# Patient Record
Sex: MALE | Race: WHITE | Employment: FULL TIME | URBAN - METROPOLITAN AREA
[De-identification: names, ages, dates, MRNs, and addresses within clinical notes are randomized per-mention and may not be internally consistent; named-entity substitution may affect disease eponyms.]

---

## 2017-04-25 RX ORDER — INSULIN DETEMIR 100 [IU]/ML
INJECTION, SOLUTION SUBCUTANEOUS
Qty: 15 ML | Refills: 3 | Status: SHIPPED | OUTPATIENT
Start: 2017-04-25 | End: 2017-05-05 | Stop reason: SDUPTHER

## 2017-04-25 RX ORDER — INSULIN DETEMIR 100 [IU]/ML
INJECTION, SOLUTION SUBCUTANEOUS
Qty: 15 ML | Refills: 3 | Status: SHIPPED | OUTPATIENT
Start: 2017-04-25 | End: 2017-05-05 | Stop reason: ALTCHOICE

## 2017-04-26 ENCOUNTER — TELEPHONE (OUTPATIENT)
Dept: INTERNAL MEDICINE CLINIC | Age: 37
End: 2017-04-26

## 2017-04-27 LAB
AVERAGE GLUCOSE: NORMAL
HBA1C MFR BLD: 18.1 %

## 2017-04-28 ENCOUNTER — TELEPHONE (OUTPATIENT)
Dept: INTERNAL MEDICINE CLINIC | Age: 37
End: 2017-04-28

## 2017-05-02 ENCOUNTER — CARE COORDINATION (OUTPATIENT)
Dept: CARE COORDINATION | Age: 37
End: 2017-05-02

## 2017-05-02 ENCOUNTER — TELEPHONE (OUTPATIENT)
Dept: INTERNAL MEDICINE CLINIC | Age: 37
End: 2017-05-02

## 2017-05-05 ENCOUNTER — OFFICE VISIT (OUTPATIENT)
Dept: INTERNAL MEDICINE CLINIC | Age: 37
End: 2017-05-05
Payer: COMMERCIAL

## 2017-05-05 ENCOUNTER — HOSPITAL ENCOUNTER (OUTPATIENT)
Age: 37
Setting detail: SPECIMEN
Discharge: HOME | End: 2017-05-05
Payer: COMMERCIAL

## 2017-05-05 VITALS
WEIGHT: 174 LBS | DIASTOLIC BLOOD PRESSURE: 68 MMHG | BODY MASS INDEX: 30.83 KG/M2 | HEART RATE: 95 BPM | SYSTOLIC BLOOD PRESSURE: 102 MMHG | HEIGHT: 63 IN

## 2017-05-05 DIAGNOSIS — N18.4 CKD (CHRONIC KIDNEY DISEASE) STAGE 4, GFR 15-29 ML/MIN (HCC): ICD-10-CM

## 2017-05-05 DIAGNOSIS — Z13.9 SCREENING: ICD-10-CM

## 2017-05-05 DIAGNOSIS — I10 BENIGN ESSENTIAL HTN: ICD-10-CM

## 2017-05-05 LAB
CREATININE URINE: 42.3 MG/DL (ref 39–259)
HBA1C MFR BLD: 14 %
MICROALBUMIN/CREAT 24H UR: 116 MG/L
MICROALBUMIN/CREAT UR-RTO: 274 MCG/MG CREAT

## 2017-05-05 PROCEDURE — 83036 HEMOGLOBIN GLYCOSYLATED A1C: CPT | Performed by: INTERNAL MEDICINE

## 2017-05-05 PROCEDURE — 99213 OFFICE O/P EST LOW 20 MIN: CPT | Performed by: INTERNAL MEDICINE

## 2017-05-05 RX ORDER — SODIUM BICARBONATE 650 MG/1
648 TABLET ORAL
COMMUNITY
Start: 2017-04-28 | End: 2017-05-28

## 2017-05-05 RX ORDER — PANTOPRAZOLE SODIUM 40 MG/1
40 TABLET, DELAYED RELEASE ORAL
COMMUNITY
Start: 2017-04-28 | End: 2017-05-28

## 2017-05-05 RX ORDER — DOXYCYCLINE HYCLATE 100 MG
100 TABLET ORAL
COMMUNITY
Start: 2017-04-28 | End: 2017-05-07

## 2017-05-05 ASSESSMENT — PATIENT HEALTH QUESTIONNAIRE - PHQ9
SUM OF ALL RESPONSES TO PHQ9 QUESTIONS 1 & 2: 0
SUM OF ALL RESPONSES TO PHQ QUESTIONS 1-9: 0
1. LITTLE INTEREST OR PLEASURE IN DOING THINGS: 0
2. FEELING DOWN, DEPRESSED OR HOPELESS: 0

## 2017-05-08 ASSESSMENT — ENCOUNTER SYMPTOMS
BACK PAIN: 0
CHOKING: 0
APNEA: 0
CONSTIPATION: 0
ANAL BLEEDING: 0
ABDOMINAL DISTENTION: 0
ABDOMINAL PAIN: 0
COUGH: 0
EYE ITCHING: 0
EYE DISCHARGE: 0
BLOOD IN STOOL: 0
SHORTNESS OF BREATH: 0
EYE PAIN: 0
EYE REDNESS: 0
CHEST TIGHTNESS: 0

## 2017-05-26 ENCOUNTER — OFFICE VISIT (OUTPATIENT)
Dept: INTERNAL MEDICINE CLINIC | Age: 37
End: 2017-05-26
Payer: COMMERCIAL

## 2017-05-26 VITALS
HEIGHT: 63 IN | DIASTOLIC BLOOD PRESSURE: 74 MMHG | WEIGHT: 180 LBS | BODY MASS INDEX: 31.89 KG/M2 | HEART RATE: 94 BPM | SYSTOLIC BLOOD PRESSURE: 122 MMHG

## 2017-05-26 DIAGNOSIS — I10 BENIGN ESSENTIAL HTN: Primary | ICD-10-CM

## 2017-05-26 DIAGNOSIS — N18.4 CKD (CHRONIC KIDNEY DISEASE) STAGE 4, GFR 15-29 ML/MIN (HCC): ICD-10-CM

## 2017-05-26 PROCEDURE — 99214 OFFICE O/P EST MOD 30 MIN: CPT | Performed by: INTERNAL MEDICINE

## 2017-06-04 ASSESSMENT — ENCOUNTER SYMPTOMS
SHORTNESS OF BREATH: 0
COLOR CHANGE: 0
CONSTIPATION: 0
EYE ITCHING: 0
EYE REDNESS: 0
BLOOD IN STOOL: 0
APNEA: 0
ABDOMINAL PAIN: 0
ANAL BLEEDING: 0
BACK PAIN: 0
ABDOMINAL DISTENTION: 0
CHEST TIGHTNESS: 0
EYE PAIN: 0
EYE DISCHARGE: 0
COUGH: 0
CHOKING: 0

## 2017-06-09 ENCOUNTER — TELEPHONE (OUTPATIENT)
Dept: INTERNAL MEDICINE CLINIC | Age: 37
End: 2017-06-09

## 2017-07-28 ENCOUNTER — TELEPHONE (OUTPATIENT)
Dept: INTERNAL MEDICINE CLINIC | Age: 37
End: 2017-07-28

## 2017-08-01 RX ORDER — LOSARTAN POTASSIUM 50 MG/1
TABLET ORAL
Qty: 90 TABLET | Refills: 2 | Status: SHIPPED | OUTPATIENT
Start: 2017-08-01 | End: 2018-05-15 | Stop reason: SDUPTHER

## 2017-09-07 ENCOUNTER — TELEPHONE (OUTPATIENT)
Dept: INTERNAL MEDICINE CLINIC | Age: 37
End: 2017-09-07

## 2018-05-15 RX ORDER — LOSARTAN POTASSIUM 50 MG/1
TABLET ORAL
Qty: 30 TABLET | Refills: 0 | Status: SHIPPED | OUTPATIENT
Start: 2018-05-15 | End: 2018-05-15 | Stop reason: SDUPTHER

## 2018-05-16 RX ORDER — LOSARTAN POTASSIUM 50 MG/1
TABLET ORAL
Qty: 15 TABLET | Refills: 0 | Status: SHIPPED | OUTPATIENT
Start: 2018-05-16

## 2018-07-09 RX ORDER — INSULIN GLARGINE 100 [IU]/ML
INJECTION, SOLUTION SUBCUTANEOUS
Qty: 15 ML | Refills: 0 | Status: SHIPPED | OUTPATIENT
Start: 2018-07-09

## 2024-02-21 ENCOUNTER — APPOINTMENT (OUTPATIENT)
Dept: GENERAL RADIOLOGY | Age: 44
DRG: 710 | End: 2024-02-21
Payer: MEDICAID

## 2024-02-21 ENCOUNTER — HOSPITAL ENCOUNTER (INPATIENT)
Age: 44
LOS: 14 days | Discharge: NURSING, REHAB OR LONG-TERM CARE | DRG: 710 | End: 2024-03-06
Attending: EMERGENCY MEDICINE | Admitting: INTERNAL MEDICINE
Payer: MEDICAID

## 2024-02-21 ENCOUNTER — APPOINTMENT (OUTPATIENT)
Dept: CT IMAGING | Age: 44
DRG: 710 | End: 2024-02-21
Payer: MEDICAID

## 2024-02-21 ENCOUNTER — HOSPITAL ENCOUNTER (EMERGENCY)
Age: 44
Discharge: ANOTHER HOSPITAL | DRG: 710 | End: 2024-02-21
Attending: EMERGENCY MEDICINE
Payer: MEDICAID

## 2024-02-21 VITALS
SYSTOLIC BLOOD PRESSURE: 97 MMHG | WEIGHT: 120 LBS | TEMPERATURE: 97.1 F | RESPIRATION RATE: 20 BRPM | BODY MASS INDEX: 21.26 KG/M2 | DIASTOLIC BLOOD PRESSURE: 64 MMHG | OXYGEN SATURATION: 100 % | HEART RATE: 116 BPM

## 2024-02-21 DIAGNOSIS — N49.3 FOURNIER GANGRENE: ICD-10-CM

## 2024-02-21 DIAGNOSIS — R79.89 ELEVATED TROPONIN: Primary | ICD-10-CM

## 2024-02-21 DIAGNOSIS — M72.6 NECROTIZING FASCIITIS (HCC): ICD-10-CM

## 2024-02-21 DIAGNOSIS — T14.8XXA OPEN WOUND: Primary | ICD-10-CM

## 2024-02-21 DIAGNOSIS — R06.02 SHORTNESS OF BREATH: ICD-10-CM

## 2024-02-21 DIAGNOSIS — N17.9 AKI (ACUTE KIDNEY INJURY) (HCC): ICD-10-CM

## 2024-02-21 DIAGNOSIS — M79.89 NECROTIZING SOFT TISSUE INFECTION: ICD-10-CM

## 2024-02-21 LAB
ABSOLUTE BANDS: 7.34 K/UL (ref 0–1)
ALBUMIN SERPL-MCNC: 2.5 G/DL (ref 3.5–5.2)
ALBUMIN/GLOB SERPL: 0.7 {RATIO} (ref 1–2.5)
ALP SERPL-CCNC: 104 U/L (ref 40–129)
ALT SERPL-CCNC: <5 U/L (ref 5–41)
ANION GAP SERPL CALCULATED.3IONS-SCNC: 24 MMOL/L (ref 9–17)
ANION GAP SERPL CALCULATED.3IONS-SCNC: 34 MMOL/L (ref 9–17)
AST SERPL-CCNC: 6 U/L
BACTERIA URNS QL MICRO: ABNORMAL
BANDS: 29 % (ref 0–10)
BASOPHILS # BLD: 0 K/UL (ref 0–0.2)
BASOPHILS # BLD: 0.04 K/UL (ref 0–0.2)
BASOPHILS NFR BLD: 0 % (ref 0–2)
BASOPHILS NFR BLD: 0 % (ref 0–2)
BILIRUB SERPL-MCNC: <0.1 MG/DL (ref 0.3–1.2)
BILIRUB UR QL STRIP: NEGATIVE
BUN SERPL-MCNC: 109 MG/DL (ref 6–20)
BUN SERPL-MCNC: 125 MG/DL (ref 6–20)
CALCIUM SERPL-MCNC: 8.1 MG/DL (ref 8.6–10.4)
CALCIUM SERPL-MCNC: 9.4 MG/DL (ref 8.6–10.4)
CASTS #/AREA URNS LPF: ABNORMAL /LPF
CHLORIDE SERPL-SCNC: 103 MMOL/L (ref 98–107)
CHLORIDE SERPL-SCNC: 92 MMOL/L (ref 98–107)
CLARITY UR: CLEAR
CO2 SERPL-SCNC: 7 MMOL/L (ref 20–31)
CO2 SERPL-SCNC: 8 MMOL/L (ref 20–31)
COLOR UR: YELLOW
CREAT SERPL-MCNC: 6.8 MG/DL (ref 0.7–1.2)
CREAT SERPL-MCNC: 8.2 MG/DL (ref 0.7–1.2)
EOSINOPHIL # BLD: 0 K/UL (ref 0–0.4)
EOSINOPHIL # BLD: <0.03 K/UL (ref 0–0.44)
EOSINOPHILS RELATIVE PERCENT: 0 % (ref 0–4)
EOSINOPHILS RELATIVE PERCENT: 0 % (ref 1–4)
EPI CELLS #/AREA URNS HPF: ABNORMAL /HPF
ERYTHROCYTE [DISTWIDTH] IN BLOOD BY AUTOMATED COUNT: 17.8 % (ref 11.8–14.4)
ERYTHROCYTE [DISTWIDTH] IN BLOOD BY AUTOMATED COUNT: 18.6 % (ref 11.5–14.9)
GFR SERPL CREATININE-BSD FRML MDRD: 10 ML/MIN/1.73M2
GFR SERPL CREATININE-BSD FRML MDRD: 8 ML/MIN/1.73M2
GLUCOSE SERPL-MCNC: 270 MG/DL (ref 70–99)
GLUCOSE SERPL-MCNC: 352 MG/DL (ref 70–99)
GLUCOSE UR STRIP-MCNC: ABNORMAL MG/DL
HCT VFR BLD AUTO: 26.4 % (ref 40.7–50.3)
HCT VFR BLD AUTO: 31.1 % (ref 41–53)
HGB BLD-MCNC: 8 G/DL (ref 13–17)
HGB BLD-MCNC: 9.4 G/DL (ref 13.5–17.5)
HGB UR QL STRIP.AUTO: ABNORMAL
IMM GRANULOCYTES # BLD AUTO: 0.5 K/UL (ref 0–0.3)
IMM GRANULOCYTES NFR BLD: 3 %
INR PPP: 1.4
INR PPP: 1.4
KETONES UR STRIP-MCNC: ABNORMAL MG/DL
LACTATE BLDV-SCNC: 2.2 MMOL/L (ref 0.5–1.9)
LACTATE BLDV-SCNC: 4.4 MMOL/L (ref 0.5–1.9)
LACTIC ACID, WHOLE BLOOD: 1 MMOL/L (ref 0.7–2.1)
LEUKOCYTE ESTERASE UR QL STRIP: NEGATIVE
LYMPHOCYTES NFR BLD: 0.95 K/UL (ref 1.1–3.7)
LYMPHOCYTES NFR BLD: 1.27 K/UL (ref 1–4.8)
LYMPHOCYTES RELATIVE PERCENT: 5 % (ref 24–44)
LYMPHOCYTES RELATIVE PERCENT: 6 % (ref 24–43)
MAGNESIUM SERPL-MCNC: 1.6 MG/DL (ref 1.6–2.6)
MAGNESIUM SERPL-MCNC: 1.8 MG/DL (ref 1.6–2.6)
MCH RBC QN AUTO: 24.8 PG (ref 26–34)
MCH RBC QN AUTO: 25.6 PG (ref 25.2–33.5)
MCHC RBC AUTO-ENTMCNC: 30.2 G/DL (ref 31–37)
MCHC RBC AUTO-ENTMCNC: 30.3 G/DL (ref 28.4–34.8)
MCV RBC AUTO: 81.8 FL (ref 80–100)
MCV RBC AUTO: 84.3 FL (ref 82.6–102.9)
MONOCYTES NFR BLD: 0.74 K/UL (ref 0.1–1.2)
MONOCYTES NFR BLD: 1.01 K/UL (ref 0.1–1.3)
MONOCYTES NFR BLD: 4 % (ref 1–7)
MONOCYTES NFR BLD: 4 % (ref 3–12)
MORPHOLOGY: ABNORMAL
MORPHOLOGY: ABNORMAL
NEUTROPHILS NFR BLD: 62 % (ref 36–66)
NEUTROPHILS NFR BLD: 87 % (ref 36–65)
NEUTS SEG NFR BLD: 14.63 K/UL (ref 1.5–8.1)
NEUTS SEG NFR BLD: 15.68 K/UL (ref 1.3–9.1)
NITRITE UR QL STRIP: NEGATIVE
NRBC BLD-RTO: 0 PER 100 WBC
PARTIAL THROMBOPLASTIN TIME: 25.7 SEC (ref 23–36.5)
PARTIAL THROMBOPLASTIN TIME: 26.4 SEC (ref 24–36)
PH UR STRIP: 5 [PH] (ref 5–8)
PHOSPHATE SERPL-MCNC: 8.3 MG/DL (ref 2.5–4.5)
PLATELET # BLD AUTO: 376 K/UL (ref 138–453)
PLATELET # BLD AUTO: 624 K/UL (ref 150–450)
PMV BLD AUTO: 7.5 FL (ref 6–12)
PMV BLD AUTO: 9.3 FL (ref 8.1–13.5)
POTASSIUM SERPL-SCNC: 4.7 MMOL/L (ref 3.7–5.3)
POTASSIUM SERPL-SCNC: 4.8 MMOL/L (ref 3.7–5.3)
PROT SERPL-MCNC: 5.9 G/DL (ref 6.4–8.3)
PROT UR STRIP-MCNC: ABNORMAL MG/DL
PROTHROMBIN TIME: 16.4 SEC (ref 11.7–14.9)
PROTHROMBIN TIME: 17.3 SEC (ref 11.8–14.6)
RBC # BLD AUTO: 3.13 M/UL (ref 4.21–5.77)
RBC # BLD AUTO: 3.8 M/UL (ref 4.5–5.9)
RBC # BLD: ABNORMAL 10*6/UL
RBC #/AREA URNS HPF: ABNORMAL /HPF
SODIUM SERPL-SCNC: 133 MMOL/L (ref 135–144)
SODIUM SERPL-SCNC: 135 MMOL/L (ref 135–144)
SP GR UR STRIP: 1.01 (ref 1–1.03)
TROPONIN I SERPL HS-MCNC: 34 NG/L (ref 0–22)
TROPONIN I SERPL HS-MCNC: 40 NG/L (ref 0–22)
UROBILINOGEN UR STRIP-ACNC: NORMAL EU/DL (ref 0–1)
WBC #/AREA URNS HPF: ABNORMAL /HPF
WBC OTHER # BLD: 16.9 K/UL (ref 3.5–11.3)
WBC OTHER # BLD: 25.3 K/UL (ref 3.5–11)

## 2024-02-21 PROCEDURE — 86403 PARTICLE AGGLUT ANTBDY SCRN: CPT

## 2024-02-21 PROCEDURE — 80202 ASSAY OF VANCOMYCIN: CPT

## 2024-02-21 PROCEDURE — 87040 BLOOD CULTURE FOR BACTERIA: CPT

## 2024-02-21 PROCEDURE — 83605 ASSAY OF LACTIC ACID: CPT

## 2024-02-21 PROCEDURE — 85025 COMPLETE CBC W/AUTO DIFF WBC: CPT

## 2024-02-21 PROCEDURE — 80053 COMPREHEN METABOLIC PANEL: CPT

## 2024-02-21 PROCEDURE — 87186 SC STD MICRODIL/AGAR DIL: CPT

## 2024-02-21 PROCEDURE — 6360000002 HC RX W HCPCS: Performed by: EMERGENCY MEDICINE

## 2024-02-21 PROCEDURE — 84484 ASSAY OF TROPONIN QUANT: CPT

## 2024-02-21 PROCEDURE — 71045 X-RAY EXAM CHEST 1 VIEW: CPT

## 2024-02-21 PROCEDURE — 36415 COLL VENOUS BLD VENIPUNCTURE: CPT

## 2024-02-21 PROCEDURE — 85610 PROTHROMBIN TIME: CPT

## 2024-02-21 PROCEDURE — 81001 URINALYSIS AUTO W/SCOPE: CPT

## 2024-02-21 PROCEDURE — 96361 HYDRATE IV INFUSION ADD-ON: CPT | Performed by: EMERGENCY MEDICINE

## 2024-02-21 PROCEDURE — 74176 CT ABD & PELVIS W/O CONTRAST: CPT

## 2024-02-21 PROCEDURE — 96368 THER/DIAG CONCURRENT INF: CPT | Performed by: EMERGENCY MEDICINE

## 2024-02-21 PROCEDURE — 82010 KETONE BODYS QUAN: CPT

## 2024-02-21 PROCEDURE — 2580000003 HC RX 258: Performed by: EMERGENCY MEDICINE

## 2024-02-21 PROCEDURE — 84100 ASSAY OF PHOSPHORUS: CPT

## 2024-02-21 PROCEDURE — 85730 THROMBOPLASTIN TIME PARTIAL: CPT

## 2024-02-21 PROCEDURE — 99285 EMERGENCY DEPT VISIT HI MDM: CPT

## 2024-02-21 PROCEDURE — 2000000000 HC ICU R&B

## 2024-02-21 PROCEDURE — 87205 SMEAR GRAM STAIN: CPT

## 2024-02-21 PROCEDURE — 96365 THER/PROPH/DIAG IV INF INIT: CPT | Performed by: EMERGENCY MEDICINE

## 2024-02-21 PROCEDURE — 96366 THER/PROPH/DIAG IV INF ADDON: CPT | Performed by: EMERGENCY MEDICINE

## 2024-02-21 PROCEDURE — 80048 BASIC METABOLIC PNL TOTAL CA: CPT

## 2024-02-21 PROCEDURE — 96367 TX/PROPH/DG ADDL SEQ IV INF: CPT | Performed by: EMERGENCY MEDICINE

## 2024-02-21 PROCEDURE — 93005 ELECTROCARDIOGRAM TRACING: CPT | Performed by: EMERGENCY MEDICINE

## 2024-02-21 PROCEDURE — 83735 ASSAY OF MAGNESIUM: CPT

## 2024-02-21 PROCEDURE — 87070 CULTURE OTHR SPECIMN AEROBIC: CPT

## 2024-02-21 PROCEDURE — 99285 EMERGENCY DEPT VISIT HI MDM: CPT | Performed by: EMERGENCY MEDICINE

## 2024-02-21 RX ORDER — SODIUM CHLORIDE 9 MG/ML
INJECTION, SOLUTION INTRAVENOUS PRN
Status: DISCONTINUED | OUTPATIENT
Start: 2024-02-21 | End: 2024-02-21 | Stop reason: HOSPADM

## 2024-02-21 RX ORDER — SODIUM CHLORIDE 0.9 % (FLUSH) 0.9 %
5-40 SYRINGE (ML) INJECTION EVERY 12 HOURS SCHEDULED
Status: DISCONTINUED | OUTPATIENT
Start: 2024-02-21 | End: 2024-02-21 | Stop reason: HOSPADM

## 2024-02-21 RX ORDER — SODIUM CHLORIDE 9 MG/ML
INJECTION, SOLUTION INTRAVENOUS CONTINUOUS
Status: DISCONTINUED | OUTPATIENT
Start: 2024-02-21 | End: 2024-02-21 | Stop reason: HOSPADM

## 2024-02-21 RX ORDER — SODIUM CHLORIDE 0.9 % (FLUSH) 0.9 %
5-40 SYRINGE (ML) INJECTION PRN
Status: DISCONTINUED | OUTPATIENT
Start: 2024-02-21 | End: 2024-02-21 | Stop reason: HOSPADM

## 2024-02-21 RX ORDER — 0.9 % SODIUM CHLORIDE 0.9 %
2000 INTRAVENOUS SOLUTION INTRAVENOUS ONCE
Status: COMPLETED | OUTPATIENT
Start: 2024-02-21 | End: 2024-02-21

## 2024-02-21 RX ORDER — CLINDAMYCIN PHOSPHATE 900 MG/50ML
900 INJECTION, SOLUTION INTRAVENOUS ONCE
Status: COMPLETED | OUTPATIENT
Start: 2024-02-21 | End: 2024-02-21

## 2024-02-21 RX ADMIN — CEFEPIME 2000 MG: 2 INJECTION, POWDER, FOR SOLUTION INTRAVENOUS at 17:28

## 2024-02-21 RX ADMIN — CLINDAMYCIN IN 5 PERCENT DEXTROSE 900 MG: 18 INJECTION, SOLUTION INTRAVENOUS at 18:11

## 2024-02-21 RX ADMIN — VANCOMYCIN HYDROCHLORIDE 1250 MG: 1.25 INJECTION, POWDER, LYOPHILIZED, FOR SOLUTION INTRAVENOUS at 19:33

## 2024-02-21 RX ADMIN — SODIUM CHLORIDE 2000 ML: 9 INJECTION, SOLUTION INTRAVENOUS at 19:34

## 2024-02-21 RX ADMIN — SODIUM CHLORIDE: 9 INJECTION, SOLUTION INTRAVENOUS at 20:40

## 2024-02-21 ASSESSMENT — ENCOUNTER SYMPTOMS
PHOTOPHOBIA: 0
COLOR CHANGE: 0
ABDOMINAL PAIN: 0
TROUBLE SWALLOWING: 0
VOMITING: 0
COUGH: 0
SHORTNESS OF BREATH: 1
DIARRHEA: 0
NAUSEA: 0

## 2024-02-21 ASSESSMENT — PAIN - FUNCTIONAL ASSESSMENT
PAIN_FUNCTIONAL_ASSESSMENT: 0-10
PAIN_FUNCTIONAL_ASSESSMENT: NONE - DENIES PAIN

## 2024-02-21 ASSESSMENT — LIFESTYLE VARIABLES
HOW MANY STANDARD DRINKS CONTAINING ALCOHOL DO YOU HAVE ON A TYPICAL DAY: PATIENT DOES NOT DRINK
HOW OFTEN DO YOU HAVE A DRINK CONTAINING ALCOHOL: NEVER

## 2024-02-21 ASSESSMENT — PAIN SCALES - GENERAL: PAINLEVEL_OUTOF10: 0

## 2024-02-21 NOTE — ED PROVIDER NOTES
EMERGENCY DEPARTMENT ENCOUNTER    Pt Name: Jules Nichols  MRN: [id number]  Birthdate 1980  Date of evaluation: 2/21/24  CHIEF COMPLAINT       Chief Complaint   Patient presents with    Wound Infection     HISTORY OF PRESENT ILLNESS   43-year-old male presenting to the ER complaining of shortness of breath as well as bilateral axillary worsening wounds.    The history is provided by the patient.   Rash  Location:  Shoulder/arm  Shoulder/arm rash location:  L axilla and R axilla  Quality: blistering, peeling, redness, scaling and weeping    Associated symptoms: fatigue and shortness of breath    Associated symptoms: no abdominal pain, no diarrhea, no fever, no joint pain, no myalgias, no nausea and not vomiting            REVIEW OF SYSTEMS     Review of Systems   Constitutional:  Positive for fatigue. Negative for activity change and fever.   HENT:  Negative for congestion, ear pain and trouble swallowing.    Eyes:  Negative for photophobia and visual disturbance.   Respiratory:  Positive for shortness of breath. Negative for cough.    Cardiovascular:  Negative for chest pain and palpitations.   Gastrointestinal:  Negative for abdominal pain, diarrhea, nausea and vomiting.   Genitourinary:  Negative for dysuria, flank pain and urgency.   Musculoskeletal:  Negative for arthralgias and myalgias.   Skin:  Positive for rash and wound. Negative for color change.   Neurological:  Negative for dizziness and facial asymmetry.   Psychiatric/Behavioral:  Negative for agitation and behavioral problems.      PASTMEDICAL HISTORY     Past Medical History:   Diagnosis Date    Benign essential HTN     Chronic kidney disease 1/20/16    Dr Stafford    Diabetic keto-acidosis (HCC) 1/20/16    hx of     Hyperlipemia     Uncontrolled type 2 diabetes mellitus with nephropathy 1/20/16     Past Problem List  Patient Active Problem List   Diagnosis Code    Benign essential HTN I10    Uncontrolled type 2 diabetes mellitus with nephropathy [id number]

## 2024-02-21 NOTE — PROGRESS NOTES
Pharmacy Note  Vancomycin Consult (Dialysis patient) - Initial Note       TODAY'S DATE:  2/21/2024  Patient name, age:  Jules Nichols, 43 y.o.    Vancomycin order/consult received from: Dr. Blas.  Indication: SSTI, MRSA suspected.  Additional antimicrobials:  cefepime, clindamycin      Allergies:  Patient has no known allergies.   Actual Weight:    Wt Readings from Last 1 Encounters:   02/21/24 54.4 kg (120 lb)     Labs/Ancillary Data  Recent Labs     02/21/24  1655   *     CrCl cannot be calculated (Unknown ideal weight.).  Recent Labs     02/21/24  1655   CREATININE 8.2*     Recent Labs     02/21/24  1655   WBC 25.3*     No results found for: \"PROCAL\"  No intake or output data in the 24 hours ending 02/21/24 1840  Temp: 97.1 F (36.2 C)       Recent vancomycin administrations        No vancomycin IV orders with administrations found.            Orders not given:            vancomycin (VANCOCIN) intermittent dosing (placeholder)    vancomycin (VANCOCIN) 1,250 mg in sodium chloride 0.9 % 250 mL IVPB ([id number])                  Culture Date / Source  /  Results  Pending     PLAN    Vancomycin level ordered for: 2/22/24 @ 0600.  2.   ONLY for suspected pneumonia or COPD: MRSA Nasal Swab: N/A. Non-respiratory infection..      Stable HD/PD patients do not require regular renal function monitoring.      Intermittent Hemodialysis (HD)*do not use Bayesian software for dosing  Empiric Dosing Regimen:  LD = 25 mg/kg (MAX 2,000mg dose), give dialysis, then         MD  = 10-15 mg/kg after HD sessions (MAX 2,000mg per dose)  Timing and Frequency of Concentration Monitoring  Critically ill à pre-HD concentrations after LD  Stable/stable dialysis à pre-HD concentration after 1st or 2nd MD on dialysis 3x/week  Pre-dialysis level Invasive MRSA Infection or Sepsis Non-Invasive Infection or Non-MRSA Infection   ? 25 mg/L Hold vancomycin dose, ? subsequent dose by 250 mg Hold vancomycin dose, ? subsequent dose by 250-500 mg

## 2024-02-22 ENCOUNTER — APPOINTMENT (OUTPATIENT)
Dept: GENERAL RADIOLOGY | Age: 44
DRG: 710 | End: 2024-02-22
Payer: MEDICAID

## 2024-02-22 ENCOUNTER — ANESTHESIA EVENT (OUTPATIENT)
Dept: OPERATING ROOM | Age: 44
End: 2024-02-22
Payer: MEDICAID

## 2024-02-22 ENCOUNTER — ANESTHESIA (OUTPATIENT)
Dept: OPERATING ROOM | Age: 44
End: 2024-02-22
Payer: MEDICAID

## 2024-02-22 PROBLEM — D64.9 ACUTE ON CHRONIC ANEMIA: Status: ACTIVE | Noted: 2024-02-22

## 2024-02-22 PROBLEM — R65.20 SEVERE SEPSIS (HCC): Status: ACTIVE | Noted: 2024-02-22

## 2024-02-22 PROBLEM — N18.9 ACUTE KIDNEY INJURY SUPERIMPOSED ON CKD (HCC): Status: ACTIVE | Noted: 2024-02-22

## 2024-02-22 PROBLEM — A41.9 SEVERE SEPSIS (HCC): Status: ACTIVE | Noted: 2024-02-22

## 2024-02-22 PROBLEM — M72.6 NECROTIZING FASCIITIS (HCC): Status: ACTIVE | Noted: 2024-02-22

## 2024-02-22 PROBLEM — R65.21 SEPTIC SHOCK (HCC): Status: ACTIVE | Noted: 2024-02-22

## 2024-02-22 PROBLEM — E44.0 MODERATE MALNUTRITION (HCC): Status: ACTIVE | Noted: 2024-02-22

## 2024-02-22 PROBLEM — N17.9 ACUTE KIDNEY INJURY SUPERIMPOSED ON CKD (HCC): Status: ACTIVE | Noted: 2024-02-22

## 2024-02-22 PROBLEM — L73.2 HIDRADENITIS SUPPURATIVA OF MULTIPLE SITES: Status: ACTIVE | Noted: 2024-02-22

## 2024-02-22 PROBLEM — E87.20 METABOLIC ACIDOSIS: Status: ACTIVE | Noted: 2024-02-22

## 2024-02-22 PROBLEM — E87.5 HYPERKALEMIA: Status: ACTIVE | Noted: 2024-02-22

## 2024-02-22 PROBLEM — A41.9 SEPTIC SHOCK (HCC): Status: ACTIVE | Noted: 2024-02-22

## 2024-02-22 PROBLEM — D72.825 BANDEMIA: Status: ACTIVE | Noted: 2024-02-22

## 2024-02-22 LAB
ALLEN TEST: POSITIVE
ANION GAP SERPL CALCULATED.3IONS-SCNC: 16 MMOL/L (ref 9–17)
ANION GAP SERPL CALCULATED.3IONS-SCNC: 21 MMOL/L (ref 9–17)
ANION GAP SERPL CALCULATED.3IONS-SCNC: 26 MMOL/L (ref 9–17)
B-OH-BUTYR SERPL-MCNC: 6.01 MMOL/L (ref 0.02–0.27)
BASOPHILS # BLD: 0 K/UL (ref 0–0.2)
BASOPHILS NFR BLD: 0 % (ref 0–2)
BUN SERPL-MCNC: 101 MG/DL (ref 6–20)
BUN SERPL-MCNC: 89 MG/DL (ref 6–20)
BUN SERPL-MCNC: 99 MG/DL (ref 6–20)
C3 SERPL-MCNC: 82 MG/DL (ref 90–180)
C4 SERPL-MCNC: 15 MG/DL (ref 10–40)
CA-I BLD-SCNC: 1.19 MMOL/L (ref 1.13–1.33)
CALCIUM SERPL-MCNC: 7.3 MG/DL (ref 8.6–10.4)
CALCIUM SERPL-MCNC: 7.9 MG/DL (ref 8.6–10.4)
CALCIUM SERPL-MCNC: 8.4 MG/DL (ref 8.6–10.4)
CHLORIDE SERPL-SCNC: 104 MMOL/L (ref 98–107)
CHLORIDE SERPL-SCNC: 106 MMOL/L (ref 98–107)
CHLORIDE SERPL-SCNC: 111 MMOL/L (ref 98–107)
CO2 SERPL-SCNC: 11 MMOL/L (ref 20–31)
CO2 SERPL-SCNC: 17 MMOL/L (ref 20–31)
CO2 SERPL-SCNC: 6 MMOL/L (ref 20–31)
CREAT SERPL-MCNC: 5.5 MG/DL (ref 0.7–1.2)
CREAT SERPL-MCNC: 6.1 MG/DL (ref 0.7–1.2)
CREAT SERPL-MCNC: 6.2 MG/DL (ref 0.7–1.2)
EKG ATRIAL RATE: 120 BPM
EKG P AXIS: 35 DEGREES
EKG P-R INTERVAL: 80 MS
EKG Q-T INTERVAL: 366 MS
EKG QRS DURATION: 96 MS
EKG QTC CALCULATION (BAZETT): 517 MS
EKG R AXIS: 52 DEGREES
EKG T AXIS: 54 DEGREES
EKG VENTRICULAR RATE: 120 BPM
EOSINOPHIL # BLD: 0 K/UL (ref 0–0.4)
EOSINOPHIL,URINE: NORMAL
EOSINOPHILS RELATIVE PERCENT: 0 % (ref 1–4)
ERYTHROCYTE [DISTWIDTH] IN BLOOD BY AUTOMATED COUNT: 18.5 % (ref 11.8–14.4)
EST. AVERAGE GLUCOSE BLD GHB EST-MCNC: 246 MG/DL
FIO2: 21
FREE KAPPA/LAMBDA RATIO: 0.8 (ref 0.26–1.65)
GFR SERPL CREATININE-BSD FRML MDRD: 11 ML/MIN/1.73M2
GFR SERPL CREATININE-BSD FRML MDRD: 11 ML/MIN/1.73M2
GFR SERPL CREATININE-BSD FRML MDRD: 12 ML/MIN/1.73M2
GLUCOSE BLD-MCNC: 110 MG/DL (ref 75–110)
GLUCOSE BLD-MCNC: 111 MG/DL (ref 75–110)
GLUCOSE BLD-MCNC: 120 MG/DL (ref 75–110)
GLUCOSE BLD-MCNC: 143 MG/DL (ref 75–110)
GLUCOSE BLD-MCNC: 145 MG/DL (ref 75–110)
GLUCOSE BLD-MCNC: 148 MG/DL (ref 75–110)
GLUCOSE BLD-MCNC: 149 MG/DL (ref 75–110)
GLUCOSE BLD-MCNC: 154 MG/DL (ref 75–110)
GLUCOSE BLD-MCNC: 163 MG/DL (ref 75–110)
GLUCOSE BLD-MCNC: 173 MG/DL (ref 75–110)
GLUCOSE BLD-MCNC: 197 MG/DL (ref 75–110)
GLUCOSE BLD-MCNC: 200 MG/DL (ref 75–110)
GLUCOSE BLD-MCNC: 209 MG/DL (ref 75–110)
GLUCOSE BLD-MCNC: 212 MG/DL (ref 75–110)
GLUCOSE BLD-MCNC: 212 MG/DL (ref 75–110)
GLUCOSE BLD-MCNC: 214 MG/DL (ref 75–110)
GLUCOSE BLD-MCNC: 223 MG/DL (ref 75–110)
GLUCOSE BLD-MCNC: 228 MG/DL (ref 74–100)
GLUCOSE BLD-MCNC: 233 MG/DL (ref 75–110)
GLUCOSE BLD-MCNC: 241 MG/DL (ref 75–110)
GLUCOSE SERPL-MCNC: 111 MG/DL (ref 70–99)
GLUCOSE SERPL-MCNC: 162 MG/DL (ref 70–99)
GLUCOSE SERPL-MCNC: 236 MG/DL (ref 70–99)
HBA1C MFR BLD: 10.2 % (ref 4–6)
HCT VFR BLD AUTO: 37.1 % (ref 40.7–50.3)
HGB BLD-MCNC: 10.4 G/DL (ref 13–17)
IMM GRANULOCYTES # BLD AUTO: 0.47 K/UL (ref 0–0.3)
IMM GRANULOCYTES NFR BLD: 2 %
KAPPA LC FREE SER-MCNC: 103.1 MG/L (ref 3.7–19.4)
LAMBDA LC FREE SERPL-MCNC: 128.8 MG/L (ref 5.7–26.3)
LYMPHOCYTES NFR BLD: 1.17 K/UL (ref 1–4.8)
LYMPHOCYTES RELATIVE PERCENT: 5 % (ref 24–44)
MAGNESIUM SERPL-MCNC: 1.2 MG/DL (ref 1.6–2.6)
MAGNESIUM SERPL-MCNC: 1.2 MG/DL (ref 1.6–2.6)
MAGNESIUM SERPL-MCNC: 1.8 MG/DL (ref 1.6–2.6)
MCH RBC QN AUTO: 25.7 PG (ref 25.2–33.5)
MCHC RBC AUTO-ENTMCNC: 28 G/DL (ref 28.4–34.8)
MCV RBC AUTO: 91.8 FL (ref 82.6–102.9)
MONOCYTES NFR BLD: 0.7 K/UL (ref 0.1–0.8)
MONOCYTES NFR BLD: 3 % (ref 1–7)
MORPHOLOGY: ABNORMAL
MRSA, DNA, NASAL: NEGATIVE
NEGATIVE BASE EXCESS, ART: 20 MMOL/L (ref 0–2)
NEUTROPHILS NFR BLD: 90 % (ref 36–66)
NEUTS SEG NFR BLD: 20.96 K/UL (ref 1.8–7.7)
NRBC BLD-RTO: 0 PER 100 WBC
O2 DELIVERY DEVICE: ABNORMAL
PHOSPHATE SERPL-MCNC: 5.1 MG/DL (ref 2.5–4.5)
PHOSPHATE SERPL-MCNC: 6.2 MG/DL (ref 2.5–4.5)
PHOSPHATE SERPL-MCNC: 8.1 MG/DL (ref 2.5–4.5)
PLATELET # BLD AUTO: 393 K/UL (ref 138–453)
PMV BLD AUTO: 9.5 FL (ref 8.1–13.5)
POC HCO3: 6.6 MMOL/L (ref 21–28)
POC O2 SATURATION: 98.5 % (ref 94–98)
POC PCO2: 18 MM HG (ref 35–48)
POC PH: 7.17 (ref 7.35–7.45)
POC PO2: 138.8 MM HG (ref 83–108)
POTASSIUM SERPL-SCNC: 3.5 MMOL/L (ref 3.7–5.3)
POTASSIUM SERPL-SCNC: 5.1 MMOL/L (ref 3.7–5.3)
POTASSIUM SERPL-SCNC: 5.1 MMOL/L (ref 3.7–5.3)
RBC # BLD AUTO: 4.04 M/UL (ref 4.21–5.77)
SAMPLE SITE: ABNORMAL
SODIUM SERPL-SCNC: 137 MMOL/L (ref 135–144)
SODIUM SERPL-SCNC: 138 MMOL/L (ref 135–144)
SODIUM SERPL-SCNC: 143 MMOL/L (ref 135–144)
SPECIMEN DESCRIPTION: NORMAL
VANCOMYCIN SERPL-MCNC: 36.7 UG/ML
WBC OTHER # BLD: 23.3 K/UL (ref 3.5–11.3)

## 2024-02-22 PROCEDURE — 86403 PARTICLE AGGLUT ANTBDY SCRN: CPT

## 2024-02-22 PROCEDURE — 6360000002 HC RX W HCPCS: Performed by: INTERNAL MEDICINE

## 2024-02-22 PROCEDURE — 86900 BLOOD TYPING SEROLOGIC ABO: CPT

## 2024-02-22 PROCEDURE — 2580000003 HC RX 258: Performed by: SPECIALIST

## 2024-02-22 PROCEDURE — 6360000002 HC RX W HCPCS: Performed by: ANESTHESIOLOGY

## 2024-02-22 PROCEDURE — 3600000015 HC SURGERY LEVEL 5 ADDTL 15MIN: Performed by: SURGERY

## 2024-02-22 PROCEDURE — 86850 RBC ANTIBODY SCREEN: CPT

## 2024-02-22 PROCEDURE — 11042 DBRDMT SUBQ TIS 1ST 20SQCM/<: CPT | Performed by: SURGERY

## 2024-02-22 PROCEDURE — P9041 ALBUMIN (HUMAN),5%, 50ML: HCPCS | Performed by: SPECIALIST

## 2024-02-22 PROCEDURE — 71045 X-RAY EXAM CHEST 1 VIEW: CPT

## 2024-02-22 PROCEDURE — 87102 FUNGUS ISOLATION CULTURE: CPT

## 2024-02-22 PROCEDURE — 2580000003 HC RX 258: Performed by: INTERNAL MEDICINE

## 2024-02-22 PROCEDURE — 2500000003 HC RX 250 WO HCPCS: Performed by: SPECIALIST

## 2024-02-22 PROCEDURE — 30243N1 TRANSFUSION OF NONAUTOLOGOUS RED BLOOD CELLS INTO CENTRAL VEIN, PERCUTANEOUS APPROACH: ICD-10-PCS | Performed by: INTERNAL MEDICINE

## 2024-02-22 PROCEDURE — 99222 1ST HOSP IP/OBS MODERATE 55: CPT | Performed by: INTERNAL MEDICINE

## 2024-02-22 PROCEDURE — 7100000000 HC PACU RECOVERY - FIRST 15 MIN: Performed by: SURGERY

## 2024-02-22 PROCEDURE — 2580000003 HC RX 258: Performed by: STUDENT IN AN ORGANIZED HEALTH CARE EDUCATION/TRAINING PROGRAM

## 2024-02-22 PROCEDURE — 84300 ASSAY OF URINE SODIUM: CPT

## 2024-02-22 PROCEDURE — P9016 RBC LEUKOCYTES REDUCED: HCPCS

## 2024-02-22 PROCEDURE — 90935 HEMODIALYSIS ONE EVALUATION: CPT

## 2024-02-22 PROCEDURE — 87206 SMEAR FLUORESCENT/ACID STAI: CPT

## 2024-02-22 PROCEDURE — 87077 CULTURE AEROBIC IDENTIFY: CPT

## 2024-02-22 PROCEDURE — 36556 INSERT NON-TUNNEL CV CATH: CPT | Performed by: INTERNAL MEDICINE

## 2024-02-22 PROCEDURE — 99291 CRITICAL CARE FIRST HOUR: CPT | Performed by: INTERNAL MEDICINE

## 2024-02-22 PROCEDURE — 0JB60ZZ EXCISION OF CHEST SUBCUTANEOUS TISSUE AND FASCIA, OPEN APPROACH: ICD-10-PCS | Performed by: SURGERY

## 2024-02-22 PROCEDURE — 11045 DBRDMT SUBQ TISS EACH ADDL: CPT | Performed by: SURGERY

## 2024-02-22 PROCEDURE — 36415 COLL VENOUS BLD VENIPUNCTURE: CPT

## 2024-02-22 PROCEDURE — 99214 OFFICE O/P EST MOD 30 MIN: CPT

## 2024-02-22 PROCEDURE — 86901 BLOOD TYPING SEROLOGIC RH(D): CPT

## 2024-02-22 PROCEDURE — 86920 COMPATIBILITY TEST SPIN: CPT

## 2024-02-22 PROCEDURE — 84100 ASSAY OF PHOSPHORUS: CPT

## 2024-02-22 PROCEDURE — 6360000002 HC RX W HCPCS: Performed by: STUDENT IN AN ORGANIZED HEALTH CARE EDUCATION/TRAINING PROGRAM

## 2024-02-22 PROCEDURE — 6370000000 HC RX 637 (ALT 250 FOR IP): Performed by: STUDENT IN AN ORGANIZED HEALTH CARE EDUCATION/TRAINING PROGRAM

## 2024-02-22 PROCEDURE — 85025 COMPLETE CBC W/AUTO DIFF WBC: CPT

## 2024-02-22 PROCEDURE — 2500000003 HC RX 250 WO HCPCS: Performed by: INTERNAL MEDICINE

## 2024-02-22 PROCEDURE — 6360000002 HC RX W HCPCS: Performed by: SPECIALIST

## 2024-02-22 PROCEDURE — 82803 BLOOD GASES ANY COMBINATION: CPT

## 2024-02-22 PROCEDURE — 36556 INSERT NON-TUNNEL CV CATH: CPT

## 2024-02-22 PROCEDURE — 2580000003 HC RX 258: Performed by: SURGERY

## 2024-02-22 PROCEDURE — 11046 DBRDMT MUSC&/FSCA EA ADDL: CPT | Performed by: SURGERY

## 2024-02-22 PROCEDURE — 87075 CULTR BACTERIA EXCEPT BLOOD: CPT

## 2024-02-22 PROCEDURE — 6360000002 HC RX W HCPCS

## 2024-02-22 PROCEDURE — 36600 WITHDRAWAL OF ARTERIAL BLOOD: CPT

## 2024-02-22 PROCEDURE — 87641 MR-STAPH DNA AMP PROBE: CPT

## 2024-02-22 PROCEDURE — 93010 ELECTROCARDIOGRAM REPORT: CPT | Performed by: INTERNAL MEDICINE

## 2024-02-22 PROCEDURE — 2000000000 HC ICU R&B

## 2024-02-22 PROCEDURE — 7100000001 HC PACU RECOVERY - ADDTL 15 MIN: Performed by: SURGERY

## 2024-02-22 PROCEDURE — 6370000000 HC RX 637 (ALT 250 FOR IP): Performed by: EMERGENCY MEDICINE

## 2024-02-22 PROCEDURE — 87205 SMEAR GRAM STAIN: CPT

## 2024-02-22 PROCEDURE — 3E043XZ INTRODUCTION OF VASOPRESSOR INTO CENTRAL VEIN, PERCUTANEOUS APPROACH: ICD-10-PCS | Performed by: INTERNAL MEDICINE

## 2024-02-22 PROCEDURE — 2709999900 HC NON-CHARGEABLE SUPPLY: Performed by: SURGERY

## 2024-02-22 PROCEDURE — 83036 HEMOGLOBIN GLYCOSYLATED A1C: CPT

## 2024-02-22 PROCEDURE — A4216 STERILE WATER/SALINE, 10 ML: HCPCS

## 2024-02-22 PROCEDURE — 02HV33Z INSERTION OF INFUSION DEVICE INTO SUPERIOR VENA CAVA, PERCUTANEOUS APPROACH: ICD-10-PCS | Performed by: INTERNAL MEDICINE

## 2024-02-22 PROCEDURE — 3700000001 HC ADD 15 MINUTES (ANESTHESIA): Performed by: SURGERY

## 2024-02-22 PROCEDURE — 87186 SC STD MICRODIL/AGAR DIL: CPT

## 2024-02-22 PROCEDURE — 3700000000 HC ANESTHESIA ATTENDED CARE: Performed by: SURGERY

## 2024-02-22 PROCEDURE — 80048 BASIC METABOLIC PNL TOTAL CA: CPT

## 2024-02-22 PROCEDURE — 83735 ASSAY OF MAGNESIUM: CPT

## 2024-02-22 PROCEDURE — G0108 DIAB MANAGE TRN  PER INDIV: HCPCS

## 2024-02-22 PROCEDURE — C9113 INJ PANTOPRAZOLE SODIUM, VIA: HCPCS

## 2024-02-22 PROCEDURE — 84165 PROTEIN E-PHORESIS SERUM: CPT

## 2024-02-22 PROCEDURE — 87106 FUNGI IDENTIFICATION YEAST: CPT

## 2024-02-22 PROCEDURE — 87070 CULTURE OTHR SPECIMN AEROBIC: CPT

## 2024-02-22 PROCEDURE — 0J980ZZ DRAINAGE OF ABDOMEN SUBCUTANEOUS TISSUE AND FASCIA, OPEN APPROACH: ICD-10-PCS | Performed by: SURGERY

## 2024-02-22 PROCEDURE — 99223 1ST HOSP IP/OBS HIGH 75: CPT | Performed by: SURGERY

## 2024-02-22 PROCEDURE — 2580000003 HC RX 258

## 2024-02-22 PROCEDURE — 0KBH0ZZ EXCISION OF RIGHT THORAX MUSCLE, OPEN APPROACH: ICD-10-PCS | Performed by: SURGERY

## 2024-02-22 PROCEDURE — 99255 IP/OBS CONSLTJ NEW/EST HI 80: CPT | Performed by: INTERNAL MEDICINE

## 2024-02-22 PROCEDURE — 5A1D70Z PERFORMANCE OF URINARY FILTRATION, INTERMITTENT, LESS THAN 6 HOURS PER DAY: ICD-10-PCS | Performed by: INTERNAL MEDICINE

## 2024-02-22 PROCEDURE — 82570 ASSAY OF URINE CREATININE: CPT

## 2024-02-22 PROCEDURE — 86334 IMMUNOFIX E-PHORESIS SERUM: CPT

## 2024-02-22 PROCEDURE — 6360000002 HC RX W HCPCS: Performed by: EMERGENCY MEDICINE

## 2024-02-22 PROCEDURE — 82947 ASSAY GLUCOSE BLOOD QUANT: CPT

## 2024-02-22 PROCEDURE — 86160 COMPLEMENT ANTIGEN: CPT

## 2024-02-22 PROCEDURE — 2580000003 HC RX 258: Performed by: EMERGENCY MEDICINE

## 2024-02-22 PROCEDURE — 86038 ANTINUCLEAR ANTIBODIES: CPT

## 2024-02-22 PROCEDURE — 84155 ASSAY OF PROTEIN SERUM: CPT

## 2024-02-22 PROCEDURE — 87086 URINE CULTURE/COLONY COUNT: CPT

## 2024-02-22 PROCEDURE — 82330 ASSAY OF CALCIUM: CPT

## 2024-02-22 PROCEDURE — 99254 IP/OBS CNSLTJ NEW/EST MOD 60: CPT | Performed by: INTERNAL MEDICINE

## 2024-02-22 PROCEDURE — 3600000005 HC SURGERY LEVEL 5 BASE: Performed by: SURGERY

## 2024-02-22 PROCEDURE — 86225 DNA ANTIBODY NATIVE: CPT

## 2024-02-22 PROCEDURE — 83521 IG LIGHT CHAINS FREE EACH: CPT

## 2024-02-22 PROCEDURE — 0JB80ZZ EXCISION OF ABDOMEN SUBCUTANEOUS TISSUE AND FASCIA, OPEN APPROACH: ICD-10-PCS | Performed by: SURGERY

## 2024-02-22 PROCEDURE — 11043 DBRDMT MUSC&/FSCA 1ST 20/<: CPT | Performed by: SURGERY

## 2024-02-22 RX ORDER — MAGNESIUM SULFATE IN WATER 40 MG/ML
2000 INJECTION, SOLUTION INTRAVENOUS PRN
Status: DISCONTINUED | OUTPATIENT
Start: 2024-02-22 | End: 2024-02-22

## 2024-02-22 RX ORDER — SODIUM CHLORIDE 0.9 % (FLUSH) 0.9 %
5-40 SYRINGE (ML) INJECTION PRN
Status: DISCONTINUED | OUTPATIENT
Start: 2024-02-22 | End: 2024-02-22

## 2024-02-22 RX ORDER — HEPARIN SODIUM 1000 [USP'U]/ML
1500 INJECTION, SOLUTION INTRAVENOUS; SUBCUTANEOUS ONCE
Status: COMPLETED | OUTPATIENT
Start: 2024-02-22 | End: 2024-02-22

## 2024-02-22 RX ORDER — LINEZOLID 2 MG/ML
600 INJECTION, SOLUTION INTRAVENOUS EVERY 12 HOURS
Status: DISCONTINUED | OUTPATIENT
Start: 2024-02-22 | End: 2024-02-24

## 2024-02-22 RX ORDER — POLYETHYLENE GLYCOL 3350 17 G/17G
17 POWDER, FOR SOLUTION ORAL DAILY PRN
Status: DISCONTINUED | OUTPATIENT
Start: 2024-02-22 | End: 2024-03-06 | Stop reason: HOSPADM

## 2024-02-22 RX ORDER — OXYCODONE HYDROCHLORIDE 5 MG/1
5 TABLET ORAL PRN
Status: DISCONTINUED | OUTPATIENT
Start: 2024-02-22 | End: 2024-02-22

## 2024-02-22 RX ORDER — CLINDAMYCIN PHOSPHATE 600 MG/50ML
600 INJECTION, SOLUTION INTRAVENOUS EVERY 8 HOURS
Status: DISCONTINUED | OUTPATIENT
Start: 2024-02-22 | End: 2024-02-22

## 2024-02-22 RX ORDER — SODIUM CHLORIDE 9 MG/ML
INJECTION, SOLUTION INTRAVENOUS PRN
Status: DISCONTINUED | OUTPATIENT
Start: 2024-02-22 | End: 2024-02-22

## 2024-02-22 RX ORDER — LIDOCAINE HYDROCHLORIDE 10 MG/ML
INJECTION, SOLUTION EPIDURAL; INFILTRATION; INTRACAUDAL; PERINEURAL PRN
Status: DISCONTINUED | OUTPATIENT
Start: 2024-02-22 | End: 2024-02-22 | Stop reason: SDUPTHER

## 2024-02-22 RX ORDER — PHENYLEPHRINE HCL IN 0.9% NACL 50MG/250ML
10-300 PLASTIC BAG, INJECTION (ML) INTRAVENOUS CONTINUOUS
Status: DISCONTINUED | OUTPATIENT
Start: 2024-02-22 | End: 2024-02-26

## 2024-02-22 RX ORDER — ONDANSETRON 2 MG/ML
4 INJECTION INTRAMUSCULAR; INTRAVENOUS EVERY 6 HOURS PRN
Status: DISCONTINUED | OUTPATIENT
Start: 2024-02-22 | End: 2024-03-06 | Stop reason: HOSPADM

## 2024-02-22 RX ORDER — ENOXAPARIN SODIUM 100 MG/ML
40 INJECTION SUBCUTANEOUS DAILY
Status: DISCONTINUED | OUTPATIENT
Start: 2024-02-22 | End: 2024-02-22

## 2024-02-22 RX ORDER — SODIUM CHLORIDE 0.9 % (FLUSH) 0.9 %
5-40 SYRINGE (ML) INJECTION PRN
Status: DISCONTINUED | OUTPATIENT
Start: 2024-02-22 | End: 2024-03-06 | Stop reason: HOSPADM

## 2024-02-22 RX ORDER — SODIUM CHLORIDE 0.9 % (FLUSH) 0.9 %
5-40 SYRINGE (ML) INJECTION EVERY 12 HOURS SCHEDULED
Status: DISCONTINUED | OUTPATIENT
Start: 2024-02-22 | End: 2024-03-06 | Stop reason: HOSPADM

## 2024-02-22 RX ORDER — FENTANYL CITRATE 50 UG/ML
INJECTION, SOLUTION INTRAMUSCULAR; INTRAVENOUS PRN
Status: DISCONTINUED | OUTPATIENT
Start: 2024-02-22 | End: 2024-02-22 | Stop reason: SDUPTHER

## 2024-02-22 RX ORDER — FENTANYL CITRATE 50 UG/ML
INJECTION, SOLUTION INTRAMUSCULAR; INTRAVENOUS
Status: COMPLETED
Start: 2024-02-22 | End: 2024-02-22

## 2024-02-22 RX ORDER — PROPOFOL 10 MG/ML
INJECTION, EMULSION INTRAVENOUS PRN
Status: DISCONTINUED | OUTPATIENT
Start: 2024-02-22 | End: 2024-02-22 | Stop reason: SDUPTHER

## 2024-02-22 RX ORDER — LABETALOL HYDROCHLORIDE 5 MG/ML
10 INJECTION, SOLUTION INTRAVENOUS
Status: DISCONTINUED | OUTPATIENT
Start: 2024-02-22 | End: 2024-02-22

## 2024-02-22 RX ORDER — ONDANSETRON 2 MG/ML
INJECTION INTRAMUSCULAR; INTRAVENOUS PRN
Status: DISCONTINUED | OUTPATIENT
Start: 2024-02-22 | End: 2024-02-22 | Stop reason: SDUPTHER

## 2024-02-22 RX ORDER — ROCURONIUM BROMIDE 10 MG/ML
INJECTION, SOLUTION INTRAVENOUS PRN
Status: DISCONTINUED | OUTPATIENT
Start: 2024-02-22 | End: 2024-02-22 | Stop reason: SDUPTHER

## 2024-02-22 RX ORDER — DIPHENHYDRAMINE HYDROCHLORIDE 50 MG/ML
12.5 INJECTION INTRAMUSCULAR; INTRAVENOUS
Status: DISCONTINUED | OUTPATIENT
Start: 2024-02-22 | End: 2024-02-22

## 2024-02-22 RX ORDER — POTASSIUM CHLORIDE 29.8 MG/ML
20 INJECTION INTRAVENOUS PRN
Status: DISCONTINUED | OUTPATIENT
Start: 2024-02-22 | End: 2024-02-22

## 2024-02-22 RX ORDER — POTASSIUM CHLORIDE 7.45 MG/ML
10 INJECTION INTRAVENOUS PRN
Status: DISCONTINUED | OUTPATIENT
Start: 2024-02-22 | End: 2024-02-26

## 2024-02-22 RX ORDER — SODIUM CHLORIDE 0.9 % (FLUSH) 0.9 %
5-40 SYRINGE (ML) INJECTION EVERY 12 HOURS SCHEDULED
Status: DISCONTINUED | OUTPATIENT
Start: 2024-02-22 | End: 2024-02-22

## 2024-02-22 RX ORDER — OXYCODONE HYDROCHLORIDE 5 MG/1
10 TABLET ORAL PRN
Status: DISCONTINUED | OUTPATIENT
Start: 2024-02-22 | End: 2024-02-22

## 2024-02-22 RX ORDER — ACETAMINOPHEN 325 MG/1
650 TABLET ORAL EVERY 6 HOURS PRN
Status: DISCONTINUED | OUTPATIENT
Start: 2024-02-22 | End: 2024-03-06 | Stop reason: HOSPADM

## 2024-02-22 RX ORDER — ONDANSETRON 4 MG/1
4 TABLET, ORALLY DISINTEGRATING ORAL EVERY 8 HOURS PRN
Status: DISCONTINUED | OUTPATIENT
Start: 2024-02-22 | End: 2024-03-06 | Stop reason: HOSPADM

## 2024-02-22 RX ORDER — POTASSIUM CHLORIDE 7.45 MG/ML
10 INJECTION INTRAVENOUS PRN
Status: DISCONTINUED | OUTPATIENT
Start: 2024-02-22 | End: 2024-02-22

## 2024-02-22 RX ORDER — MIDAZOLAM HYDROCHLORIDE 2 MG/2ML
2 INJECTION, SOLUTION INTRAMUSCULAR; INTRAVENOUS
Status: DISCONTINUED | OUTPATIENT
Start: 2024-02-22 | End: 2024-02-22

## 2024-02-22 RX ORDER — MIDAZOLAM HYDROCHLORIDE 1 MG/ML
INJECTION INTRAMUSCULAR; INTRAVENOUS PRN
Status: DISCONTINUED | OUTPATIENT
Start: 2024-02-22 | End: 2024-02-22 | Stop reason: SDUPTHER

## 2024-02-22 RX ORDER — CLINDAMYCIN PHOSPHATE 900 MG/50ML
900 INJECTION, SOLUTION INTRAVENOUS
Status: COMPLETED | OUTPATIENT
Start: 2024-02-22 | End: 2024-02-22

## 2024-02-22 RX ORDER — MAGNESIUM HYDROXIDE 1200 MG/15ML
LIQUID ORAL CONTINUOUS PRN
Status: COMPLETED | OUTPATIENT
Start: 2024-02-22 | End: 2024-02-22

## 2024-02-22 RX ORDER — FENTANYL CITRATE 50 UG/ML
25 INJECTION, SOLUTION INTRAMUSCULAR; INTRAVENOUS ONCE
Status: COMPLETED | OUTPATIENT
Start: 2024-02-22 | End: 2024-02-22

## 2024-02-22 RX ORDER — SODIUM CHLORIDE, SODIUM LACTATE, POTASSIUM CHLORIDE, CALCIUM CHLORIDE 600; 310; 30; 20 MG/100ML; MG/100ML; MG/100ML; MG/100ML
INJECTION, SOLUTION INTRAVENOUS CONTINUOUS PRN
Status: DISCONTINUED | OUTPATIENT
Start: 2024-02-22 | End: 2024-02-22 | Stop reason: SDUPTHER

## 2024-02-22 RX ORDER — SODIUM CHLORIDE 450 MG/100ML
INJECTION, SOLUTION INTRAVENOUS CONTINUOUS
Status: DISCONTINUED | OUTPATIENT
Start: 2024-02-22 | End: 2024-02-23

## 2024-02-22 RX ORDER — HEPARIN SODIUM 1000 [USP'U]/ML
1000 INJECTION, SOLUTION INTRAVENOUS; SUBCUTANEOUS ONCE
Status: DISCONTINUED | OUTPATIENT
Start: 2024-02-22 | End: 2024-02-22

## 2024-02-22 RX ORDER — HYDRALAZINE HYDROCHLORIDE 20 MG/ML
10 INJECTION INTRAMUSCULAR; INTRAVENOUS
Status: DISCONTINUED | OUTPATIENT
Start: 2024-02-22 | End: 2024-02-22

## 2024-02-22 RX ORDER — ACETAMINOPHEN 650 MG/1
650 SUPPOSITORY RECTAL EVERY 6 HOURS PRN
Status: DISCONTINUED | OUTPATIENT
Start: 2024-02-22 | End: 2024-03-06 | Stop reason: HOSPADM

## 2024-02-22 RX ORDER — MAGNESIUM SULFATE IN WATER 40 MG/ML
2000 INJECTION, SOLUTION INTRAVENOUS PRN
Status: DISCONTINUED | OUTPATIENT
Start: 2024-02-22 | End: 2024-02-26

## 2024-02-22 RX ORDER — ALBUMIN, HUMAN INJ 5% 5 %
SOLUTION INTRAVENOUS PRN
Status: DISCONTINUED | OUTPATIENT
Start: 2024-02-22 | End: 2024-02-22 | Stop reason: SDUPTHER

## 2024-02-22 RX ORDER — HEPARIN SODIUM 1000 [USP'U]/ML
1500 INJECTION, SOLUTION INTRAVENOUS; SUBCUTANEOUS PRN
Status: DISCONTINUED | OUTPATIENT
Start: 2024-02-22 | End: 2024-03-06 | Stop reason: HOSPADM

## 2024-02-22 RX ORDER — HEPARIN SODIUM 1000 [USP'U]/ML
1400 INJECTION, SOLUTION INTRAVENOUS; SUBCUTANEOUS PRN
Status: DISCONTINUED | OUTPATIENT
Start: 2024-02-22 | End: 2024-03-06 | Stop reason: HOSPADM

## 2024-02-22 RX ORDER — PROCHLORPERAZINE EDISYLATE 5 MG/ML
5 INJECTION INTRAMUSCULAR; INTRAVENOUS
Status: DISCONTINUED | OUTPATIENT
Start: 2024-02-22 | End: 2024-02-22

## 2024-02-22 RX ORDER — DROPERIDOL 2.5 MG/ML
0.62 INJECTION, SOLUTION INTRAMUSCULAR; INTRAVENOUS
Status: DISCONTINUED | OUTPATIENT
Start: 2024-02-22 | End: 2024-02-22

## 2024-02-22 RX ORDER — DEXTROSE AND SODIUM CHLORIDE 5; .45 G/100ML; G/100ML
INJECTION, SOLUTION INTRAVENOUS CONTINUOUS PRN
Status: DISCONTINUED | OUTPATIENT
Start: 2024-02-22 | End: 2024-02-23

## 2024-02-22 RX ORDER — HEPARIN SODIUM 5000 [USP'U]/ML
5000 INJECTION, SOLUTION INTRAVENOUS; SUBCUTANEOUS EVERY 8 HOURS SCHEDULED
Status: DISCONTINUED | OUTPATIENT
Start: 2024-02-22 | End: 2024-03-06 | Stop reason: HOSPADM

## 2024-02-22 RX ORDER — HEPARIN SODIUM 1000 [USP'U]/ML
1400 INJECTION, SOLUTION INTRAVENOUS; SUBCUTANEOUS PRN
Status: DISCONTINUED | OUTPATIENT
Start: 2024-02-22 | End: 2024-02-26 | Stop reason: SDUPTHER

## 2024-02-22 RX ORDER — FENTANYL CITRATE 50 UG/ML
25 INJECTION, SOLUTION INTRAMUSCULAR; INTRAVENOUS EVERY 5 MIN PRN
Status: DISCONTINUED | OUTPATIENT
Start: 2024-02-22 | End: 2024-02-22

## 2024-02-22 RX ORDER — SODIUM HYPOCHLORITE 1.25 MG/ML
SOLUTION TOPICAL 2 TIMES DAILY
Status: DISCONTINUED | OUTPATIENT
Start: 2024-02-22 | End: 2024-02-26

## 2024-02-22 RX ORDER — SODIUM CHLORIDE 9 MG/ML
INJECTION, SOLUTION INTRAVENOUS PRN
Status: DISCONTINUED | OUTPATIENT
Start: 2024-02-22 | End: 2024-03-06 | Stop reason: HOSPADM

## 2024-02-22 RX ADMIN — ONDANSETRON 4 MG: 2 INJECTION INTRAMUSCULAR; INTRAVENOUS at 22:57

## 2024-02-22 RX ADMIN — HYDROMORPHONE HYDROCHLORIDE 0.5 MG: 1 INJECTION, SOLUTION INTRAMUSCULAR; INTRAVENOUS; SUBCUTANEOUS at 03:15

## 2024-02-22 RX ADMIN — HEPARIN SODIUM 1400 UNITS: 1000 INJECTION INTRAVENOUS; SUBCUTANEOUS at 18:37

## 2024-02-22 RX ADMIN — SUGAMMADEX 200 MG: 100 INJECTION, SOLUTION INTRAVENOUS at 02:54

## 2024-02-22 RX ADMIN — HEPARIN SODIUM 1500 UNITS: 1000 INJECTION INTRAVENOUS; SUBCUTANEOUS at 18:38

## 2024-02-22 RX ADMIN — DAKIN'S SOLUTION 0.125% (QUARTER STRENGTH): 0.12 SOLUTION at 13:15

## 2024-02-22 RX ADMIN — SODIUM CHLORIDE, PRESERVATIVE FREE 10 ML: 5 INJECTION INTRAVENOUS at 20:50

## 2024-02-22 RX ADMIN — DEXTROSE AND SODIUM CHLORIDE: 5; 450 INJECTION, SOLUTION INTRAVENOUS at 21:19

## 2024-02-22 RX ADMIN — FENTANYL CITRATE 25 MCG: 50 INJECTION, SOLUTION INTRAMUSCULAR; INTRAVENOUS at 16:07

## 2024-02-22 RX ADMIN — PROPOFOL 100 MG: 10 INJECTION, EMULSION INTRAVENOUS at 00:47

## 2024-02-22 RX ADMIN — HEPARIN SODIUM 1400 UNITS: 1000 INJECTION INTRAVENOUS; SUBCUTANEOUS at 19:35

## 2024-02-22 RX ADMIN — HEPARIN SODIUM 5000 UNITS: 5000 INJECTION INTRAVENOUS; SUBCUTANEOUS at 05:56

## 2024-02-22 RX ADMIN — PHENYLEPHRINE HYDROCHLORIDE 100 MCG: 10 INJECTION INTRAVENOUS at 01:25

## 2024-02-22 RX ADMIN — SODIUM CHLORIDE 80 MG: 9 INJECTION, SOLUTION INTRAMUSCULAR; INTRAVENOUS; SUBCUTANEOUS at 10:20

## 2024-02-22 RX ADMIN — CLINDAMYCIN PHOSPHATE 900 MG: 900 INJECTION, SOLUTION INTRAVENOUS at 01:16

## 2024-02-22 RX ADMIN — SODIUM CHLORIDE, POTASSIUM CHLORIDE, SODIUM LACTATE AND CALCIUM CHLORIDE: 600; 310; 30; 20 INJECTION, SOLUTION INTRAVENOUS at 00:45

## 2024-02-22 RX ADMIN — DEXTROSE AND SODIUM CHLORIDE: 5; 450 INJECTION, SOLUTION INTRAVENOUS at 14:51

## 2024-02-22 RX ADMIN — PHENYLEPHRINE HYDROCHLORIDE 100 MCG: 10 INJECTION INTRAVENOUS at 00:49

## 2024-02-22 RX ADMIN — DEXTROSE AND SODIUM CHLORIDE: 5; 450 INJECTION, SOLUTION INTRAVENOUS at 05:20

## 2024-02-22 RX ADMIN — CLINDAMYCIN PHOSPHATE 900 MG: 900 INJECTION, SOLUTION INTRAVENOUS at 01:14

## 2024-02-22 RX ADMIN — FENTANYL CITRATE 50 MCG: 0.05 INJECTION, SOLUTION INTRAMUSCULAR; INTRAVENOUS at 03:05

## 2024-02-22 RX ADMIN — SODIUM BICARBONATE: 84 INJECTION, SOLUTION INTRAVENOUS at 12:22

## 2024-02-22 RX ADMIN — PHENYLEPHRINE HYDROCHLORIDE 100 MCG: 10 INJECTION INTRAVENOUS at 01:20

## 2024-02-22 RX ADMIN — SODIUM CHLORIDE, PRESERVATIVE FREE 10 ML: 5 INJECTION INTRAVENOUS at 08:14

## 2024-02-22 RX ADMIN — LIDOCAINE HYDROCHLORIDE 50 MG: 10 INJECTION, SOLUTION EPIDURAL; INFILTRATION; INTRACAUDAL; PERINEURAL at 00:47

## 2024-02-22 RX ADMIN — Medication 40 MCG/MIN: at 15:07

## 2024-02-22 RX ADMIN — ALBUMIN (HUMAN) 500 ML: 12.5 INJECTION, SOLUTION INTRAVENOUS at 01:09

## 2024-02-22 RX ADMIN — SODIUM CHLORIDE: 9 INJECTION, SOLUTION INTRAVENOUS at 05:28

## 2024-02-22 RX ADMIN — PHENYLEPHRINE HYDROCHLORIDE 50 MCG/MIN: 10 INJECTION INTRAVENOUS at 01:41

## 2024-02-22 RX ADMIN — ACETAMINOPHEN 650 MG: 325 TABLET ORAL at 19:04

## 2024-02-22 RX ADMIN — LINEZOLID 600 MG: 600 INJECTION, SOLUTION INTRAVENOUS at 21:24

## 2024-02-22 RX ADMIN — ROCURONIUM BROMIDE 50 MG: 10 INJECTION, SOLUTION INTRAVENOUS at 00:47

## 2024-02-22 RX ADMIN — Medication 50 MCG/MIN: at 05:59

## 2024-02-22 RX ADMIN — SODIUM BICARBONATE 50 MEQ: 84 INJECTION, SOLUTION INTRAVENOUS at 06:09

## 2024-02-22 RX ADMIN — PANTOPRAZOLE SODIUM 8 MG/HR: 40 INJECTION, POWDER, FOR SOLUTION INTRAVENOUS at 11:00

## 2024-02-22 RX ADMIN — PHENYLEPHRINE HYDROCHLORIDE 100 MCG: 10 INJECTION INTRAVENOUS at 01:03

## 2024-02-22 RX ADMIN — PIPERACILLIN AND TAZOBACTAM 3375 MG: 3; .375 INJECTION, POWDER, LYOPHILIZED, FOR SOLUTION INTRAVENOUS at 17:21

## 2024-02-22 RX ADMIN — MIDAZOLAM 2 MG: 1 INJECTION INTRAMUSCULAR; INTRAVENOUS at 00:45

## 2024-02-22 RX ADMIN — HEPARIN SODIUM 5000 UNITS: 5000 INJECTION INTRAVENOUS; SUBCUTANEOUS at 22:39

## 2024-02-22 RX ADMIN — SODIUM BICARBONATE: 84 INJECTION, SOLUTION INTRAVENOUS at 07:08

## 2024-02-22 RX ADMIN — PIPERACILLIN AND TAZOBACTAM 3375 MG: 3; .375 INJECTION, POWDER, LYOPHILIZED, FOR SOLUTION INTRAVENOUS at 05:56

## 2024-02-22 RX ADMIN — FENTANYL CITRATE 100 MCG: 0.05 INJECTION, SOLUTION INTRAMUSCULAR; INTRAVENOUS at 00:47

## 2024-02-22 RX ADMIN — SODIUM CHLORIDE 3.04 UNITS/HR: 9 INJECTION, SOLUTION INTRAVENOUS at 05:19

## 2024-02-22 RX ADMIN — HEPARIN SODIUM 1500 UNITS: 1000 INJECTION INTRAVENOUS; SUBCUTANEOUS at 19:36

## 2024-02-22 RX ADMIN — ONDANSETRON 4 MG: 2 INJECTION INTRAMUSCULAR; INTRAVENOUS at 02:20

## 2024-02-22 ASSESSMENT — PAIN SCALES - GENERAL
PAINLEVEL_OUTOF10: 0
PAINLEVEL_OUTOF10: 9
PAINLEVEL_OUTOF10: 3

## 2024-02-22 NOTE — ED NOTES
43-year-old male presenting with open wounds, tachycardic, borderline soft pressure, receiving fluids.  Predominantly concerned about open wound in the right axilla with exposure of muscle belly in the right arm and fascia overlying the ribs.  Has received Vanco cefepime and clindamycin.  Will need GEN surg, IM, and ID. Ground.    Accepted by Dr. John

## 2024-02-22 NOTE — CONSULTS
Nephrology Consult Note    Reason for Consult: Acute on chronic renal  Requesting Physician: Dr. Coley  Chief Complaint: Wounds in the groin, axillary area and shortness of breath    History Obtained From:  patient, electronic medical record    History of Present Illness:              This is a 43 y.o. male who presented to the hospital for evaluation of groin and axillary wounds with shortness of breath.  He tells me that he has a history of hidradenitis and previously has had a flareup of infections and requiring debridement as well.  Over the last 2 to 4 weeks he started noticing wounds that appeared in the groin and perineal area as well.  He has had about 10-year history of diabetes and takes Lantus at home.  He also has history of chronic hypertension and at 1 time he was on lisinopril but he was not taking that medication anymore.  He was evaluated at [facility] about a year ago in March 2, 2023 and had very extensive renal workup at that time his serum creatinine was running around 3.3-3.5 range.  His paraprotein labs were unremarkable, his complement levels were normal, his GFR based on 24-hour calculation was just around 20 mL/min.  Unfortunately he did not follow-up with any nephrology consultant following discharge from [facility] about a year or so ago.    In the ER he had CT imaging done which was worrisome for necrotizing fasciitis in the axillary and chest area but also involving scrotal area.  He was also having acute renal injury with a creatinine of 8.1.  He did receive a total of 3 L of fluid and was transferred over here.  Broad-spectrum antibiotics were started namely clindamycin cefepime and vancomycin.  He was evaluated urgently by surgery and he went for debridement through the night.    This morning his serum creatinine is 6.2, urea level is 101, potassium is 5.1 and bicarb is 11.  Through the night he was treated for DKA with insulin infusion and fluids.  He has also received    Recent Labs     02/21/24  1655 02/22/24  0417 02/22/24  0909   PHOS 8.3* 8.1* 6.2*     Magnesium:   Recent Labs     02/21/24  2249 02/22/24  0417 02/22/24  0909   MG 1.6 1.8 1.2*     Albumin:   Recent Labs     02/21/24  2249   LABALBU 2.5*         SPEP:   Lab Results   Component Value Date/Time    PROT 5.9 02/21/2024 10:49 PM     Urine Creatinine:    Lab Results   Component Value Date/Time    LABCREA 42.3 05/05/2017 08:14 PM       Urinalysis:  U/A:   Lab Results   Component Value Date/Time    NITRU NEGATIVE 02/21/2024 09:21 PM    COLORU Yellow 02/21/2024 09:21 PM    PHUR 5.0 02/21/2024 09:21 PM    WBCUA 0 TO 2 02/21/2024 09:21 PM    RBCUA 0 TO 2 02/21/2024 09:21 PM    BACTERIA None 02/21/2024 09:21 PM    SPECGRAV 1.011 02/21/2024 09:21 PM    LEUKOCYTESUR NEGATIVE 02/21/2024 09:21 PM    UROBILINOGEN Normal 02/21/2024 09:21 PM    BILIRUBINUR NEGATIVE 02/21/2024 09:21 PM    GLUCOSEU MOD 02/21/2024 09:21 PM    KETUA SMALL 02/21/2024 09:21 PM     CT scan of the chest, abdomen and pelvis     IMPRESSION:  1. Soft tissue irregularity and skin thickening in the bilateral inguinal  regions with small amount of subcutaneous air in the right inguinal region  tracking into the right perineum and small amount of air within the medial  right gluteal subcutaneous tissues.  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  2. Soft tissue wounds in the bilateral axillae, bilateral lateral chest walls  and right greater than left upper arms. There is a deep wound involving the  medial aspect of the image right upper arm. No appreciable extension to bone  or appreciable evidence of osteomyelitis.  3. Additional foci of subcutaneous air and abnormal soft tissue attenuation  within the midline anterior and left posterior paramedian chest wall and  right upper quadrant subcutaneous tissues, concerning for infection with  gas-forming organisms.  Associated skin thickening in the posterior chest  wall.  4. Wall thickening of

## 2024-02-22 NOTE — ANESTHESIA PRE PROCEDURE
Department of Anesthesiology  Preprocedure Note       Name:  Jules Nichols   Age:  43 y.o.  :  1980                                          MRN:  [id number]         Date:  2024      Surgeon: Surgeon(s):  Sandy Carmichael MD    Procedure: Procedure(s):  INCISION AND DRAINAGE OF AXILLAS, CHEST WALL AND GROIN (YELLOW FINS, LITHOTOMY, CYSTO TUBING, WINTER GREEN)    Medications prior to admission:   Prior to Admission medications    Medication Sig Start Date End Date Taking? Authorizing Provider   LANTUS SOLOSTAR 100 UNIT/ML injection pen ADMINISTER 18 UNITS UNDER THE SKIN EVERY NIGHT 18   Elvin Kelley MD   losartan (COZAAR) 50 MG tablet TAKE 1 TABLET BY MOUTH DAILY 18   Elvin Kelley MD   metFORMIN (GLUCOPHAGE) 1000 MG tablet Take 1 tablet by mouth 2 times daily (with meals) 18   Ty Tee MD   pantoprazole (PROTONIX) 40 MG tablet Take 40 mg by mouth 17  Provider, MD Flora   insulin glargine (LANTUS SOLOSTAR) 100 UNIT/ML injection pen Inject 25 Units into the skin nightly 17   Ty Tee MD   Blood Glucose Monitoring Suppl (NextBioULT BLOOD GLUCOSE) W/DEVICE KIT 1 kit by Does not apply route daily as needed 16   Elvin Kelley MD [facility] Ultra Thin Lancets MISC 1 each by Does not apply route 4 times daily (before meals and nightly) 16   Elvin Kelley MD   Glucose Blood (BLOOD GLUCOSE TEST STRIPS) STRP 1 each by Does not apply route 4 times daily (before meals and nightly) 16   Elvin Kelley MD   Alcohol Swabs 70 % PADS Inject 1 each into the skin 3 times daily as needed 16   Elvin Kelley MD   Insulin Pen Needle (B-D ULTRAFINE III SHORT PEN) 31G X 8 MM MISC Inject 1 each into the skin daily May substitute with any brand 16   Elvin Kelley MD   magnesium oxide (MAG-OX) 400 MG tablet Take 400 mg by mouth 2 times daily    ProviderFlora MD       Current medications:    Current

## 2024-02-22 NOTE — CONSULTS
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 We have been asked to see the patient in consultation by Ashkan Soares MD for concern for GI bleed      43-year-old male with a history of HTN, CKD stage IV, uncontrolled T2DM, axillary hidradenitis suppurativa, chronic wounds who presented to [facility] ED with reports of shortness of breath and worsening of axillary wounds over the past 6 months.  CT at outside facility showed necrotizing fasciitis in the axilla and chest as well as foreigners gangrene.  Patient was taken emergently to the OR for I&D of bilateral axilla and bilateral groins as well as I&D of abdominal wall abscess.   Hgb on admission was 9.4 g/dL and declined to 8.0 g/dL.  Hemoglobin this morning 10.4 g/dL.Patient has NG tube postoperatively.  Concern for GI bleed with coffee-ground content.    Patient seen and examined.  Patient denies any history of heartburn or reflux, difficulty or painful swallowing, nausea or vomiting.  No melena or hematochezia.  Reports recent NSAID use but nothing significant.  Denies any alcohol use.  No illicit drug use.  No prior EGD or colonoscopy      Previous GI history:     SUMMARY TABLE    Last EGD  - None  Last colonoscopy  - None  Primary GI physician  - None      OBJECTIVE:     PAST MEDICAL/SURGICAL HISTORY  Past Medical History:   Diagnosis Date    Benign essential HTN     Chronic kidney disease 16    Dr Stafford    Diabetic  chloride flush  5-40 mL IntraVENous 2 times per day    sodium hypochlorite   Irrigation BID    piperacillin-tazobactam  3,375 mg IntraVENous Q12H    vancomycin (VANCOCIN) intermittent dosing (placeholder)   Other RX Placeholder    heparin (porcine)  5,000 Units SubCUTAneous 3 times per day    pantoprazole  80 mg IntraVENous Once     Continuous Infusions:   sodium chloride Stopped (02/22/24 0556)    sodium chloride      dextrose 5 % and 0.45 % NaCl 150 mL/hr at 02/22/24 0727    insulin 1.2 Units/hr (02/22/24 1004)    Phenylephrine 50 mcg/min (02/22/24 0727)    sodium bicarbonate 150 mEq in sterile water 1,000 mL infusion 75 mL/hr at 02/22/24 0727    pantoprazole       PRN Meds:sodium chloride flush, sodium chloride, ondansetron **OR** ondansetron, polyethylene glycol, acetaminophen **OR** acetaminophen, dextrose bolus **OR** dextrose bolus, potassium chloride, magnesium sulfate, sodium phosphate 15 mmol in sodium chloride 0.9 % 250 mL IVPB, dextrose 5 % and 0.45 % NaCl    SOCIAL HISTORY:     Tobacco:   reports that he has never smoked. He has never used smokeless tobacco.  Alcohol:   reports no history of alcohol use.  Illicit drugs:  reports no history of drug use.    FAMILY HISTORY:     No family history on file.    REVIEW OF SYSTEMS:    Constitutional: No fever, no chills, no lethargy, no weakness.  HEENT:  No headache, otalgia, itchy eyes, nasal discharge or sore throat.  Cardiac:  No chest pain,  +dyspnea, no orthopnea or PND.  Chest:   No cough, phlegm or wheezing.  Abdomen:  No abdominal pain, nausea or vomiting.  Neuro:  No focal weakness, abnormal movements or seizure like activity.  Skin:   No rashes, no itching. + Worsening of wounds  :   No hematuria, no pyuria, no dysuria, no flank pain.  Extremities:  No swelling or joint pains.  ROS was otherwise negative except as mentioned in the [place].     PHYSICAL EXAM:    BP (!) 83/58   Pulse (!) 109   Temp 97.1 °F (36.2 °C) (Temporal)   Resp 13   Ht 1.651 m

## 2024-02-22 NOTE — ED PROVIDER NOTES
[facility]   Emergency Department  Emergency Medicine Attending Sign-out   Note started: 11:30 PM EST    Care of Jules Nichols was assumed from previous attending Dr. John at 11 PM and is being seen for Wound Infection (R. axilla, chest, L axilla, L neck)  .  The patient's initial evaluation and plan have been discussed with the prior provider who initially evaluated the patient.     Attestation  I was available and discussed any additional care issues that arose and coordinated the management plans with the resident(s) caring for the patient during my duty period. Any areas of disagreement with resident's documentation of care or procedures are noted on the chart. I was personally present for the key portions of any/all procedures, during my duty period. I have documented in the chart those procedures where I was not present during the key portions.     BRIEF PATIENT SUMMARY/MDM COURSE PER INITIAL PROVIDER:   RECENT VITALS:     Temp: 97.9 °F (36.6 °C),  Pulse: (!) 115, Respirations: 17, BP: 100/76, SpO2: 100 %    This patient is a 43 y.o. Male with extensive erosive hidradenitis supportiva.     DIAGNOSTICS/MEDICATIONS:     MEDICATIONS GIVEN:  ED Medication Orders (From admission, onward)      None            LABS    Labs Reviewed   CBC WITH AUTO DIFFERENTIAL - Abnormal; Notable for the following components:       Result Value    WBC 16.9 (*)     RBC 3.13 (*)     Hemoglobin 8.0 (*)     Hematocrit 26.4 (*)     RDW 17.8 (*)     Neutrophils % 87 (*)     Lymphocytes % 6 (*)     Eosinophils % 0 (*)     Immature Granulocytes 3 (*)     Neutrophils Absolute 14.63 (*)     Lymphocytes Absolute 0.95 (*)     Absolute Immature Granulocyte 0.50 (*)     All other components within normal limits   COMPREHENSIVE METABOLIC PANEL - Abnormal; Notable for the following components:    CO2 8 (*)     Anion Gap 24 (*)     Glucose 270 (*)      (*)     Creatinine 6.8 (*)     Est, Glom Filt Rate 10 (*)

## 2024-02-22 NOTE — PROGRESS NOTES
Pharmacist Review and Automatic Dose Adjustment of Prophylactic Enoxaparin    Reviewed reason(s) for admission/hospital problem list    The reviewing pharmacist has made an adjustment to the ordered enoxaparin dose or converted to UFH per the approved [facility] protocol and table as identified below.        Jules Nichols is a 43 y.o. male.     Recent Labs     02/21/24  1655 02/21/24  2249   CREATININE 8.2* 6.8*       CrCl cannot be calculated (Unknown ideal weight.).    Recent Labs     02/21/24  1655 02/21/24  2249   HGB 9.4* 8.0*   HCT 31.1* 26.4*   * 376     Recent Labs     02/21/24  1655 02/21/24  2249   INR 1.4 1.4       Height:   Ht Readings from Last 1 Encounters:   05/26/17 1.6 m (5' 2.99\")     Weight:  Wt Readings from Last 1 Encounters:   02/21/24 54.4 kg (120 lb)               Plan: Based upon the patient's weight and renal function    Ordered: Enoxaparin 40mg SUBQ Daily    Changed/converted to    New Order: Heparin 5,000 units SUBQ TID      Thank you,  Howard Godwin, [facility]  2/22/2024, 4:05 AM

## 2024-02-22 NOTE — CARE COORDINATION
Case Management Assessment  Initial Evaluation    Date/Time of Evaluation: 2/22/2024 11:55 AM  Assessment Completed by: LILIYA BARRON RN    If patient is discharged prior to next notation, then this note serves as note for discharge by case management.    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 Yes    Chart Reviewed: Yes      History Provided by: Patient  Patient Orientation: Alert and Oriented    Patient Cognition: Alert    Hospitalization in the last 30 days (Readmission):  No    If yes, Readmission Assessment in CM Navigator will be completed.    Advance Directives:      Code Status: Full Code   Patient's Primary Decision Maker is:        Discharge Planning:    Patient lives with: Friends Type of Home: House  Primary Care Giver: Self  Patient Support Systems include: Friends/Neighbors   Current Financial resources:    Current community resources: None  Current services prior to admission: None            Current DME:              Type of Home Care services:  None    ADLS  Prior functional level: Independent in ADLs/IADLs  Current functional level:      PT AM-PAC:   /24  OT AM-PAC:   /24    Family can provide assistance at DC:    Would you like Case Management to discuss the discharge plan with any other family members/significant others, and if so, who?    Plans to Return to Present Housing: Unknown at present  Other Identified Issues/Barriers to RETURNING to current housing: no insurance  Potential Assistance needed at discharge: Transitional Care            Potential DME:    Patient expects to discharge to: House  Plan for transportation at discharge:      Financial    Payor:

## 2024-02-22 NOTE — PLAN OF CARE
Problem: Discharge Planning  Goal: Discharge to home or other facility with appropriate resources  Outcome: Progressing  Flowsheets (Taken 2/22/2024 0400)  Discharge to home or other facility with appropriate resources:   Identify barriers to discharge with patient and caregiver   Arrange for needed discharge resources and transportation as appropriate   Identify discharge learning needs (meds, wound care, etc)     Problem: Pain  Goal: Verbalizes/displays adequate comfort level or baseline comfort level  Outcome: Progressing     Problem: Safety - Adult  Goal: Free from fall injury  Outcome: Progressing     Problem: Skin/Tissue Integrity  Goal: Absence of new skin breakdown  Description: 1.  Monitor for areas of redness and/or skin breakdown  2.  Assess vascular access sites hourly  3.  Every 4-6 hours minimum:  Change oxygen saturation probe site  4.  Every 4-6 hours:  If on nasal continuous positive airway pressure, respiratory therapy assess nares and determine need for appliance change or resting period.  Outcome: Progressing     Problem: ABCDS Injury Assessment  Goal: Absence of physical injury  Outcome: Progressing

## 2024-02-22 NOTE — ED NOTES
ED to inpatient nurses report      Chief Complaint:  Chief Complaint   Patient presents with    Wound Infection     R. axilla, chest, L axilla, L neck     Present to ED from: [facility]    MOA:     LOC: alert and orientated to name, place, date  Mobility: Independent  Oxygen Baseline: RA    Current needs required: RA   Pending ED orders: none   Present condition: stable    Why did the patient come to the ED? Pt arrived as a transport from [facility] with necrotizing fascitis to the L neck, L axilla, chest, R axilla, and back.  Pt has hx of diabetes.  Pt was going to wound care but stopped going approximately 6 months ago.  Pt states he stopped going because all of his wounds were healed.   Pt reports the wounds on his back starting about a week ago but is a poor historian about when the other wounds started getting bad/infected again.  Pt tachycardic on arrival.  Pt was given antibiotics, fluids, blood cultures completed, labs done pta.  Pt on 100 ml/hr fluids on arrival.  Pt denies pain.  Cardiac monitor placed.  What is the plan? Admission, wound care, surgery consult  Any procedures or intervention occur? labs  Any safety concerns??    Mental Status:  Level of Consciousness: Alert (0)    Psych Assessment:      Vital signs   Vitals:    02/21/24 2246 02/21/24 2248 02/21/24 2258 02/21/24 2322   BP: 100/76      Pulse: (!) 122  (!) 113 (!) 115   Resp: 20  18 17   Temp:  97.9 °F (36.6 °C)     SpO2: 99%  100% 100%   Weight: 54.4 kg (120 lb)           Vitals:  Patient Vitals for the past 24 hrs:   BP Temp Pulse Resp SpO2 Weight   02/21/24 2322 -- -- (!) 115 17 100 % --   02/21/24 2258 -- -- (!) 113 18 100 % --   02/21/24 2248 -- 97.9 °F (36.6 °C) -- -- -- --   02/21/24 2246 100/76 -- (!) 122 20 99 % 54.4 kg (120 lb)      Visit Vitals  /76   Pulse (!) 115   Temp 97.9 °F (36.6 °C)   Resp 17   Wt 54.4 kg (120 lb)   SpO2 100%   BMI 21.26 kg/m²        LDAs:   Peripheral IV 02/21/24 Left Antecubital (Active)        Ambulatory Status:  Presents to emergency department  because of falls (Syncope, seizure, or loss of consciousness): No, Age > 70: No, Altered Mental Status, Intoxication with alcohol or substance confusion (Disorientation, impaired judgment, poor safety awaremess, or inability to follow instructions): No, Impaired Mobility: Ambulates or transfers with assistive devices or assistance; Unable to ambulate or transer.: No, Nursing Judgement: No    Diagnosis:  DISPOSITION Decision To Admit 02/21/2024 10:49:31 PM   Final diagnoses:   None        Consults:  IP CONSULT TO GENERAL SURGERY  IP CONSULT TO CRITICAL CARE     Treatment Team:   Treatment Team: Attending Provider: Didi Maya MD; Consulting Physician: Sandy Carmichael MD; Resident: Nelly Walker DO; Registered Nurse: Cheryl Dalal RN    Treatment:  ED Course as of 02/21/24 2336 Wed Feb 21, 2024   2256 Surgery and critical care consulted [TD]   2306 WBC(!): 16.9 [TD]   2306 Hemoglobin Quant(!): 8.0 [TD]   2306 Lactic Acid, Whole Blood: 1.0 [TD]      ED Course User Index  [TD] Nelly Walker DO          Skin Assessment:        Pain Score:  Pain Assessment  Pain Assessment: None - Denies Pain      SOCIAL HISTORY       Social History     Socioeconomic History    Marital status: Single   Tobacco Use    Smoking status: Never    Smokeless tobacco: Never   Substance and Sexual Activity    Alcohol use: No     Alcohol/week: 0.0 standard drinks of alcohol    Drug use: No       FAMILY HISTORY     No family history on file.    ALLERGIES     Patient has no known allergies.    CURRENT MEDICATIONS       Previous Medications    ALCOHOL SWABS 70 % PADS    Inject 1 each into the skin 3 times daily as needed    BLOOD GLUCOSE MONITORING SUPPL (TRUERESULT BLOOD GLUCOSE) W/DEVICE KIT    1 kit by Does not apply route daily as needed    GLUCOSE BLOOD (BLOOD GLUCOSE TEST STRIPS) STRP    1 each by Does not apply route 4 times daily (before meals and nightly)

## 2024-02-22 NOTE — PROGRESS NOTES
Piperacillin-Tazobactam Extended Interval Interchange    The following ordered dose of Piperacillin-Tazobactam has been changed to optimize its pharmacodynamic profile as approved by the P&T Committee for adult patients.    Piperacillin/Tazobactam Ordered Creatinine Clearance* Therapeutic Interchange   3.375 gm IV over 30 minutes Greater than 20 mL/min 3.375 gm IV over 4hrs every 8hrs    Less than 20 mL/min 3.375 gm IV over 4hrs every 12hrs   *Dialysis patients should receive traditional infusion dosing     Pharmacists should be contacted for issues concerning drug compatibility with multiple IV medications.  All doses will be prepared using 50ml to be infused over 4-hours at a rate of 12.5ml/hr.    Thank You,  Howard Godwin, RPH2/22/[id number]:26 AM

## 2024-02-22 NOTE — PROGRESS NOTES
Inpatient Diabetes Education    Jules Nichols was seen for evaluation and education on uncontrolled diabetes.    Lab Results   Component Value Date    LABA1C 10.2 (H) 02/22/2024    LABA1C 14.0 05/05/2017    LABA1C 18.1 04/27/2017     Jules states that he has had diabetes for about 9 years. He states that in the past he has checked his blood sugar at home with a glucometer. He reports that in the he took metformin in the distant past. He states that in the past he took Lantus Insulin in a pen. He was vague about insulin use prior to admission. He said that he got a supply of Lantus from a friend.     Following Survival Skills education topics were covered as appropriate to patient plan of for care:  _X__ Type 2 Diabetes diagnosis as chronic and progressive  _X__ Healthy eating - CHO food groups and need for CHO controlled diet.  Elimination of sugary beverages, avoid skipping meal - States that he tends to eat one meal daily and sometimes will have a regular soda if he is getting a meal from [place].   _X__ Blood sugar targets Pre meal 80 - 130 and 2 hours post meal < 180  _X__ Hyperglycemia  ( BG over 250) signs and symptoms and treatment   _X__ Hypoglycemia( BG < than 70) signs and symptoms and treatment \"Rule of 15\"  __X_ Keep BG log and take log and meter to follow up HCP appointments  __X_ Medications -  Insulin     Education Folder provided with following support information was left at bedside:   _x__  Handout - What is diabetes? [email or web address] 2019  _x__  Handout - Eating Healthy for Life at every Meal / Plate method and grocery list  from www.[facility].org  _x__ Handout - How to Check Your Blood Sugar from www.[facility].org  _x__ Handout - Be safe with Needles teaching sheet - / www.[facility].org    _x__ Handout - How to Use an Insulin Pen - handout with QR code - [employer] Current as of Sept 22 2021  _x__ Emergency Insert sheet for your Vehicle - ADA Diabetes

## 2024-02-22 NOTE — ED PROVIDER NOTES
[facility] ED  Emergency Department Encounter  Emergency Medicine Resident     Pt Name:Jules Nichols  MRN: [id number]  Birthdate 1980  Date of evaluation: 2/21/24  PCP:  Elvin Kelley MD  Note Started: 10:47 PM EST      CHIEF COMPLAINT       Chief Complaint   Patient presents with    Wound Infection     R. axilla, chest, L axilla, L neck       HISTORY OF PRESENT ILLNESS  (Location/Symptom, Timing/Onset, Context/Setting, Quality, Duration, Modifying Factors, Severity.)      Jules Nichols is a 43 y.o. 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  Patient was previously going to wound care for his wounds with hidradenitis suppurativa but he stopped going about 6 months ago.  Patient presented to [facility] today with shortness of breath and chest wall pain.     Patient has gotten a total of 3 L of IV fluids.  Also received cefepime, vancomycin, clindamycin at [facility].    Upon arrival, patient is alert, awake.       PAST MEDICAL / SURGICAL / SOCIAL / FAMILY HISTORY      has a past medical history of Benign essential HTN, Chronic kidney disease, Diabetic keto-acidosis (HCC), Hyperlipemia, and Uncontrolled type 2 diabetes mellitus with nephropathy.       has a past surgical history that includes other surgical history (Bilateral, 02/22/2024).      Social History     Socioeconomic History    Marital status: Single     Spouse name: Not on file    Number of children: Not on file    Years of education: Not on file    Highest education level: Not on file   Occupational History    Not on file   Tobacco Use    Smoking status: Never    Smokeless tobacco: Never   Substance and Sexual Activity    Alcohol use: No     Alcohol/week: 0.0 standard drinks of alcohol    Drug use: No    Sexual

## 2024-02-22 NOTE — PROCEDURES
Central Line Placement Procedure Note    Performed by: Diallo Treviño DO    Indication: vascular access and centrally administered medications    Consent: The patient provided verbal consent for this procedure.    Time out performed: Immediately prior to the procedure a \"time out\" was called to verify the correct patient, the correct procedure, equipment, support staff and site/side marked as required.      All elements of maximal sterile barrier techniques were followed.    Procedure: The patient was positioned appropriately and the skin over the right internal jugular vein was prepped with chlorhexidine. Local anesthesia was obtained by infiltration using 1% Lidocaine without epinephrine.  A large bore needle was used to identify the vein.  A guide wire was then inserted into the vein through the needle. A triple lumen catheter was then inserted into the vessel over the guide wire using the Seldinger technique.  All ports showed good, free flowing blood return and were flushed with saline solution.  The catheter was then securely fastened to the skin with sutures and covered with a sterile dressing.  A post procedure X-ray was ordered and is still pending at this time.    The patient tolerated the procedure well.    Complications: None

## 2024-02-22 NOTE — ED NOTES
Gen surgery at bedside for consent form signature.  Writer to witness    Per pt, he has a brother who he does not talk to and he lost all of his other family years ago.  Pt reports having a roommate

## 2024-02-22 NOTE — PROGRESS NOTES
[facility]   Pharmacy Pharmacokinetic Monitoring Service - Vancomycin     Jules Nichols is a 43 y.o. male starting on vancomycin therapy for skin and soft tissue infection. Pharmacy consulted by Dr. Proctor for monitoring and adjustment.    Target Concentration: Dosing based on anticipated concentration <15 mg/L due to renal impairment/insufficiency    Additional Antimicrobials: Clindamycin, Zosyn    Pertinent Laboratory Values:   Wt Readings from Last 1 Encounters:   02/21/24 54.4 kg (120 lb)     Temp Readings from Last 1 Encounters:   02/21/24 97.9 °F (36.6 °C)     CrCl cannot be calculated (Unknown ideal weight.).  Recent Labs     02/21/24  1655 02/21/24  2249   CREATININE 8.2* 6.8*   * 109*   WBC 25.3* 16.9*     Procalcitonin: n/a    Pertinent Cultures:  Culture Date Source Results        MRSA Nasal Swab: N/A. Non-respiratory infection.    Recent vancomycin administrations                     vancomycin (VANCOCIN) 1,250 mg in sodium chloride 0.9 % 250 mL IVPB ([id number]) (mg) 1,250 mg New Bag 02/21/24 1933             Plan:  Concentration-guided dosing due to renal impairment/insufficiency  No additional vancomycin today  Renal labs as indicated   Will await further plans regarding renal function prior to re-dosing or collecting levels  Pharmacy will continue to monitor patient and adjust therapy as indicated    Thank you for the consult,  Howard Godwin RPH  2/22/2024 12:52 AM      ADDENDUM:  Jalyn Flores PharmD  PGY2 Pharmacy Resident 2/22/2024 7:38 AM [phone]

## 2024-02-22 NOTE — CONSULTS
Comprehensive Nutrition Assessment    Type and Reason for Visit:  Consult (TF Ordering & Mgt, underweight, ONS)    Nutrition Recommendations/Plan:   If desired to start TF, suggest Diabetic at goal rate 55 mL/hr continuous. Provides 1980 kcal, 109 g PRO, 1002 mL.  If PO diet initiated, suggest Diabetic ONS TID  Will monitor for start of nutrition  Pt meets ASPEN criteria for Moderate Protein-calorie Malnutrition, dx added.     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 PMH significant for HTN, CKD, T2DM, HLD, s/p excisional debridment and I&D of abdominal wall abscess. Pt +NGT to suction, with output. Pt reports UBW = 170 lbs ~ x 1 year ago, suspect accuracy of this statement. Per chart, pt UBW = 125 lbs (March 2023), indicating 6% wt loss x 11 mos (not significant). Pt reports he has increased his physical activity and reduced PO intake in that time. Pt reports typically eating 1 large meal daily, no snacks. Pt is agreeable to ONS, no flavor preference.    Nutrition Related Findings:    labs/meds reviewed Wound Type: Multiple, Surgical Incision       Current Nutrition Intake & Therapies:    Average Meal Intake: NPO  Average Supplements Intake: NPO  Diet NPO    Anthropometric Measures:  Height: 165.1 cm (5' 5\")  Ideal Body Weight (IBW): 136 lbs (62 kg)    Admission Body Weight: 53.3 kg (117 lb 8.1 oz)  Current Body Weight: 53.3 kg (117 lb 8.1 oz) (2/22/24), 86.4 % IBW.    Current BMI (kg/m2): 19.6  Usual Body Weight: 56.7 kg (125 lb)

## 2024-02-22 NOTE — ED NOTES
The following labs were labeled with appropriate pt sticker and tubed to lab:     [x] Blue     [x] Lavender   [] on ice  [x] Green/yellow  [x] Green/black [x] on ice  [] Grey  [] on ice  [] Yellow  [] Red  [] Pink  [] Type/ Screen  [] ABG  [] VBG    [] COVID-19 swab    [] Rapid  [] PCR  [] Flu swab  [] Peds Viral Panel     [] Urine Sample  [] Fecal Sample  [] Pelvic Cultures  [] Blood Cultures  [] X 2  [] STREP Cultures  [] Wound Cultures

## 2024-02-22 NOTE — PROGRESS NOTES
[facility] Nurse  Consult Note       NAME:  Jules Nichols  MEDICAL RECORD NUMBER:  [id number]  AGE: 43 y.o.   GENDER: male  : 1980  TODAY'S DATE:  2024    Subjective:     Reason for WOCN Evaluation and Assessment: \"extreme HS in all folds\"      Jules Nichols is a 43 y.o. male referred by:   [x] Physician  [] Nursing  [] Other:     Wound Identification:  Wound Type:  hidradenitis Suppurativa, surgical.   Contributing Factors: diabetes, poor glucose control, malnutrition, and non-adherence        Followed with [facility] for a non-healing back abscess s/p I&D. Was subsequently dx with HS of the axillae by the same physician in 2023. A referral for dermatology was placed. No dermatology notes are available in the EHR. Patient denies receiving medications for the HS. Per the bedside RN health insurance coverage was lost and patient did not seek further care.         L axilla, L groin - Sharp excisional debridement down through and including the removal of epidermis, dermis, and subcutaneous tissue total of  66 sq cm.  R axilla, R groin, chest wall -  Sharp excisional debridement down through and including the removal of epidermis, dermis, subcutaneous tissue, and muscle/fascia tissue total of 605 sq cm  I&D abdominal wall abscess  With Dr Carmichael 2024.    Currently on insulin gtt and phenylephrine.   Dr Terrazas at bedside for wound assessment.     Objective:      BP (!) 75/56   Pulse (!) 115   Temp 97.1 °F (36.2 °C) (Temporal)   Resp (!) 6   Ht 1.651 m (5' 5\")   Wt 53.3 kg (117 lb 8.1 oz)   SpO2 99%   BMI 19.55 kg/m²   Eduardo Risk Score: Eduardo Scale Score: 14    LABS    CBC:   Lab Results   Component Value Date/Time    WBC 23.3 2024 04:17 AM    RBC 4.04 2024 04:17 AM    HGB 10.4 2024 04:17 AM    HCT 37.1 2024 04:17 AM     CMP:  Albumin:    Lab Results   Component Value Date/Time    LABALBU 2.5 2024 10:49 PM     PT/INR:    Lab  Assessed: 02/22/24 0400   Present on Original Admission: Yes  Primary Wound Type: Other (comment)  Location: Back  Wound Location Orientation: Medial;Upper   Wound Etiology Other   Dressing Status New dressing applied   Wound Cleansed Soap and water   Dressing/Treatment ABD;Other (comment)  (moisture wicking underpad)   Wound Assessment Subcutaneous   Drainage Amount Moderate (25-50%)   Drainage Description Serosanguinous;Thin;Purulent   Odor Mild   Sariah-wound Assessment Blanchable erythema;Induration;Other (Comment)  (HS lesions and scars)   Wound 02/22/24 Pelvis   Date First Assessed: 02/22/24   Present on Original Admission: Yes  Primary Wound Type: Surgical Type  Location: Pelvis   Wound Image    Wound Etiology Surgical   Dressing Status New dressing applied   Dressing/Treatment Moist to moist;Pharmaceutical agent (see MAR);ABD   Dressing Change Due 02/23/24   Wound Assessment Subcutaneous;Pink/red   Drainage Amount Moderate (25-50%)   Drainage Description Serosanguinous   Odor None   Sariah-wound Assessment Intact;Edematous     Wound 02/22/24 Abdomen I&D site   Date First Assessed: 02/22/24   Present on Original Admission: No  Primary Wound Type: Surgical Type  Location: Abdomen  Wound Description (Comments): I&D site   Wound Etiology Surgical   Dressing Status New dressing applied   Dressing/Treatment Moist to moist;Pharmaceutical agent (see MAR);Packing;Dry dressing   Dressing Change Due 02/23/24   Wound Assessment Subcutaneous;Pink/red   Drainage Amount Moderate (25-50%)   Drainage Description Serosanguinous   Odor None   Sariah-wound Assessment Intact            Response to treatment:  Well tolerated by patient.         Plan:   Bilateral axillae, mid chest, abdomen, pelvis, bilateral groin: Dakin's solution moistened roll gauze/fluffs, ABD pads/Sorbex pads to cover. Roll gauze vs Medfix tape to secure. Quick Change pad and mesh underwear used to secure the groin dressings. Daily dressing changes planned per

## 2024-02-22 NOTE — ANESTHESIA POSTPROCEDURE EVALUATION
Department of Anesthesiology  Postprocedure Note    Patient: Jules Nichols  MRN: [id number]  YOB: 1980  Date of evaluation: 2/22/2024    Procedure Summary     Date: 02/22/24 Room / Location: [address]    Anesthesia Start: 0045 Anesthesia Stop: 0309    Procedure: INCISION AND DRAINAGE OF BILATERAL AXILLA, CHEST WALL AND BILATERAL GROIN (Bilateral: Chest) Diagnosis:       Necrotizing fasciitis (HCC)      (Necrotizing fasciitis (HCC) [M72.6])    Surgeons: Sandy Carmichael MD Responsible Provider: John Gannon MD    Anesthesia Type: general ASA Status: 4 - Emergent          Anesthesia Type: No value filed.    Jonah Phase I:      Jonah Phase II:      Anesthesia Post Evaluation    Patient location during evaluation: PACU  Patient participation: complete - patient participated  Level of consciousness: awake and alert  Airway patency: patent  Nausea & Vomiting: no nausea and no vomiting  Cardiovascular status: hemodynamically stable  Respiratory status: acceptable  Hydration status: stable  Pain management: adequate        No notable events documented.

## 2024-02-22 NOTE — ED NOTES
Report given to Bianca HERNANDEZ RN from ED .   Report method in person   The following was reviewed with receiving RN:   Current vital signs:  /72   Pulse (!) 118   Temp 97.1 °F (36.2 °C)   Resp 20   Wt 54.4 kg (120 lb)   SpO2 100%   BMI 21.26 kg/m²                      Any medication or safety alerts were reviewed. Any pending diagnostics and notifications were also reviewed, as well as any safety concerns or issues, abnormal labs, abnormal imaging, and abnormal assessment findings. Questions were answered.

## 2024-02-22 NOTE — CONSULTS
General Surgery  Consult    PATIENT NAME: Jules Nichols  AGE: 43 y.o.  MEDICAL RECORD NO. [id number]  DATE: 2/22/2024  SURGEON: Dr. Carmichael  PRIMARY CARE PHYSICIAN: Elvin Kelley MD    Patient evaluated at the request of  Dr. John  Reason for evaluation: Bishnu's gangrene    Patient information was obtained from patient and past medical records.  History/Exam limitations: due to condition and acuity of illness.    IMPRESSION:     Patient Active Problem List   Diagnosis    Benign essential HTN    Uncontrolled type 2 diabetes mellitus with nephropathy    Hyperlipemia    CKD (chronic kidney disease) stage 4, GFR 15-29 ml/min ([facility])    Bishnu's gangrene         PLAN:   OR for emergent incision and drainage with debridement of the sternum, right axilla, bilateral groins and scrotum. Informed consent obtained.  Dakin's ordered for wound dressing  MICU admit  IVF and abx  Ward placement in OR  Wound care consult  Dietitian consult for wounds and weight loss  DM education  ID consult      HISTORY:   History of Chief Complaint:    Jules Nichols is a 43 y.o. male who presents as a transfer from [place] with severe DKA, acute on chronic kidney injury, and severe wounds to the bilateral axilla, back, sternum, and bilateral groins and scrotum consistent with hidadenitis and bishnu's gangrene. Patient is a poor historian but is oriented and appropriate, states wounds started over 6mo ago with areas on the back that were managed by [facility] by Dr. Ley with I/D and dressings. Patient was healing well he reported and has not followed up for further wound assessments. Patient has poorly controlled DM-II with DM induced CKD and azotemia. Patient states he doesn't need to use insulin as he rarely eats. Patient states he lives with a roommate who helps him with his self care from time to time, patient malnourished and cachectic appearance, states he hasn't worked in over six months and he has month  Yellow  --    BACTERIA  --  None  --      Recent Labs     02/21/24  2249   ALKPHOS 104   ALT <5*   AST 6   BILITOT <0.1*   LABALBU 2.5*         RADIOLOGY:     CT CHEST ABDOMEN PELVIS WO CONTRAST Additional Contrast? None    Result Date: 2/21/2024  1. Soft tissue irregularity and skin thickening in the bilateral inguinal regions with small amount of subcutaneous air in the right inguinal region tracking into the right perineum and small amount of air within the medial right gluteal subcutaneous tissues.  Findings are concerning for developing Dago's gangrene. 2. Soft tissue wounds in the bilateral axillae, bilateral lateral chest walls and right greater than left upper arms. There is a deep wound involving the medial aspect of the image right upper arm. No appreciable extension to bone or appreciable evidence of osteomyelitis. 3. Additional foci of subcutaneous air and abnormal soft tissue attenuation within the midline anterior and left posterior paramedian chest wall and right upper quadrant subcutaneous tissues, concerning for infection with gas-forming organisms.  Associated skin thickening in the posterior chest wall. 4. Wall thickening of the mid and lower thoracic esophagus, which could be related to esophagitis or gastroesophageal reflux disease; underlying neoplasm is not excluded. Critical findings discussed with Dr. Luis Saez at 7:56 p.m. on 02/21/2024.     XR CHEST PORTABLE    Result Date: 2/21/2024  No acute process.        Thank you for the interesting evaluation. Further recommendations to follow.    Harjit Proctor DO  2/22/2024, 12:25 AM

## 2024-02-22 NOTE — ED PROVIDER NOTES
[facility]     Emergency Department     Faculty Attestation    I performed a history and physical examination of the patient and discussed management with the resident. I reviewed the resident’s note and agree with the documented findings including all diagnostic interpretations and plan of care. Any areas of disagreement are noted on the chart. I was personally present for the key portions of any procedures. I have documented in the chart those procedures where I was not present during the key portions. I have reviewed the emergency nurses triage note. I agree with the chief complaint, past medical history, past surgical history, allergies, medications, social and family history as documented unless otherwise noted below. Documentation of the HPI, Physical Exam and Medical Decision Making performed by bernard is based on my personal performance of the HPI, PE and MDM. For Physician Assistant/ Nurse Practitioner cases/documentation I have personally evaluated this patient and have completed at least one if not all key elements of the E/M (history, physical exam, and MDM). Additional findings are as noted.    Primary Care Physician: Elvin Kelley MD    Note Started: 12:26 AM EST     VITAL SIGNS:   weight is 54.4 kg (120 lb). His temperature is 97.9 °F (36.6 °C). His blood pressure is 100/76 and his pulse is 115 (abnormal). His respiration is 17 and oxygen saturation is 100%.      Medical Decision Making  Transfer for sepsis, necrotizing fasciitis, Dago's gangrene.  Known history of hidradenitis suppurativa, was following with wound care however stopped in July of last year.  Found to have renal failure with creatinine of 8 at outlying facility.  Currently receiving third liter of fluids as well as has received vancomycin cefepime and clindamycin.  On examination multiple wounds with soft tissue exposure notably in the right chest wall and arm as well

## 2024-02-22 NOTE — CONSULTS
[facility] -   Infectious diseases evaluation  admission date 2/21/2024    reason for consultation:   Fasciitis    Impression :   Current:  Right axilla  fasciitis with air in the soft tissues  Fluid in the gangrene groin area with a subcutaneous  Underlying hidradenitis suppurativa extensive sites  JAY on hemodialysis  Diabetes mellitus  Bandemia  Septic shock with severe sepsis with organ failure, on pressors    Other:    Discussion / summary of stay / plan of care/ Recommendations:     HENCE:   Continue Zosyn  Stop vancomycin and start Zyvox to spare the kidneys  Zyvox has also antitoxin effect if needed  Awaiting the cultures from the axilla and the groin,  Suspect multi bacterial fasciitis and diabetic    Infection Control Recommendations   [place] Precautions  Contact Isolation       Antimicrobial Stewardship Recommendations   Simplification of therapy  Targeted therapy  History of Present Illness:   Initial history:  Jules Nichols is a 43 y.o.-year-old male of diabetes and hidradenitis affecting multiple sites, was very short of breath and transferred from another facility with a creatinine of 3 and a right arm major inflammation seen for a groin area that had major inflammation and cellulitis, diagnosed with necrotizing patient this of both sites and was taken to surgery with a debridement of the right axilla as well as the groin area.  According to the patient those wounds were hidradenitis sites and they were progressing over the last 3 weeks progressively.    He was started on vancomycin and Zosyn  Was given a dose of clindamycin  Infectious disease consulted for antibiotic management    He is currently on Isma-Synephrine, getting hemodialysis for acute kidney injury  Alert appropriate NG tube in place abdomen soft nontender  No burning with the urine    Culture from the right axillary area is showing mixed bacteria 2/22  Another culture is showing group B    02/22/24 1045 (!) 84/58 -- (!) 107 14 -- -- --   02/22/24 1030 (!) 81/59 -- (!) 107 14 -- -- --       Summary of relevant labs:  Labs:  WBC 23  Platelet count 393  Micro:  Blood culture 2/22  Urine culture 2/22  Nasal MRSA 2/22    Right arm culture mixed bacteria 2/22  And other culture group B strep 2/21    Urine analysis negative  Procedures      Cardiology      Imaging:  CT chest and AP 2/21  . Soft tissue irregularity and skin thickening in the bilateral inguinal  regions with small amount of subcutaneous air in the right inguinal region  tracking into the right perineum and small amount of air within the medial  right gluteal subcutaneous tissues.  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  2. Soft tissue wounds in the bilateral axillae, bilateral lateral chest walls  and right greater than left upper arms. There is a deep wound involving the  medial aspect of the image right upper arm. No appreciable extension to bone  or appreciable evidence of osteomyelitis.  3. Additional foci of subcutaneous air and abnormal soft tissue attenuation  within the midline anterior and left posterior paramedian chest wall and  right upper quadrant subcutaneous tissues, concerning for infection with  gas-forming organisms.  Associated skin thickening in the posterior chest  wall.  4. Wall thickening of the mid and lower thoracic esophagus, which could be  related to esophagitis or gastroesophageal reflux disease; underlying  neoplasm is not excluded.    I have personally reviewed the past medical history, past surgical history, medications, social history, and family history, and I haveupdated the database accordingly.      Allergies:   Patient has no known allergies.     Review of Systems:     Review of Systems   Constitutional:  Negative for activity change.   HENT:  Negative for congestion.    Eyes:  Negative for discharge.   Respiratory:  Negative for apnea.    Cardiovascular:  Negative for chest pain.

## 2024-02-22 NOTE — H&P
Critical Care - History and Physical Examination    Patient's name:  Jules Nichols  Medical Record Number: [id number]  Patient's account/billing number: [id number]  Patient's YOB: 1980  Age: 43 y.o.  Date of Admission: 2/21/2024 10:41 PM  Date of History and Physical Examination: 2/22/2024    Primary Care Physician: Elvin Kelley MD  Attending Physician: Ashkan Soares MD     Code Status: No Order    Chief complaint:   Chief Complaint   Patient presents with    Wound Infection     R. axilla, chest, L axilla, L neck       HISTORY OF PRESENT ILLNESS:   Jules Nichols is a 43 y.o. male presented to outside facility with shortness of breath and worsening wounds.  Patient has a history of hypertension, diabetes, hyperlipidemia, chronic kidney disease with baseline creatinine around 3.  Patient also has a history of hidradenitis patient reportedly previously followed with wound care, however has not since then.  Patient states she has had worsening wounds over the last 6 months, however it has acutely worsened in the last few weeks.  No fevers or chills.    Patient had CT imaging done at outside hospital that showed necrotizing fasciitis in the axilla and chest as well as Dago's gangrene.  Patient also found to be in acute renal failure with creatinine of 8 at outside facility.  Patient was fluid resuscitated with 3 L total of IV fluids.  Received antibiotics with vancomycin, cefepime, clindamycin.    Past Medical History:        Diagnosis Date    Benign essential HTN     Chronic kidney disease 1/20/16    Dr Stafford    Diabetic keto-acidosis (HCC) 1/20/16    hx of     Hyperlipemia     Uncontrolled type 2 diabetes mellitus with nephropathy 1/20/16       Past Surgical History:  No past surgical history on file.    Allergies:    No Known Allergies      Home Meds:   Prior to Admission medications    Medication Sig Start Date End Date Taking? Authorizing Provider   LANTUS SOLOSTAR 100 UNIT/ML

## 2024-02-22 NOTE — PROGRESS NOTES
Dialysis Time Out  To be done by RN and tech or 2 RNs  Staff Names Felicia RN and Loreto RN    [x]  Identity of the patient using 2 patient identifiers  [x]  Consent for treatment  [x]  Equipment-proper machine and dialyzer  [x]  B-Hep B status  [x]  Orders- to include bath, blood flow, dialyzer, time and fluid removal  [x]  Access-Correct site and in working order  [x]  Time for patient to ask questions.

## 2024-02-22 NOTE — OP NOTE
Operative Note      Patient: Jules Nichols  YOB: 1980  MRN: [id number]    Date of Procedure: 2/22/2024    Pre-Op Diagnosis Codes:     * Necrotizing fasciitis (HCC) [M72.6], hidradinitis and chronic wounds    Post-Op Diagnosis: Same       PROCEDURE:  L axilla, L groin - Sharp excisional debridement down through and including the removal of epidermis, dermis, and subcutaneous tissue total of  66 sq cm.  R axilla, R groin, chest wall -  Sharp excisional debridement down through and including the removal of epidermis, dermis, subcutaneous tissue, and muscle/fascia tissue total of 605 sq cm  I&D abdominal wall abscess    Surgeon(s):  Sandy Carmichael MD    Assistant:   Resident: Harjit Proctor DO; Rika Jha DO    Anesthesia: General    Estimated Blood Loss (mL): 500    Complications: None    Specimens:   ID Type Source Tests Collected by Time Destination   1 : URINE FROM FIRST [place] CATHETER INSERTION Urine Urine, indwelling catheter CULTURE, URINE Sandy Carmichael MD 2/22/2024 0146    2 : SWAB FROM STERNUM Swab Sternum CULTURE, FUNGUS, CULTURE, ANAEROBIC AND AEROBIC Sandy Carmichael MD 2/22/2024 0204        Implants:  * No implants in log *      Drains:   NG/OG/NJ/NE Tube Nasogastric 18 fr Right nostril (Active)       Urinary Catheter 02/22/24 2 Way (Active)       Findings: Necrotic/devitalized tissue b/l Axillae, b/l groin, suprapubic region, chest wall, abdominal wall    INDICATIONS AND CONSENT:  44yo male with h/o chronic wounds who presented with significant wounds and concern for necrotizing fasciitis.  The risks, benefits, and alternatives to surgery were explained to the patient and informed consent was obtained and placed on the chart prior to the procedure.    Detailed Description of Procedure:   He was taken to the OR were he was placed in supine position with legs in lithotomy and underwent smooth induction of general anesthesia and then prepped and draped in the sterile  purulence    Post Debridement Measurements:  5x3 cm    Total Surface Area Debrided:  15 sq cm     WOUND#6: INFERIOR CHEST WALL  Debridement:Excisional Debridement  Using bovie electrocauteray the wound(s)/ulcer(s) was/were sharply debrided down through and including the removal of epidermis, dermis, subcutaneous tissue, and muscle/fascia.        Devitalized Tissue Debrided:  slough, necrotic/eschar, exudate, and purulence    Post Debridement Measurements:  8 x 6 cm    Total Surface Area Debrided:  48 sq cm     WOUND#7: ABDOMINAL WALL ABSCESS  INCISION AND DRAINAGE    All wounds were ensured to be hemostatic and packed with either kerlix or iodoform gauze and covered with sterile dressings.  All sponge and needle counts were correct x2 at the end of the case.     I performed all critical aspects of the case with assistance and was immediately available for the entirety of the case.    Electronically signed by Sandy Carmichael MD on 2/22/2024 at 2:15 AM    Sandy Carmichael MD

## 2024-02-22 NOTE — PLAN OF CARE
Problem: Discharge Planning  Goal: Discharge to home or other facility with appropriate resources  2/22/2024 1812 by Loreto Mae RN  Outcome: Progressing  2/22/2024 0648 by Fariha Epps RN  Outcome: Progressing  Flowsheets (Taken 2/22/2024 0400)  Discharge to home or other facility with appropriate resources:   Identify barriers to discharge with patient and caregiver   Arrange for needed discharge resources and transportation as appropriate   Identify discharge learning needs (meds, wound care, etc)     Problem: Pain  Goal: Verbalizes/displays adequate comfort level or baseline comfort level  2/22/2024 1812 by Loreto Mae RN  Outcome: Progressing  2/22/2024 0648 by Fariha Epps RN  Outcome: Progressing     Problem: Safety - Adult  Goal: Free from fall injury  2/22/2024 1812 by Loreto Mae RN  Outcome: Progressing  2/22/2024 0648 by Fariha Epps RN  Outcome: Progressing     Problem: Skin/Tissue Integrity  Goal: Absence of new skin breakdown  Description: 1.  Monitor for areas of redness and/or skin breakdown  2.  Assess vascular access sites hourly  3.  Every 4-6 hours minimum:  Change oxygen saturation probe site  4.  Every 4-6 hours:  If on nasal continuous positive airway pressure, respiratory therapy assess nares and determine need for appliance change or resting period.  2/22/2024 1812 by Loreto Mae RN  Outcome: Progressing  2/22/2024 0648 by Fariha Epps RN  Outcome: Progressing     Problem: ABCDS Injury Assessment  Goal: Absence of physical injury  2/22/2024 1812 by Loreto Mae RN  Outcome: Progressing  2/22/2024 0648 by Fariha Epps RN  Outcome: Progressing     Problem: Chronic Conditions and Co-morbidities  Goal: Patient's chronic conditions and co-morbidity symptoms are monitored and maintained or improved  Outcome: Progressing   Loreto Mae RN

## 2024-02-22 NOTE — ED NOTES
Pt arrived as a transport from [facility] with necrotizing fascitis to the L neck, L axilla, chest, R axilla, and back.  Pt has hx of diabetes.  Pt was going to wound care but stopped going approximately 6 months ago.  Pt states he stopped going because all of his wounds were healed.   Pt reports the wounds on his back starting about a week ago but is a poor historian about when the other wounds started getting bad/infected again.  Pt tachycardic on arrival.  Pt was given antibiotics, fluids, blood cultures completed, labs done pta.  Pt on 100 ml/hr fluids on arrival.  Pt denies pain.  Cardiac monitor placed.

## 2024-02-23 LAB
ALBUMIN PERCENT: 51 % (ref 45–65)
ALBUMIN SERPL-MCNC: 2.1 G/DL (ref 3.2–5.2)
ALPHA 2 PERCENT: 13 % (ref 6–13)
ALPHA1 GLOB SERPL ELPH-MCNC: 0.2 G/DL (ref 0.1–0.4)
ALPHA1 GLOB SERPL ELPH-MCNC: 6 % (ref 3–6)
ALPHA2 GLOB SERPL ELPH-MCNC: 0.5 G/DL (ref 0.5–0.9)
ANA SER QL IA: NEGATIVE
ANION GAP SERPL CALCULATED.3IONS-SCNC: 11 MMOL/L (ref 9–17)
ANION GAP SERPL CALCULATED.3IONS-SCNC: 14 MMOL/L (ref 9–17)
ANION GAP SERPL CALCULATED.3IONS-SCNC: 14 MMOL/L (ref 9–17)
B-GLOBULIN SERPL ELPH-MCNC: 0.5 G/DL (ref 0.5–1.1)
B-GLOBULIN SERPL ELPH-MCNC: 13 % (ref 11–19)
BASOPHILS # BLD: 0.03 K/UL (ref 0–0.2)
BASOPHILS NFR BLD: 0 % (ref 0–2)
BUN SERPL-MCNC: 41 MG/DL (ref 6–20)
BUN SERPL-MCNC: 42 MG/DL (ref 6–20)
BUN SERPL-MCNC: 42 MG/DL (ref 6–20)
CA-I BLD-SCNC: 1.09 MMOL/L (ref 1.13–1.33)
CALCIUM SERPL-MCNC: 7.5 MG/DL (ref 8.6–10.4)
CALCIUM SERPL-MCNC: 7.6 MG/DL (ref 8.6–10.4)
CALCIUM SERPL-MCNC: 7.7 MG/DL (ref 8.6–10.4)
CHLORIDE SERPL-SCNC: 98 MMOL/L (ref 98–107)
CHLORIDE SERPL-SCNC: 99 MMOL/L (ref 98–107)
CHLORIDE SERPL-SCNC: 99 MMOL/L (ref 98–107)
CO2 SERPL-SCNC: 21 MMOL/L (ref 20–31)
CO2 SERPL-SCNC: 22 MMOL/L (ref 20–31)
CO2 SERPL-SCNC: 22 MMOL/L (ref 20–31)
CREAT SERPL-MCNC: 3.1 MG/DL (ref 0.7–1.2)
CREAT SERPL-MCNC: 3.3 MG/DL (ref 0.7–1.2)
CREAT SERPL-MCNC: 3.4 MG/DL (ref 0.7–1.2)
CREAT UR-MCNC: 17.4 MG/DL (ref 39–259)
DSDNA IGG SER QL IA: 0.8 IU/ML
EOSINOPHIL # BLD: 0.06 K/UL (ref 0–0.44)
EOSINOPHILS RELATIVE PERCENT: 0 % (ref 1–4)
ERYTHROCYTE [DISTWIDTH] IN BLOOD BY AUTOMATED COUNT: 18.3 % (ref 11.8–14.4)
GAMMA GLOB SERPL ELPH-MCNC: 0.7 G/DL (ref 0.5–1.5)
GAMMA GLOBULIN %: 17 % (ref 9–20)
GFR SERPL CREATININE-BSD FRML MDRD: 22 ML/MIN/1.73M2
GFR SERPL CREATININE-BSD FRML MDRD: 23 ML/MIN/1.73M2
GFR SERPL CREATININE-BSD FRML MDRD: 24 ML/MIN/1.73M2
GLUCOSE BLD-MCNC: 124 MG/DL (ref 75–110)
GLUCOSE BLD-MCNC: 133 MG/DL (ref 75–110)
GLUCOSE BLD-MCNC: 139 MG/DL (ref 75–110)
GLUCOSE BLD-MCNC: 157 MG/DL (ref 75–110)
GLUCOSE BLD-MCNC: 164 MG/DL (ref 75–110)
GLUCOSE BLD-MCNC: 171 MG/DL (ref 75–110)
GLUCOSE BLD-MCNC: 195 MG/DL (ref 75–110)
GLUCOSE BLD-MCNC: 206 MG/DL (ref 75–110)
GLUCOSE BLD-MCNC: 217 MG/DL (ref 75–110)
GLUCOSE BLD-MCNC: 219 MG/DL (ref 75–110)
GLUCOSE BLD-MCNC: 235 MG/DL (ref 75–110)
GLUCOSE BLD-MCNC: 264 MG/DL (ref 75–110)
GLUCOSE SERPL-MCNC: 124 MG/DL (ref 70–99)
GLUCOSE SERPL-MCNC: 213 MG/DL (ref 70–99)
GLUCOSE SERPL-MCNC: 233 MG/DL (ref 70–99)
HCT VFR BLD AUTO: 26.7 % (ref 40.7–50.3)
HGB BLD-MCNC: 8.5 G/DL (ref 13–17)
IMM GRANULOCYTES # BLD AUTO: 0.39 K/UL (ref 0–0.3)
IMM GRANULOCYTES NFR BLD: 3 %
INTERPRETATION SERPL IFE-IMP: NORMAL
LYMPHOCYTES NFR BLD: 1.03 K/UL (ref 1.1–3.7)
LYMPHOCYTES RELATIVE PERCENT: 7 % (ref 24–43)
MAGNESIUM SERPL-MCNC: 1.2 MG/DL (ref 1.6–2.6)
MAGNESIUM SERPL-MCNC: 1.5 MG/DL (ref 1.6–2.6)
MCH RBC QN AUTO: 25.8 PG (ref 25.2–33.5)
MCHC RBC AUTO-ENTMCNC: 31.8 G/DL (ref 28.4–34.8)
MCV RBC AUTO: 81.2 FL (ref 82.6–102.9)
MICROORGANISM SPEC CULT: NO GROWTH
MONOCYTES NFR BLD: 0.63 K/UL (ref 0.1–1.2)
MONOCYTES NFR BLD: 4 % (ref 3–12)
NEUTROPHILS NFR BLD: 86 % (ref 36–65)
NEUTS SEG NFR BLD: 12.6 K/UL (ref 1.5–8.1)
NRBC BLD-RTO: 0 PER 100 WBC
NUCLEAR IGG SER IA-RTO: 0.1 U/ML
PATH REV: NORMAL
PATHOLOGIST: ABNORMAL
PHOSPHATE SERPL-MCNC: 2.7 MG/DL (ref 2.5–4.5)
PHOSPHATE SERPL-MCNC: 2.8 MG/DL (ref 2.5–4.5)
PLATELET # BLD AUTO: 253 K/UL (ref 138–453)
PMV BLD AUTO: 9 FL (ref 8.1–13.5)
POTASSIUM SERPL-SCNC: 3.2 MMOL/L (ref 3.7–5.3)
POTASSIUM SERPL-SCNC: 3.7 MMOL/L (ref 3.7–5.3)
POTASSIUM SERPL-SCNC: 3.8 MMOL/L (ref 3.7–5.3)
PROT PATTERN SERPL ELPH-IMP: ABNORMAL
PROT SERPL-MCNC: 4.1 G/DL (ref 6.4–8.3)
RBC # BLD AUTO: 3.29 M/UL (ref 4.21–5.77)
RBC # BLD: ABNORMAL 10*6/UL
SODIUM SERPL-SCNC: 132 MMOL/L (ref 135–144)
SODIUM SERPL-SCNC: 134 MMOL/L (ref 135–144)
SODIUM SERPL-SCNC: 134 MMOL/L (ref 135–144)
SODIUM UR-SCNC: 81 MMOL/L
SPECIMEN DESCRIPTION: NORMAL
TOTAL PROT. SUM,%: 100 % (ref 98–102)
TOTAL PROT. SUM: 4 G/DL (ref 6.3–8.2)
WBC OTHER # BLD: 14.7 K/UL (ref 3.5–11.3)

## 2024-02-23 PROCEDURE — 6360000002 HC RX W HCPCS: Performed by: EMERGENCY MEDICINE

## 2024-02-23 PROCEDURE — 2580000003 HC RX 258: Performed by: INTERNAL MEDICINE

## 2024-02-23 PROCEDURE — 99024 POSTOP FOLLOW-UP VISIT: CPT | Performed by: SURGERY

## 2024-02-23 PROCEDURE — 6360000002 HC RX W HCPCS

## 2024-02-23 PROCEDURE — 6360000002 HC RX W HCPCS: Performed by: STUDENT IN AN ORGANIZED HEALTH CARE EDUCATION/TRAINING PROGRAM

## 2024-02-23 PROCEDURE — 6370000000 HC RX 637 (ALT 250 FOR IP): Performed by: INTERNAL MEDICINE

## 2024-02-23 PROCEDURE — 82330 ASSAY OF CALCIUM: CPT

## 2024-02-23 PROCEDURE — 2580000003 HC RX 258

## 2024-02-23 PROCEDURE — 85025 COMPLETE CBC W/AUTO DIFF WBC: CPT

## 2024-02-23 PROCEDURE — 83735 ASSAY OF MAGNESIUM: CPT

## 2024-02-23 PROCEDURE — 99214 OFFICE O/P EST MOD 30 MIN: CPT

## 2024-02-23 PROCEDURE — 2580000003 HC RX 258: Performed by: EMERGENCY MEDICINE

## 2024-02-23 PROCEDURE — 84100 ASSAY OF PHOSPHORUS: CPT

## 2024-02-23 PROCEDURE — 99232 SBSQ HOSP IP/OBS MODERATE 35: CPT | Performed by: INTERNAL MEDICINE

## 2024-02-23 PROCEDURE — 82947 ASSAY GLUCOSE BLOOD QUANT: CPT

## 2024-02-23 PROCEDURE — G0108 DIAB MANAGE TRN  PER INDIV: HCPCS

## 2024-02-23 PROCEDURE — 6370000000 HC RX 637 (ALT 250 FOR IP): Performed by: STUDENT IN AN ORGANIZED HEALTH CARE EDUCATION/TRAINING PROGRAM

## 2024-02-23 PROCEDURE — 6360000002 HC RX W HCPCS: Performed by: INTERNAL MEDICINE

## 2024-02-23 PROCEDURE — 36415 COLL VENOUS BLD VENIPUNCTURE: CPT

## 2024-02-23 PROCEDURE — 2000000000 HC ICU R&B

## 2024-02-23 PROCEDURE — 6370000000 HC RX 637 (ALT 250 FOR IP)

## 2024-02-23 PROCEDURE — 2580000003 HC RX 258: Performed by: STUDENT IN AN ORGANIZED HEALTH CARE EDUCATION/TRAINING PROGRAM

## 2024-02-23 PROCEDURE — 80048 BASIC METABOLIC PNL TOTAL CA: CPT

## 2024-02-23 PROCEDURE — 99291 CRITICAL CARE FIRST HOUR: CPT | Performed by: INTERNAL MEDICINE

## 2024-02-23 RX ORDER — DEXTROSE AND SODIUM CHLORIDE 5; .9 G/100ML; G/100ML
INJECTION, SOLUTION INTRAVENOUS CONTINUOUS
Status: DISCONTINUED | OUTPATIENT
Start: 2024-02-23 | End: 2024-02-23

## 2024-02-23 RX ORDER — PANTOPRAZOLE SODIUM 40 MG/1
40 TABLET, DELAYED RELEASE ORAL
Status: DISCONTINUED | OUTPATIENT
Start: 2024-02-23 | End: 2024-02-27

## 2024-02-23 RX ORDER — INSULIN LISPRO 100 [IU]/ML
0-8 INJECTION, SOLUTION INTRAVENOUS; SUBCUTANEOUS
Status: DISCONTINUED | OUTPATIENT
Start: 2024-02-23 | End: 2024-02-25

## 2024-02-23 RX ORDER — INSULIN GLARGINE 100 [IU]/ML
20 INJECTION, SOLUTION SUBCUTANEOUS DAILY
Status: DISCONTINUED | OUTPATIENT
Start: 2024-02-23 | End: 2024-02-25

## 2024-02-23 RX ORDER — MAGNESIUM SULFATE IN WATER 40 MG/ML
2000 INJECTION, SOLUTION INTRAVENOUS ONCE
Status: DISCONTINUED | OUTPATIENT
Start: 2024-02-23 | End: 2024-02-23

## 2024-02-23 RX ORDER — MIDODRINE HYDROCHLORIDE 5 MG/1
5 TABLET ORAL
Status: DISCONTINUED | OUTPATIENT
Start: 2024-02-23 | End: 2024-02-23

## 2024-02-23 RX ORDER — POTASSIUM CHLORIDE 29.8 MG/ML
20 INJECTION INTRAVENOUS ONCE
Status: COMPLETED | OUTPATIENT
Start: 2024-02-23 | End: 2024-02-23

## 2024-02-23 RX ORDER — 0.9 % SODIUM CHLORIDE 0.9 %
250 INTRAVENOUS SOLUTION INTRAVENOUS ONCE
Status: COMPLETED | OUTPATIENT
Start: 2024-02-23 | End: 2024-02-23

## 2024-02-23 RX ORDER — MIDODRINE HYDROCHLORIDE 5 MG/1
5 TABLET ORAL
Status: DISCONTINUED | OUTPATIENT
Start: 2024-02-23 | End: 2024-02-24

## 2024-02-23 RX ORDER — FENTANYL CITRATE 50 UG/ML
25 INJECTION, SOLUTION INTRAMUSCULAR; INTRAVENOUS ONCE
Status: COMPLETED | OUTPATIENT
Start: 2024-02-23 | End: 2024-02-23

## 2024-02-23 RX ORDER — CALCIUM GLUCONATE 20 MG/ML
2000 INJECTION, SOLUTION INTRAVENOUS ONCE
Status: COMPLETED | OUTPATIENT
Start: 2024-02-23 | End: 2024-02-23

## 2024-02-23 RX ORDER — SODIUM CHLORIDE 9 MG/ML
INJECTION, SOLUTION INTRAVENOUS CONTINUOUS
Status: DISCONTINUED | OUTPATIENT
Start: 2024-02-23 | End: 2024-02-23

## 2024-02-23 RX ORDER — INSULIN LISPRO 100 [IU]/ML
0-4 INJECTION, SOLUTION INTRAVENOUS; SUBCUTANEOUS NIGHTLY
Status: DISCONTINUED | OUTPATIENT
Start: 2024-02-23 | End: 2024-02-25

## 2024-02-23 RX ORDER — SODIUM CHLORIDE 9 MG/ML
INJECTION, SOLUTION INTRAVENOUS CONTINUOUS
Status: DISCONTINUED | OUTPATIENT
Start: 2024-02-23 | End: 2024-02-25

## 2024-02-23 RX ADMIN — HEPARIN SODIUM 5000 UNITS: 5000 INJECTION INTRAVENOUS; SUBCUTANEOUS at 22:33

## 2024-02-23 RX ADMIN — MAGNESIUM SULFATE HEPTAHYDRATE 2000 MG: 40 INJECTION, SOLUTION INTRAVENOUS at 00:30

## 2024-02-23 RX ADMIN — CALCIUM GLUCONATE 2000 MG: 20 INJECTION, SOLUTION INTRAVENOUS at 12:12

## 2024-02-23 RX ADMIN — POTASSIUM CHLORIDE 20 MEQ: 29.8 INJECTION, SOLUTION INTRAVENOUS at 01:06

## 2024-02-23 RX ADMIN — DAKIN'S SOLUTION 0.125% (QUARTER STRENGTH): 0.12 SOLUTION at 15:00

## 2024-02-23 RX ADMIN — HEPARIN SODIUM 5000 UNITS: 5000 INJECTION INTRAVENOUS; SUBCUTANEOUS at 06:02

## 2024-02-23 RX ADMIN — MAGNESIUM SULFATE HEPTAHYDRATE 2000 MG: 40 INJECTION, SOLUTION INTRAVENOUS at 02:32

## 2024-02-23 RX ADMIN — LINEZOLID 600 MG: 600 INJECTION, SOLUTION INTRAVENOUS at 21:01

## 2024-02-23 RX ADMIN — FENTANYL CITRATE 25 MCG: 50 INJECTION, SOLUTION INTRAMUSCULAR; INTRAVENOUS at 15:19

## 2024-02-23 RX ADMIN — POTASSIUM BICARBONATE 20 MEQ: 782 TABLET, EFFERVESCENT ORAL at 00:25

## 2024-02-23 RX ADMIN — HEPARIN SODIUM 5000 UNITS: 5000 INJECTION INTRAVENOUS; SUBCUTANEOUS at 13:06

## 2024-02-23 RX ADMIN — MIDODRINE HYDROCHLORIDE 5 MG: 5 TABLET ORAL at 08:51

## 2024-02-23 RX ADMIN — MIDODRINE HYDROCHLORIDE 5 MG: 5 TABLET ORAL at 16:44

## 2024-02-23 RX ADMIN — DEXTROSE AND SODIUM CHLORIDE: 5; 900 INJECTION, SOLUTION INTRAVENOUS at 10:38

## 2024-02-23 RX ADMIN — SODIUM CHLORIDE, PRESERVATIVE FREE 10 ML: 5 INJECTION INTRAVENOUS at 20:00

## 2024-02-23 RX ADMIN — PIPERACILLIN AND TAZOBACTAM 3375 MG: 3; .375 INJECTION, POWDER, LYOPHILIZED, FOR SOLUTION INTRAVENOUS at 16:45

## 2024-02-23 RX ADMIN — INSULIN LISPRO 2 UNITS: 100 INJECTION, SOLUTION INTRAVENOUS; SUBCUTANEOUS at 16:42

## 2024-02-23 RX ADMIN — INSULIN GLARGINE 20 UNITS: 100 INJECTION, SOLUTION SUBCUTANEOUS at 08:48

## 2024-02-23 RX ADMIN — DEXTROSE AND SODIUM CHLORIDE: 5; 450 INJECTION, SOLUTION INTRAVENOUS at 04:02

## 2024-02-23 RX ADMIN — LINEZOLID 600 MG: 600 INJECTION, SOLUTION INTRAVENOUS at 09:00

## 2024-02-23 RX ADMIN — SODIUM CHLORIDE, PRESERVATIVE FREE 10 ML: 5 INJECTION INTRAVENOUS at 08:51

## 2024-02-23 RX ADMIN — PANTOPRAZOLE SODIUM 40 MG: 40 TABLET, DELAYED RELEASE ORAL at 08:51

## 2024-02-23 RX ADMIN — PIPERACILLIN AND TAZOBACTAM 3375 MG: 3; .375 INJECTION, POWDER, LYOPHILIZED, FOR SOLUTION INTRAVENOUS at 05:40

## 2024-02-23 RX ADMIN — SODIUM CHLORIDE: 9 INJECTION, SOLUTION INTRAVENOUS at 18:19

## 2024-02-23 RX ADMIN — SODIUM CHLORIDE 0.28 UNITS/HR: 9 INJECTION, SOLUTION INTRAVENOUS at 05:07

## 2024-02-23 RX ADMIN — Medication 30 MCG/MIN: at 15:56

## 2024-02-23 RX ADMIN — SODIUM CHLORIDE: 9 INJECTION, SOLUTION INTRAVENOUS at 12:10

## 2024-02-23 RX ADMIN — SODIUM CHLORIDE 250 ML: 9 INJECTION, SOLUTION INTRAVENOUS at 12:14

## 2024-02-23 ASSESSMENT — PAIN SCALES - GENERAL: PAINLEVEL_OUTOF10: 0

## 2024-02-23 NOTE — PROGRESS NOTES
Dialysis Post Treatment Note  Vitals:    02/22/24 2021   BP: 98/69   Pulse: 95   Resp: 22   Temp: 98.1 °F (36.7 °C)   SpO2: 100%     Pre-Weight = 53.3 kg  Post-weight = Weight - Scale: 53.3 kg (117 lb 8.1 oz)  Total Liters Processed = Blood Volume Processed (Liters): 29.56 L  Rinseback Volume (mL) = Rinseback Volume (ml): 250 ml  Net Removal (mL) =  0  Patient's dry weight=TBD  Type of access used= R temp cath  Length of treatment=120 minutes    Pt was on insulin and phenylephrine drip, BP soft 90s and tolerated treatment fairly. Temp cath flushed and locked with heparin. Report given to OSEAS Hannon.     Protopic Pregnancy And Lactation Text: This medication is Pregnancy Category C. It is unknown if this medication is excreted in breast milk when applied topically.

## 2024-02-23 NOTE — PROGRESS NOTES
PROGRESS NOTE          PATIENT NAME: Jules Nichols  MEDICAL RECORD NO. [id number]  DATE: 2/23/2024  SURGEON: Dr Carmichael  PRIMARY CARE PHYSICIAN: Elvin Kelley MD    HD: # 2    ASSESSMENT    Patient Active Problem List   Diagnosis    Benign essential HTN    Uncontrolled type 2 diabetes mellitus with nephropathy    Hyperlipemia    Stage 4 chronic kidney disease (HCC)    Dago disease    Necrotizing fasciitis (HCC)    Acute kidney injury superimposed on CKD (HCC)    Hidradenitis suppurativa of multiple sites    Metabolic acidosis    Hyperkalemia    Acute on chronic anemia    Moderate malnutrition (HCC)    Bandemia    Septic shock (HCC)    Severe sepsis (HCC)   43y/M w necrotizing soft tissue infection of bilateral axillae and bilateral groins.  S/p debridement of wound.    MEDICAL DECISION MAKING AND PLAN    Patient has NG tube in situ.  Currently continue NG tube placement.  NG tube is not meant for GI decompression but it has to be placed for providing nutritional support to the patient.  Okay to start tube feeds from general surgery standpoint.  Will defer the initiation of tube feeds to primary team in view of currently ongoing DKA management.   Patient is currently on insulin gtt.  Continue multimodal pain therapy  Daily dressings, wet-to-dry  Plan for wound debridement in OR on Saturday, 2/24/2024.  Follow-up culture report.  Continue antibiotics per ID recommendations.  Recommend psychiatry referral in view of failure to thrive possible depression  Rest of the care per primary team  Okay for ambulation and PT OT.      Chief Complaint: \"Patient complains of some soreness at the wound site\"    SUBJECTIVE    Jules Nichols is has slightly improved since yesterday.  Complains of slight soreness at the wound site.  Does not complain of any fever, chills, shortness of breath.  Does not have any chest pain.  NG tube in situ connected to LIWS.  Was told that tube feed has not started in view of DKA.  Patient  FINDINGS: The heart is normal in size.  Feeding tube terminates in the distal stomach. Dialysis catheter terminates in the SVC RA junction.  Pulmonary venous congestion is seen.  Perihilar pulmonary consolidation is present.  There is no pleural effusion.  No pneumothorax.  No acute osseous abnormality is seen.     1. Pulmonary venous congestion with perihilar pulmonary edema. 2. Feeding tube terminates in the distal stomach.     XR CHEST (SINGLE VIEW FRONTAL)    Result Date: 2/22/2024  EXAMINATION: ONE XRAY VIEW OF THE CHEST 2/22/2024 5:54 am COMPARISON: 02/21/2024 HISTORY: ORDERING SYSTEM PROVIDED HISTORY: Central line placement TECHNOLOGIST PROVIDED HISTORY: Central line placement FINDINGS: In the interim, and OG tube and right IJ line have been placed.  The distal tip and side hole of the OG tube are well within the body and antrum of the stomach.  The right IJ line tip is in the expected location of the right atrium. Heart size and pulmonary vasculature are normal.  The lungs are clear and normally expanded.  No pneumothorax or pleural effusion identified on this supine image.  Surrounding structures are unremarkable.     Right IJ line tip in the expected location of the right atrium. OG tube in satisfactory position. No acute cardiopulmonary abnormality identified.     CT CHEST ABDOMEN PELVIS WO CONTRAST Additional Contrast? None    Result Date: 2/21/2024  EXAMINATION: CT OF THE CHEST, ABDOMEN, AND PELVIS WITHOUT CONTRAST 2/21/2024 5:21 pm TECHNIQUE: CT of the chest, abdomen and pelvis was performed without the administration of intravenous contrast. Multiplanar reformatted images are provided for review. Automated exposure control, iterative reconstruction, and/or weight based adjustment of the mA/kV was utilized to reduce the radiation dose to as low as reasonably achievable. COMPARISON: Chest radiograph performed the same day HISTORY: ORDERING SYSTEM PROVIDED HISTORY: wounds bilateral axillary infections

## 2024-02-23 NOTE — PROGRESS NOTES
Nephrology Progress Note      SUBJECTIVE      Patient seen and examined.  He did receive 2-1/2-hour of dialysis treatment yesterday.  His urine output over the last 24 hours has improved significantly though.  His blood pressures are still labile.  Patient is without any nausea or vomiting.  He states he just feels overall quite weak.  He is off insulin drip as of this morning  Current blood pressure was in the high 80s although he seems to be mentating just fine.    Background history:  43-year-old gentleman with a history of diabetes mellitus, essential hypertension presented to the hospital because of axillary groin as well as chest wall wounds with shortness of breath.  He has a history of hidradenitis.  He underwent extensive debridement of the axillary area pectoral area and also had Dago's gangrene.  He was in DKA with blood sugars that were in the high 500s, he had high ketones in the serum, his creatinine was up to 8 and he had severe metabolic acidosis.  He did receive 1 hemodialysis treatment.  Urine output seems to have picked up.  He does have history of stage IV CKD with a creatinine that is been running around 3.5 or thereabouts going back almost 10 months from [employer] database.    OBJECTIVE      CURRENT TEMPERATURE:  Temp: 97.5 °F (36.4 °C)  MAXIMUM TEMPERATURE OVER 24HRS:  Temp (24hrs), Av.8 °F (36.6 °C), Min:97.5 °F (36.4 °C), Max:98.4 °F (36.9 °C)    CURRENT RESPIRATORY RATE:  Respirations: 15  CURRENT PULSE:  Pulse: 90  CURRENT BLOOD PRESSURE:  BP: (!) 84/54  24HR BLOOD PRESSURE RANGE:  Systolic (24hrs), Av , Min:75 , Max:144   ; Diastolic (24hrs), Av, Min:48, Max:86    24HR INTAKE/OUTPUT:    Intake/Output Summary (Last 24 hours) at 2024 1410  Last data filed at 2024 1316  Gross per 24 hour   Intake 7157.33 ml   Output 3500 ml   Net 3657.33 ml     WEIGHT :Patient Vitals for the past 96 hrs (Last 3 readings):   Weight   24 0600 52.4 kg (115 lb 8.3 oz)   24

## 2024-02-23 NOTE — PROGRESS NOTES
[facility] Nurse  Follow up      NAME:  Jules Nichols  MEDICAL RECORD NUMBER:  [id number]  AGE: 43 y.o.   GENDER: male  : 1980  TODAY'S DATE:  2024    Subjective:     Reason for WOCN Evaluation and Assessment: \"extreme HS in all folds\"      Jules Nichols is a 43 y.o. male referred by:   [x] Physician  [] Nursing  [] Other:      Wound Identification:  Wound Type:  hidradenitis Suppurativa, surgical.   Contributing Factors: diabetes, poor glucose control, malnutrition, and non-adherence                                        Followed with [facility] for a non-healing back abscess s/p I&D. Was subsequently dx with HS of the axillae by the same physician in 2023. A referral for dermatology was placed. No dermatology notes are available in the EHR. Patient denies receiving medications for the HS. Per the bedside RN health insurance coverage was lost and patient did not seek further care.            L axilla, L groin - Sharp excisional debridement down through and including the removal of epidermis, dermis, and subcutaneous tissue total of  66 sq cm.  R axilla, R groin, chest wall -  Sharp excisional debridement down through and including the removal of epidermis, dermis, subcutaneous tissue, and muscle/fascia tissue total of 605 sq cm  I&D abdominal wall abscess  With Dr Carmichael 2024.     Currently on phenylephrine. insulin gtt off.  Diet started today.   No strikethrough drainage noted. Per bedside RN the groin dressings were changed overnight due to fecal soilage.   Dr Terrazas at bedside for wound assessment.             Objective:      BP 94/63   Pulse 60   Temp 97.9 °F (36.6 °C) (Oral)   Resp 15   Ht 1.651 m (5' 5\")   Wt 52.4 kg (115 lb 8.3 oz)   SpO2 99%   BMI 19.22 kg/m²   Eduardo Risk Score: Eduardo Scale Score: 14    LABS    CBC:   Lab Results   Component Value Date/Time    WBC 14.7 2024 02:13 AM    RBC 3.29 2024 02:13 AM    HGB 8.5 2024  (Comment)  (HS lesions and scar)   Wound 02/22/24 Groin Proximal;Anterior;Left   Date First Assessed/Time First Assessed: 02/22/24 0400   Present on Original Admission: Yes  Primary Wound Type: Surgical Type  Location: Groin  Wound Location Orientation: Proximal;Anterior;Left   Wound Image    Wound Etiology Surgical   Dressing Status New dressing applied   Dressing/Treatment Moist to moist;Pharmaceutical agent (see MAR);Packing;ABD   Dressing Change Due 02/23/24   Wound Assessment Subcutaneous;Pink/red   Drainage Amount Moderate (25-50%)   Drainage Description Serosanguinous   Odor None   Sariah-wound Assessment Maceration;Hyperpigmented;Denuded   Wound 02/22/24 Back Medial;Upper   Date First Assessed/Time First Assessed: 02/22/24 0400   Present on Original Admission: Yes  Primary Wound Type: Other (comment)  Location: Back  Wound Location Orientation: Medial;Upper   Wound Etiology Other   Dressing Status New dressing applied   Wound Cleansed Soap and water   Dressing/Treatment Other (comment)  (moisture wicking underpad)   Wound Assessment Subcutaneous   Drainage Amount Moderate (25-50%)   Drainage Description Serosanguinous;Thin;Purulent   Odor Mild   Sariah-wound Assessment Blanchable erythema;Induration;Other (Comment)  (HS lesions and scars)   Wound 02/22/24 Pelvis   Date First Assessed: 02/22/24   Present on Original Admission: Yes  Primary Wound Type: Surgical Type  Location: Pelvis   Wound Etiology Surgical   Dressing Status New dressing applied   Dressing/Treatment Moist to moist;Pharmaceutical agent (see MAR);ABD   Dressing Change Due 02/24/24   Wound Assessment Subcutaneous;Pink/red   Drainage Amount Moderate (25-50%)   Drainage Description Serosanguinous   Odor None   Sariah-wound Assessment Intact;Edematous   Wound 02/22/24 Abdomen I&D site   Date First Assessed: 02/22/24   Present on Original Admission: No  Primary Wound Type: Surgical Type  Location: Abdomen  Wound Description (Comments): I&D site   Wound

## 2024-02-23 NOTE — PROGRESS NOTES
[facility] -   Infectious diseases evaluation  admission date 2/21/2024    reason for consultation:   Fasciitis    Impression :   Current:  Right axilla  fasciitis with air in the soft tissues  Fluid in the gangrene groin area with a subcutaneous  Underlying hidradenitis suppurativa extensive sites  JAY on hemodialysis  Diabetes mellitus  Bandemia  Septic shock with severe sepsis with organ failure, on pressors    Other:    Discussion / summary of stay / plan of care/ Recommendations:     HENCE:   Continue Zosyn  Zyvox to spare the kidneys  Suspect multi bacterial fasciitis and diabetic  Adjust AB per final cx    Infection Control Recommendations   [place] Precautions  Contact Isolation       Antimicrobial Stewardship Recommendations   Simplification of therapy  Targeted therapy  History of Present Illness:   Initial history:  Jules Nichols is a 43 y.o.-year-old male of diabetes and hidradenitis affecting multiple sites, was very short of breath and transferred from another facility with a creatinine of 3 and a right arm major inflammation seen for a groin area that had major inflammation and cellulitis, diagnosed with necrotizing patient this of both sites and was taken to surgery with a debridement of the right axilla as well as the groin area.  According to the patient those wounds were hidradenitis sites and they were progressing over the last 3 weeks progressively.    He was started on vancomycin and Zosyn  Was given a dose of clindamycin  Infectious disease consulted for antibiotic management    He is currently on Isma-Synephrine, getting hemodialysis for acute kidney injury  Alert appropriate NG tube in place abdomen soft nontender  No burning with the urine    Culture from the right axillary area is showing mixed bacteria 2/22  Another culture is showing group B strep  Blood culture and urine culture still pending    Patient is tachycardic hypotensive    Left  group B strep 2/21    Urine analysis negative  Procedures      Cardiology      Imaging:  CT chest and AP 2/21  . Soft tissue irregularity and skin thickening in the bilateral inguinal  regions with small amount of subcutaneous air in the right inguinal region  tracking into the right perineum and small amount of air within the medial  right gluteal subcutaneous tissues.  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  2. Soft tissue wounds in the bilateral axillae, bilateral lateral chest walls  and right greater than left upper arms. There is a deep wound involving the  medial aspect of the image right upper arm. No appreciable extension to bone  or appreciable evidence of osteomyelitis.  3. Additional foci of subcutaneous air and abnormal soft tissue attenuation  within the midline anterior and left posterior paramedian chest wall and  right upper quadrant subcutaneous tissues, concerning for infection with  gas-forming organisms.  Associated skin thickening in the posterior chest  wall.  4. Wall thickening of the mid and lower thoracic esophagus, which could be  related to esophagitis or gastroesophageal reflux disease; underlying  neoplasm is not excluded.    I have personally reviewed the past medical history, past surgical history, medications, social history, and family history, and I haveupdated the database accordingly.      Allergies:   Patient has no known allergies.     Review of Systems:     Review of Systems   Constitutional:  Negative for activity change.   HENT:  Negative for congestion.    Eyes:  Negative for discharge.   Respiratory:  Negative for apnea.    Cardiovascular:  Negative for chest pain.   Gastrointestinal:  Negative for abdominal distention.   Endocrine: Negative for cold intolerance, polydipsia and polyphagia.   Genitourinary:  Negative for dysuria.   Musculoskeletal:  Negative for arthralgias.   Skin:  Positive for color change and wound (right axillae and groin).

## 2024-02-23 NOTE — PLAN OF CARE
Problem: Discharge Planning  Goal: Discharge to home or other facility with appropriate resources  2/22/2024 2326 by Hanane Todd RN  Outcome: Progressing  2/22/2024 1812 by Loreto Mae RN  Outcome: Progressing     Problem: Pain  Goal: Verbalizes/displays adequate comfort level or baseline comfort level  2/22/2024 2326 by Hanane Todd RN  Outcome: Progressing  2/22/2024 1812 by Loreto Mae RN  Outcome: Progressing     Problem: Safety - Adult  Goal: Free from fall injury  2/22/2024 2326 by Hanane Todd RN  Outcome: Progressing  2/22/2024 1812 by Loreto Mae RN  Outcome: Progressing     Problem: Skin/Tissue Integrity  Goal: Absence of new skin breakdown  Description: 1.  Monitor for areas of redness and/or skin breakdown  2.  Assess vascular access sites hourly  3.  Every 4-6 hours minimum:  Change oxygen saturation probe site  4.  Every 4-6 hours:  If on nasal continuous positive airway pressure, respiratory therapy assess nares and determine need for appliance change or resting period.  2/22/2024 2326 by Hanane Todd RN  Outcome: Progressing  2/22/2024 1812 by Loreto Mae RN  Outcome: Progressing     Problem: ABCDS Injury Assessment  Goal: Absence of physical injury  2/22/2024 2326 by Hanane Todd RN  Outcome: Progressing  2/22/2024 1812 by Loreto Mae RN  Outcome: Progressing     Problem: Chronic Conditions and Co-morbidities  Goal: Patient's chronic conditions and co-morbidity symptoms are monitored and maintained or improved  2/22/2024 2326 by Hanane Todd RN  Outcome: Progressing  2/22/2024 1812 by Loreto Mae RN  Outcome: Progressing

## 2024-02-23 NOTE — PROGRESS NOTES
INTENSIVE CARE UNIT  Resident Physician Progress Note    Patient - Jules Nichols  Date of Admission -  2/21/2024 10:41 PM  Date of Evaluation -  2/23/2024  Room and Bed Number -  3015/3015-01   Hospital Day - 2      SUBJECTIVE:     OVERNIGHT EVENTS:      No acute events overnight  Patient remained afebrile, hemodynamically stable and remained on pressor support    TODAY:    Patient examined at bedside today.  Labs and chart reviewed.  Vitals reviewed.    Labs show sodium 134, potassium 3.8, chloride 99, bicarb 21, BUN 41, creatinine 3.3, anion gap 14  Magnesium 1.5-replaced, glucose 124, calcium 7.6  Phosphorus 2.7  WBC trending down 14.7  Hemoglobin 8.5    Patient has no concerns and complaints, is on Isma-Synephrine 40.  MAP is above 75, saturating well on room air, heart rate 70/min..    When asked, patient stated that he was tolerating oral diet before coming to the hospital.  Patient stated that he wants to try p.o. intake instead of tube feedings.  Per nursing staff, patient passed bedside swallow test.  NG was removed but afterwards surgery wanted the NG to be placed back for his nutritional support.  Tentative plan is to potentially put NG tube tomorrow in OR while doing debridement.    Infectious disease : Consulted for fasciitis, recommends to continue Zosyn and Zyvox.  Cultures from the wound show streptococci, beta-hemolytic group B and many gram-positive cocci in pairs and gram-positive rods and few gram-positive cocci in clusters.  Wound care is also following.    Nephrology : Is on board for JAY on CKD.  As per report he appears to be in CKD stage IV with baseline creatinine around 3.5.  Patient has dialysis access, underwent dialysis overnight.      Brief History:   Jules Nichols is a 43 y.o. male presented to outside facility with shortness of breath and worsening wounds.  Patient has a history of hypertension, diabetes, hyperlipidemia, chronic kidney disease with baseline creatinine around 3.

## 2024-02-23 NOTE — PROGRESS NOTES
Inpatient Diabetes Education    Jules Nichols was seen for follow-up. Progress note from yesterday copied below for reference.    Lab Results   Component Value Date    LABA1C 10.2 (H) 02/22/2024    LABA1C 14.0 05/05/2017    LABA1C 18.1 04/27/2017     Jules was awake in bed. A container of applesauce with a few bites out of it was on his over bed table. I asked him about how it tasted. He states \"okay\". I used this as a teaching opportunity to talk about regular meals being a good style of eating for a person with diabetes.    We next talked about him being transitioned off the insulin drip onto insulin injections. He acknowledged this.     Will continue to follow patient's case as discharge plan develops. Patient likely will need support with his diabetes self care.    Education Folder provided with following support information was left at bedside: Folder of educational materials remains at bedside  _x__  Handout - What is diabetes? [email or web address] 2019  _x__  Handout - Eating Healthy for Life at every Meal / Plate method and grocery list  from www.[facility].org  _x__ Handout - How to Check Your Blood Sugar from www.[facility].org  _x__ Handout - Be safe with Needles teaching sheet - / www.[facility].org    _x__ Handout - How to Use an Insulin Pen - handout with QR code - [employer] Current as of Sept 22 2021  _x__ Emergency Insert sheet for your Vehicle - ADA Diabetes Emergencies   _x__ Diabetes ID card - ADA Low and High Blood Sugar and treatments  _x__ [facility] Education brochure/ contact card    PABLO VARGAS RN       Inpatient Diabetes Education Date of Note: 2-22-24    Jules Nichols was seen for evaluation and education on uncontrolled diabetes.    Lab Results   Component Value Date    LABA1C 10.2 (H) 02/22/2024    LABA1C 14.0 05/05/2017    LABA1C 18.1 04/27/2017     Jules states that he has had diabetes for about 9 years. He states that in the past he has checked

## 2024-02-24 ENCOUNTER — ANESTHESIA EVENT (OUTPATIENT)
Dept: OPERATING ROOM | Age: 44
End: 2024-02-24

## 2024-02-24 ENCOUNTER — ANESTHESIA (OUTPATIENT)
Dept: OPERATING ROOM | Age: 44
End: 2024-02-24

## 2024-02-24 LAB
ABO/RH: NORMAL
ANION GAP SERPL CALCULATED.3IONS-SCNC: 11 MMOL/L (ref 9–17)
ANTIBODY SCREEN: NEGATIVE
ARM BAND NUMBER: NORMAL
BASOPHILS # BLD: 0.04 K/UL (ref 0–0.2)
BASOPHILS NFR BLD: 0 % (ref 0–2)
BLOOD BANK BLOOD PRODUCT EXPIRATION DATE: NORMAL
BLOOD BANK DISPENSE STATUS: NORMAL
BLOOD BANK ISBT PRODUCT BLOOD TYPE: 9500
BLOOD BANK PRODUCT CODE: NORMAL
BLOOD BANK SAMPLE EXPIRATION: NORMAL
BLOOD BANK UNIT TYPE AND RH: NORMAL
BPU ID: NORMAL
BUN SERPL-MCNC: 48 MG/DL (ref 6–20)
CALCIUM SERPL-MCNC: 7.6 MG/DL (ref 8.6–10.4)
CHLORIDE SERPL-SCNC: 106 MMOL/L (ref 98–107)
CO2 SERPL-SCNC: 19 MMOL/L (ref 20–31)
COMPONENT: NORMAL
CREAT SERPL-MCNC: 4 MG/DL (ref 0.7–1.2)
CROSSMATCH RESULT: NORMAL
EOSINOPHIL # BLD: 0.14 K/UL (ref 0–0.44)
EOSINOPHILS RELATIVE PERCENT: 1 % (ref 1–4)
ERYTHROCYTE [DISTWIDTH] IN BLOOD BY AUTOMATED COUNT: 18.6 % (ref 11.8–14.4)
GFR SERPL CREATININE-BSD FRML MDRD: 18 ML/MIN/1.73M2
GLUCOSE BLD-MCNC: 112 MG/DL (ref 75–110)
GLUCOSE BLD-MCNC: 122 MG/DL (ref 75–110)
GLUCOSE BLD-MCNC: 176 MG/DL (ref 75–110)
GLUCOSE SERPL-MCNC: 182 MG/DL (ref 70–99)
HCT VFR BLD AUTO: 23.9 % (ref 40.7–50.3)
HGB BLD-MCNC: 7.8 G/DL (ref 13–17)
IMM GRANULOCYTES # BLD AUTO: 0.35 K/UL (ref 0–0.3)
IMM GRANULOCYTES NFR BLD: 3 %
LYMPHOCYTES NFR BLD: 2.18 K/UL (ref 1.1–3.7)
LYMPHOCYTES RELATIVE PERCENT: 20 % (ref 24–43)
MAGNESIUM SERPL-MCNC: 1.7 MG/DL (ref 1.6–2.6)
MCH RBC QN AUTO: 26 PG (ref 25.2–33.5)
MCHC RBC AUTO-ENTMCNC: 32.6 G/DL (ref 28.4–34.8)
MCV RBC AUTO: 79.7 FL (ref 82.6–102.9)
MICROORGANISM SPEC CULT: ABNORMAL
MICROORGANISM SPEC CULT: ABNORMAL
MICROORGANISM/AGENT SPEC: ABNORMAL
MICROORGANISM/AGENT SPEC: ABNORMAL
MONOCYTES NFR BLD: 0.72 K/UL (ref 0.1–1.2)
MONOCYTES NFR BLD: 7 % (ref 3–12)
NEUTROPHILS NFR BLD: 69 % (ref 36–65)
NEUTS SEG NFR BLD: 7.73 K/UL (ref 1.5–8.1)
NRBC BLD-RTO: 0 PER 100 WBC
PHOSPHATE SERPL-MCNC: 3.5 MG/DL (ref 2.5–4.5)
PLATELET # BLD AUTO: 235 K/UL (ref 138–453)
PMV BLD AUTO: 9.5 FL (ref 8.1–13.5)
POTASSIUM SERPL-SCNC: 4.1 MMOL/L (ref 3.7–5.3)
RBC # BLD AUTO: 3 M/UL (ref 4.21–5.77)
RBC # BLD: ABNORMAL 10*6/UL
SODIUM SERPL-SCNC: 136 MMOL/L (ref 135–144)
SPECIMEN DESCRIPTION: ABNORMAL
TRANSFUSION STATUS: NORMAL
UNIT DIVISION: 0
UNIT ISSUE DATE/TIME: NORMAL
WBC OTHER # BLD: 11.2 K/UL (ref 3.5–11.3)

## 2024-02-24 PROCEDURE — 83735 ASSAY OF MAGNESIUM: CPT

## 2024-02-24 PROCEDURE — 6360000002 HC RX W HCPCS: Performed by: INTERNAL MEDICINE

## 2024-02-24 PROCEDURE — 2720000010 HC SURG SUPPLY STERILE: Performed by: SURGERY

## 2024-02-24 PROCEDURE — 99232 SBSQ HOSP IP/OBS MODERATE 35: CPT | Performed by: INTERNAL MEDICINE

## 2024-02-24 PROCEDURE — 85025 COMPLETE CBC W/AUTO DIFF WBC: CPT

## 2024-02-24 PROCEDURE — 11046 DBRDMT MUSC&/FSCA EA ADDL: CPT | Performed by: SURGERY

## 2024-02-24 PROCEDURE — 86901 BLOOD TYPING SEROLOGIC RH(D): CPT

## 2024-02-24 PROCEDURE — 99024 POSTOP FOLLOW-UP VISIT: CPT | Performed by: SURGERY

## 2024-02-24 PROCEDURE — 3600000004 HC SURGERY LEVEL 4 BASE: Performed by: SURGERY

## 2024-02-24 PROCEDURE — 3700000000 HC ANESTHESIA ATTENDED CARE: Performed by: SURGERY

## 2024-02-24 PROCEDURE — 2580000003 HC RX 258: Performed by: STUDENT IN AN ORGANIZED HEALTH CARE EDUCATION/TRAINING PROGRAM

## 2024-02-24 PROCEDURE — 86850 RBC ANTIBODY SCREEN: CPT

## 2024-02-24 PROCEDURE — 2580000003 HC RX 258: Performed by: SURGERY

## 2024-02-24 PROCEDURE — 80048 BASIC METABOLIC PNL TOTAL CA: CPT

## 2024-02-24 PROCEDURE — 6360000002 HC RX W HCPCS: Performed by: STUDENT IN AN ORGANIZED HEALTH CARE EDUCATION/TRAINING PROGRAM

## 2024-02-24 PROCEDURE — 2500000003 HC RX 250 WO HCPCS

## 2024-02-24 PROCEDURE — 6360000002 HC RX W HCPCS

## 2024-02-24 PROCEDURE — 84100 ASSAY OF PHOSPHORUS: CPT

## 2024-02-24 PROCEDURE — 7100000000 HC PACU RECOVERY - FIRST 15 MIN: Performed by: SURGERY

## 2024-02-24 PROCEDURE — 0KBF0ZZ EXCISION OF RIGHT TRUNK MUSCLE, OPEN APPROACH: ICD-10-PCS | Performed by: SURGERY

## 2024-02-24 PROCEDURE — 6370000000 HC RX 637 (ALT 250 FOR IP)

## 2024-02-24 PROCEDURE — 2580000003 HC RX 258: Performed by: INTERNAL MEDICINE

## 2024-02-24 PROCEDURE — 6370000000 HC RX 637 (ALT 250 FOR IP): Performed by: INTERNAL MEDICINE

## 2024-02-24 PROCEDURE — 99291 CRITICAL CARE FIRST HOUR: CPT | Performed by: INTERNAL MEDICINE

## 2024-02-24 PROCEDURE — 7100000001 HC PACU RECOVERY - ADDTL 15 MIN: Performed by: SURGERY

## 2024-02-24 PROCEDURE — 36415 COLL VENOUS BLD VENIPUNCTURE: CPT

## 2024-02-24 PROCEDURE — 86920 COMPATIBILITY TEST SPIN: CPT

## 2024-02-24 PROCEDURE — 2709999900 HC NON-CHARGEABLE SUPPLY: Performed by: SURGERY

## 2024-02-24 PROCEDURE — 11043 DBRDMT MUSC&/FSCA 1ST 20/<: CPT | Performed by: SURGERY

## 2024-02-24 PROCEDURE — 3600000014 HC SURGERY LEVEL 4 ADDTL 15MIN: Performed by: SURGERY

## 2024-02-24 PROCEDURE — 3700000001 HC ADD 15 MINUTES (ANESTHESIA): Performed by: SURGERY

## 2024-02-24 PROCEDURE — 2580000003 HC RX 258: Performed by: EMERGENCY MEDICINE

## 2024-02-24 PROCEDURE — 6370000000 HC RX 637 (ALT 250 FOR IP): Performed by: STUDENT IN AN ORGANIZED HEALTH CARE EDUCATION/TRAINING PROGRAM

## 2024-02-24 PROCEDURE — 82947 ASSAY GLUCOSE BLOOD QUANT: CPT

## 2024-02-24 PROCEDURE — 86900 BLOOD TYPING SEROLOGIC ABO: CPT

## 2024-02-24 PROCEDURE — 6360000002 HC RX W HCPCS: Performed by: EMERGENCY MEDICINE

## 2024-02-24 PROCEDURE — 2060000000 HC ICU INTERMEDIATE R&B

## 2024-02-24 RX ORDER — DROPERIDOL 2.5 MG/ML
0.62 INJECTION, SOLUTION INTRAMUSCULAR; INTRAVENOUS
Status: DISCONTINUED | OUTPATIENT
Start: 2024-02-24 | End: 2024-02-25

## 2024-02-24 RX ORDER — PROPOFOL 10 MG/ML
INJECTION, EMULSION INTRAVENOUS PRN
Status: DISCONTINUED | OUTPATIENT
Start: 2024-02-24 | End: 2024-02-24

## 2024-02-24 RX ORDER — ETOMIDATE 2 MG/ML
INJECTION INTRAVENOUS PRN
Status: DISCONTINUED | OUTPATIENT
Start: 2024-02-24 | End: 2024-02-24 | Stop reason: SDUPTHER

## 2024-02-24 RX ORDER — SODIUM CHLORIDE 9 MG/ML
INJECTION, SOLUTION INTRAVENOUS PRN
Status: DISCONTINUED | OUTPATIENT
Start: 2024-02-24 | End: 2024-02-25

## 2024-02-24 RX ORDER — SODIUM CHLORIDE 0.9 % (FLUSH) 0.9 %
5-40 SYRINGE (ML) INJECTION PRN
Status: DISCONTINUED | OUTPATIENT
Start: 2024-02-24 | End: 2024-02-25

## 2024-02-24 RX ORDER — AMPICILLIN AND SULBACTAM 2; 1 G/1; G/1
INJECTION, POWDER, FOR SOLUTION INTRAMUSCULAR; INTRAVENOUS PRN
Status: DISCONTINUED | OUTPATIENT
Start: 2024-02-24 | End: 2024-02-24 | Stop reason: SDUPTHER

## 2024-02-24 RX ORDER — MIDODRINE HYDROCHLORIDE 5 MG/1
10 TABLET ORAL
Status: DISCONTINUED | OUTPATIENT
Start: 2024-02-24 | End: 2024-03-02

## 2024-02-24 RX ORDER — MIDAZOLAM HYDROCHLORIDE 1 MG/ML
INJECTION INTRAMUSCULAR; INTRAVENOUS PRN
Status: DISCONTINUED | OUTPATIENT
Start: 2024-02-24 | End: 2024-02-24 | Stop reason: SDUPTHER

## 2024-02-24 RX ORDER — LIDOCAINE HYDROCHLORIDE 10 MG/ML
INJECTION, SOLUTION EPIDURAL; INFILTRATION; INTRACAUDAL; PERINEURAL PRN
Status: DISCONTINUED | OUTPATIENT
Start: 2024-02-24 | End: 2024-02-24 | Stop reason: SDUPTHER

## 2024-02-24 RX ORDER — FENTANYL CITRATE 50 UG/ML
INJECTION, SOLUTION INTRAMUSCULAR; INTRAVENOUS PRN
Status: DISCONTINUED | OUTPATIENT
Start: 2024-02-24 | End: 2024-02-24 | Stop reason: SDUPTHER

## 2024-02-24 RX ORDER — MAGNESIUM HYDROXIDE 1200 MG/15ML
LIQUID ORAL CONTINUOUS PRN
Status: COMPLETED | OUTPATIENT
Start: 2024-02-24 | End: 2024-02-24

## 2024-02-24 RX ORDER — SODIUM CHLORIDE 0.9 % (FLUSH) 0.9 %
5-40 SYRINGE (ML) INJECTION EVERY 12 HOURS SCHEDULED
Status: DISCONTINUED | OUTPATIENT
Start: 2024-02-25 | End: 2024-02-25

## 2024-02-24 RX ORDER — HYDRALAZINE HYDROCHLORIDE 20 MG/ML
10 INJECTION INTRAMUSCULAR; INTRAVENOUS
Status: DISCONTINUED | OUTPATIENT
Start: 2024-02-24 | End: 2024-02-25

## 2024-02-24 RX ORDER — DIPHENHYDRAMINE HYDROCHLORIDE 50 MG/ML
12.5 INJECTION INTRAMUSCULAR; INTRAVENOUS
Status: DISCONTINUED | OUTPATIENT
Start: 2024-02-24 | End: 2024-02-25

## 2024-02-24 RX ORDER — DEXAMETHASONE SODIUM PHOSPHATE 10 MG/ML
INJECTION INTRAMUSCULAR; INTRAVENOUS PRN
Status: DISCONTINUED | OUTPATIENT
Start: 2024-02-24 | End: 2024-02-24 | Stop reason: SDUPTHER

## 2024-02-24 RX ORDER — FENTANYL CITRATE 50 UG/ML
50 INJECTION, SOLUTION INTRAMUSCULAR; INTRAVENOUS ONCE
Status: COMPLETED | OUTPATIENT
Start: 2024-02-24 | End: 2024-02-24

## 2024-02-24 RX ORDER — ROCURONIUM BROMIDE 10 MG/ML
INJECTION, SOLUTION INTRAVENOUS PRN
Status: DISCONTINUED | OUTPATIENT
Start: 2024-02-24 | End: 2024-02-24 | Stop reason: SDUPTHER

## 2024-02-24 RX ORDER — METOCLOPRAMIDE HYDROCHLORIDE 5 MG/ML
10 INJECTION INTRAMUSCULAR; INTRAVENOUS
Status: DISCONTINUED | OUTPATIENT
Start: 2024-02-24 | End: 2024-02-25

## 2024-02-24 RX ORDER — ONDANSETRON 2 MG/ML
INJECTION INTRAMUSCULAR; INTRAVENOUS PRN
Status: DISCONTINUED | OUTPATIENT
Start: 2024-02-24 | End: 2024-02-24 | Stop reason: SDUPTHER

## 2024-02-24 RX ADMIN — HEPARIN SODIUM 5000 UNITS: 5000 INJECTION INTRAVENOUS; SUBCUTANEOUS at 05:49

## 2024-02-24 RX ADMIN — MIDODRINE HYDROCHLORIDE 5 MG: 5 TABLET ORAL at 09:00

## 2024-02-24 RX ADMIN — FENTANYL CITRATE 25 MCG: 50 INJECTION, SOLUTION INTRAMUSCULAR; INTRAVENOUS at 22:40

## 2024-02-24 RX ADMIN — ROCURONIUM BROMIDE 50 MG: 10 INJECTION, SOLUTION INTRAVENOUS at 21:57

## 2024-02-24 RX ADMIN — DEXAMETHASONE SODIUM PHOSPHATE 4 MG: 10 INJECTION INTRAMUSCULAR; INTRAVENOUS at 21:55

## 2024-02-24 RX ADMIN — PANTOPRAZOLE SODIUM 40 MG: 40 TABLET, DELAYED RELEASE ORAL at 09:51

## 2024-02-24 RX ADMIN — PIPERACILLIN AND TAZOBACTAM 3375 MG: 3; .375 INJECTION, POWDER, LYOPHILIZED, FOR SOLUTION INTRAVENOUS at 04:49

## 2024-02-24 RX ADMIN — SUGAMMADEX 200 MG: 100 INJECTION, SOLUTION INTRAVENOUS at 23:41

## 2024-02-24 RX ADMIN — ETOMIDATE 16 MG: 2 INJECTION, SOLUTION INTRAVENOUS at 21:43

## 2024-02-24 RX ADMIN — SODIUM CHLORIDE: 9 INJECTION, SOLUTION INTRAVENOUS at 02:45

## 2024-02-24 RX ADMIN — SODIUM CHLORIDE: 9 INJECTION, SOLUTION INTRAVENOUS at 04:52

## 2024-02-24 RX ADMIN — FENTANYL CITRATE 50 MCG: 50 INJECTION, SOLUTION INTRAMUSCULAR; INTRAVENOUS at 04:06

## 2024-02-24 RX ADMIN — SODIUM CHLORIDE, PRESERVATIVE FREE 10 ML: 5 INJECTION INTRAVENOUS at 20:06

## 2024-02-24 RX ADMIN — FENTANYL CITRATE 100 MCG: 50 INJECTION, SOLUTION INTRAMUSCULAR; INTRAVENOUS at 21:43

## 2024-02-24 RX ADMIN — LINEZOLID 600 MG: 600 INJECTION, SOLUTION INTRAVENOUS at 07:43

## 2024-02-24 RX ADMIN — LIDOCAINE HYDROCHLORIDE 50 MG: 10 INJECTION, SOLUTION EPIDURAL; INFILTRATION; INTRACAUDAL; PERINEURAL at 21:43

## 2024-02-24 RX ADMIN — FENTANYL CITRATE 50 MCG: 50 INJECTION, SOLUTION INTRAMUSCULAR; INTRAVENOUS at 23:47

## 2024-02-24 RX ADMIN — ROCURONIUM BROMIDE 50 MG: 10 INJECTION, SOLUTION INTRAVENOUS at 21:43

## 2024-02-24 RX ADMIN — AMPICILLIN AND SULBACTAM 3000 G: 2; 1 INJECTION, POWDER, FOR SOLUTION INTRAMUSCULAR; INTRAVENOUS at 22:29

## 2024-02-24 RX ADMIN — DAKIN'S SOLUTION 0.125% (QUARTER STRENGTH): 0.12 SOLUTION at 04:14

## 2024-02-24 RX ADMIN — ONDANSETRON 4 MG: 2 INJECTION INTRAMUSCULAR; INTRAVENOUS at 23:24

## 2024-02-24 RX ADMIN — AMPICILLIN SODIUM AND SULBACTAM SODIUM 3000 MG: 2; 1 INJECTION, POWDER, FOR SOLUTION INTRAMUSCULAR; INTRAVENOUS at 20:06

## 2024-02-24 RX ADMIN — FENTANYL CITRATE 25 MCG: 50 INJECTION, SOLUTION INTRAMUSCULAR; INTRAVENOUS at 22:15

## 2024-02-24 RX ADMIN — SODIUM CHLORIDE, PRESERVATIVE FREE 10 ML: 5 INJECTION INTRAVENOUS at 09:00

## 2024-02-24 RX ADMIN — SODIUM CHLORIDE: 9 INJECTION, SOLUTION INTRAVENOUS at 04:54

## 2024-02-24 RX ADMIN — MIDAZOLAM 2 MG: 1 INJECTION INTRAMUSCULAR; INTRAVENOUS at 21:40

## 2024-02-24 ASSESSMENT — PAIN SCALES - GENERAL: PAINLEVEL_OUTOF10: 0

## 2024-02-24 NOTE — PLAN OF CARE
Problem: Discharge Planning  Goal: Discharge to home or other facility with appropriate resources  Outcome: Progressing     Problem: Pain  Goal: Verbalizes/displays adequate comfort level or baseline comfort level  Outcome: Progressing     Problem: Safety - Adult  Goal: Free from fall injury  Outcome: Progressing     Problem: Skin/Tissue Integrity  Goal: Absence of new skin breakdown  Description: 1.  Monitor for areas of redness and/or skin breakdown  2.  Assess vascular access sites hourly  3.  Every 4-6 hours minimum:  Change oxygen saturation probe site  4.  Every 4-6 hours:  If on nasal continuous positive airway pressure, respiratory therapy assess nares and determine need for appliance change or resting period.  Outcome: Progressing     Problem: ABCDS Injury Assessment  Goal: Absence of physical injury  Outcome: Progressing  Flowsheets (Taken 2/24/2024 0800)  Absence of Physical Injury: Implement safety measures based on patient assessment     Problem: Chronic Conditions and Co-morbidities  Goal: Patient's chronic conditions and co-morbidity symptoms are monitored and maintained or improved  Outcome: Progressing

## 2024-02-24 NOTE — PROGRESS NOTES
Conjunctiva/sclera: Conjunctivae normal.   Cardiovascular:      Rate and Rhythm: Normal rate and regular rhythm.      Heart sounds: No murmur heard.     No gallop.   Pulmonary:      Effort: No respiratory distress.      Breath sounds: No wheezing.   Abdominal:      General: There is no distension.      Tenderness: There is no abdominal tenderness.   Musculoskeletal:         General: Tenderness (arm pit and groin) present. No swelling or deformity.      Cervical back: Neck supple. No rigidity or tenderness.      Comments: Right axilla major wound debridement with groin area as well, both dressed   Skin:     Coloration: Skin is not jaundiced.   Neurological:      Mental Status: He is alert and oriented to person, place, and time.      Cranial Nerves: No cranial nerve deficit.   Psychiatric:         Mood and Affect: Mood normal.         Thought Content: Thought content normal.         Past Medical History:     Past Medical History:   Diagnosis Date    Benign essential HTN     Chronic kidney disease 1/20/16    Dr Stafford    Diabetic keto-acidosis (HCC) 1/20/16    hx of     Hyperlipemia     Uncontrolled type 2 diabetes mellitus with nephropathy 1/20/16       Past Surgical  History:     Past Surgical History:   Procedure Laterality Date    OTHER SURGICAL HISTORY Bilateral 02/22/2024    INCISION AND DRAINAGE OF AXILLAS, CHEST WALL AND GROIN (YELLOW FINS, LITHOTOMY, CYSTO TUBING, WINTER GREEN) (Bilateral)    WOUND EXPLORATION Bilateral 2/22/2024    INCISION AND DRAINAGE OF BILATERAL AXILLA, CHEST WALL AND BILATERAL GROIN performed by Sandy Carmichael MD at [facility] OR       Medications:      midodrine  10 mg Oral TID WC    insulin glargine  20 Units SubCUTAneous Daily    insulin lispro  0-8 Units SubCUTAneous TID WC    insulin lispro  0-4 Units SubCUTAneous Nightly    pantoprazole  40 mg Oral QAM AC    sodium chloride flush  5-40 mL IntraVENous 2 times per day    sodium hypochlorite   Irrigation BID    piperacillin-tazobactam   Results   Component Value Date/Time    CREATININE 4.0 02/24/2024 12:00 PM    GLUCOSE 182 02/24/2024 12:00 PM       Detailed results:        Thank you for allowing us to participate in the care of this patient.Please call with questions.    This note is created with the assistance of a speech recognition program.  While intending to generate adocument that actually reflects the content of the visit, the document can still have some errors including those of syntax and sound a like substitutions which may escape proof reading.  It such instances, actual meaningcan be extrapolated by contextual diversion.    Isha Mack MD  Office: [phone]  Perfect serve / office [phone]

## 2024-02-24 NOTE — PROGRESS NOTES
Nephrology Progress Note      SUBJECTIVE       Pt was seen and examined. No acute issues overnite. Stable hemodynamics .   Patient is awake and alert.  This morning he is not on any pressors.  Blood pressures are in the low systolic 100s.  Urine output noted to be at 2.1 L.  Labs from this morning are still pending.  White count is down to 11.2 with a hemoglobin of 7.8 and a normal platelet count though.  Blood sugars are in the 200s now.    Background history:  43-year-old gentleman with a history of diabetes mellitus, essential hypertension presented to the hospital because of axillary groin as well as chest wall wounds with shortness of breath.  He has a history of hidradenitis.  He underwent extensive debridement of the axillary area pectoral area and also had Dago's gangrene.  He was in DKA with blood sugars that were in the high 500s, he had high ketones in the serum, his creatinine was up to 8 and he had severe metabolic acidosis.  He did receive 1 hemodialysis treatment.  Urine output seems to have picked up.  He does have history of stage IV CKD with a creatinine that is been running around 3.5 or thereabouts going back almost 10 months from [facility] [employer] database.    OBJECTIVE      CURRENT TEMPERATURE:  Temp: 98.1 °F (36.7 °C)  MAXIMUM TEMPERATURE OVER 24HRS:  Temp (24hrs), Av.6 °F (36.4 °C), Min:97.2 °F (36.2 °C), Max:98.1 °F (36.7 °C)    CURRENT RESPIRATORY RATE:  Respirations: 16  CURRENT PULSE:  Pulse: (!) 48  CURRENT BLOOD PRESSURE:  BP: 103/71  24HR BLOOD PRESSURE RANGE:  Systolic (24hrs), Av , Min:75 , Max:145   ; Diastolic (24hrs), Av, Min:48, Max:95    24HR INTAKE/OUTPUT:    Intake/Output Summary (Last 24 hours) at 2024 1050  Last data filed at 2024 0900  Gross per 24 hour   Intake 2455.92 ml   Output 2150 ml   Net 305.92 ml     WEIGHT :Patient Vitals for the past 96 hrs (Last 3 readings):   Weight   24 0600 52.7 kg (116 lb 2.9 oz)   24 0600 52.4 kg (115 lb 8.3

## 2024-02-24 NOTE — PROGRESS NOTES
PROGRESS NOTE          PATIENT NAME: Jules Nichols  MEDICAL RECORD NO. [id number]  DATE: 2024  SURGEON: Dr. Keene  PRIMARY CARE PHYSICIAN: Elvin Kelley MD    HD: # 3    ASSESSMENT    Patient Active Problem List   Diagnosis    Benign essential HTN    Uncontrolled type 2 diabetes mellitus with nephropathy    Hyperlipemia    Stage 4 chronic kidney disease (HCC)    Dago disease    Necrotizing fasciitis (HCC)    Acute kidney injury superimposed on CKD (HCC)    Hidradenitis suppurativa of multiple sites    Metabolic acidosis    Hyperkalemia    Acute on chronic anemia    Moderate malnutrition (HCC)    Bandemia    Septic shock (HCC)    Severe sepsis (HCC)       MEDICAL DECISION MAKING AND PLAN    Plan 4 OR today for surgical debridement of multiple areas of wounds  Risks and benefits of the procedure explained the patient detail.  Risk including bleeding, infection, pain, scarring, damage surrounding structures, need for additional procedures were discussed.  He was given the opportunity ask questions.  Written and verbal consent was obtained.     Chief Complaint: \"I feel okay\"    SUBJECTIVE    Jules Nichols was seen and evaluated bedside.  Patient states he feels fine and has minimal pain.  Agreeable to surgery this afternoon.      OBJECTIVE  VITALS: Temp: Temp: 98.1 °F (36.7 °C)Temp  Av.5 °F (36.4 °C)  Min: 97.2 °F (36.2 °C)  Max: 98.1 °F (36.7 °C) BP Systolic (24hrs), Av , Min:77 , Max:145   Diastolic (24hrs), Av, Min:54, Max:95   Pulse Pulse  Av.1  Min: 46  Max: 108 Resp Resp  Avg: 15  Min: 6  Max: 21 Pulse ox SpO2  Av.8 %  Min: 94 %  Max: 100 %  GENERAL: alert, no distress  NEURO: GCS 15  HEENT: Normocephalic, atraumatic  : deferred  LUNGS: Equal rise and fall of chest, normal effort with respirations  HEART: normal rate and regular rhythm  ABDOMEN: soft, non-tender and non-distended  EXTREMITY: Various stages of wounds scattered on bilateral upper lower extremities and

## 2024-02-24 NOTE — PROCEDURES
[facility] Procedure Note    Performed by: Bo Kendrick MD, MD    Indication: need for dialysis    Consent: The patient provided verbal consent for this procedure.    Time out performed: Immediately prior to the procedure a \"time out\" was called to verify the correct patient, the correct procedure, equipment, support staff and site/side marked as required.      All elements of maximal sterile barrier techniques were followed.    Procedure: The patient was positioned appropriately and the skin and CVC in right IJ was throughouly cleansed with chloraprep. Local anesthesia was not performed.  A guide wire was then inserted into the existing triple lumen CVC and CVC subsequently removed. Sequential dilation performed and 15 F trialysis catheter then inserted over the guidewire using a modified seldinger technique.  All ports showed good, free flowing blood return and were flushed with saline solution. The catheter was then securely fastened to the skin with sutures and covered with a sterile dressing.  Heparin then instilled into the arterial and venous limb. A post procedure X-ray was ordered and showed good line position.    The patient tolerated the procedure well.    Complications: None

## 2024-02-24 NOTE — PROGRESS NOTES
INTENSIVE CARE UNIT  Resident Physician Progress Note    Patient - Jules Nichols  Date of Admission -  2/21/2024 10:41 PM  Date of Evaluation -  2/24/2024  Room and Bed Number -  3015/3015-01   Hospital Day - 3      SUBJECTIVE:     OVERNIGHT EVENTS:      No acute events overnight  Patient remained afebrile, hemodynamically stable and remained on pressor support    TODAY:    Patient examined at bedside today.  Labs and chart reviewed.  Vitals reviewed.    Patient has no concerns and complaints, is on minimal dose of Isma-Synephrine 10.    saturating well on room air,    On 100 cc/hr of NS  On midodrine 5 mg TID     Planned for surgical debridement in OR today.     Lab  BMP pending   WBC trending down 14.7-> 11.7  Hemoglobin 8.5-> 7.8     On lantus 20 units daily.     Infectious disease : Consulted for fasciitis, recommends to continue Zosyn and Zyvox.  Cultures from the wound show streptococci, beta-hemolytic group B and many gram-positive cocci in pairs and gram-positive rods and few gram-positive cocci in clusters.  Wound care is also following.    Nephrology : Is on board for JAY on CKD stage IV with baseline creatinine around 3.5.  Patient has dialysis access, underwent dialysis 2 days back       Brief History:   Jules Nichols is a 43 y.o. male presented to outside facility with shortness of breath and worsening wounds.  Patient has a history of hypertension, diabetes, hyperlipidemia, chronic kidney disease with baseline creatinine around 3.  Patient also has a history of hidradenitis patient reportedly previously followed with wound care, however has not since then.  Patient states she has had worsening wounds over the last 6 months, however it has acutely worsened in the last few weeks.  No fevers or chills.     Patient had CT imaging done at outside hospital that showed necrotizing fasciitis in the axilla and chest as well as Dago's gangrene.  Patient also found to be in acute renal failure with      sodium chloride flush  5-40 mL IntraVENous 2 times per day    sodium hypochlorite   Irrigation BID    piperacillin-tazobactam  3,375 mg IntraVENous Q12H    heparin (porcine)  5,000 Units SubCUTAneous 3 times per day    sodium bicarbonate 75 mEq in sterile water 500 mL infusion   IntraVENous Once    linezolid  600 mg IntraVENous Q12H     Continuous Infusions:   sodium chloride 100 mL/hr at 02/24/24 0452    sodium chloride 10 mL/hr at 02/24/24 0454    insulin Stopped (02/23/24 1041)    Phenylephrine 20 mcg/min (02/24/24 0246)     PRN Meds:   sodium chloride flush, 5-40 mL, PRN  sodium chloride, , PRN  ondansetron, 4 mg, Q8H PRN   Or  ondansetron, 4 mg, Q6H PRN  polyethylene glycol, 17 g, Daily PRN  acetaminophen, 650 mg, Q6H PRN   Or  acetaminophen, 650 mg, Q6H PRN  dextrose bolus, 125 mL, PRN   Or  dextrose bolus, 250 mL, PRN  potassium chloride, 10 mEq, PRN  magnesium sulfate, 2,000 mg, PRN  sodium phosphate 15 mmol in sodium chloride 0.9 % 250 mL IVPB, 15 mmol, PRN  heparin (porcine), 1,400 Units, PRN  heparin (porcine), 1,500 Units, PRN  heparin (porcine), 1,400 Units, PRN         Additional Respiratory Assessments  Pulse: 70  Respirations: 16  SpO2: 100 %      Lab Results   Component Value Date/Time    FIO2 21.0 02/22/2024 05:54 AM         DATA:  Complete Blood Count:   Recent Labs     02/22/24  0417 02/23/24  0213 02/24/24  0628   WBC 23.3* 14.7* 11.2   RBC 4.04* 3.29* 3.00*   HGB 10.4* 8.5* 7.8*   HCT 37.1* 26.7* 23.9*   MCV 91.8 81.2* 79.7*   MCH 25.7 25.8 26.0   MCHC 28.0* 31.8 32.6   RDW 18.5* 18.3* 18.6*    253 235   MPV 9.5 9.0 9.5          Last 3 Blood Glucose:   Recent Labs     02/21/24  1655 02/21/24  2249 02/22/24  0417 02/22/24  0909 02/22/24  1302 02/22/24  2246 02/23/24  0213 02/23/24  0742   GLUCOSE 352* 270* 236* 111* 162* 233* 124* 213*          PT/INR:    Lab Results   Component Value Date/Time    PROTIME 16.4 02/21/2024 10:49 PM    INR 1.4 02/21/2024 10:49 PM     PTT:    Lab

## 2024-02-24 NOTE — ANESTHESIA PRE PROCEDURE
Department of Anesthesiology  Preprocedure Note       Name:  Jules Nichols   Age:  43 y.o.  :  1980                                          MRN:  [id number]         Date:  2024      Surgeon: Surgeon(s):  Boyd Keene MD    Procedure: Procedure(s):  WOUND BED PREPARATION BILATERAL GROIN AND ARMPIT, POSSIBLE WOUND VAC PLACEMENT  (PRONE, SUPINE, MISONIX)    Medications prior to admission:   Prior to Admission medications    Medication Sig Start Date End Date Taking? Authorizing Provider   LANTUS SOLOSTAR 100 UNIT/ML injection pen ADMINISTER 18 UNITS UNDER THE SKIN EVERY NIGHT 18   Elvin Kelley MD   losartan (COZAAR) 50 MG tablet TAKE 1 TABLET BY MOUTH DAILY 18   Elvin Kelley MD   metFORMIN (GLUCOPHAGE) 1000 MG tablet Take 1 tablet by mouth 2 times daily (with meals) 18   Ty Tee MD   pantoprazole (PROTONIX) 40 MG tablet Take 40 mg by mouth 17  ProviderFlora MD   insulin glargine (LANTUS SOLOSTAR) 100 UNIT/ML injection pen Inject 25 Units into the skin nightly 17   Ty Tee MD   Blood Glucose Monitoring Suppl ([employer] BLOOD GLUCOSE) W/DEVICE KIT 1 kit by Does not apply route daily as needed 16   Elvin Kelley MD [facility] Ultra Thin Lancets MISC 1 each by Does not apply route 4 times daily (before meals and nightly) 16   Elvin Kelley MD   Glucose Blood (BLOOD GLUCOSE TEST STRIPS) STRP 1 each by Does not apply route 4 times daily (before meals and nightly) 16   Elvin Kelley MD   Alcohol Swabs 70 % PADS Inject 1 each into the skin 3 times daily as needed 16   Elvin Kelley MD   Insulin Pen Needle (B-D ULTRAFINE III SHORT PEN) 31G X 8 MM MISC Inject 1 each into the skin daily May substitute with any brand 16   Elvin Kelley MD   magnesium oxide (MAG-OX) 400 MG tablet Take 400 mg by mouth 2 times daily    ProviderFlora MD       Current medications:    Current Facility-Administered

## 2024-02-24 NOTE — PROGRESS NOTES
Advance Care Planning     Advance Care Planning Inpatient Note  Spiritual Care Department    Today's Date: 2/23/2024  Unit: [facility] 3- MICU    Received request from HealthCare Provider.  Upon review of chart and communication with care team, patient's decision making abilities are not in question.. Patient was/were present in the room during visit.    Goals of ACP Conversation:  Discuss advance care planning documents    Health Care Decision Makers:       Primary Decision Maker: Eddi Nichols - Brother/Sister - [phone]  Summary:  Completed New Documents    Advance Care Planning Documents (Patient Wishes):  Healthcare Power of /Advance Directive Appointment of Health Care Agent     Assessment:  Patient appears to be calm and coping.  Indicates that he wants his brother as his POA.  Seems to be handling his condition well and in good spirits.      Interventions:  Provided education on documents for clarity and greater understanding  Assisted in the completion of documents according to patient's wishes at this time    Care Preferences Communicated:   No    Outcomes/Plan:  New advance directive completed.    Electronically signed by ERUM Rousseau on 2/23/2024 at 10:08 PM             02/23/24 2100   Encounter Summary   Encounter Overview/Reason  Advance Care Planning   Service Provided For: Patient   Referral/Consult From: Other    Support System Family members   Last Encounter  02/23/24   Complexity of Encounter Moderate   Begin Time 2100   End Time  2120   Total Time Calculated 20 min   Advance Care Planning   Type Completed AD/ACP document(s)   Assessment/Intervention/Outcome   Assessment Calm;Coping   Intervention Active listening;Discussed illness injury and it’s impact;Explored/Affirmed feelings, thoughts, concerns   Outcome Engaged in conversation;Expressed feelings, needs, and concerns;Coping     Electronically signed by Gianluca Denney on 2/23/2024 at 10:08 PM

## 2024-02-25 LAB
ALBUMIN SERPL-MCNC: 1.9 G/DL (ref 3.5–5.2)
ALBUMIN/GLOB SERPL: 0.8 {RATIO} (ref 1–2.5)
ALP SERPL-CCNC: 103 U/L (ref 40–129)
ALT SERPL-CCNC: 6 U/L (ref 5–41)
ANION GAP SERPL CALCULATED.3IONS-SCNC: 12 MMOL/L (ref 9–17)
AST SERPL-CCNC: 11 U/L
BASOPHILS # BLD: 0 K/UL (ref 0–0.2)
BASOPHILS NFR BLD: 0 % (ref 0–2)
BILIRUB DIRECT SERPL-MCNC: <0.1 MG/DL
BILIRUB INDIRECT SERPL-MCNC: ABNORMAL MG/DL (ref 0–1)
BILIRUB SERPL-MCNC: <0.1 MG/DL (ref 0.3–1.2)
BUN SERPL-MCNC: 46 MG/DL (ref 6–20)
CALCIUM SERPL-MCNC: 7.1 MG/DL (ref 8.6–10.4)
CHLORIDE SERPL-SCNC: 103 MMOL/L (ref 98–107)
CO2 SERPL-SCNC: 18 MMOL/L (ref 20–31)
CREAT SERPL-MCNC: 3.9 MG/DL (ref 0.7–1.2)
EOSINOPHIL # BLD: 0 K/UL (ref 0–0.4)
EOSINOPHILS RELATIVE PERCENT: 0 % (ref 1–4)
ERYTHROCYTE [DISTWIDTH] IN BLOOD BY AUTOMATED COUNT: 19 % (ref 11.8–14.4)
GFR SERPL CREATININE-BSD FRML MDRD: 19 ML/MIN/1.73M2
GLUCOSE BLD-MCNC: 106 MG/DL (ref 75–110)
GLUCOSE BLD-MCNC: 265 MG/DL (ref 75–110)
GLUCOSE BLD-MCNC: 311 MG/DL (ref 75–110)
GLUCOSE BLD-MCNC: 406 MG/DL (ref 75–110)
GLUCOSE SERPL-MCNC: 348 MG/DL (ref 70–99)
HCT VFR BLD AUTO: 25.3 % (ref 40.7–50.3)
HGB BLD-MCNC: 7.6 G/DL (ref 13–17)
HIV 1+2 AB+HIV1 P24 AG SERPL QL IA: NONREACTIVE
IMM GRANULOCYTES # BLD AUTO: 0.08 K/UL (ref 0–0.3)
IMM GRANULOCYTES NFR BLD: 1 %
LYMPHOCYTES NFR BLD: 0.66 K/UL (ref 1–4.8)
LYMPHOCYTES RELATIVE PERCENT: 8 % (ref 24–44)
MAGNESIUM SERPL-MCNC: 1.6 MG/DL (ref 1.6–2.6)
MCH RBC QN AUTO: 25.9 PG (ref 25.2–33.5)
MCHC RBC AUTO-ENTMCNC: 30 G/DL (ref 28.4–34.8)
MCV RBC AUTO: 86.3 FL (ref 82.6–102.9)
MICROORGANISM SPEC CULT: ABNORMAL
MICROORGANISM SPEC CULT: NORMAL
MICROORGANISM/AGENT SPEC: ABNORMAL
MONOCYTES NFR BLD: 0.08 K/UL (ref 0.1–0.8)
MONOCYTES NFR BLD: 1 % (ref 1–7)
MORPHOLOGY: ABNORMAL
NEUTROPHILS NFR BLD: 90 % (ref 36–66)
NEUTS SEG NFR BLD: 7.38 K/UL (ref 1.8–7.7)
NRBC BLD-RTO: 0 PER 100 WBC
PHOSPHATE SERPL-MCNC: 4.1 MG/DL (ref 2.5–4.5)
PLATELET # BLD AUTO: 186 K/UL (ref 138–453)
PMV BLD AUTO: 9.6 FL (ref 8.1–13.5)
POTASSIUM SERPL-SCNC: 3.6 MMOL/L (ref 3.7–5.3)
PROT SERPL-MCNC: 4.4 G/DL (ref 6.4–8.3)
RBC # BLD AUTO: 2.93 M/UL (ref 4.21–5.77)
SERVICE CMNT-IMP: NORMAL
SODIUM SERPL-SCNC: 133 MMOL/L (ref 135–144)
SPECIMEN DESCRIPTION: ABNORMAL
SPECIMEN DESCRIPTION: NORMAL
WBC OTHER # BLD: 8.2 K/UL (ref 3.5–11.3)

## 2024-02-25 PROCEDURE — 99232 SBSQ HOSP IP/OBS MODERATE 35: CPT | Performed by: INTERNAL MEDICINE

## 2024-02-25 PROCEDURE — 83735 ASSAY OF MAGNESIUM: CPT

## 2024-02-25 PROCEDURE — 2580000003 HC RX 258: Performed by: STUDENT IN AN ORGANIZED HEALTH CARE EDUCATION/TRAINING PROGRAM

## 2024-02-25 PROCEDURE — 6370000000 HC RX 637 (ALT 250 FOR IP)

## 2024-02-25 PROCEDURE — 36415 COLL VENOUS BLD VENIPUNCTURE: CPT

## 2024-02-25 PROCEDURE — 2580000003 HC RX 258

## 2024-02-25 PROCEDURE — 6360000002 HC RX W HCPCS

## 2024-02-25 PROCEDURE — 80048 BASIC METABOLIC PNL TOTAL CA: CPT

## 2024-02-25 PROCEDURE — 99024 POSTOP FOLLOW-UP VISIT: CPT | Performed by: SURGERY

## 2024-02-25 PROCEDURE — 85025 COMPLETE CBC W/AUTO DIFF WBC: CPT

## 2024-02-25 PROCEDURE — 6360000002 HC RX W HCPCS: Performed by: STUDENT IN AN ORGANIZED HEALTH CARE EDUCATION/TRAINING PROGRAM

## 2024-02-25 PROCEDURE — 6370000000 HC RX 637 (ALT 250 FOR IP): Performed by: STUDENT IN AN ORGANIZED HEALTH CARE EDUCATION/TRAINING PROGRAM

## 2024-02-25 PROCEDURE — 87389 HIV-1 AG W/HIV-1&-2 AB AG IA: CPT

## 2024-02-25 PROCEDURE — 2000000000 HC ICU R&B

## 2024-02-25 PROCEDURE — 99291 CRITICAL CARE FIRST HOUR: CPT | Performed by: INTERNAL MEDICINE

## 2024-02-25 PROCEDURE — 80076 HEPATIC FUNCTION PANEL: CPT

## 2024-02-25 PROCEDURE — 94761 N-INVAS EAR/PLS OXIMETRY MLT: CPT

## 2024-02-25 PROCEDURE — 84100 ASSAY OF PHOSPHORUS: CPT

## 2024-02-25 RX ORDER — DEXTROSE MONOHYDRATE 100 MG/ML
INJECTION, SOLUTION INTRAVENOUS CONTINUOUS PRN
Status: DISCONTINUED | OUTPATIENT
Start: 2024-02-25 | End: 2024-03-06 | Stop reason: HOSPADM

## 2024-02-25 RX ORDER — INSULIN GLARGINE 100 [IU]/ML
5 INJECTION, SOLUTION SUBCUTANEOUS ONCE
Status: COMPLETED | OUTPATIENT
Start: 2024-02-25 | End: 2024-02-25

## 2024-02-25 RX ORDER — INSULIN GLARGINE 100 [IU]/ML
10 INJECTION, SOLUTION SUBCUTANEOUS ONCE
Status: DISCONTINUED | OUTPATIENT
Start: 2024-02-25 | End: 2024-02-25

## 2024-02-25 RX ORDER — GLUCAGON 1 MG/ML
1 KIT INJECTION PRN
Status: DISCONTINUED | OUTPATIENT
Start: 2024-02-25 | End: 2024-03-06 | Stop reason: HOSPADM

## 2024-02-25 RX ORDER — OXYCODONE HYDROCHLORIDE 5 MG/1
5 TABLET ORAL EVERY 4 HOURS PRN
Status: DISCONTINUED | OUTPATIENT
Start: 2024-02-25 | End: 2024-03-06 | Stop reason: HOSPADM

## 2024-02-25 RX ORDER — INSULIN GLARGINE 100 [IU]/ML
20 INJECTION, SOLUTION SUBCUTANEOUS DAILY
Status: DISCONTINUED | OUTPATIENT
Start: 2024-02-26 | End: 2024-02-29

## 2024-02-25 RX ORDER — SODIUM CHLORIDE 9 MG/ML
INJECTION, SOLUTION INTRAVENOUS CONTINUOUS
Status: DISCONTINUED | OUTPATIENT
Start: 2024-02-25 | End: 2024-02-26

## 2024-02-25 RX ORDER — INSULIN LISPRO 100 [IU]/ML
0-4 INJECTION, SOLUTION INTRAVENOUS; SUBCUTANEOUS NIGHTLY
Status: DISCONTINUED | OUTPATIENT
Start: 2024-02-25 | End: 2024-02-26

## 2024-02-25 RX ORDER — INSULIN LISPRO 100 [IU]/ML
0-16 INJECTION, SOLUTION INTRAVENOUS; SUBCUTANEOUS
Status: DISCONTINUED | OUTPATIENT
Start: 2024-02-25 | End: 2024-02-26

## 2024-02-25 RX ORDER — INSULIN GLARGINE 100 [IU]/ML
30 INJECTION, SOLUTION SUBCUTANEOUS DAILY
Status: DISCONTINUED | OUTPATIENT
Start: 2024-02-26 | End: 2024-02-25

## 2024-02-25 RX ORDER — SODIUM CHLORIDE, SODIUM LACTATE, POTASSIUM CHLORIDE, CALCIUM CHLORIDE 600; 310; 30; 20 MG/100ML; MG/100ML; MG/100ML; MG/100ML
INJECTION, SOLUTION INTRAVENOUS CONTINUOUS
Status: DISCONTINUED | OUTPATIENT
Start: 2024-02-25 | End: 2024-02-25

## 2024-02-25 RX ORDER — MAGNESIUM SULFATE IN WATER 40 MG/ML
2000 INJECTION, SOLUTION INTRAVENOUS ONCE
Status: COMPLETED | OUTPATIENT
Start: 2024-02-25 | End: 2024-02-25

## 2024-02-25 RX ADMIN — HEPARIN SODIUM 5000 UNITS: 5000 INJECTION INTRAVENOUS; SUBCUTANEOUS at 21:31

## 2024-02-25 RX ADMIN — HEPARIN SODIUM 5000 UNITS: 5000 INJECTION INTRAVENOUS; SUBCUTANEOUS at 16:00

## 2024-02-25 RX ADMIN — SODIUM CHLORIDE, PRESERVATIVE FREE 10 ML: 5 INJECTION INTRAVENOUS at 21:16

## 2024-02-25 RX ADMIN — SODIUM CHLORIDE: 9 INJECTION, SOLUTION INTRAVENOUS at 01:25

## 2024-02-25 RX ADMIN — PANTOPRAZOLE SODIUM 40 MG: 40 TABLET, DELAYED RELEASE ORAL at 09:05

## 2024-02-25 RX ADMIN — INSULIN GLARGINE 20 UNITS: 100 INJECTION, SOLUTION SUBCUTANEOUS at 09:04

## 2024-02-25 RX ADMIN — INSULIN LISPRO 12 UNITS: 100 INJECTION, SOLUTION INTRAVENOUS; SUBCUTANEOUS at 17:30

## 2024-02-25 RX ADMIN — MIDODRINE HYDROCHLORIDE 10 MG: 5 TABLET ORAL at 21:35

## 2024-02-25 RX ADMIN — MAGNESIUM SULFATE HEPTAHYDRATE 2000 MG: 40 INJECTION, SOLUTION INTRAVENOUS at 13:03

## 2024-02-25 RX ADMIN — INSULIN LISPRO 16 UNITS: 100 INJECTION, SOLUTION INTRAVENOUS; SUBCUTANEOUS at 12:30

## 2024-02-25 RX ADMIN — DAKIN'S SOLUTION 0.125% (QUARTER STRENGTH): 0.12 SOLUTION at 21:17

## 2024-02-25 RX ADMIN — INSULIN GLARGINE 5 UNITS: 100 INJECTION, SOLUTION SUBCUTANEOUS at 12:30

## 2024-02-25 RX ADMIN — HEPARIN SODIUM 5000 UNITS: 5000 INJECTION INTRAVENOUS; SUBCUTANEOUS at 06:26

## 2024-02-25 RX ADMIN — AMPICILLIN SODIUM AND SULBACTAM SODIUM 3000 MG: 2; 1 INJECTION, POWDER, FOR SOLUTION INTRAMUSCULAR; INTRAVENOUS at 09:11

## 2024-02-25 RX ADMIN — DAKIN'S SOLUTION 0.125% (QUARTER STRENGTH): 0.12 SOLUTION at 16:56

## 2024-02-25 RX ADMIN — AMPICILLIN SODIUM AND SULBACTAM SODIUM 3000 MG: 2; 1 INJECTION, POWDER, FOR SOLUTION INTRAMUSCULAR; INTRAVENOUS at 21:16

## 2024-02-25 RX ADMIN — INSULIN LISPRO 6 UNITS: 100 INJECTION, SOLUTION INTRAVENOUS; SUBCUTANEOUS at 08:00

## 2024-02-25 RX ADMIN — SODIUM CHLORIDE, PRESERVATIVE FREE 10 ML: 5 INJECTION INTRAVENOUS at 09:05

## 2024-02-25 RX ADMIN — OXYCODONE 5 MG: 5 TABLET ORAL at 16:51

## 2024-02-25 RX ADMIN — SODIUM CHLORIDE: 9 INJECTION, SOLUTION INTRAVENOUS at 03:04

## 2024-02-25 RX ADMIN — POTASSIUM BICARBONATE 40 MEQ: 782 TABLET, EFFERVESCENT ORAL at 12:57

## 2024-02-25 RX ADMIN — MIDODRINE HYDROCHLORIDE 10 MG: 5 TABLET ORAL at 09:05

## 2024-02-25 RX ADMIN — MIDODRINE HYDROCHLORIDE 10 MG: 5 TABLET ORAL at 12:57

## 2024-02-25 RX ADMIN — OXYCODONE 5 MG: 5 TABLET ORAL at 04:48

## 2024-02-25 ASSESSMENT — PAIN DESCRIPTION - LOCATION: LOCATION: BACK

## 2024-02-25 ASSESSMENT — PAIN SCALES - GENERAL
PAINLEVEL_OUTOF10: 1
PAINLEVEL_OUTOF10: 0
PAINLEVEL_OUTOF10: 4
PAINLEVEL_OUTOF10: 0

## 2024-02-25 ASSESSMENT — PAIN DESCRIPTION - DESCRIPTORS: DESCRIPTORS: DISCOMFORT

## 2024-02-25 ASSESSMENT — PAIN DESCRIPTION - ORIENTATION: ORIENTATION: MID

## 2024-02-25 NOTE — PROGRESS NOTES
INTENSIVE CARE UNIT  Resident Physician Progress Note    Patient - Jules Nichols  Date of Admission -  2/21/2024 10:41 PM  Date of Evaluation -  2/25/2024  Room and Bed Number -  3015/3015-01   Hospital Day - 4      SUBJECTIVE:     OVERNIGHT EVENTS:      No acute events overnight  Patient remained afebrile, hemodynamically stable and off pressors support.    TODAY:    Patient examined at bedside today.  Labs and chart reviewed.  Vitals reviewed.    Patient has no concerns and complaints,   Off pressors, on Room air    On 60 cc/hr of NS  On midodrine 10 mg TID     S/p Wound debridement bilateral groin and armpit, chest and back and plan for dressing change at 12 noon.     Lab  BMP sodium 133, potassium 3.6--replaced, chloride 103, bicarb 18, BUN 46, creatinine 3.9, magnesium 1.6--replaced  Glucose 348, calcium 7.1    WBC trending down 14.7-> 11.7-> 8.2  Hemoglobin 8.5-> 7.8 -> 7.6    On lantus 20 units daily.     Infectious disease : Consulted for fasciitis, recommends to continue Zosyn and Zyvox.  Cultures from the wound show streptococci, beta-hemolytic group B, S.aureus, Prevotella,bacteroides. ID on board, stop zosyn and zyvox, on Unasyn till 3/4    Nephrology : Is on board for JAY on CKD stage IV with baseline creatinine around 3.5.  Patient has dialysis access, underwent dialysis 2 days back.Recommends to continue NS @ 60 cc/hr. No HD right now    Brief History:   Jules Nichols is a 43 y.o. male presented to outside facility with shortness of breath and worsening wounds.  Patient has a history of hypertension, diabetes, hyperlipidemia, chronic kidney disease with baseline creatinine around 3.  Patient also has a history of hidradenitis patient reportedly previously followed with wound care, however has not since then.  Patient states she has had worsening wounds over the last 6 months, however it has acutely worsened in the last few weeks.  No fevers or chills.     Patient had CT imaging done at outside  05/05/2016    Benign essential HTN     Hyperlipemia     Uncontrolled type 2 diabetes mellitus with nephropathy 01/20/2016          PLAN:     WEAN PER PROTOCOL:  [] No   [] Yes  [] N/A    ICU PROPHYLAXIS:  Stress ulcer:  [x] PPI Agent  [] G3Mioov [] Sucralfate  [] Other:  VTE:   [] Enoxaparin  [x] Unfract. Heparin Subcut  [] EPC Cuffs    NUTRITION:  [] NPO [] Tube Feeding (Specify: ) [] TPN  [] PO    HOME MEDS RECONCILED: [] No  [] Yes    CONSULTATION NEEDED:  [] No  [] Yes    FAMILY UPDATED:    [] No  [] Yes    TRANSFER OUT OF ICU:   [x] No  [] Yes    Plan:    CV:  Hemodynamically stable.  Pressor support weaned off  On fluids NS 60 cc/hr   Continue midodrine 10 3 times daily  Continue to monitor blood pressure    GI/Nutrition:  Patient to continue p.o. oral intake.  Continue to monitor blood sugars    /Fluids/Electrolytes:  Strict input and output monitoring.  Replace electrolytes as needed  Nephrology following for JAY on CKD.  Follow-up with nephrology recommendations regarding dialysis  Continue normal saline at 60 MLS per hour  Will increase the dose of  midodrine to 10 mg 3 times daily    Heme:  Continue to monitor hemoglobin  Transfuse if clinically indicated    ID:  Afebrile  WBC stable  Infectious disease following for fasciitis, recommends to continue Zosyn and Zyvox.  Cultures from the wound show streptococci, beta-hemolytic group B and many gram-positive cocci in pairs and gram-positive rods and few gram-positive cocci in clusters.  Wound care is also following.  Per ID, continue Unasyn    Neuro:  Patient alert and oriented x 4.  Continue to monitor neurological status.    Resp:  Patient currently saturating well on room air.  Continue to monitor respiratory status.    Endo:  Patient out of DKA.  Continue with Lantus 20 units daily.  Continue with high dose sliding scale  Continue to monitor blood glucose    Skin:  Wound care following, appreciate recommendations    Prophylaxis:  DVT: Heparin  GI:

## 2024-02-25 NOTE — CARE COORDINATION
Transitional planning. OR last night for multiple wound debridements. Wound Ostomy following. Nephrology following for HD needs. Follow for LTACH/ SNF needs.

## 2024-02-25 NOTE — PROGRESS NOTES
Nephrology Progress Note      SUBJECTIVE       Pt was seen and examined.  He is awake and alert.  Eating breakfast.  Not on any pressors.  Patient underwent debridement of his necrotizing wounds yesterday with surgery last evening.  Blood pressures are stable in the low 100s systolic.  24-hour urine output recorded at 2.9 L.  Reviewed, creatinine is 3.9, potassium 3.6, BUN 46 and bicarb of 18     Background history:  43-year-old gentleman with a history of diabetes mellitus, essential hypertension presented to the hospital because of axillary groin as well as chest wall wounds with shortness of breath.  He has a history of hidradenitis.  He underwent extensive debridement of the axillary area pectoral area and also had Dago's gangrene.  He was in DKA with blood sugars that were in the high 500s, he had high ketones in the serum, his creatinine was up to 8 and he had severe metabolic acidosis.  He did receive 1 hemodialysis treatment.  Urine output seems to have picked up.  He does have history of stage IV CKD with a creatinine that is been running around 3.5 or thereabouts going back almost 10 months from [facility] database.    OBJECTIVE      CURRENT TEMPERATURE:  Temp: 97.5 °F (36.4 °C)  MAXIMUM TEMPERATURE OVER 24HRS:  Temp (24hrs), Av.1 °F (36.2 °C), Min:96.8 °F (36 °C), Max:97.5 °F (36.4 °C)    CURRENT RESPIRATORY RATE:  Respirations: (!) 9  CURRENT PULSE:  Pulse: 96  CURRENT BLOOD PRESSURE:  BP: 99/65  24HR BLOOD PRESSURE RANGE:  Systolic (24hrs), Av , Min:76 , Max:143   ; Diastolic (24hrs), Av, Min:51, Max:118    24HR INTAKE/OUTPUT:    Intake/Output Summary (Last 24 hours) at 2024 0911  Last data filed at 2024 0905  Gross per 24 hour   Intake 3809.8 ml   Output 2650 ml   Net 1159.8 ml     WEIGHT :Patient Vitals for the past 96 hrs (Last 3 readings):   Weight   24 0600 54.7 kg (120 lb 9.5 oz)   24 0600 52.7 kg (116 lb 2.9 oz)   02/23/24 0600 52.4 kg (115 lb 8.3 oz)      PHYSICAL EXAM      GENERAL APPEARANCE:Awake, alert, in no acute distress  SKIN: warm and dry, no rash or erythema  EYES: conjunctivae normal and sclera anicteric  ENT: no thrush no pharyngeal congestion   NECK:   No JVD. No carotid bruits and no carotid lymphadenopathy .  PULMONARY: lungs are clear to auscultation. No Wheezing, no ronchi .  Dressings in the axillary and pectoral area noted.  CADRDIOVASCULAR: S1 and S2 normal NO S3 and NO S4 . No rubs , no murmur.   ABDOMEN: soft nontender, bowel sounds present, no organomegaly,   EXTREMITIES: no cyanosis, clubbing or edema     CURRENT MEDICATIONS      0.9 % sodium chloride infusion, Continuous  oxyCODONE (ROXICODONE) immediate release tablet 5 mg, Q4H PRN  midodrine (PROAMATINE) tablet 10 mg, TID WC  ampicillin-sulbactam (UNASYN) 3,000 mg in sodium chloride 0.9 % 100 mL IVPB (mini-bag), Q12H  insulin glargine (LANTUS) injection vial 20 Units, Daily  insulin lispro (HUMALOG) injection vial 0-8 Units, TID WC  insulin lispro (HUMALOG) injection vial 0-4 Units, Nightly  pantoprazole (PROTONIX) tablet 40 mg, QAM AC  sodium chloride flush 0.9 % injection 5-40 mL, 2 times per day  sodium chloride flush 0.9 % injection 5-40 mL, PRN  0.9 % sodium chloride infusion, PRN  ondansetron (ZOFRAN-ODT) disintegrating tablet 4 mg, Q8H PRN   Or  ondansetron (ZOFRAN) injection 4 mg, Q6H PRN  polyethylene glycol (GLYCOLAX) packet 17 g, Daily PRN  acetaminophen (TYLENOL) tablet 650 mg, Q6H PRN   Or  acetaminophen (TYLENOL) suppository 650 mg, Q6H PRN  sodium hypochlorite (DAKINS) 0.125 % external solution, BID  dextrose bolus 10% 125 mL, PRN   Or  dextrose bolus 10% 250 mL, PRN  potassium chloride 10 mEq/100 mL IVPB (Peripheral Line), PRN  magnesium sulfate 2000 mg in 50 mL IVPB premix, PRN  sodium phosphate 15 mmol in sodium chloride 0.9 % 250 mL IVPB, PRN  insulin regular (HUMULIN R;NOVOLIN R) 100 Units in sodium chloride 0.9 % 100 mL infusion, Continuous  heparin (porcine)

## 2024-02-25 NOTE — PROGRESS NOTES
PROGRESS NOTE          PATIENT NAME: Jules Nichols  MEDICAL RECORD NO. [id number]  DATE: 2024  SURGEON: Dr. Keene  PRIMARY CARE PHYSICIAN: Elvin eKlley MD    HD: # 4    ASSESSMENT    Patient Active Problem List   Diagnosis    Benign essential HTN    Uncontrolled type 2 diabetes mellitus with nephropathy    Hyperlipemia    Stage 4 chronic kidney disease (HCC)    Dago disease    Necrotizing fasciitis (HCC)    Acute kidney injury superimposed on CKD (HCC)    Hidradenitis suppurativa of multiple sites    Metabolic acidosis    Hyperkalemia    Acute on chronic anemia    Moderate malnutrition (HCC)    Bandemia    Septic shock (HCC)    Severe sepsis (HCC)       MEDICAL DECISION MAKING AND PLAN    -Patient seen and evaluated.  History, physical exam, laboratory, and radiographic findings reviewed and discussed with attending.  -S/p debridement of multiple wounds 24. No further surgical plans at this time. Continue wound care with wet-to-dry dressing changes. Can transition to once daily dressing changes.   -Unclear etiology for wounds. Appreciate ID evaluation and recommendations.   -Remainder of plan and disposition per primary team.    Chief Complaint: \"I feel okay\"    SUBJECTIVE    uJles Nichols was seen and evaluated bedside.  Patient states he feels fine and has minimal pain.  Pt has no complaints at this time.       OBJECTIVE  VITALS: Temp: Temp: 97.9 °F (36.6 °C)Temp  Av.2 °F (36.2 °C)  Min: 96.8 °F (36 °C)  Max: 97.9 °F (36.6 °C) BP Systolic (24hrs), Av , Min:76 , Max:143   Diastolic (24hrs), Av, Min:51, Max:118   Pulse Pulse  Av.2  Min: 47  Max: 103 Resp Resp  Av.3  Min: 7  Max: 23 Pulse ox SpO2  Av.3 %  Min: 80 %  Max: 100 %  GENERAL: alert, no distress  NEURO: GCS 15  HEENT: Normocephalic, atraumatic  : deferred  LUNGS: Equal rise and fall of chest, normal effort with respirations  HEART: normal rate and regular rhythm  ABDOMEN: soft, non-tender and  non-distended  EXTREMITY: Various stages of wounds scattered on bilateral upper/lower extremities, daksha, back, and chest.    I/O last 3 completed shifts:  In: 4818.2 [P.O.:180; I.V.:3868.5; IV Piggyback:769.7]  Out: 4035 [Urine:4035]    Drain/tube output:  In: 4191.2 [I.V.:3656.9]  Out: 3110 [Urine:3110]    LAB:  CBC:   Recent Labs     02/23/24  0213 02/24/24  0628 02/25/24  0649   WBC 14.7* 11.2 8.2   HGB 8.5* 7.8* 7.6*   HCT 26.7* 23.9* 25.3*   MCV 81.2* 79.7* 86.3    235 186       BMP:   Recent Labs     02/23/24  0742 02/24/24  1200 02/25/24  0649   * 136 133*   K 3.7 4.1 3.6*   CL 99 106 103   CO2 22 19* 18*   BUN 42* 48* 46*   CREATININE 3.4* 4.0* 3.9*   GLUCOSE 213* 182* 348*       COAGS:   Recent Labs     02/22/24  1302   PROT 4.1*         RADIOLOGY:  No results found.          Howard Duffy,   2/24/24, 11:21 AM

## 2024-02-25 NOTE — OP NOTE
Operative Note      Patient: Jules Nichols  YOB: 1980  MRN: [id number]    Date of Procedure: 2/24/2024    Pre-Op Diagnosis Codes:     * Necrotizing soft tissue infection [M79.89]    Post-Op Diagnosis: Same       Procedure(s):  WOUND BED PREPARATION BILATERAL GROIN AND ARMPIT, CHEST AND BACK    Back: wound debridement sharp soft tissue and muscle with scalpel and bovie  [id number]  Front: Dressing changes no debridement    Surgeon(s):  Boyd Keene MD    Assistant:   Resident: Madalyn Dupree MD    Anesthesia: General    Estimated Blood Loss (mL): Minimal    Complications: None    Specimens:   ID Type Source Tests Collected by Time Destination   1 : MID BACK WOUND Tissue Back CULTURE, TISSUE Boyd Keene MD 2/24/2024 2223        Implants:  * No implants in log *      Drains:   Urinary Catheter 02/22/24 2 Way (Active)   Catheter Indications Need for fluid volume management of the critically ill patient in a critical care setting 02/24/24 2000   Site Assessment No urethral drainage 02/24/24 2000   Urine Color Colorless 02/24/24 2000   Urine Appearance Clear 02/24/24 2000   Collection Container Standard 02/24/24 2000   Securement Method Securing device (Describe) 02/24/24 2000   Catheter Care  Soap and water 02/24/24 0800   Catheter Best Practices  Drainage tube clipped to bed;Catheter secured to thigh;Bag below bladder;Tamper seal intact;Bag not on floor;Lack of dependent loop in tubing;Drainage bag less than half full 02/24/24 2000   Status Draining;Patent 02/24/24 2000   Output (mL) 100 mL 02/24/24 2100       [REMOVED] NG/OG/NJ/NE Tube Nasogastric 18 fr Right nostril (Removed)   Securement device Tape 02/23/24 0800   Status Clamped 02/23/24 0800   Placement Verified External Catheter Length 02/23/24 0800   NG/OG/NJ/NE External Measurement (cm) 65 cm 02/23/24 0800   Drainage Appearance Brown;Coffee Ground 02/23/24 0600   Output (mL) 100 ml 02/23/24 0905       Findings: Back: areas of  purulent drainage and non adheraent skin and soft tissue to deep tissue in multiples areas Tissue cultures taken  Front: no new s/sx of infection          Detailed Description of Procedure:   Patient was brought to the operating room on the evening of 2/24/2024 for debridement of previous necrotizing wounds and axillas groin chest back.  Risk benefits alternatives were explained to the patient all questions were answered agreed to proceed patient was brought to the operating room placed in supine position position intubated and then rolled rolled prone audible timeout was performed ensuring correct patient procedure position special, and laterality the back was sterilely prepped and draped in usual sterile fashion a curvilinear incision on the back over areas of purulent discharge and nonadherent subcutaneous tissue to underlying deep tissue was made in multiple areas of the back total combined areas of sharp debridement with scalpel and Bovie was 24 cm x 12.5 cm these were all taken down to the muscle layer.  Large amount of these were sent for culture for Gram stain.  Once areas were cleaned out and irrigated and no signs of infection the wounds were packed with Kerlix and dry dressings were applied over top patient was rolled supine we removed the previous dressings we again prepped and draped in a sterile fashion patient was shaved we saw that there was no new signs of necrosis in any of the wounds no new debridement had had to be done wounds were copiously irrigated and you and the use of Irrisept we packed the wounds with Curlex dry dressings were applied plan for dressing change tomorrow at the bedside at 12 noon the send dictation on patient Jules Nichols    Electronically signed by Boyd Keene MD on 2/24/2024 at 11:41 PM

## 2024-02-26 LAB
ANION GAP SERPL CALCULATED.3IONS-SCNC: 9 MMOL/L (ref 9–17)
BASOPHILS # BLD: <0.03 K/UL (ref 0–0.2)
BASOPHILS NFR BLD: 0 % (ref 0–2)
BUN SERPL-MCNC: 51 MG/DL (ref 6–20)
CALCIUM SERPL-MCNC: 7.5 MG/DL (ref 8.6–10.4)
CHLORIDE SERPL-SCNC: 106 MMOL/L (ref 98–107)
CO2 SERPL-SCNC: 22 MMOL/L (ref 20–31)
CREAT SERPL-MCNC: 4.3 MG/DL (ref 0.7–1.2)
EOSINOPHIL # BLD: 0.14 K/UL (ref 0–0.44)
EOSINOPHILS RELATIVE PERCENT: 1 % (ref 1–4)
ERYTHROCYTE [DISTWIDTH] IN BLOOD BY AUTOMATED COUNT: 19 % (ref 11.8–14.4)
GFR SERPL CREATININE-BSD FRML MDRD: 17 ML/MIN/1.73M2
GLUCOSE BLD-MCNC: 157 MG/DL (ref 75–110)
GLUCOSE BLD-MCNC: 168 MG/DL (ref 75–110)
GLUCOSE BLD-MCNC: 180 MG/DL (ref 75–110)
GLUCOSE BLD-MCNC: 209 MG/DL (ref 75–110)
GLUCOSE SERPL-MCNC: 205 MG/DL (ref 70–99)
HCT VFR BLD AUTO: 22.7 % (ref 40.7–50.3)
HCT VFR BLD AUTO: 28.4 % (ref 40.7–50.3)
HGB BLD-MCNC: 6.5 G/DL (ref 13–17)
HGB BLD-MCNC: 8.4 G/DL (ref 13–17)
IMM GRANULOCYTES # BLD AUTO: 0.33 K/UL (ref 0–0.3)
IMM GRANULOCYTES NFR BLD: 3 %
LYMPHOCYTES NFR BLD: 2.79 K/UL (ref 1.1–3.7)
LYMPHOCYTES RELATIVE PERCENT: 21 % (ref 24–43)
MAGNESIUM SERPL-MCNC: 2.1 MG/DL (ref 1.6–2.6)
MCH RBC QN AUTO: 25.6 PG (ref 25.2–33.5)
MCHC RBC AUTO-ENTMCNC: 28.6 G/DL (ref 28.4–34.8)
MCV RBC AUTO: 89.4 FL (ref 82.6–102.9)
MICROORGANISM SPEC CULT: NORMAL
MONOCYTES NFR BLD: 0.57 K/UL (ref 0.1–1.2)
MONOCYTES NFR BLD: 4 % (ref 3–12)
NEUTROPHILS NFR BLD: 71 % (ref 36–65)
NEUTS SEG NFR BLD: 9.42 K/UL (ref 1.5–8.1)
NRBC BLD-RTO: 0 PER 100 WBC
PHOSPHATE SERPL-MCNC: 2.5 MG/DL (ref 2.5–4.5)
PLATELET # BLD AUTO: 218 K/UL (ref 138–453)
PMV BLD AUTO: 9.6 FL (ref 8.1–13.5)
POTASSIUM SERPL-SCNC: 3.7 MMOL/L (ref 3.7–5.3)
RBC # BLD AUTO: 2.54 M/UL (ref 4.21–5.77)
RBC # BLD: ABNORMAL 10*6/UL
SERVICE CMNT-IMP: NORMAL
SODIUM SERPL-SCNC: 137 MMOL/L (ref 135–144)
SPECIMEN DESCRIPTION: NORMAL
WBC OTHER # BLD: 13.3 K/UL (ref 3.5–11.3)

## 2024-02-26 PROCEDURE — 97535 SELF CARE MNGMENT TRAINING: CPT

## 2024-02-26 PROCEDURE — 1200000000 HC SEMI PRIVATE

## 2024-02-26 PROCEDURE — 2580000003 HC RX 258: Performed by: STUDENT IN AN ORGANIZED HEALTH CARE EDUCATION/TRAINING PROGRAM

## 2024-02-26 PROCEDURE — 6370000000 HC RX 637 (ALT 250 FOR IP)

## 2024-02-26 PROCEDURE — 97161 PT EVAL LOW COMPLEX 20 MIN: CPT

## 2024-02-26 PROCEDURE — 85025 COMPLETE CBC W/AUTO DIFF WBC: CPT

## 2024-02-26 PROCEDURE — 97166 OT EVAL MOD COMPLEX 45 MIN: CPT

## 2024-02-26 PROCEDURE — 36415 COLL VENOUS BLD VENIPUNCTURE: CPT

## 2024-02-26 PROCEDURE — 6360000002 HC RX W HCPCS: Performed by: STUDENT IN AN ORGANIZED HEALTH CARE EDUCATION/TRAINING PROGRAM

## 2024-02-26 PROCEDURE — 85014 HEMATOCRIT: CPT

## 2024-02-26 PROCEDURE — 99214 OFFICE O/P EST MOD 30 MIN: CPT

## 2024-02-26 PROCEDURE — 36430 TRANSFUSION BLD/BLD COMPNT: CPT

## 2024-02-26 PROCEDURE — 84100 ASSAY OF PHOSPHORUS: CPT

## 2024-02-26 PROCEDURE — 80048 BASIC METABOLIC PNL TOTAL CA: CPT

## 2024-02-26 PROCEDURE — 99233 SBSQ HOSP IP/OBS HIGH 50: CPT | Performed by: INTERNAL MEDICINE

## 2024-02-26 PROCEDURE — G0108 DIAB MANAGE TRN  PER INDIV: HCPCS

## 2024-02-26 PROCEDURE — 82947 ASSAY GLUCOSE BLOOD QUANT: CPT

## 2024-02-26 PROCEDURE — 85018 HEMOGLOBIN: CPT

## 2024-02-26 PROCEDURE — P9016 RBC LEUKOCYTES REDUCED: HCPCS

## 2024-02-26 PROCEDURE — 6370000000 HC RX 637 (ALT 250 FOR IP): Performed by: STUDENT IN AN ORGANIZED HEALTH CARE EDUCATION/TRAINING PROGRAM

## 2024-02-26 PROCEDURE — 99232 SBSQ HOSP IP/OBS MODERATE 35: CPT | Performed by: SURGERY

## 2024-02-26 PROCEDURE — 99232 SBSQ HOSP IP/OBS MODERATE 35: CPT | Performed by: INTERNAL MEDICINE

## 2024-02-26 PROCEDURE — 83735 ASSAY OF MAGNESIUM: CPT

## 2024-02-26 PROCEDURE — 2060000000 HC ICU INTERMEDIATE R&B

## 2024-02-26 PROCEDURE — 97530 THERAPEUTIC ACTIVITIES: CPT

## 2024-02-26 RX ORDER — INSULIN LISPRO 100 [IU]/ML
0-8 INJECTION, SOLUTION INTRAVENOUS; SUBCUTANEOUS
Status: DISCONTINUED | OUTPATIENT
Start: 2024-02-26 | End: 2024-03-06 | Stop reason: HOSPADM

## 2024-02-26 RX ORDER — SODIUM CHLORIDE 9 MG/ML
INJECTION, SOLUTION INTRAVENOUS PRN
Status: DISCONTINUED | OUTPATIENT
Start: 2024-02-26 | End: 2024-03-06 | Stop reason: HOSPADM

## 2024-02-26 RX ORDER — SODIUM HYPOCHLORITE 1.25 MG/ML
SOLUTION TOPICAL DAILY
Status: DISCONTINUED | OUTPATIENT
Start: 2024-02-27 | End: 2024-03-06 | Stop reason: HOSPADM

## 2024-02-26 RX ORDER — INSULIN LISPRO 100 [IU]/ML
0-4 INJECTION, SOLUTION INTRAVENOUS; SUBCUTANEOUS NIGHTLY
Status: DISCONTINUED | OUTPATIENT
Start: 2024-02-26 | End: 2024-03-06 | Stop reason: HOSPADM

## 2024-02-26 RX ADMIN — OXYCODONE 5 MG: 5 TABLET ORAL at 12:19

## 2024-02-26 RX ADMIN — INSULIN LISPRO 4 UNITS: 100 INJECTION, SOLUTION INTRAVENOUS; SUBCUTANEOUS at 07:56

## 2024-02-26 RX ADMIN — AMPICILLIN SODIUM AND SULBACTAM SODIUM 3000 MG: 2; 1 INJECTION, POWDER, FOR SOLUTION INTRAMUSCULAR; INTRAVENOUS at 08:30

## 2024-02-26 RX ADMIN — HEPARIN SODIUM 5000 UNITS: 5000 INJECTION INTRAVENOUS; SUBCUTANEOUS at 14:05

## 2024-02-26 RX ADMIN — SODIUM CHLORIDE, PRESERVATIVE FREE 10 ML: 5 INJECTION INTRAVENOUS at 19:45

## 2024-02-26 RX ADMIN — PANTOPRAZOLE SODIUM 40 MG: 40 TABLET, DELAYED RELEASE ORAL at 07:57

## 2024-02-26 RX ADMIN — SODIUM CHLORIDE, PRESERVATIVE FREE 10 ML: 5 INJECTION INTRAVENOUS at 07:58

## 2024-02-26 RX ADMIN — DAKIN'S SOLUTION 0.125% (QUARTER STRENGTH): 0.12 SOLUTION at 10:30

## 2024-02-26 RX ADMIN — OXYCODONE 5 MG: 5 TABLET ORAL at 00:03

## 2024-02-26 RX ADMIN — AMPICILLIN SODIUM AND SULBACTAM SODIUM 3000 MG: 2; 1 INJECTION, POWDER, FOR SOLUTION INTRAMUSCULAR; INTRAVENOUS at 19:43

## 2024-02-26 RX ADMIN — HEPARIN SODIUM 5000 UNITS: 5000 INJECTION INTRAVENOUS; SUBCUTANEOUS at 22:43

## 2024-02-26 RX ADMIN — ONDANSETRON 4 MG: 2 INJECTION INTRAMUSCULAR; INTRAVENOUS at 17:39

## 2024-02-26 RX ADMIN — INSULIN GLARGINE 20 UNITS: 100 INJECTION, SOLUTION SUBCUTANEOUS at 09:17

## 2024-02-26 RX ADMIN — HEPARIN SODIUM 5000 UNITS: 5000 INJECTION INTRAVENOUS; SUBCUTANEOUS at 05:37

## 2024-02-26 ASSESSMENT — PAIN SCALES - GENERAL
PAINLEVEL_OUTOF10: 3
PAINLEVEL_OUTOF10: 7
PAINLEVEL_OUTOF10: 0
PAINLEVEL_OUTOF10: 7
PAINLEVEL_OUTOF10: 4
PAINLEVEL_OUTOF10: 0

## 2024-02-26 ASSESSMENT — PAIN DESCRIPTION - LOCATION
LOCATION: BACK;SHOULDER
LOCATION: SHOULDER
LOCATION: BACK

## 2024-02-26 ASSESSMENT — PAIN DESCRIPTION - ORIENTATION
ORIENTATION: MID
ORIENTATION: POSTERIOR

## 2024-02-26 ASSESSMENT — PAIN DESCRIPTION - ONSET: ONSET: GRADUAL

## 2024-02-26 ASSESSMENT — PAIN - FUNCTIONAL ASSESSMENT: PAIN_FUNCTIONAL_ASSESSMENT: PREVENTS OR INTERFERES SOME ACTIVE ACTIVITIES AND ADLS

## 2024-02-26 ASSESSMENT — PAIN DESCRIPTION - DESCRIPTORS
DESCRIPTORS: ACHING
DESCRIPTORS: DISCOMFORT

## 2024-02-26 ASSESSMENT — PAIN DESCRIPTION - FREQUENCY: FREQUENCY: INTERMITTENT

## 2024-02-26 NOTE — PROGRESS NOTES
PROGRESS NOTE    PATIENT NAME: Jules Nichols  MEDICAL RECORD NO. [id number]  DATE: 2024  SURGEON: Dr. Keene  PRIMARY CARE PHYSICIAN: Elvin Kelley MD    HD: # 5    ASSESSMENT    Patient Active Problem List   Diagnosis    Benign essential HTN    Uncontrolled type 2 diabetes mellitus with nephropathy    Hyperlipemia    Stage 4 chronic kidney disease (HCC)    Dago disease    Necrotizing fasciitis (HCC)    Acute kidney injury superimposed on CKD (HCC)    Hidradenitis suppurativa of multiple sites    Metabolic acidosis    Hyperkalemia    Acute on chronic anemia    Moderate malnutrition (HCC)    Bandemia    Septic shock (HCC)    Severe sepsis (HCC)       MEDICAL DECISION MAKING AND PLAN      -S/p debridement of multiple wounds 24. No further surgical plans at this time. Continue wound care with wet-to-dry dressing changes.  Dressing to be done once daily by wound ostomy team/bedside nurse..   -Unclear etiology for wounds. Appreciate ID evaluation and recommendations.   -Remainder of plan and disposition per primary team.  - No further intervention planned. Recommend consult to plastic for long term coverage of wound. General surgery will sign off at this time. Please message with any questions or concerns.    Chief Complaint: \"I feel okay\"    SUBJECTIVE  Patient is POD#2 after wound debridement of the back, axilla, groin.   Jules Nichols was seen and evaluated bedside.  Patient states he feels fine and has minimal pain.  Pt has no complaints at this time.       OBJECTIVE  VITALS: Temp: Temp: 97.8 °F (36.6 °C)Temp  Av.6 °F (36.4 °C)  Min: 96.8 °F (36 °C)  Max: 98 °F (36.7 °C) BP Systolic (24hrs), Av , Min:96 , Max:117   Diastolic (24hrs), Av, Min:49, Max:82   Pulse Pulse  Av.3  Min: 56  Max: 108 Resp Resp  Avg: 15.1  Min: 9  Max: 19 Pulse ox SpO2  Av.7 %  Min: 97 %  Max: 100 %  GENERAL: alert, no distress  NEURO: GCS 15  HEENT: Normocephalic, atraumatic  : deferred  LUNGS:

## 2024-02-26 NOTE — PROGRESS NOTES
Occupational Therapy  Facility/Department: [facility] CAR 3- MICU  Occupational Therapy Initial Assessment    Name: Jules Nichols  : 1980  MRN: [id number]  Date of Service: 2024  Chief Complaint   Patient presents with    Wound Infection     R. axilla, chest, L axilla, L neck       Discharge Recommendations:  Patient would benefit from continued therapy after discharge  OT Equipment Recommendations  Equipment Needed: No       Patient Diagnosis(es): Diagnoses of Necrotizing fasciitis (HCC) and Necrotizing soft tissue infection were pertinent to this visit.  Past Medical History:  has a past medical history of Benign essential HTN, Chronic kidney disease, Diabetic keto-acidosis (HCC), Hyperlipemia, and Uncontrolled type 2 diabetes mellitus with nephropathy.  Past Surgical History:  has a past surgical history that includes other surgical history (Bilateral, 2024); Wound Exploration (Bilateral, 2024); Wound debridement; and Abdomen surgery (N/A, 2024).           Assessment   Performance deficits / Impairments: Decreased functional mobility ;Decreased ADL status;Decreased safe awareness;Decreased endurance;Decreased ROM;Decreased strength;Decreased balance;Decreased high-level IADLs  Assessment: Pt began session supine in bed. Pt demo'd bed mobility with SBA. pt sitting EOB demo'd donning B socks with MIN A d/t decreased functional reach. Pt completed STS transfers/mobility with CGA without use of DME. Pt with 1 minor LOB during mobility able to self correct. Pt ended session supine in bed. Pt would continue to benefit from OT services to address deficits listed above and improve overall functional performance prior to discharge.  Prognosis: Good  Decision Making: Medium Complexity  REQUIRES OT FOLLOW-UP: Yes  Activity Tolerance  Activity Tolerance: Patient Tolerated treatment well        Plan   Occupational Therapy Plan  Times Per Week: 3x/wk  Current Treatment Recommendations: Strengthening, ROM,

## 2024-02-26 NOTE — PLAN OF CARE
Problem: Discharge Planning  Goal: Discharge to home or other facility with appropriate resources  Outcome: Progressing     Problem: Pain  Goal: Verbalizes/displays adequate comfort level or baseline comfort level  Outcome: Progressing     Problem: Safety - Adult  Goal: Free from fall injury  Outcome: Progressing  Flowsheets (Taken 2/25/2024 0800)  Free From Fall Injury: Based on caregiver fall risk screen, instruct family/caregiver to ask for assistance with transferring infant if caregiver noted to have fall risk factors     Problem: Skin/Tissue Integrity  Goal: Absence of new skin breakdown  Description: 1.  Monitor for areas of redness and/or skin breakdown  2.  Assess vascular access sites hourly  3.  Every 4-6 hours minimum:  Change oxygen saturation probe site  4.  Every 4-6 hours:  If on nasal continuous positive airway pressure, respiratory therapy assess nares and determine need for appliance change or resting period.  Outcome: Progressing     Problem: ABCDS Injury Assessment  Goal: Absence of physical injury  Outcome: Progressing  Flowsheets (Taken 2/25/2024 0800)  Absence of Physical Injury: Implement safety measures based on patient assessment     Problem: Chronic Conditions and Co-morbidities  Goal: Patient's chronic conditions and co-morbidity symptoms are monitored and maintained or improved  Outcome: Progressing

## 2024-02-26 NOTE — PROGRESS NOTES
PHARMACY NOTE:    The electrolyte replacement protocol for potassium/magnesium has been discontinued per P&T guidelines because the patient has reduced renal function (CrCl < 30 mL/min).      The patient's most recent potassium & magnesium levels are:  Recent Labs     02/24/24  1200 02/25/24  0649 02/26/24  0613   K 4.1 3.6* 3.7   MG 1.7 1.6 2.1     Estimated Creatinine Clearance: 17 mL/min (A) (based on SCr of 4.3 mg/dL (H)).    For patients with decreased renal function (below 30ml/min) needing potassium/magnesium supplementation, please order individual bolus doses with appropriate monitoring.      Please contact the inpatient pharmacy with any concerns.  Thank you.    Jalyn Flores, PharmD  PGY2 Pharmacy Resident 2/26/2024 10:08 AM [phone]

## 2024-02-26 NOTE — PROGRESS NOTES
Inpatient Diabetes Education  Initial  Progress note copied below for reference 2/22/24.  Follow-up on 2/23 - Jackie Cohen RN  Follow up visit on 2/26/24  - JASMIN NDIAYE RN    2/26/24 - Care plan reviewed - next level care will require Ltac or SNF  Reviewed diabetes care plan concepts - Healthy eating/ CHO meal plan, Monitoring blood sugar targets and checks ac and hs, role and use of insulin.  Reinforce low BG symptoms and self care/ call staff if has low BG symptoms. Noted dialysis also part of care plan at this time.    Education Folder provided with following support information was left at bedside: Folder of educational materials remains at bedside  _x__  Handout - What is diabetes? cornerstones4 care 2019  _x__  Handout - Eating Healthy for Life at every Meal / Plate method and grocery list  from www.DiabetesCuil Jc.org  _x__ Handout - How to Check Your Blood Sugar from www.DiabetesCuil Jc.org  _x__ Handout - Be safe with Needles teaching sheet - / www.DiabetesRegenaStem.org    _x__ Handout - How to Use an Insulin Pen - handout with QR code - Health wise Current as of Sept 22 2021  _x__ Emergency Insert sheet for your Vehicle - ADA Diabetes Emergencies   _x__ Diabetes ID card - ADA Low and High Blood Sugar and treatments  _x__ [facility] Diabetes Education brochure/ contact card      Inpatient Diabetes Education Date of Note: 2-22-24    Jules Nichols was seen for evaluation and education on uncontrolled diabetes.    Lab Results   Component Value Date    LABA1C 10.2 (H) 02/22/2024    LABA1C 14.0 05/05/2017    LABA1C 18.1 04/27/2017     Jules states that he has had diabetes for about 9 years. He states that in the past he has checked his blood sugar at home with a glucometer. He reports that in the he took metformin in the distant past. He states that in the past he took Lantus Insulin in a pen. He was vague about insulin use prior to admission. He said that he got a supply of Lantus from a friend.      Following Survival Skills education topics were covered as appropriate to patient plan of for care:  _X__ Type 2 Diabetes diagnosis as chronic and progressive  _X__ Healthy eating - CHO food groups and need for CHO controlled diet.  Elimination of sugary beverages, avoid skipping meal - States that he tends to eat one meal daily and sometimes will have a regular soda if he is getting a meal from [employer].   _X__ Blood sugar targets Pre meal 80 - 130 and 2 hours post meal < 180  _X__ Hyperglycemia  ( BG over 250) signs and symptoms and treatment   _X__ Hypoglycemia( BG < than 70) signs and symptoms and treatment \"Rule of 15\"  __X_ Keep BG log and take log and meter to follow up HCP appointments  __X_ Medications -  Insulin     Education Folder provided with following support information was left at bedside:   _x__  Handout - What is diabetes? cornerstones4 care 2019  _x__  Handout - Eating Healthy for Life at every Meal / Plate method and grocery list  from www.[employer]abetesHealth Jc.org  _x__ Handout - How to Check Your Blood Sugar from www.DiabetesHealth Jc.org  _x__ Handout - Be safe with Needles teaching sheet - / www.DiabetesHealth Jc.org    _x__ Handout - How to Use an Insulin Pen - handout with QR code - Health wise Current as of Sept 22 2021  _x__ Emergency Insert sheet for your Vehicle - ADA Diabetes Emergencies   _x__ Diabetes ID card - ADA Low and High Blood Sugar and treatments  _x__ [facility] Education brochure/ contact card    Patient needs reinforcement of survival skills education.     RECOMMENDATIONS FOR OUTPATIENT PLAN:    Diabetes Self-Monitoring Supplies:  __X_ Preferred / formulary blood glucose meter for BGSM at home use    __X_ Strips and lancets for bid-qid  frequency of home BGSM      Diabetes Education / HCP follow -up:   _x_ Would recommend follow -up education at outpatient diabetes education at [facility]. An ordered is needed for this service and

## 2024-02-26 NOTE — PLAN OF CARE
Problem: Discharge Planning  Goal: Discharge to home or other facility with appropriate resources  2/26/2024 1038 by Bosler, Mary, RN  Outcome: Progressing  2/26/2024 0606 by Fariha Epps RN  Outcome: Progressing     Problem: Pain  Goal: Verbalizes/displays adequate comfort level or baseline comfort level  2/26/2024 1038 by Bosler, Mary, RN  Outcome: Progressing  2/26/2024 0606 by Fariha Epps RN  Outcome: Progressing     Problem: Safety - Adult  Goal: Free from fall injury  2/26/2024 1038 by Bosler, Mary, RN  Outcome: Progressing  2/26/2024 0606 by Fariha Epps RN  Outcome: Progressing     Problem: Skin/Tissue Integrity  Goal: Absence of new skin breakdown  Description: 1.  Monitor for areas of redness and/or skin breakdown  2.  Assess vascular access sites hourly  3.  Every 4-6 hours minimum:  Change oxygen saturation probe site  4.  Every 4-6 hours:  If on nasal continuous positive airway pressure, respiratory therapy assess nares and determine need for appliance change or resting period.  2/26/2024 1038 by Bosler, Mary, RN  Outcome: Progressing  2/26/2024 0606 by Fariha Epps RN  Outcome: Progressing     Problem: ABCDS Injury Assessment  Goal: Absence of physical injury  2/26/2024 1038 by Bosler, Mary, RN  Outcome: Progressing  2/26/2024 0606 by Fariha Epps RN  Outcome: Progressing     Problem: Chronic Conditions and Co-morbidities  Goal: Patient's chronic conditions and co-morbidity symptoms are monitored and maintained or improved  2/26/2024 1038 by Bosler, Mary, RN  Outcome: Progressing  2/26/2024 0606 by Fariha Epps RN  Outcome: Progressing     Problem: Nutrition Deficit:  Goal: Optimize nutritional status  Outcome: Progressing

## 2024-02-26 NOTE — PLAN OF CARE
Problem: Discharge Planning  Goal: Discharge to home or other facility with appropriate resources  2/26/2024 0606 by Fariha Epps RN  Outcome: Progressing  2/25/2024 2004 by Benita Pickard RN  Outcome: Progressing     Problem: Pain  Goal: Verbalizes/displays adequate comfort level or baseline comfort level  2/26/2024 0606 by Fariha Epps RN  Outcome: Progressing  2/25/2024 2004 by Benita Pickard RN  Outcome: Progressing     Problem: Safety - Adult  Goal: Free from fall injury  2/26/2024 0606 by Fariha Epps RN  Outcome: Progressing  2/25/2024 2004 by Benita Pickard RN  Outcome: Progressing  Flowsheets (Taken 2/25/2024 0800)  Free From Fall Injury: Based on caregiver fall risk screen, instruct family/caregiver to ask for assistance with transferring infant if caregiver noted to have fall risk factors     Problem: Skin/Tissue Integrity  Goal: Absence of new skin breakdown  Description: 1.  Monitor for areas of redness and/or skin breakdown  2.  Assess vascular access sites hourly  3.  Every 4-6 hours minimum:  Change oxygen saturation probe site  4.  Every 4-6 hours:  If on nasal continuous positive airway pressure, respiratory therapy assess nares and determine need for appliance change or resting period.  2/26/2024 0606 by Fariha Epps RN  Outcome: Progressing  2/25/2024 2004 by Benita Pickard RN  Outcome: Progressing     Problem: ABCDS Injury Assessment  Goal: Absence of physical injury  2/26/2024 0606 by Fariha Epps RN  Outcome: Progressing  2/25/2024 2004 by Benita Pickard RN  Outcome: Progressing  Flowsheets (Taken 2/25/2024 0800)  Absence of Physical Injury: Implement safety measures based on patient assessment     Problem: Chronic Conditions and Co-morbidities  Goal: Patient's chronic conditions and co-morbidity symptoms are monitored and maintained or improved  2/26/2024 0606 by Fariha Epps RN  Outcome: Progressing  2/25/2024 2004 by Benita Pickard RN  Outcome: Progressing

## 2024-02-26 NOTE — CARE COORDINATION
Transitional planning. Plastics consulted, wound ostomy following. Referral faxed to [facility]- LTACH choice    Spoke to pt concerning his wound cared after discharge- reviewed LTACHs. Choice is [facility] LTACH- referral faxed, [place] schmidt/ [facility] informed.

## 2024-02-26 NOTE — PROGRESS NOTES
Physical Therapy  Facility/Department: [facility] CAR 3- MICU  Physical Therapy Initial Assessment    Name: Jules Nichols  : 1980  MRN: [id number]  Date of Service: 2024    Discharge Recommendations: No therapy recommended at discharge.    Chief Complaint   Patient presents with    Wound Infection     R. axilla, chest, L axilla, L neck         Jules Nichols is a 43 y.o. male presented to outside facility with shortness of breath and worsening wounds.  Patient has a history of hypertension, diabetes, hyperlipidemia, chronic kidney disease with baseline creatinine around 3.  Patient also has a history of hidradenitis patient reportedly previously followed with wound care, however has not since then.  Patient states she has had worsening wounds over the last 6 months, however it has acutely worsened in the last few weeks.  No fevers or chills.     PT Equipment Recommendations  Equipment Needed: No      Patient Diagnosis(es): Diagnoses of Necrotizing fasciitis (HCC) and Necrotizing soft tissue infection were pertinent to this visit.  Past Medical History:  has a past medical history of Benign essential HTN, Chronic kidney disease, Diabetic keto-acidosis (HCC), Hyperlipemia, and Uncontrolled type 2 diabetes mellitus with nephropathy.  Past Surgical History:  has a past surgical history that includes other surgical history (Bilateral, 2024); Wound Exploration (Bilateral, 2024); and Wound debridement.    Assessment   Body Structures, Functions, Activity Limitations Requiring Skilled Therapeutic Intervention: Decreased functional mobility ;Decreased ADL status;Decreased body mechanics;Decreased strength;Decreased balance;Decreased endurance;Decreased coordination;Decreased high-level IADLs  Assessment: Pt ambulates 300 ft no AD CGA. Pt should be safe to return to prior living situation with intermittent support as needed. Pt would benefit from continued therapy to promote endurance, balance, and  help is needed moving from lying on your back to sitting on the side of a flat bed without using bedrails?: None  How much help is needed moving to and from a bed to a chair?: A Little  How much help is needed standing up from a chair using your arms?: A Little  How much help is needed walking in hospital room?: A Little  How much help is needed climbing 3-5 steps with a railing?: A Little  AM-[facility] Inpatient Mobility Raw Score : 20  AM-PAC Inpatient T-Scale Score : 47.67  Mobility Inpatient CMS 0-100% Score: 35.83  Mobility Inpatient CMS G-Code Modifier : CJ         Goals  Short Term Goals  Time Frame for Short Term Goals: 6 visits  Short Term Goal 1: Complete transfers independently  Short Term Goal 2: Complete 300 ft of gait independently  Short Term Goal 3: Complete 2full flight of steps with L handrail and mod I  Short Term Goal 4: Participate in 30 minutes of therapy to promote endurance       Education  Patient Education  Education Given To: Patient  Education Provided: Role of Therapy;Plan of Care  Education Method: Verbal  Barriers to Learning: None  Education Outcome: Verbalized understanding;Demonstrated understanding      Therapy Time   Individual Concurrent Group Co-treatment   Time In 1116         Time Out 1139         Minutes 23         Timed Code Treatment Minutes: 10 Minutes       Linda Robertson, PT

## 2024-02-26 NOTE — PROGRESS NOTES
INTENSIVE CARE UNIT  Resident Physician Progress Note    Patient - Jules Nichols  Date of Admission -  2/21/2024 10:41 PM  Date of Evaluation -  2/26/2024  Room and Bed Number -  3015/3015-01   Hospital Day - 5    Chief Complaint   Patient presents with    Wound Infection     R. axilla, chest, L axilla, L neck      SUBJECTIVE:     TODAY:    Patient examined at bedside today.  Labs and chart reviewed.  Vitals reviewed.    Patient has no concerns and complaints,   Off pressors, on Room air    Last night, he underwent surgical debridement in OR.  General surgery signed off, recommended daily dressing changes and plastic surgery consult for long-term management of the wounds.    This morning, hemoglobin 6.5, received 1 unit PRBCs.    JAY on CKD stage IV getting better.  On 60 cc/hr of NS as per nephrology.  Have been assessing daily for need of hemodialysis.  On midodrine 10 mg TID     Blood sugars better than yesterday.  Diet changed to carbohydrate restricted diet.  On lantus 20 units daily.  On medium dose sliding scale insulin.    ID recommended to stop Zosyn and Zyvox.  Currently, on Unasyn likely until 3/4.  Cultures from the wound show streptococci, beta-hemolytic group B, S.aureus, Prevotella,bacteroides    Brief History:   Jules Nichols is a 43 y.o. male presented to outside facility with shortness of breath and worsening wounds.  Patient has a history of hypertension, diabetes, hyperlipidemia, chronic kidney disease with baseline creatinine around 3.  Patient also has a history of hidradenitis patient reportedly previously followed with wound care, however has not since then.  Patient states she has had worsening wounds over the last 6 months, however it has acutely worsened in the last few weeks.  No fevers or chills.     Patient had CT imaging done at outside hospital that showed necrotizing fasciitis in the axilla and chest as well as Dago's gangrene.  Patient also found to be in acute renal failure  Physician Statement  I have discussed the care of Jules Nichols, including pertinent history and exam findings,  with the resident. I have seen and examined the patient and the key elements of all parts of the encounter have been performed by me.  I agree with the assessment, plan and orders as documented by the resident with additions .        Electronically signed by BROOKLYN CHAVARRIA MD on   2/26/24 at 9:15 PM EST    Please note that this chart was generated using voice recognition Dragon dictation software.  Although every effort was made to ensure the accuracy of this automated transcription, some errors in transcription may have occurred.

## 2024-02-26 NOTE — CARE COORDINATION
SW following for HD needs. New JAY this admission. Nephrology watching for recovery. Has LTACH referral to [facility] which has HD on-site. Will monitor and submit OP HD referral if appropriate.

## 2024-02-26 NOTE — PROGRESS NOTES
NEPHROLOGY PROGRESS NOTE      ASSESSMENT     Acute kidney injury nonoliguric secondary to ischemic ATN from septic shock/DKA-presented with a creatinine of 8.2.  Had 1 session of hemodialysis so far  Chronic kidney disease stage IV secondary likely to diabetic nephropathy with baseline creatinine 3.3-3.5 till March 2023.  Failed to follow-up with nephrology consultant on a regular basis since then  Right axillary fasciitis with air and soft tissues/underlying hidradenitis suppurativa and foreigners gangrene  Type 2 diabetes  Essential hypertension    PLAN     Not exhibiting signs of volume overload or uremia.  Will hold hemodialysis today.  Reassess tomorrow  Antibiotics as per GFR less than 15  Avoid nephrotoxins      SUBJECTIVE     Hospitalized with worsening wounds in the axilla and chest wall area along with groin.  Investigations demonstrated elevated creatinine prompting nephrology consultation.  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  Received 1 session of hemodialysis.  Creatinine seems to have plateaued.  Urine output has been excellent.  Patient has no complaints to offer.  P.o. intake decent.    OBJECTIVE     Vitals:    02/26/24 0850 02/26/24 0905 02/26/24 1000 02/26/24 1100   BP: 124/75 128/70 (!) 99/58 (!) 112/56   Pulse: 80 67 92 60   Resp: 19 15 15 13   Temp: 97.7 °F (36.5 °C) 97.7 °F (36.5 °C)     TempSrc:       SpO2: 98% 99% 97% 97%   Weight:       Height:         24HR INTAKE/OUTPUT:    Intake/Output Summary (Last 24 hours) at 2/26/2024 1127  Last data filed at 2/26/2024 1100  Gross per 24 hour   Intake 1925.75 ml   Output 2525 ml   Net -599.25 ml       General appearance:Awake, alert, in no acute distress  HEENT: PERRLA  Respiratory::vesicular breath sounds,no wheeze/crackles  Cardiovascular:S1 S2 normal,no gallop or organic murmur.  Abdomen:Non tender/non distended.Bowel sounds present  Extremities: No Cyanosis or Clubbing,Lower

## 2024-02-26 NOTE — ANESTHESIA POSTPROCEDURE EVALUATION
POST- ANESTHESIA EVALUATION       Pt Name: Jules Nichols  MRN: [id number]  YOB: 1980  Date of evaluation: 2/26/2024  Time:  6:54 AM      BP (!) 107/57   Pulse 63   Temp 97.8 °F (36.6 °C) (Oral)   Resp 16   Ht 1.651 m (5' 5\")   Wt 54.7 kg (120 lb 9.5 oz)   SpO2 98%   BMI 20.07 kg/m²      Consciousness Level  Awake  Cardiopulmonary Status  Stable  Pain Adequately Treated YES  Nausea / Vomiting  NO  Adequate Hydration  YES  Anesthesia Related Complications NONE      Electronically signed by Tobi De Jesus MD on 2/26/2024 at 6:54 AM     Department of Anesthesiology  Postprocedure Note    Patient: Jules Nichols  MRN: [id number]  YOB: 1980  Date of evaluation: 2/26/2024    Procedure Summary       Date: 02/24/24 Room / Location: [address]    Anesthesia Start: 2137 Anesthesia Stop: 2352    Procedure: WOUND BED PREPARATION BILATERAL GROIN AND ARMPIT, CHEST AND BACK (Axilla) Diagnosis:       Necrotizing soft tissue infection      (Necrotizing soft tissue infection [M79.89])    Surgeons: Boyd Keene MD Responsible Provider: Tobi De Jesus MD    Anesthesia Type: general ASA Status: 4            Anesthesia Type: No value filed.    Jonah Phase I: Jonah Score: 10    Jonah Phase II:      Anesthesia Post Evaluation    No notable events documented.

## 2024-02-26 NOTE — PROGRESS NOTES
[facility] Nurse  Consult Note       NAME:  Jules Nichols  MEDICAL RECORD NUMBER:  [id number]  AGE: 43 y.o.   GENDER: male  : 1980  TODAY'S DATE:  2024    Subjective:     Reason for WOCN Evaluation and Assessment: \"extreme HS in all folds\"      Jules Nichols is a 43 y.o. male referred by:   [x] Physician  [] Nursing  [] Other:      Wound Identification:  Wound Type:  hidradenitis Suppurativa, surgical.   Contributing Factors: diabetes, poor glucose control, malnutrition, and non-adherence    L axilla, L groin - Sharp excisional debridement down through and including the removal of epidermis, dermis, and subcutaneous tissue total of  66 sq cm.  R axilla, R groin, chest wall -  Sharp excisional debridement down through and including the removal of epidermis, dermis, subcutaneous tissue, and muscle/fascia tissue total of 605 sq cm  I&D abdominal wall abscess  With Dr Carmichael 2024.    Back I&D of multiple lesions 2023 with Dr Keene. Dakin's solution moistened gauze dressings continued.           Objective:      /64   Pulse 70   Temp 97.5 °F (36.4 °C) (Oral)   Resp 16   Ht 1.651 m (5' 5\")   Wt 55.7 kg (122 lb 12.7 oz)   SpO2 98%   BMI 20.43 kg/m²   Eduardo Risk Score: Eduardo Scale Score: 17    LABS    CBC:   Lab Results   Component Value Date/Time    WBC 13.3 2024 06:13 AM    RBC 2.54 2024 06:13 AM    HGB 8.4 2024 09:58 AM    HCT 28.4 2024 09:58 AM     CMP:  Albumin:    Lab Results   Component Value Date/Time    LABALBU 1.9 2024 06:49 AM     PT/INR:    Lab Results   Component Value Date/Time    PROTIME 16.4 2024 10:49 PM    INR 1.4 2024 10:49 PM     HgBA1c:    Lab Results   Component Value Date/Time    LABA1C 10.2 2024 04:17 AM     PTT: No components found for: \"LABPTT\"      Assessment:       Measurements:     24 1340   Wound 24 Axilla Proximal;Right;Anterior   Date First Assessed/Time First Assessed:

## 2024-02-26 NOTE — PROGRESS NOTES
Comprehensive Nutrition Assessment    Type and Reason for Visit:  Reassess    Nutrition Recommendations/Plan:   Modify diet to meet nutrition needs, CCHO-Medium  Continue Diabetic ONS TID  Monitor/encourage PO intake     Malnutrition Assessment:  Malnutrition Status:  Moderate malnutrition (02/22/24 1557)    Context:  Acute Illness     Findings of the 6 clinical characteristics of malnutrition:  Energy Intake:  50% or less of estimated energy requirements for 5 or more days  Weight Loss:  No significant weight loss     Body Fat Loss:  Mild body fat loss Orbital, Buccal region   Muscle Mass Loss:  Unable to assess    Fluid Accumulation:  Unable to assess     Strength:  Not Performed    Nutrition Assessment:    Pt would not wake at visit. Per RN, pt PO intake is excellent, eating > 75% of meals and drinking 100% of ONS, which is consistent with intake per chart % of meals. Discussed modifying diet to increase carb intake and meet nutrition needs. LBM 2/26. No edema noted.    Nutrition Related Findings:    labs/meds reviewed Wound Type: Multiple, Surgical Incision       Current Nutrition Intake & Therapies:    Average Meal Intake: %  Average Supplements Intake: %  ADULT DIET; Regular; 4 carb choices (60 gm/meal)  ADULT ORAL NUTRITION SUPPLEMENT; Breakfast, Lunch, Dinner; Diabetic Oral Supplement    Anthropometric Measures:  Height: 165.1 cm (5' 5\")  Ideal Body Weight (IBW): 136 lbs (62 kg)    Admission Body Weight: 53.3 kg (117 lb 8.1 oz)  Current Body Weight: 55.7 kg (122 lb 12.7 oz) (2/26/24), 90.3 % IBW.    Current BMI (kg/m2): 20.4  Usual Body Weight: 56.7 kg (125 lb) (3/21/23)  % Weight Change (Calculated): -6  Weight Adjustment For: No Adjustment                 BMI Categories: Normal Weight (BMI 18.5-24.9)    Estimated Daily Nutrient Needs:  Energy Requirements Based On: Formula  Weight Used for Energy Requirements: Admission  Energy (kcal/day): 1800 to 2000 kcal/day  Weight Used for  Protein Requirements: Admission  Protein (g/day): 80 to 100 g/day  Method Used for Fluid Requirements: 1 ml/kcal  Fluid (ml/day): 1800 to 2000 ml/day    Nutrition Diagnosis:   Inadequate oral intake related to food and nutrition related knowledge deficit as evidenced by poor intake prior to admission    Nutrition Interventions:   Food and/or Nutrient Delivery: Continue Current Diet, Continue Oral Nutrition Supplement  Nutrition Education/Counseling: No recommendation at this time  Coordination of Nutrition Care: Continue to monitor while inpatient       Goals:  Previous Goal Met: Progressing toward Goal(s)  Goals: Meet at least 75% of estimated needs, prior to discharge       Nutrition Monitoring and Evaluation:   Behavioral-Environmental Outcomes: None Identified  Food/Nutrient Intake Outcomes: Food and Nutrient Intake, Supplement Intake  Physical Signs/Symptoms Outcomes: Biochemical Data, GI Status, Fluid Status or Edema, Nutrition Focused Physical Findings, Skin, Weight    Discharge Planning:    Continue Oral Nutrition Supplement     Lucy Gupta, MS, RD, LD  Contact:   Floor Mobile: [phone]  Desk Phone: [phone]  RD Weekend Mobile: [phone]

## 2024-02-27 LAB
ABO/RH: NORMAL
ANION GAP SERPL CALCULATED.3IONS-SCNC: 12 MMOL/L (ref 9–17)
ANTIBODY SCREEN: NEGATIVE
ARM BAND NUMBER: NORMAL
BASOPHILS # BLD: <0.03 K/UL (ref 0–0.2)
BASOPHILS NFR BLD: 0 % (ref 0–2)
BLOOD BANK BLOOD PRODUCT EXPIRATION DATE: NORMAL
BLOOD BANK DISPENSE STATUS: NORMAL
BLOOD BANK ISBT PRODUCT BLOOD TYPE: 9500
BLOOD BANK PRODUCT CODE: NORMAL
BLOOD BANK SAMPLE EXPIRATION: NORMAL
BLOOD BANK UNIT TYPE AND RH: NORMAL
BPU ID: NORMAL
BUN SERPL-MCNC: 52 MG/DL (ref 6–20)
CALCIUM SERPL-MCNC: 7.9 MG/DL (ref 8.6–10.4)
CHLORIDE SERPL-SCNC: 107 MMOL/L (ref 98–107)
CO2 SERPL-SCNC: 19 MMOL/L (ref 20–31)
COMPONENT: NORMAL
CREAT SERPL-MCNC: 4.5 MG/DL (ref 0.7–1.2)
CROSSMATCH RESULT: NORMAL
EOSINOPHIL # BLD: 0.19 K/UL (ref 0–0.44)
EOSINOPHILS RELATIVE PERCENT: 2 % (ref 1–4)
ERYTHROCYTE [DISTWIDTH] IN BLOOD BY AUTOMATED COUNT: 18.6 % (ref 11.8–14.4)
GFR SERPL CREATININE-BSD FRML MDRD: 16 ML/MIN/1.73M2
GLUCOSE BLD-MCNC: 164 MG/DL (ref 75–110)
GLUCOSE BLD-MCNC: 171 MG/DL (ref 75–110)
GLUCOSE BLD-MCNC: 214 MG/DL (ref 75–110)
GLUCOSE BLD-MCNC: 219 MG/DL (ref 75–110)
GLUCOSE SERPL-MCNC: 196 MG/DL (ref 70–99)
HCT VFR BLD AUTO: 28.6 % (ref 40.7–50.3)
HGB BLD-MCNC: 8.6 G/DL (ref 13–17)
IMM GRANULOCYTES # BLD AUTO: 0.29 K/UL (ref 0–0.3)
IMM GRANULOCYTES NFR BLD: 3 %
LYMPHOCYTES NFR BLD: 1.95 K/UL (ref 1.1–3.7)
LYMPHOCYTES RELATIVE PERCENT: 21 % (ref 24–43)
MAGNESIUM SERPL-MCNC: 1.9 MG/DL (ref 1.6–2.6)
MCH RBC QN AUTO: 26.8 PG (ref 25.2–33.5)
MCHC RBC AUTO-ENTMCNC: 30.1 G/DL (ref 28.4–34.8)
MCV RBC AUTO: 89.1 FL (ref 82.6–102.9)
MONOCYTES NFR BLD: 0.46 K/UL (ref 0.1–1.2)
MONOCYTES NFR BLD: 5 % (ref 3–12)
NEUTROPHILS NFR BLD: 69 % (ref 36–65)
NEUTS SEG NFR BLD: 6.45 K/UL (ref 1.5–8.1)
NRBC BLD-RTO: 0 PER 100 WBC
PHOSPHATE SERPL-MCNC: 2 MG/DL (ref 2.5–4.5)
PLATELET # BLD AUTO: 171 K/UL (ref 138–453)
PMV BLD AUTO: 9.2 FL (ref 8.1–13.5)
POTASSIUM SERPL-SCNC: 3.6 MMOL/L (ref 3.7–5.3)
RBC # BLD AUTO: 3.21 M/UL (ref 4.21–5.77)
RBC # BLD: ABNORMAL 10*6/UL
SODIUM SERPL-SCNC: 138 MMOL/L (ref 135–144)
TRANSFUSION STATUS: NORMAL
UNIT DIVISION: 0
UNIT ISSUE DATE/TIME: NORMAL
WBC OTHER # BLD: 9.4 K/UL (ref 3.5–11.3)

## 2024-02-27 PROCEDURE — 6370000000 HC RX 637 (ALT 250 FOR IP)

## 2024-02-27 PROCEDURE — 6360000002 HC RX W HCPCS: Performed by: STUDENT IN AN ORGANIZED HEALTH CARE EDUCATION/TRAINING PROGRAM

## 2024-02-27 PROCEDURE — 36415 COLL VENOUS BLD VENIPUNCTURE: CPT

## 2024-02-27 PROCEDURE — 82947 ASSAY GLUCOSE BLOOD QUANT: CPT

## 2024-02-27 PROCEDURE — 2580000003 HC RX 258: Performed by: STUDENT IN AN ORGANIZED HEALTH CARE EDUCATION/TRAINING PROGRAM

## 2024-02-27 PROCEDURE — 6370000000 HC RX 637 (ALT 250 FOR IP): Performed by: STUDENT IN AN ORGANIZED HEALTH CARE EDUCATION/TRAINING PROGRAM

## 2024-02-27 PROCEDURE — 83735 ASSAY OF MAGNESIUM: CPT

## 2024-02-27 PROCEDURE — 51798 US URINE CAPACITY MEASURE: CPT

## 2024-02-27 PROCEDURE — 80048 BASIC METABOLIC PNL TOTAL CA: CPT

## 2024-02-27 PROCEDURE — 99214 OFFICE O/P EST MOD 30 MIN: CPT

## 2024-02-27 PROCEDURE — 94761 N-INVAS EAR/PLS OXIMETRY MLT: CPT

## 2024-02-27 PROCEDURE — 99233 SBSQ HOSP IP/OBS HIGH 50: CPT | Performed by: INTERNAL MEDICINE

## 2024-02-27 PROCEDURE — 99232 SBSQ HOSP IP/OBS MODERATE 35: CPT | Performed by: INTERNAL MEDICINE

## 2024-02-27 PROCEDURE — 2580000003 HC RX 258

## 2024-02-27 PROCEDURE — 1200000000 HC SEMI PRIVATE

## 2024-02-27 PROCEDURE — 84100 ASSAY OF PHOSPHORUS: CPT

## 2024-02-27 PROCEDURE — 85025 COMPLETE CBC W/AUTO DIFF WBC: CPT

## 2024-02-27 RX ORDER — LACTOBACILLUS RHAMNOSUS GG 10B CELL
1 CAPSULE ORAL 2 TIMES DAILY WITH MEALS
Status: DISCONTINUED | OUTPATIENT
Start: 2024-02-27 | End: 2024-03-06 | Stop reason: HOSPADM

## 2024-02-27 RX ORDER — 0.9 % SODIUM CHLORIDE 0.9 %
250 INTRAVENOUS SOLUTION INTRAVENOUS ONCE
Status: COMPLETED | OUTPATIENT
Start: 2024-02-27 | End: 2024-02-27

## 2024-02-27 RX ORDER — LACTOBACILLUS RHAMNOSUS GG 10B CELL
1 CAPSULE ORAL
Status: DISCONTINUED | OUTPATIENT
Start: 2024-02-28 | End: 2024-02-27

## 2024-02-27 RX ORDER — POTASSIUM CHLORIDE 20 MEQ/1
20 TABLET, EXTENDED RELEASE ORAL ONCE
Status: COMPLETED | OUTPATIENT
Start: 2024-02-27 | End: 2024-02-27

## 2024-02-27 RX ADMIN — SODIUM CHLORIDE, PRESERVATIVE FREE 10 ML: 5 INJECTION INTRAVENOUS at 07:29

## 2024-02-27 RX ADMIN — Medication: at 07:29

## 2024-02-27 RX ADMIN — SODIUM CHLORIDE 250 ML: 9 INJECTION, SOLUTION INTRAVENOUS at 20:52

## 2024-02-27 RX ADMIN — AMPICILLIN SODIUM AND SULBACTAM SODIUM 3000 MG: 2; 1 INJECTION, POWDER, FOR SOLUTION INTRAMUSCULAR; INTRAVENOUS at 07:39

## 2024-02-27 RX ADMIN — HEPARIN SODIUM 5000 UNITS: 5000 INJECTION INTRAVENOUS; SUBCUTANEOUS at 13:59

## 2024-02-27 RX ADMIN — OXYCODONE 5 MG: 5 TABLET ORAL at 13:59

## 2024-02-27 RX ADMIN — SODIUM CHLORIDE, PRESERVATIVE FREE 10 ML: 5 INJECTION INTRAVENOUS at 20:16

## 2024-02-27 RX ADMIN — SODIUM CHLORIDE: 9 INJECTION, SOLUTION INTRAVENOUS at 08:10

## 2024-02-27 RX ADMIN — HEPARIN SODIUM 5000 UNITS: 5000 INJECTION INTRAVENOUS; SUBCUTANEOUS at 06:23

## 2024-02-27 RX ADMIN — Medication 1 CAPSULE: at 16:50

## 2024-02-27 RX ADMIN — Medication 1 CAPSULE: at 11:27

## 2024-02-27 RX ADMIN — INSULIN LISPRO 2 UNITS: 100 INJECTION, SOLUTION INTRAVENOUS; SUBCUTANEOUS at 16:53

## 2024-02-27 RX ADMIN — AMPICILLIN SODIUM AND SULBACTAM SODIUM 3000 MG: 2; 1 INJECTION, POWDER, FOR SOLUTION INTRAMUSCULAR; INTRAVENOUS at 20:15

## 2024-02-27 RX ADMIN — SODIUM CHLORIDE: 9 INJECTION, SOLUTION INTRAVENOUS at 20:15

## 2024-02-27 RX ADMIN — PANTOPRAZOLE SODIUM 40 MG: 40 TABLET, DELAYED RELEASE ORAL at 07:39

## 2024-02-27 RX ADMIN — POTASSIUM CHLORIDE 20 MEQ: 1500 TABLET, EXTENDED RELEASE ORAL at 11:27

## 2024-02-27 RX ADMIN — INSULIN GLARGINE 20 UNITS: 100 INJECTION, SOLUTION SUBCUTANEOUS at 08:30

## 2024-02-27 RX ADMIN — HEPARIN SODIUM 5000 UNITS: 5000 INJECTION INTRAVENOUS; SUBCUTANEOUS at 23:27

## 2024-02-27 RX ADMIN — INSULIN LISPRO 2 UNITS: 100 INJECTION, SOLUTION INTRAVENOUS; SUBCUTANEOUS at 11:50

## 2024-02-27 ASSESSMENT — PAIN DESCRIPTION - ORIENTATION: ORIENTATION: POSTERIOR

## 2024-02-27 ASSESSMENT — PAIN SCALES - GENERAL
PAINLEVEL_OUTOF10: 0
PAINLEVEL_OUTOF10: 5

## 2024-02-27 ASSESSMENT — PAIN DESCRIPTION - DESCRIPTORS: DESCRIPTORS: ACHING

## 2024-02-27 ASSESSMENT — PAIN DESCRIPTION - LOCATION: LOCATION: BACK

## 2024-02-27 NOTE — PLAN OF CARE
Problem: Discharge Planning  Goal: Discharge to home or other facility with appropriate resources  2/27/2024 1424 by Bosler, Mary, RN  Outcome: Progressing  2/27/2024 0524 by Fariha Epps RN  Outcome: Progressing     Problem: Pain  Goal: Verbalizes/displays adequate comfort level or baseline comfort level  2/27/2024 1424 by Bosler, Mary, RN  Outcome: Progressing  2/27/2024 0524 by Fariha Epps RN  Outcome: Progressing     Problem: Safety - Adult  Goal: Free from fall injury  2/27/2024 1424 by Bosler, Mary, RN  Outcome: Progressing  2/27/2024 0524 by Fariha Epps RN  Outcome: Progressing     Problem: Skin/Tissue Integrity  Goal: Absence of new skin breakdown  Description: 1.  Monitor for areas of redness and/or skin breakdown  2.  Assess vascular access sites hourly  3.  Every 4-6 hours minimum:  Change oxygen saturation probe site  4.  Every 4-6 hours:  If on nasal continuous positive airway pressure, respiratory therapy assess nares and determine need for appliance change or resting period.  2/27/2024 1424 by Bosler, Mary, RN  Outcome: Progressing  2/27/2024 0524 by Fariha Epps RN  Outcome: Progressing     Problem: ABCDS Injury Assessment  Goal: Absence of physical injury  2/27/2024 1424 by Bosler, Mary, RN  Outcome: Progressing  2/27/2024 0524 by Fariha Epps RN  Outcome: Progressing     Problem: Chronic Conditions and Co-morbidities  Goal: Patient's chronic conditions and co-morbidity symptoms are monitored and maintained or improved  2/27/2024 1424 by Bosler, Mary, RN  Outcome: Progressing  2/27/2024 0524 by Fariha Epps RN  Outcome: Progressing     Problem: Nutrition Deficit:  Goal: Optimize nutritional status  2/27/2024 1424 by Bosler, Mary, RN  Outcome: Progressing  2/27/2024 0524 by Fariha Epps RN  Outcome: Progressing

## 2024-02-27 NOTE — CARE COORDINATION
Spoke to [place] from [facility]. They are able to accept patient today if patient is ready for d/c. Discussed with critical care resident. Patient is awaiting plastics consult before he can discharge

## 2024-02-27 NOTE — PROGRESS NOTES
[facility] -   Infectious diseases evaluation  admission date 2/21/2024    reason for consultation:   Fasciitis    Impression :   Current:  Right axilla  fasciitis with air in the soft tissues  Fluid in the gangrene groin area with a subcutaneous  Underlying hidradenitis suppurativa extensive sites  JAY on hemodialysis  Diabetes mellitus  Bandemia  Septic shock with severe sepsis with organ failure, on pressors    Other:    Discussion / summary of stay / plan of care/ Recommendations:     HENCE:   Stop Zosyn/ zyvox 2/24 2/24 Start unasyn  likely till 3/4 roughly  All wounds looking  as below and no MRSA  Disc w pt and RN    Infection Control Recommendations   [facility] Precautions  Contact Isolation       Antimicrobial Stewardship Recommendations   Simplification of therapy  Targeted therapy  History of Present Illness:   Initial history:  Jules Nichols is a 43 y.o.-year-old male of diabetes and hidradenitis affecting multiple sites, was very short of breath and transferred from another facility with a creatinine of 3 and a right arm major inflammation seen for a groin area that had major inflammation and cellulitis, diagnosed with necrotizing patient this of both sites and was taken to surgery with a debridement of the right axilla as well as the groin area.  According to the patient those wounds were hidradenitis sites and they were progressing over the last 3 weeks progressively.    He was started on vancomycin and Zosyn  Was given a dose of clindamycin  Infectious disease consulted for antibiotic management    He is currently on Isma-Synephrine, getting hemodialysis for acute kidney injury  Alert appropriate NG tube in place abdomen soft nontender  No burning with the urine    Culture from the right axillary area is showing mixed bacteria 2/22  Another culture is showing group B strep  Blood culture and urine culture still pending    Patient is tachycardic  insulin lispro  0-4 Units SubCUTAneous Nightly   • sodium hypochlorite   Irrigation Daily   • insulin glargine  20 Units SubCUTAneous Daily   • midodrine  10 mg Oral TID WC   • ampicillin-sulbactam  3,000 mg IntraVENous Q12H   • pantoprazole  40 mg Oral QAM AC   • sodium chloride flush  5-40 mL IntraVENous 2 times per day   • heparin (porcine)  5,000 Units SubCUTAneous 3 times per day       Social History:     Social History     Socioeconomic History   • Marital status: Single     Spouse name: Not on file   • Number of children: Not on file   • Years of education: Not on file   • Highest education level: Not on file   Occupational History   • Not on file   Tobacco Use   • Smoking status: Never   • Smokeless tobacco: Never   Substance and Sexual Activity   • Alcohol use: No     Alcohol/week: 0.0 standard drinks of alcohol   • Drug use: No   • Sexual activity: Not on file   Other Topics Concern   • Not on file   Social History Narrative   • Not on file     Social Determinants of Health     Financial Resource Strain: Not on file   Food Insecurity: Patient Declined (2/26/2024)    Hunger Vital Sign    • Worried About Running Out of Food in the Last Year: Patient declined    • Ran Out of Food in the Last Year: Patient declined   Transportation Needs: Patient Declined (2/26/2024)    PRAPARE - Transportation    • Lack of Transportation (Medical): Patient declined    • Lack of Transportation (Non-Medical): Patient declined   Physical Activity: Not on file   Stress: Not on file   Social Connections: Not on file   Intimate Partner Violence: Not on file   Housing Stability: Patient Declined (2/26/2024)    Housing Stability Vital Sign    • Unable to Pay for Housing in the Last Year: Patient declined    • Number of Places Lived in the Last Year: 0    • Unstable Housing in the Last Year: Patient declined       Family History:   No family history on file.   Medical Decision Making:   I have independently reviewed/ordered the

## 2024-02-27 NOTE — PROGRESS NOTES
[facility] Nurse  Follow Up      NAME:  Jules Nichols  MEDICAL RECORD NUMBER:  [id number]  AGE: 43 y.o.   GENDER: male  : 1980  TODAY'S DATE:  2024    Subjective:     Subjective:      Reason for WOCN Evaluation and Assessment: \"extreme HS in all folds\"      Jules Nichols is a 43 y.o. male referred by:   [x] Physician  [] Nursing  [] Other:      Wound Identification:  Wound Type:  hidradenitis Suppurativa, surgical.   Contributing Factors: diabetes, poor glucose control, malnutrition, and non-adherence    L axilla, L groin - Sharp excisional debridement down through and including the removal of epidermis, dermis, and subcutaneous tissue total of  66 sq cm.  R axilla, R groin, chest wall -  Sharp excisional debridement down through and including the removal of epidermis, dermis, subcutaneous tissue, and muscle/fascia tissue total of 605 sq cm  I&D abdominal wall abscess  With Dr Carmichael 2024.     Back I&D of multiple lesions 2023 with Dr Keene. Dakin's solution moistened gauze dressings continued.               Objective:      /76   Pulse (!) 111   Temp 97.5 °F (36.4 °C) (Oral)   Resp 18   Ht 1.651 m (5' 5\")   Wt 54.6 kg (120 lb 5.9 oz)   SpO2 96%   BMI 20.03 kg/m²   Eduardo Risk Score: Eduardo Scale Score: 18    LABS    CBC:   Lab Results   Component Value Date/Time    WBC 9.4 2024 08:27 AM    RBC 3.21 2024 08:27 AM    HGB 8.6 2024 08:27 AM    HCT 28.6 2024 08:27 AM     CMP:  Albumin:    Lab Results   Component Value Date/Time    LABALBU 1.9 2024 06:49 AM     PT/INR:    Lab Results   Component Value Date/Time    PROTIME 16.4 2024 10:49 PM    INR 1.4 2024 10:49 PM     HgBA1c:    Lab Results   Component Value Date/Time    LABA1C 10.2 2024 04:17 AM     PTT: No components found for: \"LABPTT\"      Assessment:       Measurements:     24 1515   Wound 24 Axilla Proximal;Right;Anterior   Date First Assessed/Time  vs Medfix tape to secure. Quick Change pad and mesh underwear used to secure the groin dressings. Daily dressing changes.      Bilateral neck lesions: soap and water daily. Open to air.   Await plastic surgeon evaluation.        Plan of Care:     [x]  Turn and reposition every 2 hours while in bed.     [x] Float heels off of bed with pillows under calves.      [x] Perform routine incontinence care with use of foam cleanser.     [x] Use single layer moisture wicking underpad.     [x] Use comfort glide system and wedges to reposition patient.     [x] Keep the head of the bed below 30 degrees unless contraindicated.     [] Pressure reducing chair cushion while up to chair. Reposition every hour while in chair and limit chair time to 2 hour intervals.     [] Encourage good nutritional intake and fluids. Consult dietician if needed.     Specialty Bed Required : Yes   [] Low Air Loss   [x] Pressure Redistribution  [] Fluid Immersion  [] Bariatric  [] Total Pressure Relief  [] Other:      Discharge Plan:  TBD. LTACH     Patient/Caregiver Teaching:  Mr Nichols is alert. Follows care. Denies complaints. Encouraged him to verbalize pain so we can adequately treat.   Level of patient/caregiver understanding:    [] Indicates understanding                [x] Needs reinforcement  [] Unsuccessful                                  [x] Verbal Understanding  [] Demonstrated understanding        [] No evidence of learning  [] Refused teaching                           [] N/A              Contact the Wound Ostomy RN on-call during working hours Monday-Friday [id number] via [facility] by searching \"wound\" under \"groups\" and selecting the on-call clinician. Sending messages via individual names will not reach a clinician.           Electronically signed by Veda Garcia RN, CWON on 2/27/2024 at 3:44 PM

## 2024-02-27 NOTE — PROGRESS NOTES
NEPHROLOGY PROGRESS NOTE      ASSESSMENT     Acute kidney injury nonoliguric secondary to ischemic ATN from septic shock/DKA-presented with a creatinine of 8.2.  Had 1 session of hemodialysis so far  Chronic kidney disease stage IV secondary likely to diabetic nephropathy with baseline creatinine 3.3-3.5 till March 2023.  Failed to follow-up with nephrology consultant on a regular basis since then  Right axillary fasciitis with air and soft tissues/underlying hidradenitis suppurativa and foreigners gangrene  Type 2 diabetes  Essential hypertension    PLAN     Not exhibiting signs of volume overload or uremia.  Will hold hemodialysis today.  Reassess tomorrow  Antibiotics as per GFR less than 15  Avoid nephrotoxins      SUBJECTIVE     Hospitalized with worsening wounds in the axilla and chest wall area along with groin.  Investigations demonstrated elevated creatinine prompting nephrology consultation.  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  Received 1 session of hemodialysis.    Urine output excellent.  Creatinine marginally high from yesterday.  He is asymptomatic.  No acute hemodynamic issues overnight.  Blood pressure stable.    OBJECTIVE     Vitals:    02/27/24 0700 02/27/24 0800 02/27/24 0900 02/27/24 1000   BP: (!) 113/54 124/85 (!) 106/59 (!) 110/57   Pulse: 73 (!) 108 79 75   Resp: 16 13 17 20   Temp:  97.5 °F (36.4 °C)     TempSrc:  Oral     SpO2: 96% 93% 97% 97%   Weight:       Height:         24HR INTAKE/OUTPUT:    Intake/Output Summary (Last 24 hours) at 2/27/2024 1132  Last data filed at 2/27/2024 1100  Gross per 24 hour   Intake 857.53 ml   Output 3195 ml   Net -2337.47 ml       General appearance:Awake, alert, in no acute distress  HEENT: PERRLA  Respiratory::vesicular breath sounds,no wheeze/crackles  Cardiovascular:S1 S2 normal,no gallop or organic murmur.  Abdomen:Non tender/non distended.Bowel sounds present  Extremities: No Cyanosis

## 2024-02-27 NOTE — PLAN OF CARE
Problem: Discharge Planning  Goal: Discharge to home or other facility with appropriate resources  Outcome: Progressing     Problem: Pain  Goal: Verbalizes/displays adequate comfort level or baseline comfort level  Outcome: Progressing     Problem: Safety - Adult  Goal: Free from fall injury  Outcome: Progressing     Problem: Skin/Tissue Integrity  Goal: Absence of new skin breakdown  Description: 1.  Monitor for areas of redness and/or skin breakdown  2.  Assess vascular access sites hourly  3.  Every 4-6 hours minimum:  Change oxygen saturation probe site  4.  Every 4-6 hours:  If on nasal continuous positive airway pressure, respiratory therapy assess nares and determine need for appliance change or resting period.  Outcome: Progressing     Problem: ABCDS Injury Assessment  Goal: Absence of physical injury  Outcome: Progressing     Problem: Chronic Conditions and Co-morbidities  Goal: Patient's chronic conditions and co-morbidity symptoms are monitored and maintained or improved  Outcome: Progressing     Problem: Nutrition Deficit:  Goal: Optimize nutritional status  2/27/2024 0524 by Fariha Epps, RN  Outcome: Progressing  2/26/2024 1525 by Lucy Gupta, MS, RD, LD  Outcome: Progressing  Flowsheets (Taken 2/26/2024 1520)  Nutrient intake appropriate for improving, restoring, or maintaining nutritional needs:   Assess nutritional status and recommend course of action   Monitor oral intake, labs, and treatment plans   Recommend appropriate diets, oral nutritional supplements, and vitamin/mineral supplements

## 2024-02-28 PROBLEM — Z99.2 DEPENDENCE ON RENAL DIALYSIS (HCC): Status: ACTIVE | Noted: 2024-02-28

## 2024-02-28 LAB
ANION GAP SERPL CALCULATED.3IONS-SCNC: 12 MMOL/L (ref 9–17)
BASOPHILS # BLD: 0 K/UL (ref 0–0.2)
BASOPHILS NFR BLD: 0 % (ref 0–2)
BUN SERPL-MCNC: 51 MG/DL (ref 6–20)
CALCIUM SERPL-MCNC: 7.7 MG/DL (ref 8.6–10.4)
CHLORIDE SERPL-SCNC: 108 MMOL/L (ref 98–107)
CO2 SERPL-SCNC: 20 MMOL/L (ref 20–31)
CREAT SERPL-MCNC: 4.8 MG/DL (ref 0.7–1.2)
EOSINOPHIL # BLD: 0.09 K/UL (ref 0–0.4)
EOSINOPHILS RELATIVE PERCENT: 1 % (ref 1–4)
ERYTHROCYTE [DISTWIDTH] IN BLOOD BY AUTOMATED COUNT: 18.4 % (ref 11.8–14.4)
GFR SERPL CREATININE-BSD FRML MDRD: 14 ML/MIN/1.73M2
GLUCOSE BLD-MCNC: 135 MG/DL (ref 75–110)
GLUCOSE BLD-MCNC: 149 MG/DL (ref 75–110)
GLUCOSE BLD-MCNC: 190 MG/DL (ref 75–110)
GLUCOSE BLD-MCNC: 215 MG/DL (ref 75–110)
GLUCOSE SERPL-MCNC: 197 MG/DL (ref 70–99)
HCT VFR BLD AUTO: 27.1 % (ref 40.7–50.3)
HGB BLD-MCNC: 8.5 G/DL (ref 13–17)
IMM GRANULOCYTES # BLD AUTO: 0.19 K/UL (ref 0–0.3)
IMM GRANULOCYTES NFR BLD: 2 %
LYMPHOCYTES NFR BLD: 2.42 K/UL (ref 1–4.8)
LYMPHOCYTES RELATIVE PERCENT: 26 % (ref 24–44)
MAGNESIUM SERPL-MCNC: 1.6 MG/DL (ref 1.6–2.6)
MCH RBC QN AUTO: 26.8 PG (ref 25.2–33.5)
MCHC RBC AUTO-ENTMCNC: 31.4 G/DL (ref 28.4–34.8)
MCV RBC AUTO: 85.5 FL (ref 82.6–102.9)
MONOCYTES NFR BLD: 0.56 K/UL (ref 0.1–0.8)
MONOCYTES NFR BLD: 6 % (ref 1–7)
MORPHOLOGY: ABNORMAL
NEUTROPHILS NFR BLD: 65 % (ref 36–66)
NEUTS SEG NFR BLD: 6.04 K/UL (ref 1.8–7.7)
NRBC BLD-RTO: 0 PER 100 WBC
PHOSPHATE SERPL-MCNC: 2.2 MG/DL (ref 2.5–4.5)
PLATELET # BLD AUTO: ABNORMAL K/UL (ref 138–453)
PLATELET, FLUORESCENCE: 125 K/UL (ref 138–453)
PLATELETS.RETICULATED NFR BLD AUTO: 2.8 % (ref 1.1–10.3)
POTASSIUM SERPL-SCNC: 4.3 MMOL/L (ref 3.7–5.3)
RBC # BLD AUTO: 3.17 M/UL (ref 4.21–5.77)
SODIUM SERPL-SCNC: 140 MMOL/L (ref 135–144)
WBC OTHER # BLD: 9.3 K/UL (ref 3.5–11.3)

## 2024-02-28 PROCEDURE — 84100 ASSAY OF PHOSPHORUS: CPT

## 2024-02-28 PROCEDURE — 99232 SBSQ HOSP IP/OBS MODERATE 35: CPT | Performed by: INTERNAL MEDICINE

## 2024-02-28 PROCEDURE — 6360000002 HC RX W HCPCS: Performed by: STUDENT IN AN ORGANIZED HEALTH CARE EDUCATION/TRAINING PROGRAM

## 2024-02-28 PROCEDURE — 6370000000 HC RX 637 (ALT 250 FOR IP): Performed by: STUDENT IN AN ORGANIZED HEALTH CARE EDUCATION/TRAINING PROGRAM

## 2024-02-28 PROCEDURE — 97606 NEG PRS WND THER DME>50 SQCM: CPT

## 2024-02-28 PROCEDURE — 2060000000 HC ICU INTERMEDIATE R&B

## 2024-02-28 PROCEDURE — 6370000000 HC RX 637 (ALT 250 FOR IP)

## 2024-02-28 PROCEDURE — 94761 N-INVAS EAR/PLS OXIMETRY MLT: CPT

## 2024-02-28 PROCEDURE — 85055 RETICULATED PLATELET ASSAY: CPT

## 2024-02-28 PROCEDURE — 36415 COLL VENOUS BLD VENIPUNCTURE: CPT

## 2024-02-28 PROCEDURE — 83735 ASSAY OF MAGNESIUM: CPT

## 2024-02-28 PROCEDURE — 85025 COMPLETE CBC W/AUTO DIFF WBC: CPT

## 2024-02-28 PROCEDURE — 6370000000 HC RX 637 (ALT 250 FOR IP): Performed by: INTERNAL MEDICINE

## 2024-02-28 PROCEDURE — 2580000003 HC RX 258: Performed by: STUDENT IN AN ORGANIZED HEALTH CARE EDUCATION/TRAINING PROGRAM

## 2024-02-28 PROCEDURE — 82947 ASSAY GLUCOSE BLOOD QUANT: CPT

## 2024-02-28 PROCEDURE — 80048 BASIC METABOLIC PNL TOTAL CA: CPT

## 2024-02-28 RX ORDER — AMOXICILLIN AND CLAVULANATE POTASSIUM 875; 125 MG/1; MG/1
1 TABLET, FILM COATED ORAL EVERY 12 HOURS SCHEDULED
Qty: 10 TABLET | Refills: 0 | Status: SHIPPED | OUTPATIENT
Start: 2024-02-28 | End: 2024-03-04

## 2024-02-28 RX ORDER — AMOXICILLIN AND CLAVULANATE POTASSIUM 500; 125 MG/1; MG/1
1 TABLET, FILM COATED ORAL EVERY 12 HOURS SCHEDULED
Status: DISCONTINUED | OUTPATIENT
Start: 2024-02-28 | End: 2024-03-03

## 2024-02-28 RX ADMIN — ACETAMINOPHEN 650 MG: 325 TABLET ORAL at 16:07

## 2024-02-28 RX ADMIN — Medication 1 CAPSULE: at 08:59

## 2024-02-28 RX ADMIN — HEPARIN SODIUM 5000 UNITS: 5000 INJECTION INTRAVENOUS; SUBCUTANEOUS at 20:47

## 2024-02-28 RX ADMIN — INSULIN GLARGINE 20 UNITS: 100 INJECTION, SOLUTION SUBCUTANEOUS at 08:59

## 2024-02-28 RX ADMIN — MIDODRINE HYDROCHLORIDE 10 MG: 5 TABLET ORAL at 16:07

## 2024-02-28 RX ADMIN — Medication 1 CAPSULE: at 16:07

## 2024-02-28 RX ADMIN — AMPICILLIN SODIUM AND SULBACTAM SODIUM 3000 MG: 2; 1 INJECTION, POWDER, FOR SOLUTION INTRAMUSCULAR; INTRAVENOUS at 20:44

## 2024-02-28 RX ADMIN — SODIUM CHLORIDE, PRESERVATIVE FREE 10 ML: 5 INJECTION INTRAVENOUS at 08:59

## 2024-02-28 RX ADMIN — AMOXICILLIN AND CLAVULANATE POTASSIUM 1 TABLET: 500; 125 TABLET, FILM COATED ORAL at 23:18

## 2024-02-28 RX ADMIN — HEPARIN SODIUM 5000 UNITS: 5000 INJECTION INTRAVENOUS; SUBCUTANEOUS at 06:06

## 2024-02-28 RX ADMIN — Medication: at 08:59

## 2024-02-28 RX ADMIN — INSULIN LISPRO 2 UNITS: 100 INJECTION, SOLUTION INTRAVENOUS; SUBCUTANEOUS at 08:59

## 2024-02-28 RX ADMIN — HEPARIN SODIUM 5000 UNITS: 5000 INJECTION INTRAVENOUS; SUBCUTANEOUS at 13:45

## 2024-02-28 RX ADMIN — AMPICILLIN SODIUM AND SULBACTAM SODIUM 3000 MG: 2; 1 INJECTION, POWDER, FOR SOLUTION INTRAMUSCULAR; INTRAVENOUS at 09:03

## 2024-02-28 RX ADMIN — OXYCODONE 5 MG: 5 TABLET ORAL at 13:45

## 2024-02-28 ASSESSMENT — PAIN SCALES - GENERAL: PAINLEVEL_OUTOF10: 3

## 2024-02-28 ASSESSMENT — PAIN DESCRIPTION - DESCRIPTORS: DESCRIPTORS: ACHING;DISCOMFORT

## 2024-02-28 ASSESSMENT — PAIN DESCRIPTION - LOCATION: LOCATION: OTHER (COMMENT)

## 2024-02-28 ASSESSMENT — PAIN - FUNCTIONAL ASSESSMENT: PAIN_FUNCTIONAL_ASSESSMENT: ACTIVITIES ARE NOT PREVENTED

## 2024-02-28 NOTE — PROGRESS NOTES
Encounters:   02/27/24 54.6 kg (120 lb 5.9 oz)   02/21/24 54.4 kg (120 lb)   05/26/17 81.6 kg (180 lb)     Body mass index is 20.03 kg/m².        PHYSICAL EXAM:  Physical Exam  Vitals and nursing note reviewed.   Constitutional:       General: He is not in acute distress.     Appearance: Normal appearance. He is not ill-appearing.      Comments: NG in place   HENT:      Mouth/Throat:      Pharynx: Oropharynx is clear.   Eyes:      General: No scleral icterus.  Cardiovascular:      Heart sounds: Normal heart sounds.   Pulmonary:      Breath sounds: Normal breath sounds.   Abdominal:      General: Abdomen is flat. There is no distension.      Palpations: Abdomen is soft. There is no mass.      Tenderness: There is no abdominal tenderness.   Musculoskeletal:         General: No swelling or tenderness.      Right lower leg: No edema.      Left lower leg: No edema.   Skin:     General: Skin is warm and dry.      Comments: Appropriately covered wounds   Neurological:      Mental Status: He is alert and oriented to person, place, and time.   Psychiatric:         Mood and Affect: Mood normal.       MEDICATIONS:  Scheduled Meds:   lactobacillus  1 capsule Oral BID WC    insulin lispro  0-8 Units SubCUTAneous TID WC    insulin lispro  0-4 Units SubCUTAneous Nightly    sodium hypochlorite   Irrigation Daily    insulin glargine  20 Units SubCUTAneous Daily    midodrine  10 mg Oral TID WC    ampicillin-sulbactam  3,000 mg IntraVENous Q12H    sodium chloride flush  5-40 mL IntraVENous 2 times per day    heparin (porcine)  5,000 Units SubCUTAneous 3 times per day     Continuous Infusions:   sodium chloride Stopped (02/27/24 0738)    dextrose      sodium chloride Stopped (02/28/24 1046)     PRN Meds:   sodium chloride, , PRN  oxyCODONE, 5 mg, Q4H PRN  glucose, 4 tablet, PRN  dextrose bolus, 125 mL, PRN   Or  dextrose bolus, 250 mL, PRN  glucagon (rDNA), 1 mg, PRN  dextrose, , Continuous PRN  sodium chloride flush, 5-40 mL,  PRN  sodium chloride, , PRN  ondansetron, 4 mg, Q8H PRN   Or  ondansetron, 4 mg, Q6H PRN  polyethylene glycol, 17 g, Daily PRN  acetaminophen, 650 mg, Q6H PRN   Or  acetaminophen, 650 mg, Q6H PRN  heparin (porcine), 1,500 Units, PRN  heparin (porcine), 1,400 Units, PRN         Additional Respiratory Assessments  Pulse: (!) 111  Respirations: 15  SpO2: 99 %      Lab Results   Component Value Date/Time    FIO2 21.0 02/22/2024 05:54 AM         DATA:  Complete Blood Count:   Recent Labs     02/26/24  0613 02/26/24  0958 02/27/24 0827 02/28/24 0254   WBC 13.3*  --  9.4 9.3   RBC 2.54*  --  3.21* 3.17*   HGB 6.5* 8.4* 8.6* 8.5*   HCT 22.7* 28.4* 28.6* 27.1*   MCV 89.4  --  89.1 85.5   MCH 25.6  --  26.8 26.8   MCHC 28.6  --  30.1 31.4   RDW 19.0*  --  18.6* 18.4*     --  171 See Reflexed IPF Result   MPV 9.6  --  9.2  --           Last 3 Blood Glucose:   Recent Labs     02/26/24  0613 02/27/24 0827 02/28/24 0254   GLUCOSE 205* 196* 197*          PT/INR:    Lab Results   Component Value Date/Time    PROTIME 16.4 02/21/2024 10:49 PM    INR 1.4 02/21/2024 10:49 PM     PTT:    Lab Results   Component Value Date/Time    APTT 25.7 02/21/2024 10:49 PM       Comprehensive Metabolic Profile:   Recent Labs     02/26/24  0613 02/27/24  0827 02/28/24  0254    138 140   K 3.7 3.6* 4.3    107 108*   CO2 22 19* 20   BUN 51* 52* 51*   CREATININE 4.3* 4.5* 4.8*   GLUCOSE 205* 196* 197*   CALCIUM 7.5* 7.9* 7.7*        Magnesium:   Lab Results   Component Value Date/Time    MG 1.6 02/28/2024 02:54 AM    MG 1.9 02/27/2024 08:27 AM    MG 2.1 02/26/2024 06:13 AM     Phosphorus:   Lab Results   Component Value Date/Time    PHOS 2.2 02/28/2024 02:54 AM    PHOS 2.0 02/27/2024 08:27 AM    PHOS 2.5 02/26/2024 06:13 AM     Ionized Calcium:   Lab Results   Component Value Date/Time    CAION 1.09 02/23/2024 10:28 AM    CAION 1.19 02/22/2024 04:17 AM        Urinalysis:   Lab Results   Component Value Date/Time    NITRU NEGATIVE

## 2024-02-28 NOTE — PROGRESS NOTES
Occupational Therapy    [facility]  Occupational Therapy Not Seen Note    DATE: 2024    NAME: Jules Nichols  MRN: [id number]   : 1980      Patient not seen this date for Occupational Therapy due to: Dressing and wound vac change    Next Scheduled Treatment: 24    Electronically signed by SRIRAM OLIVA on 2024 at 3:48 PM

## 2024-02-28 NOTE — PROGRESS NOTES
Patient arrives to [facility]. Telemetry applied. Call light within reach. Patient eating ice cream.

## 2024-02-28 NOTE — PROGRESS NOTES
[facility] -   Infectious diseases evaluation  admission date 2/21/2024    reason for consultation:   Fasciitis    Impression :   Current:  Right axilla  fasciitis with air in the soft tissues  Fluid in the gangrene groin area with a subcutaneous  Underlying hidradenitis suppurativa extensive sites  JAY on hemodialysis  Diabetes mellitus  Bandemia  Septic shock with severe sepsis with organ failure, on pressors    Other:    Discussion / summary of stay / plan of care/ Recommendations:     HENCE:   Stop Zosyn/ zyvox 2/24 2/24 Start unasyn  likely till 3/4 roughly  Can switch to po augmentin at DC till 3/4  All wounds looking  as below and no MRSA  Disc w pt and RN    Infection Control Recommendations   [place] Precautions  Contact Isolation       Antimicrobial Stewardship Recommendations   Simplification of therapy  Targeted therapy  History of Present Illness:   Initial history:  Jules Nichols is a 43 y.o.-year-old male of diabetes and hidradenitis affecting multiple sites, was very short of breath and transferred from another facility with a creatinine of 3 and a right arm major inflammation seen for a groin area that had major inflammation and cellulitis, diagnosed with necrotizing patient this of both sites and was taken to surgery with a debridement of the right axilla as well as the groin area.  According to the patient those wounds were hidradenitis sites and they were progressing over the last 3 weeks progressively.    He was started on vancomycin and Zosyn  Was given a dose of clindamycin  Infectious disease consulted for antibiotic management    He is currently on Isma-Synephrine, getting hemodialysis for acute kidney injury  Alert appropriate NG tube in place abdomen soft nontender  No burning with the urine    Culture from the right axillary area is showing mixed bacteria 2/22  Another culture is showing group B strep  Blood culture and urine

## 2024-02-28 NOTE — PROGRESS NOTES
Critical care team - Resident sign-out to medicine service      Date and time: 2/28/2024 4:30 PM  Patient's name:  Jules Nichols  Medical Record Number: [id number]  Patient's account/billing number: [id number]  Patient's YOB: 1980  Age: 43 y.o.  Date of Admission: 2/21/2024 10:41 PM  Length of stay during current admission: 7    Primary Care Physician: Elvin Kelley MD    Code Status: Full Code    Mode of physician to physician communication:        [x] Via telephone   [] In person     Date and time of sign-out: 2/28/2024 4:30 PM    Accepting Medicine team: [facility]     Accepting team's attending: Dr. Murray    Patient's current ICU Bed:  3015     Patient's assigned bed on floor:  417        [x] Med-Surg Monitored [] Step-down       [] Psychiatry ICU       [] Psych floor     Reason for ICU admission:     DKA with necrotising fascitis of axilla, groin and chest.     ICU course summary:     43 y.o. male presented to outside facility with shortness of breath and worsening wounds.  Patient has a history of hypertension, diabetes, hyperlipidemia, chronic kidney disease stage IV with baseline creatinine around 3.  Patient also has a history of hidradenitis patient reportedly previously followed with wound care, however has not since then.  Patient states she has had worsening wounds over the last 6 months, however it has acutely worsened in the last few weeks.  No fevers or chills.     He was found to be in DKA with blood sugars in the high 500s with severe metabolic acidosis and started on insulin drip.  Patient had CT imaging done at outside hospital that showed necrotizing fasciitis in the axilla and chest as well as Dago's gangrene.  Patient also found to be in acute renal failure with creatinine of 8 at outside facility.  Patient was fluid resuscitated with 3 L total of IV fluids.  Received antibiotics with vancomycin, cefepime, clindamycin.      During patient's ICU, he was treated with DKA  1245     Specimen Description .STERNUM     Direct Exam MANY NEUTROPHILS       MANY GRAM POSITIVE COCCI IN PAIRS Abnormal        MANY GRAM NEGATIVE RODS       MANY GRAM POSITIVE RODS Abnormal        FEW GRAM POSITIVE COCCI IN CLUSTERS     Culture STREPTOCOCCI, BETA HEMOLYTIC GROUP B MODERATE GROWTH Abnormal        STAPHYLOCOCCUS AUREUS MODERATE GROWTH Abnormal        PREVOTELLA (BACTEROIDES BIVIUS) BIVIA MODERATE GROWTH BETA LACTAMASE POSITIVE Abnormal        BACTEROIDES FRAGILIS MODERATE GROWTH BETA LACTAMASE POSITIVE Abnormal           Consults:     P CONSULT TO GENERAL SURGERY  IP CONSULT TO DIABETES EDUCATOR  IP CONSULT TO NEPHROLOGY  IP CONSULT TO DIETITIAN  IP CONSULT TO DIABETES EDUCATOR  IP CONSULT TO INFECTIOUS DISEASES  IP CONSULT TO GI  IP CONSULT TO IV TEAM     Assessment:     Patient Active Problem List    Diagnosis Date Noted    Dependence on renal dialysis ([facility]) 02/28/2024    Necrotizing fasciitis ([facility]) 02/22/2024    JAY (acute kidney injury) ([facility]) 02/22/2024    Hidradenitis suppurativa of multiple sites 02/22/2024    Metabolic acidosis 02/22/2024    Hyperkalemia 02/22/2024    Acute on chronic anemia 02/22/2024    Moderate malnutrition ([facility]) 02/22/2024    Bandemia 02/22/2024    Septic shock ([facility]) 02/22/2024    Severe sepsis ([facility]) 02/22/2024    Dago disease 02/21/2024    CKD (chronic kidney disease) stage 4, GFR 15-29 ml/min ([facility]) 05/05/2016    Hypertension, essential     Hyperlipemia     Uncontrolled type 2 diabetes mellitus with nephropathy 01/20/2016     Recommended Follow-up:     Continue with daily dressing changes   Follow up outpatient with plastic surgery in about 2 weeks outpatient   Monitor urine output and kidney functions. Follow up with nephrology recommendations for possible need of hemodialysis   Support patient's oral intake with proteins, but also needs diabetic intake   Had place at [facility], need to update CM about the discharge planning.  Resume other home medications as

## 2024-02-28 NOTE — PROGRESS NOTES
[facility] -   Infectious diseases evaluation  admission date 2/21/2024    reason for consultation:   Fasciitis    Impression :   Current:  Right axilla  fasciitis with air in the soft tissues  Fluid in the gangrene groin area with a subcutaneous  Underlying hidradenitis suppurativa extensive sites  JAY on hemodialysis  Diabetes mellitus  Bandemia  Septic shock with severe sepsis with organ failure, on pressors    Other:    Discussion / summary of stay / plan of care/ Recommendations:     HENCE:   Stop Zosyn/ zyvox 2/24 2/24 Start unasyn  likely till 3/4 roughly  Yeast on the axillary wound does not need to be addressed, the wound is very clean and has a VAC on it  Okay for discharge with Augmentin until 3/4, reconciled, and follow-up with plastic    Infection Control Recommendations   [place] Precautions  Contact Isolation       Antimicrobial Stewardship Recommendations   Simplification of therapy  Targeted therapy  History of Present Illness:   Initial history:  Jules Nichols is a 43 y.o.-year-old male of diabetes and hidradenitis affecting multiple sites, was very short of breath and transferred from another facility with a creatinine of 3 and a right arm major inflammation seen for a groin area that had major inflammation and cellulitis, diagnosed with necrotizing patient this of both sites and was taken to surgery with a debridement of the right axilla as well as the groin area.  According to the patient those wounds were hidradenitis sites and they were progressing over the last 3 weeks progressively.    He was started on vancomycin and Zosyn  Was given a dose of clindamycin  Infectious disease consulted for antibiotic management    He is currently on Isma-Synephrine, getting hemodialysis for acute kidney injury  Alert appropriate NG tube in place abdomen soft nontender  No burning with the urine    Culture from the right axillary area is showing mixed  escape proof reading.  It such instances, actual meaningcan be extrapolated by contextual diversion.    Isha Mack MD  Office: [phone]  Perfect serve / office [phone]

## 2024-02-28 NOTE — PROGRESS NOTES
Renal Progress Note    Patient :  Jules Nichols; 43 y.o. MRN# [id number]  Location:  3015/3015-01  Attending:  Ashkan Soares MD  Admit Date:  2/21/2024   Hospital Day: 7    Subjective:     Patient was seen and examined.  No new issues reported overnight.  BMP results from today reviewed sodium 140, potassium 4.3, chloride 108, bicarb 20, calcium 7.7, BUN 51, creatinine 4.8 mg/dl.  Urine output documented as about 2.9 L in the last 24 hours.  Patient does have a Ad catheter in place and has required dialysis only once on 2/22/2024.  Currently seems to be improving.    Outpatient Medications:     Medications Prior to Admission: LANTUS SOLOSTAR 100 UNIT/ML injection pen, ADMINISTER 18 UNITS UNDER THE SKIN EVERY NIGHT  losartan (COZAAR) 50 MG tablet, TAKE 1 TABLET BY MOUTH DAILY  metFORMIN (GLUCOPHAGE) 1000 MG tablet, Take 1 tablet by mouth 2 times daily (with meals)  pantoprazole (PROTONIX) 40 MG tablet, Take 40 mg by mouth  insulin glargine (LANTUS SOLOSTAR) 100 UNIT/ML injection pen, Inject 25 Units into the skin nightly  Blood Glucose Monitoring Suppl (TRUERESULT BLOOD GLUCOSE) W/DEVICE KIT, 1 kit by Does not apply route daily as needed  [facility] Ultra Thin Lancets MISC, 1 each by Does not apply route 4 times daily (before meals and nightly)  Glucose Blood (BLOOD GLUCOSE TEST STRIPS) STRP, 1 each by Does not apply route 4 times daily (before meals and nightly)  Alcohol Swabs 70 % PADS, Inject 1 each into the skin 3 times daily as needed  Insulin Pen Needle (B-D ULTRAFINE III SHORT PEN) 31G X 8 MM MISC, Inject 1 each into the skin daily May substitute with any brand  magnesium oxide (MAG-OX) 400 MG tablet, Take 1 tablet by mouth 2 times daily    Current Medications:     Scheduled Meds:    lactobacillus  1 capsule Oral BID WC    insulin lispro  0-8 Units SubCUTAneous TID WC    insulin lispro  0-4 Units SubCUTAneous Nightly    sodium hypochlorite   Irrigation Daily    insulin glargine  20 Units SubCUTAneous  Daily    midodrine  10 mg Oral TID WC    ampicillin-sulbactam  3,000 mg IntraVENous Q12H    sodium chloride flush  5-40 mL IntraVENous 2 times per day    heparin (porcine)  5,000 Units SubCUTAneous 3 times per day     Continuous Infusions:    sodium chloride Stopped (24 0738)    dextrose      sodium chloride Stopped (24 1046)     PRN Meds:  sodium chloride, oxyCODONE, glucose, dextrose bolus **OR** dextrose bolus, glucagon (rDNA), dextrose, sodium chloride flush, sodium chloride, ondansetron **OR** ondansetron, polyethylene glycol, acetaminophen **OR** acetaminophen, heparin (porcine), heparin (porcine)    Input/Output:       I/O last 3 completed shifts:  In: 1820.9 [P.O.:1400; I.V.:118; IV Piggyback:303]  Out: 4545 [Urine:4545].    Patient Vitals for the past 96 hrs (Last 3 readings):   Weight   24 0600 54.6 kg (120 lb 5.9 oz)   24 0600 55.7 kg (122 lb 12.7 oz)   24 0600 54.7 kg (120 lb 9.5 oz)       Vital Signs:   Temperature:  Temp: 97.7 °F (36.5 °C)  TMax:   Temp (24hrs), Av.1 °F (36.7 °C), Min:97.7 °F (36.5 °C), Max:98.3 °F (36.8 °C)    Respirations:  Respirations: 15  Pulse:   Pulse: (!) 111  BP:    BP: 137/89  BP Range: Systolic (24hrs), Av , Min:104 , Max:137       Diastolic (24hrs), Av, Min:63, Max:89      Physical Examination:     General:  AAO x 3, speaking in full sentences, no accessory muscle use.  HEENT: Atraumatic, normocephalic, no throat congestion, moist mucosa.  Eyes:   Pupils equal, round and reactive to light, EOMI.  Neck:   Supple  Chest:   Bilateral vesicular breath sounds, no rales or wheezes.  Cardiac:  S1 S2 RR, no murmurs, gallops or rubs.  Positive chest wound.  Abdomen: Soft, non-tender, no masses or organomegaly, BS audible.  :   No suprapubic or flank tenderness.  Neuro:  AAO x 3, No FND.  SKIN:  No rashes, good skin turgor.  Extremities:  1+ edema.    Labs:       Recent Labs     24  0613 24  0958 24  0827 24  0254

## 2024-02-28 NOTE — PLAN OF CARE
Problem: Discharge Planning  Goal: Discharge to home or other facility with appropriate resources  2/28/2024 1104 by Ricco Sullivan RN  Outcome: Progressing     Problem: Pain  Goal: Verbalizes/displays adequate comfort level or baseline comfort level  2/28/2024 1104 by Ricco Sullivan RN  Outcome: Progressing     Problem: Safety - Adult  Goal: Free from fall injury  2/28/2024 1104 by Ricco Sullivan RN  Outcome: Progressing     Problem: Skin/Tissue Integrity  Goal: Absence of new skin breakdown  Description: 1.  Monitor for areas of redness and/or skin breakdown  2.  Assess vascular access sites hourly  3.  Every 4-6 hours minimum:  Change oxygen saturation probe site  4.  Every 4-6 hours:  If on nasal continuous positive airway pressure, respiratory therapy assess nares and determine need for appliance change or resting period.  2/28/2024 1104 by Ricco Sullivan RN  Outcome: Progressing     Problem: ABCDS Injury Assessment  Goal: Absence of physical injury  2/28/2024 1104 by Ricco Sullivan RN  Outcome: Progressing     Problem: Chronic Conditions and Co-morbidities  Goal: Patient's chronic conditions and co-morbidity symptoms are monitored and maintained or improved  2/28/2024 1104 by Ricco Sullivan RN  Outcome: Progressing     Problem: Nutrition Deficit:  Goal: Optimize nutritional status  2/28/2024 1104 by Ricco Sullivan RN  Outcome: Progressing     Problem: Neurosensory - Adult  Goal: Achieves maximal functionality and self care  2/28/2024 1104 by Ricco Sullivan RN  Outcome: Progressing     Problem: Respiratory - Adult  Goal: Achieves optimal ventilation and oxygenation  2/28/2024 1104 by Ricco Sullivan RN  Outcome: Progressing     Problem: Cardiovascular - Adult  Goal: Maintains optimal cardiac output and hemodynamic stability  2/28/2024 1104 by Ricco Sullivan RN  Outcome: Progressing     Problem: Cardiovascular - Adult  Goal: Absence of cardiac dysrhythmias or at baseline  2/28/2024 1104 by Ricco Sullivan RN  Outcome:  Progressing     Problem: Skin/Tissue Integrity - Adult  Goal: Skin integrity remains intact  2/28/2024 1104 by Ricco Sullivan RN  Outcome: Progressing     Problem: Skin/Tissue Integrity - Adult  Goal: Incisions, wounds, or drain sites healing without S/S of infection  2/28/2024 1104 by Ricco Sullivan RN  Outcome: Progressing     Problem: Skin/Tissue Integrity - Adult  Goal: Oral mucous membranes remain intact  2/28/2024 1104 by Ricco Sullivan RN  Outcome: Progressing     Problem: Gastrointestinal - Adult  Goal: Maintains adequate nutritional intake  2/28/2024 1104 by Ricco Sullivan RN  Outcome: Progressing     Problem: Genitourinary - Adult  Goal: Absence of urinary retention  2/28/2024 1104 by Ricco Sullivan RN  Outcome: Progressing     Problem: Infection - Adult  Goal: Absence of infection at discharge  2/28/2024 1104 by Ricco Sullivan RN  Outcome: Progressing     Problem: Infection - Adult  Goal: Absence of infection during hospitalization  2/28/2024 1104 by Ricco Sullivan RN  Outcome: Progressing     Problem: Metabolic/Fluid and Electrolytes - Adult  Goal: Electrolytes maintained within normal limits  2/28/2024 1104 by Ricco Sullivan RN  Outcome: Progressing     Problem: Metabolic/Fluid and Electrolytes - Adult  Goal: Hemodynamic stability and optimal renal function maintained  2/28/2024 1104 by Ricco Sullivan RN  Outcome: Progressing     Problem: Metabolic/Fluid and Electrolytes - Adult  Goal: Glucose maintained within prescribed range  2/28/2024 1104 by Ricco Sullivan RN  Outcome: Progressing     Problem: Hematologic - Adult  Goal: Maintains hematologic stability  2/28/2024 1104 by Ricco Sullivan RN  Outcome: Progressing     Problem: Musculoskeletal - Adult  Goal: Return mobility to safest level of function  2/28/2024 1104 by Ricco Sullivan RN  Outcome: Progressing     Problem: Musculoskeletal - Adult  Goal: Maintain proper alignment of affected body part  2/28/2024 1104 by Ricco Sullivan RN  Outcome: Progressing

## 2024-02-28 NOTE — PROGRESS NOTES
[facility]ound Ostomy Continence Nurse  Follow Up       NAME:  Jules Nichols  MEDICAL RECORD NUMBER:  [id number]  AGE: 43 y.o.   GENDER: male  : 1980  TODAY'S DATE:  2024    Subjective:     Reason for WOCN Evaluation and Assessment: \"extreme HS in all folds\"      Jules Nichols is a 43 y.o. male referred by:   [x] Physician  [] Nursing  [] Other:      Wound Identification:  Wound Type:  hidradenitis Suppurativa, surgical.   Contributing Factors: diabetes, poor glucose control, malnutrition, and non-adherence    L axilla, L groin - Sharp excisional debridement down through and including the removal of epidermis, dermis, and subcutaneous tissue total of  66 sq cm.  R axilla, R groin, chest wall -  Sharp excisional debridement down through and including the removal of epidermis, dermis, subcutaneous tissue, and muscle/fascia tissue total of 605 sq cm  I&D abdominal wall abscess  With Dr Carmichael 2024.     Back I&D of multiple lesions 2023 with Dr Keene. Dakin's solution moistened gauze dressings continued.           Plastic surgeon evaluated. Advised NPWT and follow up in two weeks to determine next steps. Orders received.     Objective:      /76   Pulse (!) 105   Temp 97.7 °F (36.5 °C) (Oral)   Resp 20   Ht 1.651 m (5' 5\")   Wt 54.6 kg (120 lb 5.9 oz)   SpO2 98%   BMI 20.03 kg/m²   Eduardo Risk Score: Eduardo Scale Score: 18    LABS    CBC:   Lab Results   Component Value Date/Time    WBC 9.3 2024 02:54 AM    RBC 3.17 2024 02:54 AM    HGB 8.5 2024 02:54 AM    HCT 27.1 2024 02:54 AM     CMP:  Albumin:    Lab Results   Component Value Date/Time    LABALBU 1.9 2024 06:49 AM     PT/INR:    Lab Results   Component Value Date/Time    PROTIME 16.4 2024 10:49 PM    INR 1.4 2024 10:49 PM     HgBA1c:    Lab Results   Component Value Date/Time    LABA1C 10.2 2024 04:17 AM     PTT: No components found for: \"LABPTT\"      Assessment:  Demonstrated understanding        [] No evidence of learning  [] Refused teaching                           [] N/A       Electronically signed by Veda Garcia RN, CWON on 2/28/2024 at 4:12 PM

## 2024-02-28 NOTE — CONSULTS
Plastic/Burn Surgery Consult    CHIEF COMPLAINT:  No complaints at this time    HISTORY OF PRESENT ILLNESS: Jules Nichols is a 43 y.o. male who was admitted for multiple necrotic wounds involving the B/L axilla, right groin, chest wall, back and abdominal wall. On exam patient states it all started with a pimple on his back about 6 months ago. Patient reports no chest pain, nausea, vomiting or other complaints at this time. Patient rates total pain as 2/10. Wounds are currently being treated with wet to dry dressings and patient is receiving IV antibiotics and patient is being worked up for hidradenitis as a possible cause. Wound images are provided below.     Past Medical History:    Past Medical History:   Diagnosis Date    Benign essential HTN     Chronic kidney disease 1/20/16    Dr Stafford    Diabetic keto-acidosis (HCC) 1/20/16    hx of     Hyperlipemia     Uncontrolled type 2 diabetes mellitus with nephropathy 1/20/16     Past Surgical History:    Past Surgical History:   Procedure Laterality Date    ABDOMEN SURGERY N/A 2/24/2024    WOUND BED PREPARATION BILATERAL GROIN AND ARMPIT, CHEST AND BACK performed by Boyd Keene MD at [facility] OR    DEBRIDEMENT      Irrigation and Debridement BILATERAL GROIN AND ARMPIT, and buttocks, chest    OTHER SURGICAL HISTORY Bilateral 02/22/2024    INCISION AND DRAINAGE OF AXILLAS, CHEST WALL AND GROIN (YELLOW FINS, LITHOTOMY, CYSTO TUBING, WINTER GREEN) (Bilateral)    WOUND EXPLORATION Bilateral 02/22/2024    INCISION AND DRAINAGE OF BILATERAL AXILLA, CHEST WALL AND BILATERAL GROIN performed by Sandy Carmichael MD at [facility] OR     Medications Prior to Admission:   Prior to Admission medications    Medication Sig Start Date End Date Taking? Authorizing Provider   LANTUS SOLOSTAR 100 UNIT/ML injection pen ADMINISTER 18 UNITS UNDER THE SKIN EVERY NIGHT 7/9/18   Elvin Kelley MD   losartan (COZAAR) 50 MG tablet TAKE 1 TABLET BY MOUTH DAILY 5/16/18   Elvin Kelley MD  breathing.  Skin: Extensive post debridement wounds to back, chest, b/l axilla, right groin and abdomen. Dressed with wet to dry dressings without significant drainage and no signs of purulence during exam. Smaller wounds packed with packing tape.                                             Assessment:   43 y.o. male with S/p sharp excisional debridement x2 for extensive full thickness wounds of the right axilla, right groin, back, and chest wall. Also including Left axilla and left groin partial thickness wounds.       Plan:  -Recommend placement of wound vac in areas that are amendable to placement. Wet to dry dressing in the smaller wounds.   -Plan for two weeks of vac therapy to allow for granulation of the wound bed. This will provide optimization for skin graft placement and allow for some volume replacement in the deeper wounds. .   -Follow up in clinic after 2 weeks to wound assessment and further plan.     Jatin Marks DO  PGY-1 General Surgery resident          Attending Physician Statement  I have seen and examined the patient personally and the key elements of all parts of the encounter have been performed by me.  I have discussed the case, including pertinent history and exam findings with the resident.  I agree with the assessment, plan and orders as documented by the resident.    Started wound VAC therapy to the larger wounds if possible otherwise continue with local wound care.  This will allow for better granulation and a better graft bed for skin grafting in the future.  Patient can follow-up with us in the burn clinic in 2 weeks.      ERMA Garcia MD on3/1/2024 on 3:45 PM

## 2024-02-28 NOTE — CARE COORDINATION
Transitional planning. Plastics consulted- wound vac and dressing change orders placed. Wound Ostomy following. Nephrology following for HD needs. [facility] [place] accepted. Can take pt when ready for dc to [facility]

## 2024-02-28 NOTE — PLAN OF CARE
Problem: Discharge Planning  Goal: Discharge to home or other facility with appropriate resources  Outcome: Progressing     Problem: Pain  Goal: Verbalizes/displays adequate comfort level or baseline comfort level  Outcome: Progressing     Problem: Safety - Adult  Goal: Free from fall injury  Outcome: Progressing     Problem: Skin/Tissue Integrity  Goal: Absence of new skin breakdown  Description: 1.  Monitor for areas of redness and/or skin breakdown  2.  Assess vascular access sites hourly  3.  Every 4-6 hours minimum:  Change oxygen saturation probe site  4.  Every 4-6 hours:  If on nasal continuous positive airway pressure, respiratory therapy assess nares and determine need for appliance change or resting period.  Outcome: Progressing     Problem: ABCDS Injury Assessment  Goal: Absence of physical injury  Outcome: Progressing     Problem: Chronic Conditions and Co-morbidities  Goal: Patient's chronic conditions and co-morbidity symptoms are monitored and maintained or improved  Outcome: Progressing     Problem: Nutrition Deficit:  Goal: Optimize nutritional status  Outcome: Progressing     Problem: Neurosensory - Adult  Goal: Achieves maximal functionality and self care  Outcome: Progressing     Problem: Respiratory - Adult  Goal: Achieves optimal ventilation and oxygenation  Outcome: Progressing     Problem: Cardiovascular - Adult  Goal: Maintains optimal cardiac output and hemodynamic stability  Outcome: Progressing  Goal: Absence of cardiac dysrhythmias or at baseline  Outcome: Progressing     Problem: Skin/Tissue Integrity - Adult  Goal: Skin integrity remains intact  Outcome: Progressing  Flowsheets (Taken 2/27/2024 2000)  Skin Integrity Remains Intact: Monitor for areas of redness and/or skin breakdown  Goal: Incisions, wounds, or drain sites healing without S/S of infection  Outcome: Progressing  Goal: Oral mucous membranes remain intact  Outcome: Progressing     Problem: Gastrointestinal -

## 2024-02-29 LAB
ANION GAP SERPL CALCULATED.3IONS-SCNC: 10 MMOL/L (ref 9–17)
ANION GAP SERPL CALCULATED.3IONS-SCNC: 9 MMOL/L (ref 9–17)
ATYPICAL LYMPHOCYTE ABSOLUTE COUNT: 0.07 K/UL
ATYPICAL LYMPHOCYTES: 1 %
BACTERIA URNS QL MICRO: NORMAL
BASOPHILS # BLD: 0 K/UL (ref 0–0.2)
BASOPHILS # BLD: 0 K/UL (ref 0–0.2)
BASOPHILS NFR BLD: 0 % (ref 0–2)
BASOPHILS NFR BLD: 0 % (ref 0–2)
BILIRUB UR QL STRIP: NEGATIVE
BUN SERPL-MCNC: 27 MG/DL (ref 6–20)
BUN SERPL-MCNC: 60 MG/DL (ref 6–20)
CALCIUM SERPL-MCNC: 7.8 MG/DL (ref 8.6–10.4)
CALCIUM SERPL-MCNC: 7.9 MG/DL (ref 8.6–10.4)
CASTS #/AREA URNS LPF: NORMAL /LPF (ref 0–8)
CHLORIDE SERPL-SCNC: 101 MMOL/L (ref 98–107)
CHLORIDE SERPL-SCNC: 105 MMOL/L (ref 98–107)
CLARITY UR: CLEAR
CO2 SERPL-SCNC: 21 MMOL/L (ref 20–31)
CO2 SERPL-SCNC: 25 MMOL/L (ref 20–31)
COLOR UR: YELLOW
CREAT SERPL-MCNC: 2.5 MG/DL (ref 0.7–1.2)
CREAT SERPL-MCNC: 4.9 MG/DL (ref 0.7–1.2)
EOSINOPHIL # BLD: 0 K/UL (ref 0–0.4)
EOSINOPHIL # BLD: 0.15 K/UL (ref 0–0.4)
EOSINOPHILS RELATIVE PERCENT: 0 % (ref 1–4)
EOSINOPHILS RELATIVE PERCENT: 2 % (ref 1–4)
EPI CELLS #/AREA URNS HPF: NORMAL /HPF (ref 0–5)
ERYTHROCYTE [DISTWIDTH] IN BLOOD BY AUTOMATED COUNT: 17.4 % (ref 11.8–14.4)
ERYTHROCYTE [DISTWIDTH] IN BLOOD BY AUTOMATED COUNT: 18.6 % (ref 11.8–14.4)
GFR SERPL CREATININE-BSD FRML MDRD: 14 ML/MIN/1.73M2
GFR SERPL CREATININE-BSD FRML MDRD: 32 ML/MIN/1.73M2
GLUCOSE BLD-MCNC: 184 MG/DL (ref 75–110)
GLUCOSE BLD-MCNC: 207 MG/DL (ref 75–110)
GLUCOSE BLD-MCNC: 239 MG/DL (ref 75–110)
GLUCOSE SERPL-MCNC: 238 MG/DL (ref 70–99)
GLUCOSE SERPL-MCNC: 336 MG/DL (ref 70–99)
GLUCOSE UR STRIP-MCNC: ABNORMAL MG/DL
HCT VFR BLD AUTO: 22.6 % (ref 40.7–50.3)
HCT VFR BLD AUTO: 27.2 % (ref 40.7–50.3)
HGB BLD-MCNC: 6.6 G/DL (ref 13–17)
HGB BLD-MCNC: 6.8 G/DL (ref 13–17)
HGB BLD-MCNC: 8.8 G/DL (ref 13–17)
HGB UR QL STRIP.AUTO: NEGATIVE
IMM GRANULOCYTES # BLD AUTO: 0.22 K/UL (ref 0–0.3)
IMM GRANULOCYTES # BLD AUTO: 0.5 K/UL (ref 0–0.3)
IMM GRANULOCYTES NFR BLD: 3 %
IMM GRANULOCYTES NFR BLD: 5 %
KETONES UR STRIP-MCNC: NEGATIVE MG/DL
LEUKOCYTE ESTERASE UR QL STRIP: NEGATIVE
LYMPHOCYTES NFR BLD: 0.96 K/UL (ref 1–4.8)
LYMPHOCYTES NFR BLD: 2.3 K/UL (ref 1–4.8)
LYMPHOCYTES RELATIVE PERCENT: 13 % (ref 24–44)
LYMPHOCYTES RELATIVE PERCENT: 23 % (ref 24–44)
MCH RBC QN AUTO: 26.2 PG (ref 25.2–33.5)
MCH RBC QN AUTO: 27.5 PG (ref 25.2–33.5)
MCHC RBC AUTO-ENTMCNC: 29.2 G/DL (ref 28.4–34.8)
MCHC RBC AUTO-ENTMCNC: 32.4 G/DL (ref 28.4–34.8)
MCV RBC AUTO: 85 FL (ref 82.6–102.9)
MCV RBC AUTO: 89.7 FL (ref 82.6–102.9)
MICROORGANISM SPEC CULT: ABNORMAL
MICROORGANISM SPEC CULT: ABNORMAL
MICROORGANISM/AGENT SPEC: ABNORMAL
MONOCYTES NFR BLD: 0.37 K/UL (ref 0.1–0.8)
MONOCYTES NFR BLD: 0.5 K/UL (ref 0.1–0.8)
MONOCYTES NFR BLD: 5 % (ref 1–7)
MONOCYTES NFR BLD: 5 % (ref 1–7)
MORPHOLOGY: ABNORMAL
NEUTROPHILS NFR BLD: 67 % (ref 36–66)
NEUTROPHILS NFR BLD: 76 % (ref 36–66)
NEUTS SEG NFR BLD: 5.63 K/UL (ref 1.8–7.7)
NEUTS SEG NFR BLD: 6.7 K/UL (ref 1.8–7.7)
NITRITE UR QL STRIP: NEGATIVE
NRBC BLD-RTO: 0 PER 100 WBC
NRBC BLD-RTO: 0 PER 100 WBC
PH UR STRIP: 7 [PH] (ref 5–8)
PLATELET # BLD AUTO: 150 K/UL (ref 138–453)
PLATELET # BLD AUTO: 178 K/UL (ref 138–453)
PMV BLD AUTO: 9.5 FL (ref 8.1–13.5)
PMV BLD AUTO: 9.9 FL (ref 8.1–13.5)
POTASSIUM SERPL-SCNC: 4.4 MMOL/L (ref 3.7–5.3)
POTASSIUM SERPL-SCNC: 4.7 MMOL/L (ref 3.7–5.3)
PROT UR STRIP-MCNC: ABNORMAL MG/DL
RBC # BLD AUTO: 2.52 M/UL (ref 4.21–5.77)
RBC # BLD AUTO: 3.2 M/UL (ref 4.21–5.77)
RBC #/AREA URNS HPF: NORMAL /HPF (ref 0–4)
SODIUM SERPL-SCNC: 135 MMOL/L (ref 135–144)
SODIUM SERPL-SCNC: 136 MMOL/L (ref 135–144)
SP GR UR STRIP: 1.01 (ref 1–1.03)
SPECIMEN DESCRIPTION: ABNORMAL
UROBILINOGEN UR STRIP-ACNC: NORMAL EU/DL (ref 0–1)
WBC #/AREA URNS HPF: NORMAL /HPF (ref 0–5)
WBC OTHER # BLD: 10 K/UL (ref 3.5–11.3)
WBC OTHER # BLD: 7.4 K/UL (ref 3.5–11.3)

## 2024-02-29 PROCEDURE — 86920 COMPATIBILITY TEST SPIN: CPT

## 2024-02-29 PROCEDURE — 6360000002 HC RX W HCPCS: Performed by: STUDENT IN AN ORGANIZED HEALTH CARE EDUCATION/TRAINING PROGRAM

## 2024-02-29 PROCEDURE — 6370000000 HC RX 637 (ALT 250 FOR IP)

## 2024-02-29 PROCEDURE — 6370000000 HC RX 637 (ALT 250 FOR IP): Performed by: INTERNAL MEDICINE

## 2024-02-29 PROCEDURE — 86901 BLOOD TYPING SEROLOGIC RH(D): CPT

## 2024-02-29 PROCEDURE — 2580000003 HC RX 258: Performed by: STUDENT IN AN ORGANIZED HEALTH CARE EDUCATION/TRAINING PROGRAM

## 2024-02-29 PROCEDURE — 6370000000 HC RX 637 (ALT 250 FOR IP): Performed by: STUDENT IN AN ORGANIZED HEALTH CARE EDUCATION/TRAINING PROGRAM

## 2024-02-29 PROCEDURE — 36415 COLL VENOUS BLD VENIPUNCTURE: CPT

## 2024-02-29 PROCEDURE — 81001 URINALYSIS AUTO W/SCOPE: CPT

## 2024-02-29 PROCEDURE — 80048 BASIC METABOLIC PNL TOTAL CA: CPT

## 2024-02-29 PROCEDURE — 2060000000 HC ICU INTERMEDIATE R&B

## 2024-02-29 PROCEDURE — 86900 BLOOD TYPING SEROLOGIC ABO: CPT

## 2024-02-29 PROCEDURE — 51798 US URINE CAPACITY MEASURE: CPT

## 2024-02-29 PROCEDURE — P9016 RBC LEUKOCYTES REDUCED: HCPCS

## 2024-02-29 PROCEDURE — 99232 SBSQ HOSP IP/OBS MODERATE 35: CPT | Performed by: INTERNAL MEDICINE

## 2024-02-29 PROCEDURE — 99223 1ST HOSP IP/OBS HIGH 75: CPT | Performed by: STUDENT IN AN ORGANIZED HEALTH CARE EDUCATION/TRAINING PROGRAM

## 2024-02-29 PROCEDURE — 51702 INSERT TEMP BLADDER CATH: CPT

## 2024-02-29 PROCEDURE — 85018 HEMOGLOBIN: CPT

## 2024-02-29 PROCEDURE — 85025 COMPLETE CBC W/AUTO DIFF WBC: CPT

## 2024-02-29 PROCEDURE — 82947 ASSAY GLUCOSE BLOOD QUANT: CPT

## 2024-02-29 PROCEDURE — 94761 N-INVAS EAR/PLS OXIMETRY MLT: CPT

## 2024-02-29 PROCEDURE — 86850 RBC ANTIBODY SCREEN: CPT

## 2024-02-29 PROCEDURE — 90935 HEMODIALYSIS ONE EVALUATION: CPT

## 2024-02-29 RX ORDER — SODIUM CHLORIDE 9 MG/ML
INJECTION, SOLUTION INTRAVENOUS PRN
Status: DISCONTINUED | OUTPATIENT
Start: 2024-02-29 | End: 2024-03-06 | Stop reason: HOSPADM

## 2024-02-29 RX ORDER — TAMSULOSIN HYDROCHLORIDE 0.4 MG/1
0.4 CAPSULE ORAL DAILY
Status: DISCONTINUED | OUTPATIENT
Start: 2024-02-29 | End: 2024-03-06 | Stop reason: HOSPADM

## 2024-02-29 RX ORDER — INSULIN GLARGINE 100 [IU]/ML
23 INJECTION, SOLUTION SUBCUTANEOUS DAILY
Status: DISCONTINUED | OUTPATIENT
Start: 2024-02-29 | End: 2024-03-02

## 2024-02-29 RX ADMIN — SODIUM CHLORIDE, PRESERVATIVE FREE 10 ML: 5 INJECTION INTRAVENOUS at 08:33

## 2024-02-29 RX ADMIN — HEPARIN SODIUM 1400 UNITS: 1000 INJECTION INTRAVENOUS; SUBCUTANEOUS at 16:44

## 2024-02-29 RX ADMIN — Medication 1 CAPSULE: at 18:30

## 2024-02-29 RX ADMIN — TAMSULOSIN HYDROCHLORIDE 0.4 MG: 0.4 CAPSULE ORAL at 12:43

## 2024-02-29 RX ADMIN — Medication 1 CAPSULE: at 08:32

## 2024-02-29 RX ADMIN — HEPARIN SODIUM 5000 UNITS: 5000 INJECTION INTRAVENOUS; SUBCUTANEOUS at 22:12

## 2024-02-29 RX ADMIN — ACETAMINOPHEN 650 MG: 325 TABLET ORAL at 02:23

## 2024-02-29 RX ADMIN — INSULIN LISPRO 2 UNITS: 100 INJECTION, SOLUTION INTRAVENOUS; SUBCUTANEOUS at 12:26

## 2024-02-29 RX ADMIN — AMOXICILLIN AND CLAVULANATE POTASSIUM 1 TABLET: 500; 125 TABLET, FILM COATED ORAL at 08:32

## 2024-02-29 RX ADMIN — HEPARIN SODIUM 5000 UNITS: 5000 INJECTION INTRAVENOUS; SUBCUTANEOUS at 05:27

## 2024-02-29 RX ADMIN — HEPARIN SODIUM 1500 UNITS: 1000 INJECTION INTRAVENOUS; SUBCUTANEOUS at 16:44

## 2024-02-29 RX ADMIN — AMOXICILLIN AND CLAVULANATE POTASSIUM 1 TABLET: 500; 125 TABLET, FILM COATED ORAL at 20:44

## 2024-02-29 RX ADMIN — OXYCODONE 5 MG: 5 TABLET ORAL at 18:30

## 2024-02-29 RX ADMIN — SODIUM CHLORIDE, PRESERVATIVE FREE 10 ML: 5 INJECTION INTRAVENOUS at 20:44

## 2024-02-29 RX ADMIN — INSULIN GLARGINE 23 UNITS: 100 INJECTION, SOLUTION SUBCUTANEOUS at 08:31

## 2024-02-29 RX ADMIN — OXYCODONE 5 MG: 5 TABLET ORAL at 12:28

## 2024-02-29 RX ADMIN — INSULIN LISPRO 2 UNITS: 100 INJECTION, SOLUTION INTRAVENOUS; SUBCUTANEOUS at 08:31

## 2024-02-29 RX ADMIN — MIDODRINE HYDROCHLORIDE 10 MG: 5 TABLET ORAL at 08:32

## 2024-02-29 RX ADMIN — Medication: at 08:32

## 2024-02-29 ASSESSMENT — PAIN SCALES - GENERAL
PAINLEVEL_OUTOF10: 0
PAINLEVEL_OUTOF10: 3

## 2024-02-29 ASSESSMENT — PAIN DESCRIPTION - LOCATION: LOCATION: BACK

## 2024-02-29 ASSESSMENT — PAIN DESCRIPTION - ORIENTATION: ORIENTATION: OTHER (COMMENT)

## 2024-02-29 NOTE — DISCHARGE INSTRUCTIONS
Urology orders:  Void trial on Monday 3/4/24 - Remove mulligan catheter early at 0600. 4-6 hours after removal, prompt the patient to double void, measure urine output, then check post void residual (bladder scan or straight cath). For PVR greater than 400cc,  please replace mulligan catheter for outpatient urology office visit. If patient voiding >50% of total bladder volume, encourage timed voiding every 4 hours and still will need outpatient urology office visit to evaluate incomplete emptying.

## 2024-02-29 NOTE — DISCHARGE INSTR - COC
Continuity of Care Form    Patient Name: Jules Nichols   :  1980  MRN:  [id number]    Admit date:  2024  Discharge date:  3/6/24    Code Status Order: Full Code   Advance Directives:     Admitting Physician:  Kendra Murray MD  PCP: Elvin Kelley MD    Discharging Nurse: [facility]/Room#: 0417/0417-02  Discharging Unit Phone Number: [phone]    Emergency Contact:   Extended Emergency Contact Information  Primary Emergency Contact: Isela Santos   [facility]  Home Phone: [phone]  Relation: Other  Secondary Emergency Contact: Eddi Nichols  Mobile Phone: [phone]  Relation: Brother/Sister   needed? No    Past Surgical History:  Past Surgical History:   Procedure Laterality Date    ABDOMEN SURGERY N/A 2024    WOUND BED PREPARATION BILATERAL GROIN AND ARMPIT, CHEST AND BACK performed by Boyd Keene MD at [facility] OR    DEBRIDEMENT      Irrigation and Debridement BILATERAL GROIN AND ARMPIT, and buttocks, chest    OTHER SURGICAL HISTORY Bilateral 2024    INCISION AND DRAINAGE OF AXILLAS, CHEST WALL AND GROIN (YELLOW FINS, LITHOTOMY, CYSTO TUBING, WINTER GREEN) (Bilateral)    WOUND EXPLORATION Bilateral 2024    INCISION AND DRAINAGE OF BILATERAL AXILLA, CHEST WALL AND BILATERAL GROIN performed by Sandy Carmichael MD at [facility] OR       Immunization History:   Immunization History   Administered Date(s) Administered    Influenza Virus Vaccine 10/07/2019    Influenza Whole 2016    Pneumococcal, PPSV23, PNEUMOVAX 23, (age 2y+), SC/IM, 0.5mL 2016       Active Problems:  Patient Active Problem List   Diagnosis Code    Hypertension, essential I10    Uncontrolled type 2 diabetes mellitus with nephropathy [id number]    Hyperlipemia E78.5    CKD (chronic kidney disease) stage 4, GFR 15-29 ml/min (HCC) N18.4    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    Necrotizing fasciitis ([facility]) M72.6    JAY (acute kidney injury) ([facility]) N17.9    Hidradenitis  filed at 2/29/2024 0745  Gross per 24 hour   Intake 1221.62 ml   Output 1650 ml   Net -428.38 ml     I/O last 3 completed shifts:  In: 2676.9 [P.O.:2310; I.V.:161; IV Piggyback:205.9]  Out: 3575 [Urine:3575]    Safety Concerns:     At Risk for Falls    Impairments/Disabilities:      None    Nutrition Therapy:  Current Nutrition Therapy:   - Oral Diet:  General    Routes of Feeding: Oral  Liquids: Thin Liquids  Daily Fluid Restriction: no  Last Modified Barium Swallow with Video (Video Swallowing Test): not done    Treatments at the Time of Hospital Discharge:   Respiratory Treatments: n/a  Oxygen Therapy:  is not on home oxygen therapy.  Ventilator:    - No ventilator support    Rehab Therapies: Physical Therapy and Occupational Therapy  Weight Bearing Status/Restrictions: No weight bearing restrictions  Other Medical Equipment (for information only, NOT a DME order):  walker  Other Treatments:     Patient's personal belongings (please select all that are sent with patient):  None    RN SIGNATURE:  Electronically signed by LESA DELACRUZ RN on 3/6/24 at 3:08 PM EST    CASE MANAGEMENT/SOCIAL WORK SECTION    Inpatient Status Date: ***    Readmission Risk Assessment Score:  Readmission Risk              Risk of Unplanned Readmission:  24           Discharging to Facility/ Agency   Name: [facility]  Address:  Phone: [phone]  Fax:    Dialysis Facility (if applicable)   Name:  Address:  Dialysis Schedule:  Phone:  Fax:    / signature: Electronically signed by Afsaneh Aguero RN on 3/6/24 at 10:09 AM EST    PHYSICIAN SECTION    Prognosis: Fair    Condition at Discharge: Stable    Rehab Potential (if transferring to Rehab): Fair    Recommended Labs or Other Treatments After Discharge:   Continue wound vac and wound care  Follow plastics and ID for wounds  Follow urology, bladder scan as needed  Passed void trial  Dialysis per nephro  Complete augmentin 3/8  Cardio follow up

## 2024-02-29 NOTE — PROGRESS NOTES
Occupational Therapy      [facility]  Occupational Therapy Not Seen Note    DATE: 2024    NAME: Jules Nichols  MRN: [id number]   : 1980      Patient not seen this date for Occupational Therapy due to:      Other: Pt with low hemoglobin this AM at 6.6      Next Scheduled Treatment: Will check back PM if able or 3/1/2024    Electronically signed by Marivel Feldman OT on 2024 at 10:35 AM

## 2024-02-29 NOTE — CONSULTS
Jose Marti, Geronimo, Miladys, Autumn, José Luis, Haroon, & Joshua  Urology Consultation      Patient:  Jules Nichols  MRN: [id number]  YOB: 1980    REASON FOR CONSULT:  urinary retention with overflow incontinence    HISTORY OF PRESENT ILLNESS:   The patient is a 43 y.o. male who presented from OSH with PMH including DM on insulin, HTN, CKD but does not follow with nephrology. He was transferred here due to multiple chronic wounds and necrotizing fasciitis with gangrene of neck, chest, and axilla, JAY on CKD with Cr at 6.2 (baseline around 3.5) on arrival. He underwent surgical debridement and admitted to ICU for septic shock. He was just transferred to med/surg floor. He started having urinary incontinence overnight, able to urinate on his own, 250mL, but was bladder scanned for 799mL, urology was consulted. He has no prior urology history. No known family  history. He denies baseline urinary frequency, dysuria, stream issues, nocturia, hematuria, flank pain. He was unaware that he was not fully emptying bladder overnight. Ct was done without any acute abnormalities of the  system.       Patient's old records, notes and chart reviewed and summarized above.    Past Medical History:    Past Medical History:   Diagnosis Date    Benign essential HTN     Chronic kidney disease 1/20/16    Dr Stafford    Diabetic keto-acidosis (HCC) 1/20/16    hx of     Hyperlipemia     Uncontrolled type 2 diabetes mellitus with nephropathy 1/20/16       Past Surgical History:    Past Surgical History:   Procedure Laterality Date    ABDOMEN SURGERY N/A 2/24/2024    WOUND BED PREPARATION BILATERAL GROIN AND ARMPIT, CHEST AND BACK performed by Boyd Keene MD at [facility] OR    DEBRIDEMENT      Irrigation and Debridement BILATERAL GROIN AND ARMPIT, and buttocks, chest    OTHER SURGICAL HISTORY Bilateral 02/22/2024    INCISION AND DRAINAGE OF AXILLAS, CHEST WALL AND GROIN (YELLOW FINS, LITHOTOMY, CYSTO TUBING, WINTER GREEN)  suppurativa of multiple sites    Metabolic acidosis    Hyperkalemia    Acute on chronic anemia    Moderate malnutrition (HCC)    Bandemia    Septic shock (HCC)    Severe sepsis (HCC)    Dependence on renal dialysis (HCC)       Plan:   -Continue mulligan at this time. Patient not wanting to try intermittent self cath at this time  -No growth on urine culture  -Flomax/tamsulosin 0.4 mg po QD however likely his retention issue is related to diabetes  -Encourage ambulation as WB status allows  -Avoid narcotics, anticholinergics or other medications contributing to retention  -Bowel regimen for regular Bms  -Call for void trial when anticipating discharge vs initiate in a few days at [facility] on Monday  -Likely will need outpatient follow up for cysto/urodynamics to furtehr evaluate bladder function. He may need to learn intermittent self catheterization in the future.    Thank you for involving us in the care of Jules Nichols. Should you have any questions, please do not hesitate to contact us at any time.      Sandy Bianchi PA-C  Urology Service   10:16 AM 2/29/2024

## 2024-02-29 NOTE — PROGRESS NOTES
Nephrology Progress Note      SUBJECTIVE      Patient seen and examined.  Transferred out of the medical ICU to the regular floor overnight.  Did have some problems voiding since Ward came out and had to be reintroduced.  He had a postvoid residual of 650 mL plus.  Serum creatinine is 4.9.  He looks more edematous to me than what I recall from the ICU from 4 days ago.  Still has a Ad in place, right IJ.  Discussed with patient about dialysis today primarily for worsening creatinine and worsening edema.    Background history:  43-year-old gentleman with a history of diabetes mellitus, essential hypertension presented to the hospital because of necrotic lesions involving axillary, groin as well as chest wall wounds with shortness of breath.  He has a history of hidradenitis.  He underwent extensive debridement of the axillary area pectoral area and also had Dago's gangrene.  He was in DKA with blood sugars that were in the high 500s, he had high ketones in the serum, his creatinine was up to 8 and he had severe metabolic acidosis.  He did receive 1 hemodialysis treatment.  Urine output seems to have picked up.  He does have history of stage IV CKD with a creatinine that is been running around 3.5 or thereabouts going back almost 10 months from [email or web address] database.     OBJECTIVE      CURRENT TEMPERATURE:  Temp: 97.7 °F (36.5 °C)  MAXIMUM TEMPERATURE OVER 24HRS:  Temp (24hrs), Av.3 °F (36.8 °C), Min:97.7 °F (36.5 °C), Max:98.9 °F (37.2 °C)    CURRENT RESPIRATORY RATE:  Respirations: 16  CURRENT PULSE:  Pulse: 84  CURRENT BLOOD PRESSURE:  BP: (!) 130/47  24HR BLOOD PRESSURE RANGE:  Systolic (24hrs), Av , Min:94 , Max:130   ; Diastolic (24hrs), Av, Min:43, Max:84    24HR INTAKE/OUTPUT:    Intake/Output Summary (Last 24 hours) at 2024 1246  Last data filed at 2024 0745  Gross per 24 hour   Intake 1210 ml   Output 1200 ml   Net 10 ml     WEIGHT :Patient Vitals for the past 96 hrs (Last 3

## 2024-02-29 NOTE — PLAN OF CARE
Problem: Discharge Planning  Goal: Discharge to home or other facility with appropriate resources  2/29/2024 1239 by Ariadna Delarosa RN  Outcome: Progressing  2/29/2024 0008 by Guille Castillo RN  Outcome: Progressing     Problem: Pain  Goal: Verbalizes/displays adequate comfort level or baseline comfort level  2/29/2024 1239 by Ariadna Delarosa RN  Outcome: Progressing  2/29/2024 0008 by Guille Castillo RN  Outcome: Progressing     Problem: Safety - Adult  Goal: Free from fall injury  2/29/2024 1239 by Ariadna Delarosa RN  Outcome: Progressing  2/29/2024 0008 by Guille Castillo RN  Outcome: Progressing     Problem: Skin/Tissue Integrity  Goal: Absence of new skin breakdown  Description: 1.  Monitor for areas of redness and/or skin breakdown  2.  Assess vascular access sites hourly  3.  Every 4-6 hours minimum:  Change oxygen saturation probe site  4.  Every 4-6 hours:  If on nasal continuous positive airway pressure, respiratory therapy assess nares and determine need for appliance change or resting period.  Outcome: Progressing     Problem: ABCDS Injury Assessment  Goal: Absence of physical injury  Outcome: Progressing     Problem: Chronic Conditions and Co-morbidities  Goal: Patient's chronic conditions and co-morbidity symptoms are monitored and maintained or improved  Outcome: Progressing     Problem: Nutrition Deficit:  Goal: Optimize nutritional status  Outcome: Progressing     Problem: Neurosensory - Adult  Goal: Achieves maximal functionality and self care  Outcome: Progressing     Problem: Respiratory - Adult  Goal: Achieves optimal ventilation and oxygenation  Outcome: Progressing     Problem: Skin/Tissue Integrity - Adult  Goal: Skin integrity remains intact  Outcome: Progressing  Goal: Incisions, wounds, or drain sites healing without S/S of infection  Outcome: Progressing  Goal: Oral mucous membranes remain intact  Outcome: Progressing     Problem: Gastrointestinal - Adult  Goal: Maintains

## 2024-02-29 NOTE — PROGRESS NOTES
Dialysis Time Out  To be done by RN and tech or 2 RNs  Staff Names Curyr CHOI RN and Ladan OATES RN    [x]  Identity of the patient using 2 patient identifiers  [x]  Consent for treatment  [x]  Equipment-proper machine and dialyzer  [x]  B-Hep B status  [x]  Orders- to include bath, blood flow, dialyzer, time and fluid removal  [x]  Access-Correct site and in working order  [x]  Time for patient to ask questions.

## 2024-02-29 NOTE — PROGRESS NOTES
Physician Progress Note      PATIENT:               MARICRUZ ANTHONY  CSN #:                  [id number]  :                       1980  ADMIT DATE:       2024 10:41 PM  DISCH DATE:  RESPONDING  PROVIDER #:        DARRYL ROSS MD          QUERY TEXT:    Per Op note dated  documentation of debridement of back down to muscle. To   accurately reflect the procedure performed please document if debridement was   excisional or nonexcisional :    The medical record reflects the following:  Risk Factors: extensive hidradenitis  Clinical Indicators: Per OP note  ' Patient was brought to the operating   room on the evening of 2024 for debridement of previous necrotizing   wounds and axillas groin chest back. Back: wound debridement sharp soft tissue   and muscle with scalpel and bovie.A curvilinear incision on the back over   areas of purulent discharge and nonadherent subcutaneous tissue to underlying   deep tissue was made in multiple areas of the back total combined areas of   sharp debridement with scalpel and Bovie was 24 cm x 12.5 cm these were all   taken down to the muscle layer. '  Treatment: s/p debridement    Thank you,  [email or web address]  office hours  m-f 7-3  Options provided:  -- Nonexcisional debridement of muscle  -- Excisional debridement of muscle  -- Other - I will add my own diagnosis  -- Disagree - Not applicable / Not valid  -- Disagree - Clinically unable to determine / Unknown  -- Refer to Clinical Documentation Reviewer    PROVIDER RESPONSE TEXT:    Excisional debridement of muscle of back was performed during procedure on   24.    Query created by: Shazia Dugan on 2024 8:16 AM      Electronically signed by:  DARRYL ROSS MD 2024 12:56 PM

## 2024-02-29 NOTE — PROGRESS NOTES
Physical Therapy        Physical Therapy Cancel Note      DATE: 2024    NAME: Jules Nichols  MRN: [id number]   : 1980      Patient not seen this date for Physical Therapy due to:    Patient is not appropriate for PT evaluation/treatment at this time d/t low Hgb 6.6. Will check back in the PM as time allows.       Electronically signed by Annita Mancilla PTA on 2024 at 8:06 AM

## 2024-02-29 NOTE — PLAN OF CARE
Problem: Discharge Planning  Goal: Discharge to home or other facility with appropriate resources  2/29/2024 0008 by Guille Castillo, RN  Outcome: Progressing     Problem: Pain  Goal: Verbalizes/displays adequate comfort level or baseline comfort level  2/29/2024 0008 by Guille Castillo, RN  Outcome: Progressing     Problem: Safety - Adult  Goal: Free from fall injury  2/29/2024 0008 by Guille Castillo, RN  Outcome: Progressing

## 2024-02-29 NOTE — H&P
[facility]  Office: [phone]  Deyvi Lyles DO, Chuck Boothe DO, Chris Johnson DO, Helio Olson DO, Andrew Ann MD, Bernarda Brizuela MD, An Valdez MD, Annalisa Mojica MD,  Black Mendoza MD, Anurag Hook MD, Guanakito Griggs MD,  Osmany Bolivar DO, Kendra Murray MD, Pete Castillo MD, Shawn Lyles DO, Neli Looney MD,  Hong Cool DO, Alyse Owusu MD, Eloise Alejandro MD, Shannon Hein MD, Robert José MD,  Blair Rg MD, Pritesh Amaya MD, Melina Bacon MD, Maddie Cho MD, Curtis Crow MD, Steffen Hernandez MD, Benjamin David DO, Les Mcleod DO, Osvaldo Rogers MD,  Reuben Bray MD, Shirley Waterhouse, CNP,  Colleen Rodriguez, CNP, Deo Bo, CNP,  Giana Perry, DNP, Elizabeth Astudillo, CNP, Genna Barron, CNP, Khloe Marlow CNP, Sarah Fierro, CNP, Michelle Wheat, CNP, Rehana Degroot, PA-C, Angelia Baker, PA-C, Jalyn Vail, CNP, Nya Knight, CNP, Isela Keene, CNP, Lucy uQan, CNS, Renata Prado, CNP, Susan Lemons, CNP, Tracy Schwab, CNP         [facility]   IN-PATIENT SERVICE   [facility]    HISTORY AND PHYSICAL EXAMINATION            Date:   2/29/2024  Patient name:  Jules Nichols  Date of admission:  2/21/2024 10:41 PM  MRN:   [id number]  Account:  [id number]  YOB: 1980  PCP:    Elvin Kelley MD  Room:   [facility]/0417-02  Code Status:    Full Code    Chief Complaint:     Chief Complaint   Patient presents with    Wound Infection     R. axilla, chest, L axilla, L neck       History Obtained From:     patient, electronic medical record    History of Present Illness:     Jules Nichols is a 43 y.o. Non- / non  male who presents with Wound Infection (R. axilla, chest, L axilla, L neck)   and is admitted to the hospital for the management of Dago disease.    43-year-old male with past medical history of CKD, hypertension, diabetes mellitus, hyperlipidemia presents to the hospital with  Collection Time: 02/29/24  4:29 AM   Result Value Ref Range    WBC 7.4 3.5 - 11.3 k/uL    RBC 2.52 (L) 4.21 - 5.77 m/uL    Hemoglobin 6.6 (LL) 13.0 - 17.0 g/dL    Hematocrit 22.6 (L) 40.7 - 50.3 %    MCV 89.7 82.6 - 102.9 fL    MCH 26.2 25.2 - 33.5 pg    MCHC 29.2 28.4 - 34.8 g/dL    RDW 18.6 (H) 11.8 - 14.4 %    Platelets 150 138 - 453 k/uL    MPV 9.5 8.1 - 13.5 fL    NRBC Automated 0.0 0.0 per 100 WBC    Immature Granulocytes 3 (H) 0 %    Neutrophils % 76 (H) 36 - 66 %    Lymphocytes % 13 (L) 24 - 44 %    Atypical Lymphocytes 1 %    Monocytes % 5 1 - 7 %    Eosinophils % 2 1 - 4 %    Basophils % 0 0 - 2 %    Absolute Immature Granulocyte 0.22 0.00 - 0.30 k/uL    Neutrophils Absolute 5.63 1.8 - 7.7 k/uL    Lymphocytes Absolute 0.96 (L) 1.0 - 4.8 k/uL    Atypical Lymphocytes Absolute 0.07 k/uL    Monocytes Absolute 0.37 0.1 - 0.8 k/uL    Eosinophils Absolute 0.15 0.0 - 0.4 k/uL    Basophils Absolute 0.00 0.0 - 0.2 k/uL    Morphology ANISOCYTOSIS PRESENT    Basic Metabolic Panel    Collection Time: 02/29/24  4:29 AM   Result Value Ref Range    Sodium 136 135 - 144 mmol/L    Potassium 4.7 3.7 - 5.3 mmol/L    Chloride 105 98 - 107 mmol/L    CO2 21 20 - 31 mmol/L    Anion Gap 10 9 - 17 mmol/L    Glucose 336 (H) 70 - 99 mg/dL    BUN 60 (H) 6 - 20 mg/dL    Creatinine 4.9 (H) 0.7 - 1.2 mg/dL    Est, Glom Filt Rate 14 (L) >60 mL/min/1.73m2    Calcium 7.8 (L) 8.6 - 10.4 mg/dL   POC Glucose Fingerstick    Collection Time: 02/29/24  7:38 AM   Result Value Ref Range    POC Glucose 239 (H) 75 - 110 mg/dL   Urinalysis with Reflex to Culture    Collection Time: 02/29/24  8:10 AM    Specimen: Urine, clean catch   Result Value Ref Range    Color, UA PENDING Yellow    Turbidity UA PENDING Clear    Glucose, Ur PENDING NEGATIVE mg/dL    Bilirubin Urine PENDING NEGATIVE    Ketones, Urine PENDING NEGATIVE mg/dL    Specific Gravity, UA PENDING     Urine Hgb PENDING NEGATIVE    pH, UA PENDING     Protein, UA PENDING NEGATIVE mg/dL

## 2024-02-29 NOTE — PROGRESS NOTES
Dialysis Post Treatment Note  Vitals:    02/29/24 1653   BP: 117/71   Pulse: 93   Resp: 14   Temp: 97.8 °F (36.6 °C)   SpO2: 100%     Pre-Weight = 62.6  Post-weight = Weight - Scale: 61.1 kg (134 lb 11.2 oz)  Total Liters Processed = Blood Volume Processed (Liters): 63.53 L  Rinseback Volume (mL) = Rinseback Volume (ml): 290 ml  Net Removal (mL) = 1500 ML   Patient's dry weight=  Type of access used= Right Triple lumen Dialysis Catheter   Length of treatment=3 Hours    Pt tolerated tx well, no s/s of distress noted during HD, catheter worked well, 1.5 Liters removed, 1 Unit PRBC given with HD, Report given to Primary RN Ariadna VELAZQUEZ

## 2024-02-29 NOTE — PROGRESS NOTES
Dr. Murray notified that patient voided 250 ml, and was bladder scanned for 799 ml PVR. Awaiting further instructions.   0759- mulligan catheter placed, UA with reflex sent with 425 ml returned and still draining clear straw urine.

## 2024-02-29 NOTE — PROGRESS NOTES
[facility] -   Infectious diseases evaluation  admission date 2/21/2024    reason for consultation:   Fasciitis    Impression :   Current:  Right axilla  fasciitis with air in the soft tissues  Fluid in the gangrene groin area with a subcutaneous  Underlying hidradenitis suppurativa extensive sites  JAY on hemodialysis  Diabetes mellitus  Bandemia  Septic shock with severe sepsis with organ failure, on pressors    Other:    Discussion / summary of stay / plan of care/ Recommendations:     HENCE:   Stop Zosyn/ zyvox 2/24 2/24 Start unasyn  likely till 3/4 roughly  Yeast on the axillary wound does not need to be addressed, the wound is very clean and has a VAC on it  2/22 wound cx - GBS,MSSA,prevotella,bacteroides   Okay for discharge with Augmentin until 3/4, reconciled, and follow-up with plastic  ID signing off    Infection Control Recommendations   [place] Precautions  Contact Isolation       Antimicrobial Stewardship Recommendations   Simplification of therapy  Targeted therapy  History of Present Illness:   Initial history:  Jules Nichols is a 43 y.o.-year-old male of diabetes and hidradenitis affecting multiple sites, was very short of breath and transferred from another facility with a creatinine of 3 and a right arm major inflammation seen for a groin area that had major inflammation and cellulitis, diagnosed with necrotizing patient this of both sites and was taken to surgery with a debridement of the right axilla as well as the groin area.  According to the patient those wounds were hidradenitis sites and they were progressing over the last 3 weeks progressively.    He was started on vancomycin and Zosyn  Was given a dose of clindamycin  Infectious disease consulted for antibiotic management    He is currently on Isma-Synephrine, getting hemodialysis for acute kidney injury  Alert appropriate NG tube in place abdomen soft nontender  No burning with the    Musculoskeletal:  Negative for arthralgias.   Skin:  Positive for color change and wound (right axillae and groin).   Allergic/Immunologic: Negative for immunocompromised state.   Neurological:  Negative for dizziness.   Hematological:  Negative for adenopathy.   Psychiatric/Behavioral:  Negative for agitation.        Physical Examination :       Physical Exam  Constitutional:       General: He is not in acute distress.     Appearance: Normal appearance. He is not ill-appearing, toxic-appearing or diaphoretic.   HENT:      Head: Normocephalic and atraumatic.      Nose: Nose normal.      Mouth/Throat:      Mouth: Mucous membranes are moist.   Eyes:      General: No scleral icterus.     Conjunctiva/sclera: Conjunctivae normal.   Cardiovascular:      Rate and Rhythm: Normal rate and regular rhythm.      Heart sounds: No murmur heard.     No friction rub. No gallop.   Pulmonary:      Effort: No respiratory distress.      Breath sounds: No stridor. No wheezing, rhonchi or rales.   Chest:      Chest wall: No tenderness.   Abdominal:      General: There is no distension.      Palpations: There is no mass.      Tenderness: There is no abdominal tenderness.      Hernia: No hernia is present.   Musculoskeletal:         General: Tenderness (arm pit and groin) present. No swelling or deformity.      Cervical back: Neck supple. No rigidity or tenderness.      Comments: Right axilla major wound debridement with groin area as well, both dressed   Skin:     Coloration: Skin is not jaundiced.      Findings: No bruising or lesion.   Neurological:      Mental Status: He is alert and oriented to person, place, and time.      Cranial Nerves: No cranial nerve deficit.   Psychiatric:         Mood and Affect: Mood normal.         Thought Content: Thought content normal.         Past Medical History:     Past Medical History:   Diagnosis Date    Benign essential HTN     Chronic kidney disease 1/20/16    Dr Stafford    Diabetic keto-acidosis

## 2024-02-29 NOTE — CARE COORDINATION
Transitional planning note: plan is [facility] LTACH. Insurance now updated to [facility] medicaid as of today. Call placed to [facility] admissions to update regarding insurance change as patient previously had traditional [place] medicaid. CM requested [facility] start precert as he is close to discharge

## 2024-03-01 ENCOUNTER — APPOINTMENT (OUTPATIENT)
Dept: INTERVENTIONAL RADIOLOGY/VASCULAR | Age: 44
DRG: 710 | End: 2024-03-01
Payer: MEDICAID

## 2024-03-01 LAB
ABO/RH: NORMAL
ANION GAP SERPL CALCULATED.3IONS-SCNC: 10 MMOL/L (ref 9–17)
ANION GAP SERPL CALCULATED.3IONS-SCNC: 9 MMOL/L (ref 9–17)
ANTIBODY SCREEN: NEGATIVE
ARM BAND NUMBER: NORMAL
BASOPHILS # BLD: 0 K/UL (ref 0–0.2)
BASOPHILS NFR BLD: 0 % (ref 0–2)
BLOOD BANK BLOOD PRODUCT EXPIRATION DATE: NORMAL
BLOOD BANK DISPENSE STATUS: NORMAL
BLOOD BANK ISBT PRODUCT BLOOD TYPE: 5100
BLOOD BANK PRODUCT CODE: NORMAL
BLOOD BANK SAMPLE EXPIRATION: NORMAL
BLOOD BANK UNIT TYPE AND RH: NORMAL
BPU ID: NORMAL
BUN SERPL-MCNC: 31 MG/DL (ref 6–20)
BUN SERPL-MCNC: 32 MG/DL (ref 6–20)
CALCIUM SERPL-MCNC: 8.1 MG/DL (ref 8.6–10.4)
CALCIUM SERPL-MCNC: 8.7 MG/DL (ref 8.6–10.4)
CHLORIDE SERPL-SCNC: 100 MMOL/L (ref 98–107)
CHLORIDE SERPL-SCNC: 103 MMOL/L (ref 98–107)
CO2 SERPL-SCNC: 23 MMOL/L (ref 20–31)
CO2 SERPL-SCNC: 25 MMOL/L (ref 20–31)
COMPONENT: NORMAL
CREAT SERPL-MCNC: 2.8 MG/DL (ref 0.7–1.2)
CREAT SERPL-MCNC: 3 MG/DL (ref 0.7–1.2)
CROSSMATCH RESULT: NORMAL
EOSINOPHIL # BLD: 0 K/UL (ref 0–0.4)
EOSINOPHILS RELATIVE PERCENT: 0 % (ref 1–4)
ERYTHROCYTE [DISTWIDTH] IN BLOOD BY AUTOMATED COUNT: 18 % (ref 11.8–14.4)
GFR SERPL CREATININE-BSD FRML MDRD: 25 ML/MIN/1.73M2
GFR SERPL CREATININE-BSD FRML MDRD: 28 ML/MIN/1.73M2
GLUCOSE BLD-MCNC: 118 MG/DL (ref 75–110)
GLUCOSE BLD-MCNC: 156 MG/DL (ref 75–110)
GLUCOSE BLD-MCNC: 203 MG/DL (ref 75–110)
GLUCOSE BLD-MCNC: 203 MG/DL (ref 75–110)
GLUCOSE BLD-MCNC: 270 MG/DL (ref 75–110)
GLUCOSE SERPL-MCNC: 214 MG/DL (ref 70–99)
GLUCOSE SERPL-MCNC: 262 MG/DL (ref 70–99)
HCT VFR BLD AUTO: 29.5 % (ref 40.7–50.3)
HGB BLD-MCNC: 8.7 G/DL (ref 13–17)
IMM GRANULOCYTES # BLD AUTO: 0.35 K/UL (ref 0–0.3)
IMM GRANULOCYTES NFR BLD: 4 %
LYMPHOCYTES NFR BLD: 1.65 K/UL (ref 1–4.8)
LYMPHOCYTES RELATIVE PERCENT: 19 % (ref 24–44)
MAGNESIUM SERPL-MCNC: 1.6 MG/DL (ref 1.6–2.6)
MCH RBC QN AUTO: 26.9 PG (ref 25.2–33.5)
MCHC RBC AUTO-ENTMCNC: 29.5 G/DL (ref 28.4–34.8)
MCV RBC AUTO: 91.3 FL (ref 82.6–102.9)
MONOCYTES NFR BLD: 0.35 K/UL (ref 0.1–0.8)
MONOCYTES NFR BLD: 4 % (ref 1–7)
MORPHOLOGY: ABNORMAL
NEUTROPHILS NFR BLD: 73 % (ref 36–66)
NEUTS SEG NFR BLD: 6.35 K/UL (ref 1.8–7.7)
NRBC BLD-RTO: 0 PER 100 WBC
PLATELET # BLD AUTO: 201 K/UL (ref 138–453)
PMV BLD AUTO: 9.8 FL (ref 8.1–13.5)
POTASSIUM SERPL-SCNC: 4.2 MMOL/L (ref 3.7–5.3)
POTASSIUM SERPL-SCNC: 4.6 MMOL/L (ref 3.7–5.3)
RBC # BLD AUTO: 3.23 M/UL (ref 4.21–5.77)
SODIUM SERPL-SCNC: 133 MMOL/L (ref 135–144)
SODIUM SERPL-SCNC: 137 MMOL/L (ref 135–144)
TRANSFUSION STATUS: NORMAL
UNIT DIVISION: 0
UNIT ISSUE DATE/TIME: NORMAL
WBC OTHER # BLD: 8.7 K/UL (ref 3.5–11.3)

## 2024-03-01 PROCEDURE — 6370000000 HC RX 637 (ALT 250 FOR IP)

## 2024-03-01 PROCEDURE — 80048 BASIC METABOLIC PNL TOTAL CA: CPT

## 2024-03-01 PROCEDURE — 83735 ASSAY OF MAGNESIUM: CPT

## 2024-03-01 PROCEDURE — 6360000002 HC RX W HCPCS: Performed by: STUDENT IN AN ORGANIZED HEALTH CARE EDUCATION/TRAINING PROGRAM

## 2024-03-01 PROCEDURE — 6360000002 HC RX W HCPCS: Performed by: PHYSICIAN ASSISTANT

## 2024-03-01 PROCEDURE — 2580000003 HC RX 258: Performed by: STUDENT IN AN ORGANIZED HEALTH CARE EDUCATION/TRAINING PROGRAM

## 2024-03-01 PROCEDURE — 6370000000 HC RX 637 (ALT 250 FOR IP): Performed by: INTERNAL MEDICINE

## 2024-03-01 PROCEDURE — 6370000000 HC RX 637 (ALT 250 FOR IP): Performed by: STUDENT IN AN ORGANIZED HEALTH CARE EDUCATION/TRAINING PROGRAM

## 2024-03-01 PROCEDURE — 0JH63XZ INSERTION OF TUNNELED VASCULAR ACCESS DEVICE INTO CHEST SUBCUTANEOUS TISSUE AND FASCIA, PERCUTANEOUS APPROACH: ICD-10-PCS | Performed by: RADIOLOGY

## 2024-03-01 PROCEDURE — 77001 FLUOROGUIDE FOR VEIN DEVICE: CPT

## 2024-03-01 PROCEDURE — 97606 NEG PRS WND THER DME>50 SQCM: CPT

## 2024-03-01 PROCEDURE — 2060000000 HC ICU INTERMEDIATE R&B

## 2024-03-01 PROCEDURE — C1769 GUIDE WIRE: HCPCS

## 2024-03-01 PROCEDURE — B5181ZA FLUOROSCOPY OF SUPERIOR VENA CAVA USING LOW OSMOLAR CONTRAST, GUIDANCE: ICD-10-PCS | Performed by: RADIOLOGY

## 2024-03-01 PROCEDURE — 02HV33Z INSERTION OF INFUSION DEVICE INTO SUPERIOR VENA CAVA, PERCUTANEOUS APPROACH: ICD-10-PCS | Performed by: RADIOLOGY

## 2024-03-01 PROCEDURE — 82947 ASSAY GLUCOSE BLOOD QUANT: CPT

## 2024-03-01 PROCEDURE — 99232 SBSQ HOSP IP/OBS MODERATE 35: CPT | Performed by: STUDENT IN AN ORGANIZED HEALTH CARE EDUCATION/TRAINING PROGRAM

## 2024-03-01 PROCEDURE — 36415 COLL VENOUS BLD VENIPUNCTURE: CPT

## 2024-03-01 PROCEDURE — 36558 INSERT TUNNELED CV CATH: CPT

## 2024-03-01 PROCEDURE — 85025 COMPLETE CBC W/AUTO DIFF WBC: CPT

## 2024-03-01 PROCEDURE — 99232 SBSQ HOSP IP/OBS MODERATE 35: CPT | Performed by: INTERNAL MEDICINE

## 2024-03-01 RX ORDER — HEPARIN SODIUM 1000 [USP'U]/ML
INJECTION, SOLUTION INTRAVENOUS; SUBCUTANEOUS PRN
Status: COMPLETED | OUTPATIENT
Start: 2024-03-01 | End: 2024-03-01

## 2024-03-01 RX ORDER — PROCHLORPERAZINE EDISYLATE 5 MG/ML
10 INJECTION INTRAMUSCULAR; INTRAVENOUS EVERY 6 HOURS PRN
Status: DISCONTINUED | OUTPATIENT
Start: 2024-03-01 | End: 2024-03-06 | Stop reason: HOSPADM

## 2024-03-01 RX ADMIN — ONDANSETRON 4 MG: 2 INJECTION INTRAMUSCULAR; INTRAVENOUS at 18:32

## 2024-03-01 RX ADMIN — HEPARIN SODIUM 1.6 UNITS: 1000 INJECTION, SOLUTION INTRAVENOUS; SUBCUTANEOUS at 16:17

## 2024-03-01 RX ADMIN — INSULIN GLARGINE 23 UNITS: 100 INJECTION, SOLUTION SUBCUTANEOUS at 11:40

## 2024-03-01 RX ADMIN — SODIUM CHLORIDE, PRESERVATIVE FREE 10 ML: 5 INJECTION INTRAVENOUS at 08:23

## 2024-03-01 RX ADMIN — HEPARIN SODIUM 5000 UNITS: 5000 INJECTION INTRAVENOUS; SUBCUTANEOUS at 20:18

## 2024-03-01 RX ADMIN — SODIUM CHLORIDE, PRESERVATIVE FREE 10 ML: 5 INJECTION INTRAVENOUS at 20:19

## 2024-03-01 RX ADMIN — TAMSULOSIN HYDROCHLORIDE 0.4 MG: 0.4 CAPSULE ORAL at 11:39

## 2024-03-01 RX ADMIN — ONDANSETRON 4 MG: 2 INJECTION INTRAMUSCULAR; INTRAVENOUS at 07:28

## 2024-03-01 RX ADMIN — AMOXICILLIN AND CLAVULANATE POTASSIUM 1 TABLET: 500; 125 TABLET, FILM COATED ORAL at 20:18

## 2024-03-01 RX ADMIN — HEPARIN SODIUM 5000 UNITS: 5000 INJECTION INTRAVENOUS; SUBCUTANEOUS at 13:45

## 2024-03-01 RX ADMIN — PROCHLORPERAZINE EDISYLATE 10 MG: 5 INJECTION INTRAMUSCULAR; INTRAVENOUS at 10:17

## 2024-03-01 RX ADMIN — ACETAMINOPHEN 650 MG: 325 TABLET ORAL at 05:35

## 2024-03-01 RX ADMIN — INSULIN LISPRO 4 UNITS: 100 INJECTION, SOLUTION INTRAVENOUS; SUBCUTANEOUS at 12:24

## 2024-03-01 RX ADMIN — AMOXICILLIN AND CLAVULANATE POTASSIUM 1 TABLET: 500; 125 TABLET, FILM COATED ORAL at 08:22

## 2024-03-01 RX ADMIN — ACETAMINOPHEN 650 MG: 325 TABLET ORAL at 11:43

## 2024-03-01 RX ADMIN — OXYCODONE 5 MG: 5 TABLET ORAL at 12:24

## 2024-03-01 RX ADMIN — Medication: at 12:25

## 2024-03-01 RX ADMIN — PROCHLORPERAZINE EDISYLATE 10 MG: 5 INJECTION INTRAMUSCULAR; INTRAVENOUS at 23:55

## 2024-03-01 RX ADMIN — Medication 1 CAPSULE: at 08:23

## 2024-03-01 RX ADMIN — Medication 1 CAPSULE: at 17:32

## 2024-03-01 RX ADMIN — MIDODRINE HYDROCHLORIDE 10 MG: 5 TABLET ORAL at 08:22

## 2024-03-01 ASSESSMENT — PAIN DESCRIPTION - DESCRIPTORS: DESCRIPTORS: CRAMPING

## 2024-03-01 ASSESSMENT — PAIN SCALES - GENERAL
PAINLEVEL_OUTOF10: 0
PAINLEVEL_OUTOF10: 3

## 2024-03-01 ASSESSMENT — PAIN DESCRIPTION - LOCATION: LOCATION: ABDOMEN

## 2024-03-01 NOTE — PROGRESS NOTES
Occupational Therapy    [facility]  Occupational Therapy Not Seen Note    DATE: 3/1/2024    NAME: Jules Nichols  MRN: [id number]   : 1980      Patient not seen this date for Occupational Therapy due to:    Pt. Declined OT services d/t feeling nauseous.  Will check back if time allows.    Electronically signed by SRIRAM Ford on 3/1/2024 at 9:50 AM

## 2024-03-01 NOTE — PROGRESS NOTES
[facility]  Office: [phone]  Deyvi Lyles DO, Chuck Boothe DO, Chris Johnson DO, Helio Olson DO, Andrew Ann MD, Bernarda Brizuela MD, An Valdez MD, Annalisa Mojica MD,  Black Mendoza MD, Anurag Hook MD, Guanakito Griggs MD,  Osmany Bolivar DO, Kendra Murray MD, Pete Castillo MD, Shawn Lyles DO, Neli Looney MD,  Hong Cool DO, Alyse Owusu MD, Eloise Alejandro MD, Shannon Hein MD, Robert José MD,  Blair Rg MD, Pritesh Amaya MD, Melina Bacon MD, Maddie Cho MD, Curtis Crow MD, Steffen Hernandez MD, Benjamin David DO, Les Mcleod DO, Osvaldo Rogers MD,  Reuben Bray MD, Shirley Waterhouse, CNP,  Colleen Rodriguez, CNP, Deo Bo, CNP,  Giana Perry, DNP, Elizabeth Astudillo, CNP, Genna Barron, CNP, Khloe Marlow CNP, Sarah Fierro, CNP, Michelle Wheat, CNP, Rehana Degroot, PA-C, Angelia Baker, PA-C, Jalyn Vail, CNP, Nya Knight, CNP, Isela Keene, CNP, Lucy Quan, CNS, Renata Prado, CNP, Susan Lemons, CNP, Tracy Schwab, CNP         [facility]   IN-PATIENT SERVICE   [facility]    Progress Note    3/1/2024    3:47 PM    Name:   Jules Nichols  MRN:     [id number]     Acct:      [id number]   Room:   0417/0417-02  IP Day:  9  Admit Date:  2/21/2024 10:41 PM    PCP:   Elvin Kelley MD  Code Status:  Full Code    Subjective:     C/C:   Chief Complaint   Patient presents with    Wound Infection     R. axilla, chest, L axilla, L neck     Interval History Status: not changed.     Patient was nauseous In morning  Received zofran  No throwing up  No chets pain or sob  Wound vac on  Plan for tunneled dialysis catheter  Labs and vitals reviewed    Brief History:     43-year-old male with past medical history of CKD, hypertension, diabetes mellitus, hyperlipidemia presents to the hospital with worsening skin wounds and shortness of breath.  Patient had history of hidradenitis and previously followed with   reports that he has never smoked. He has never used smokeless tobacco. He reports that he does not drink alcohol and does not use drugs.     Family History: No family history on file.    Vitals:  BP (!) 148/80   Pulse 76   Temp 97.5 °F (36.4 °C) (Oral)   Resp 22   Ht 1.651 m (5' 5\")   Wt 61.1 kg (134 lb 11.2 oz)   SpO2 98%   BMI 22.42 kg/m²   Temp (24hrs), Av °F (36.7 °C), Min:97.2 °F (36.2 °C), Max:99 °F (37.2 °C)    Recent Labs     24  19324  0724  1102 24  1527   POCGLU 203* 203* 270* 156*       I/O (24Hr):    Intake/Output Summary (Last 24 hours) at 3/1/2024 1547  Last data filed at 3/1/2024 1446  Gross per 24 hour   Intake 310 ml   Output 7000 ml   Net -6690 ml       Labs:  Hematology:  Recent Labs     24   WBC 7.4  --  10.0 8.7   RBC 2.52*  --  3.20* 3.23*   HGB 6.6* 6.8* 8.8* 8.7*   HCT 22.6*  --  27.2* 29.5*   MCV 89.7  --  85.0 91.3   MCH 26.2  --  27.5 26.9   MCHC 29.2  --  32.4 29.5   RDW 18.6*  --  17.4* 18.0*     --  178 201   MPV 9.5  --  9.9 9.8     Chemistry:  Recent Labs     24  0254 24  1107      < > 135 137 133*   K 4.3   < > 4.4 4.6 4.2   *   < > 101 103 100   CO2 20   < > 25 25 23   GLUCOSE 197*   < > 238* 214* 262*   BUN 51*   < > 27* 31* 32*   CREATININE 4.8*   < > 2.5* 2.8* 3.0*   MG 1.6  --   --  1.6  --    ANIONGAP 12   < > 9 9 10   LABGLOM 14*   < > 32* 28* 25*   CALCIUM 7.7*   < > 7.9* 8.1* 8.7   PHOS 2.2*  --   --   --   --     < > = values in this interval not displayed.     Recent Labs     24  1200 24  1759 24  1932 24  0729 24  1102 24  1527   POCGLU 207* 184* 203* 203* 270* 156*     ABG:  Lab Results   Component Value Date/Time    POCPH 7.173 2024 05:54 AM    POCPCO2 18.0 2024 05:54 AM    POCPO2 138.8 2024 05:54 AM    POCHCO3 6.6 2024 05:54 AM    NBEA

## 2024-03-01 NOTE — PROGRESS NOTES
Value Date/Time    LABA1C 10.2 02/22/2024 04:17 AM     PTT: No components found for: \"LABPTT\"      Assessment:       Measurements:     03/01/24 1440   Wound 02/22/24 Axilla Proximal;Right;Anterior   Date First Assessed/Time First Assessed: 02/22/24 0400   Present on Original Admission: Yes  Primary Wound Type: Surgical Type  Location: Axilla  Wound Location Orientation: Proximal;Right;Anterior   Wound Image    Wound Etiology Surgical   Dressing Status New dressing applied   Wound Cleansed Cleansed with saline   Dressing/Treatment Negative pressure wound therapy   Dressing Change Due 03/04/24   Wound Assessment Subcutaneous;Pink/red;Exposed structure muscle;Fibrinous   Drainage Amount Moderate (25-50%)   Drainage Description Serosanguinous   Odor None   Sariah-wound Assessment Intact   Wound 02/22/24 Axilla Lateral;Left   Date First Assessed/Time First Assessed: 02/22/24 0400   Location: Axilla  Wound Location Orientation: Lateral;Left   Wound Image    Wound Etiology Surgical   Dressing Status New dressing applied   Wound Cleansed Cleansed with saline   Dressing/Treatment Negative pressure wound therapy   Dressing Change Due 03/04/24   Wound Assessment Subcutaneous;Dry;Pink/red;Granulation tissue   Drainage Amount Small (< 25%)   Drainage Description Serosanguinous;Sanguinous   Odor None   Sariah-wound Assessment Intact  (small open wound covered with silver hydrofiber and drape)   Wound 02/22/24 Sternum Lower;Mid   Date First Assessed/Time First Assessed: 02/22/24 0400   Present on Original Admission: Yes  Primary Wound Type: Surgical Type  Location: Sternum  Wound Location Orientation: Lower;Mid   Wound Image    Wound Etiology Surgical   Dressing Status New dressing applied   Wound Cleansed Cleansed with saline   Dressing/Treatment Negative pressure wound therapy   Dressing Change Due 03/04/24   Wound Assessment Subcutaneous;Dry;Exposed structure tendon;Exposed structure fascia;Granulation tissue;Pink/red  (cartiledge)  nutritional intake and fluids. Consult dietician if needed.     Specialty Bed Required : Yes   [] Low Air Loss   [x] Pressure Redistribution  [] Fluid Immersion  [] Bariatric  [] Total Pressure Relief  [] Other:      Discharge Plan:  TBD. LTACH     Patient/Caregiver Teaching:  Encouraged oral intake.   Level of patient/caregiver understanding:    [] Indicates understanding                [x] Needs reinforcement  [] Unsuccessful                                  [x] Verbal Understanding  [] Demonstrated understanding        [] No evidence of learning  [] Refused teaching                           [] N/A    Contact the Wound Ostomy RN on-call during working hours Monday-Friday [phone] via [employer] by searching \"wound\" under \"groups\" and selecting the on-call clinician. Sending messages via individual names will not reach a clinician.        Electronically signed by Veda Garcia RN, CWON on 3/1/2024 at 4:15 PM

## 2024-03-01 NOTE — BRIEF OP NOTE
Brief Postoperative Note    Jules Nichols  YOB: 1980  [id number]    Pre-operative Diagnosis: Acute Renal Failure      Post-operative Diagnosis: Same    Procedure: Tunneled Dialysis Catheter    Medication Given: none    Anesthesia: 1%Lidocaine     Surgeons/Assistants: LYNDSAY Lambert    Estimated Blood Loss: Minimal    Complications: none    14 Fr x 19 cm tip to cuff palindrome tunneled HD Catheter placed successfully via the Site:  Right Internal Jugular Vein.  Catheter secured to skin, dressing applied.  Catheter locked with Heparin.  May use catheter.    Electronically signed by LYNDSAY Lambert on 3/1/2024 at 4:28 PM

## 2024-03-01 NOTE — PLAN OF CARE
Problem: Discharge Planning  Goal: Discharge to home or other facility with appropriate resources  3/1/2024 1044 by Ariadna Delarosa RN  Outcome: Progressing  Flowsheets (Taken 3/1/2024 0734)  Discharge to home or other facility with appropriate resources:   Identify barriers to discharge with patient and caregiver   Arrange for needed discharge resources and transportation as appropriate   Identify discharge learning needs (meds, wound care, etc)   Refer to discharge planning if patient needs post-hospital services based on physician order or complex needs related to functional status, cognitive ability or social support system  2/29/2024 2242 by Valeria Casillas RN  Outcome: Progressing     Problem: Pain  Goal: Verbalizes/displays adequate comfort level or baseline comfort level  3/1/2024 1044 by Ariadna Delarosa RN  Outcome: Progressing  2/29/2024 2242 by Valeria Casillas RN  Outcome: Progressing     Problem: Safety - Adult  Goal: Free from fall injury  3/1/2024 1044 by Ariadna Delarosa RN  Outcome: Progressing  2/29/2024 2242 by Valeria Casillas RN  Outcome: Progressing     Problem: Skin/Tissue Integrity  Goal: Absence of new skin breakdown  Description: 1.  Monitor for areas of redness and/or skin breakdown  2.  Assess vascular access sites hourly  3.  Every 4-6 hours minimum:  Change oxygen saturation probe site  4.  Every 4-6 hours:  If on nasal continuous positive airway pressure, respiratory therapy assess nares and determine need for appliance change or resting period.  3/1/2024 1044 by Ariadna Delarosa RN  Outcome: Progressing  2/29/2024 2242 by Valeria Casillas, OSEAS  Outcome: Progressing     Problem: ABCDS Injury Assessment  Goal: Absence of physical injury  3/1/2024 1044 by Ariadna Delarosa RN  Outcome: Progressing  2/29/2024 2242 by Valeria Casillas RN  Outcome: Progressing     Problem: Chronic Conditions and

## 2024-03-01 NOTE — CARE COORDINATION
Transitional planning note: plan is [facility]. Spoke with [place] at [facility] and patient does now have active [employer] medicaid. She states they will submit for precert as soon as the [employer] insurance portal is up and working. She states it is currently down and they have not been able to submit for auth yet today.

## 2024-03-01 NOTE — PLAN OF CARE
Problem: Discharge Planning  Goal: Discharge to home or other facility with appropriate resources  2/29/2024 2242 by Valeria Casillas RN  Outcome: Progressing  2/29/2024 1239 by Ariadna Delarosa RN  Outcome: Progressing     Problem: Pain  Goal: Verbalizes/displays adequate comfort level or baseline comfort level  2/29/2024 2242 by Valeria Casillas RN  Outcome: Progressing  2/29/2024 1239 by Ariadna Delarosa RN  Outcome: Progressing     Problem: Safety - Adult  Goal: Free from fall injury  2/29/2024 2242 by Valeria Casillas RN  Outcome: Progressing  2/29/2024 1239 by Ariadna Delarosa RN  Outcome: Progressing     Problem: Skin/Tissue Integrity  Goal: Absence of new skin breakdown  Description: 1.  Monitor for areas of redness and/or skin breakdown  2.  Assess vascular access sites hourly  3.  Every 4-6 hours minimum:  Change oxygen saturation probe site  4.  Every 4-6 hours:  If on nasal continuous positive airway pressure, respiratory therapy assess nares and determine need for appliance change or resting period.  2/29/2024 2242 by Valeria Casillas RN  Outcome: Progressing  2/29/2024 1239 by Ariadna Delarosa RN  Outcome: Progressing     Problem: ABCDS Injury Assessment  Goal: Absence of physical injury  2/29/2024 2242 by Valeria Casillas RN  Outcome: Progressing  2/29/2024 1239 by Ariadna Delarosa RN  Outcome: Progressing     Problem: Chronic Conditions and Co-morbidities  Goal: Patient's chronic conditions and co-morbidity symptoms are monitored and maintained or improved  2/29/2024 2242 by Valeria Casillas RN  Outcome: Progressing  2/29/2024 1239 by Ariadna Delarosa RN  Outcome: Progressing     Problem: Nutrition Deficit:  Goal: Optimize nutritional status  2/29/2024 2242 by Valeria Casillas RN  Outcome: Progressing  2/29/2024 1239 by Ariadna Delarosa RN  Outcome: Progressing     Problem: Neurosensory -

## 2024-03-01 NOTE — PROGRESS NOTES
Physical Therapy        Physical Therapy Cancel Note      DATE: 3/1/2024    NAME: Jules Nichols  MRN: [id number]   : 1980      Patient not seen this date for Physical Therapy due to:    Patient is not appropriate for PT evaluation/treatment at this time d/t nausea and stomach pain. RN notified. Will check back in the PM as time allows      Electronically signed by Annita Mancilla PTA on 3/1/2024 at 9:28 AM

## 2024-03-01 NOTE — PROGRESS NOTES
Nephrology Progress Note      SUBJECTIVE      Patient seen and examined.  Transferred out of the medical ICU to the regular floor overnight.  Did have some problems voiding since Ward came out and had to be reintroduced.  He had a postvoid residual of 650 mL plus.  Patient had 2.85 L of urine out yesterday and has already had 2 L out thus far this shift in 5 hours.  However, his creatinine continues to go up.  He likely will need dialysis for a period of time, although with his improving urine output and relief of the obstruction, hopefully, renal function will improve soon.  Patient's creatinine was 2.5 yesterday at 9:30 PM and up to 2.8 this morning at 3:18 AM and up further to 3 at 11:07 AM this morning.  No chest pain or shortness of breath.  Edema is improving.  Temporary dialysis catheter continues right IJ.    No diuretics currently use.  He is on Flomax       Background history:  43-year-old gentleman with a history of diabetes mellitus, essential hypertension presented to the hospital because of necrotic lesions involving axillary, groin as well as chest wall wounds with shortness of breath.  He has a history of hidradenitis.  He underwent extensive debridement of the axillary area, pectoral area and also had Dago's gangrene.  He was in DKA with blood sugars that were in the high 500s, he had high ketones in the serum, his creatinine was up to 8 and he had severe metabolic acidosis.  He did receive 1 hemodialysis treatment.  Urine output seems to have picked up.  He does have history of stage IV CKD with a creatinine that is been running around 3.5 or thereabouts going back almost 10 months from [facility] database.     OBJECTIVE      CURRENT TEMPERATURE:  Temp: 97.4 °F (36.3 °C)  MAXIMUM TEMPERATURE OVER 24HRS:  Temp (24hrs), Av °F (36.7 °C), Min:97.4 °F (36.3 °C), Max:99 °F (37.2 °C)    CURRENT RESPIRATORY RATE:  Respirations: 17  CURRENT PULSE:  Pulse: 92  CURRENT BLOOD PRESSURE:  BP: (!)      C3:   Lab Results   Component Value Date    C3 82 (L) 02/22/2024     C4:   Lab Results   Component Value Date    C4 15 02/22/2024       URINE SODIUM:    Lab Results   Component Value Date/Time    KAREEM 81 02/22/2024 03:14 PM      URINE CREATININE:    Lab Results   Component Value Date/Time    LABCREA 17.4 02/22/2024 03:14 PM     URINE EOSINOPHILS:   Lab Results   Component Value Date/Time    UREO NONE SEEN 02/22/2024 03:14 PM     URINALYSIS:  U/A:   Lab Results   Component Value Date/Time    NITRU NEGATIVE 02/29/2024 08:10 AM    COLORU Yellow 02/29/2024 08:10 AM    PHUR 7.0 02/29/2024 08:10 AM    WBCUA None 02/29/2024 08:10 AM    RBCUA None 02/29/2024 08:10 AM    BACTERIA None 02/29/2024 08:10 AM    SPECGRAV 1.009 02/29/2024 08:10 AM    LEUKOCYTESUR NEGATIVE 02/29/2024 08:10 AM    UROBILINOGEN Normal 02/29/2024 08:10 AM    BILIRUBINUR NEGATIVE 02/29/2024 08:10 AM    GLUCOSEU 2+ 02/29/2024 08:10 AM    KETUA NEGATIVE 02/29/2024 08:10 AM         ASSESSMENT      1. JAY on top of chronic kidney disease stage IV secondary to ischemic ATN.  Patient has required dialysis treatments and still is has a rising creatinine, despite improving urine output with placement of Ward catheter.    2.  Has CKD stage IV with a baseline creatinine of around 3.5 from about a year or so ago.  Has not been following with any nephrology consultant on a regular basis since March 2023.  3.  10+ years of diabetes mellitus  4.  History of essential hypertension  5.  Necrotizing soft tissue infection, history of hidradenitis, wound debridement yesterday  6. Dago's gangrene :   7. DKA resolved  8. Severe metabolic acidosis: Improved  9. Hyperkalemia: Improved  10 urinary retention, Ward reintroduced, urology follow  11.  Dilutional hyponatremia    PLAN      1.  Hemodialysis tomorrow   2.  Flomax added  3. Follow up labs ordered.   4.  Agree with continuing Ward catheter  5.  Tunneled hemodialysis catheter placement by IR  6.  Patient likely

## 2024-03-01 NOTE — CARE COORDINATION
SW following for HD needs. New to HD this admission. Patient awaiting pre-cert to [facility] LTACH w/HD onsite. No OP HD referral needed at this time.

## 2024-03-02 LAB
ANION GAP SERPL CALCULATED.3IONS-SCNC: 13 MMOL/L (ref 9–17)
BASOPHILS # BLD: <0.03 K/UL (ref 0–0.2)
BASOPHILS NFR BLD: 0 % (ref 0–2)
BUN SERPL-MCNC: 33 MG/DL (ref 6–20)
CALCIUM SERPL-MCNC: 9.1 MG/DL (ref 8.6–10.4)
CHLORIDE SERPL-SCNC: 100 MMOL/L (ref 98–107)
CO2 SERPL-SCNC: 23 MMOL/L (ref 20–31)
CREAT SERPL-MCNC: 3.4 MG/DL (ref 0.7–1.2)
EOSINOPHIL # BLD: 0.06 K/UL (ref 0–0.44)
EOSINOPHILS RELATIVE PERCENT: 1 % (ref 1–4)
ERYTHROCYTE [DISTWIDTH] IN BLOOD BY AUTOMATED COUNT: 17.5 % (ref 11.8–14.4)
GFR SERPL CREATININE-BSD FRML MDRD: 22 ML/MIN/1.73M2
GLUCOSE BLD-MCNC: 105 MG/DL (ref 75–110)
GLUCOSE BLD-MCNC: 145 MG/DL (ref 75–110)
GLUCOSE BLD-MCNC: 166 MG/DL (ref 75–110)
GLUCOSE BLD-MCNC: 85 MG/DL (ref 75–110)
GLUCOSE SERPL-MCNC: 84 MG/DL (ref 70–99)
HCT VFR BLD AUTO: 28.1 % (ref 40.7–50.3)
HGB BLD-MCNC: 8.9 G/DL (ref 13–17)
IMM GRANULOCYTES # BLD AUTO: 0.24 K/UL (ref 0–0.3)
IMM GRANULOCYTES NFR BLD: 2 %
LYMPHOCYTES NFR BLD: 1.34 K/UL (ref 1.1–3.7)
LYMPHOCYTES RELATIVE PERCENT: 12 % (ref 24–43)
MCH RBC QN AUTO: 27.7 PG (ref 25.2–33.5)
MCHC RBC AUTO-ENTMCNC: 31.7 G/DL (ref 28.4–34.8)
MCV RBC AUTO: 87.5 FL (ref 82.6–102.9)
MONOCYTES NFR BLD: 0.72 K/UL (ref 0.1–1.2)
MONOCYTES NFR BLD: 6 % (ref 3–12)
NEUTROPHILS NFR BLD: 79 % (ref 36–65)
NEUTS SEG NFR BLD: 9.07 K/UL (ref 1.5–8.1)
NRBC BLD-RTO: 0 PER 100 WBC
PLATELET # BLD AUTO: 203 K/UL (ref 138–453)
PMV BLD AUTO: 9.4 FL (ref 8.1–13.5)
POTASSIUM SERPL-SCNC: 4.7 MMOL/L (ref 3.7–5.3)
RBC # BLD AUTO: 3.21 M/UL (ref 4.21–5.77)
RBC # BLD: ABNORMAL 10*6/UL
SODIUM SERPL-SCNC: 136 MMOL/L (ref 135–144)
WBC OTHER # BLD: 11.5 K/UL (ref 3.5–11.3)

## 2024-03-02 PROCEDURE — 97110 THERAPEUTIC EXERCISES: CPT

## 2024-03-02 PROCEDURE — 97116 GAIT TRAINING THERAPY: CPT

## 2024-03-02 PROCEDURE — 6370000000 HC RX 637 (ALT 250 FOR IP): Performed by: STUDENT IN AN ORGANIZED HEALTH CARE EDUCATION/TRAINING PROGRAM

## 2024-03-02 PROCEDURE — 2580000003 HC RX 258: Performed by: STUDENT IN AN ORGANIZED HEALTH CARE EDUCATION/TRAINING PROGRAM

## 2024-03-02 PROCEDURE — 6370000000 HC RX 637 (ALT 250 FOR IP)

## 2024-03-02 PROCEDURE — 85025 COMPLETE CBC W/AUTO DIFF WBC: CPT

## 2024-03-02 PROCEDURE — 80048 BASIC METABOLIC PNL TOTAL CA: CPT

## 2024-03-02 PROCEDURE — 36415 COLL VENOUS BLD VENIPUNCTURE: CPT

## 2024-03-02 PROCEDURE — 99232 SBSQ HOSP IP/OBS MODERATE 35: CPT | Performed by: STUDENT IN AN ORGANIZED HEALTH CARE EDUCATION/TRAINING PROGRAM

## 2024-03-02 PROCEDURE — 99232 SBSQ HOSP IP/OBS MODERATE 35: CPT | Performed by: INTERNAL MEDICINE

## 2024-03-02 PROCEDURE — 2060000000 HC ICU INTERMEDIATE R&B

## 2024-03-02 PROCEDURE — 6370000000 HC RX 637 (ALT 250 FOR IP): Performed by: INTERNAL MEDICINE

## 2024-03-02 PROCEDURE — 6360000002 HC RX W HCPCS: Performed by: STUDENT IN AN ORGANIZED HEALTH CARE EDUCATION/TRAINING PROGRAM

## 2024-03-02 PROCEDURE — 82947 ASSAY GLUCOSE BLOOD QUANT: CPT

## 2024-03-02 PROCEDURE — 93005 ELECTROCARDIOGRAM TRACING: CPT | Performed by: STUDENT IN AN ORGANIZED HEALTH CARE EDUCATION/TRAINING PROGRAM

## 2024-03-02 RX ORDER — INSULIN GLARGINE 100 [IU]/ML
20 INJECTION, SOLUTION SUBCUTANEOUS DAILY
Status: DISCONTINUED | OUTPATIENT
Start: 2024-03-03 | End: 2024-03-06 | Stop reason: HOSPADM

## 2024-03-02 RX ADMIN — AMOXICILLIN AND CLAVULANATE POTASSIUM 1 TABLET: 500; 125 TABLET, FILM COATED ORAL at 21:22

## 2024-03-02 RX ADMIN — ACETAMINOPHEN 650 MG: 325 TABLET ORAL at 20:19

## 2024-03-02 RX ADMIN — Medication: at 08:23

## 2024-03-02 RX ADMIN — Medication 1 CAPSULE: at 18:20

## 2024-03-02 RX ADMIN — POLYETHYLENE GLYCOL 3350 17 G: 17 POWDER, FOR SOLUTION ORAL at 18:24

## 2024-03-02 RX ADMIN — AMOXICILLIN AND CLAVULANATE POTASSIUM 1 TABLET: 500; 125 TABLET, FILM COATED ORAL at 08:22

## 2024-03-02 RX ADMIN — INSULIN GLARGINE 23 UNITS: 100 INJECTION, SOLUTION SUBCUTANEOUS at 08:22

## 2024-03-02 RX ADMIN — ACETAMINOPHEN 650 MG: 325 TABLET ORAL at 12:27

## 2024-03-02 RX ADMIN — SODIUM CHLORIDE, PRESERVATIVE FREE 10 ML: 5 INJECTION INTRAVENOUS at 08:24

## 2024-03-02 RX ADMIN — SODIUM CHLORIDE, PRESERVATIVE FREE 10 ML: 5 INJECTION INTRAVENOUS at 20:19

## 2024-03-02 RX ADMIN — Medication 1 CAPSULE: at 08:22

## 2024-03-02 RX ADMIN — OXYCODONE 5 MG: 5 TABLET ORAL at 15:17

## 2024-03-02 RX ADMIN — HEPARIN SODIUM 5000 UNITS: 5000 INJECTION INTRAVENOUS; SUBCUTANEOUS at 21:22

## 2024-03-02 RX ADMIN — MIDODRINE HYDROCHLORIDE 10 MG: 5 TABLET ORAL at 08:22

## 2024-03-02 ASSESSMENT — PAIN - FUNCTIONAL ASSESSMENT: PAIN_FUNCTIONAL_ASSESSMENT: ACTIVITIES ARE NOT PREVENTED

## 2024-03-02 ASSESSMENT — PAIN SCALES - GENERAL
PAINLEVEL_OUTOF10: 0
PAINLEVEL_OUTOF10: 4
PAINLEVEL_OUTOF10: 3
PAINLEVEL_OUTOF10: 3

## 2024-03-02 ASSESSMENT — PAIN DESCRIPTION - LOCATION: LOCATION: BACK

## 2024-03-02 NOTE — PROGRESS NOTES
[facility]  Office: [phone]  Deyvi Lyles DO, Chuck Boothe DO, Chris Johnson DO, Helio Olson DO, Andrew Ann MD, Bernarda Brizuela MD, An Valdez MD, Annalisa Mojica MD,  Black Mendoza MD, Anurag Hook MD, Guanakito Griggs MD,  Osmany Bolivar DO, Kendra Murray MD, Pete Castillo MD, Shawn Lyles DO, Neli Looney MD,  Hong Cool DO, Alyse Owusu MD, Eloise Alejandro MD, Shannon Hein MD, Robert José MD,  Blair Rg MD, Pritesh Amaya MD, Melina Bacon MD, Maddie Cho MD, Curtis Crow MD, Steffen Hernandez MD, Benjamin David DO, Les Mcleod DO, Osvaldo Rogers MD,  Reuben Bray MD, Shirley Waterhouse, CNP,  Colleen Rodriguez, CNP, Deo Bo, CNP,  Giana Perry, DNP, Elizabeth Astudillo, CNP, Genna Barron, CNP, Khloe Marlow CNP, Sarah Fierro, CNP, Michelle Wheat, CNP, Rehana Degroot, PA-C, Angelia Baker, PA-C, Jalyn aVil, CNP, Nya Knight, CNP, Isela Keene, CNP, Lucy Quan, CNS, Renata Prado, CNP, Susan Lemons, CNP, Tracy Schwab, CNP         [facility]   IN-PATIENT SERVICE   [facility]    Progress Note    3/2/2024    2:54 PM    Name:   Jules Nichols  MRN:     [id number]     Acct:      [id number]   Room:   0417/0417-02  IP Day:  10  Admit Date:  2/21/2024 10:41 PM    PCP:   Elvin Kelley MD  Code Status:  Full Code    Subjective:     C/C:   Chief Complaint   Patient presents with    Wound Infection     R. axilla, chest, L axilla, L neck     Interval History Status: not changed.     Patient is having backache from lying in bed  No nausea  No sob, chest pain  No acute events overnight  IJ tunneled cath placed  Labs and vitals reviewed  Bp 140s now  Glucose 84  Hb 8.9    Brief History:     43-year-old male with past medical history of CKD, hypertension, diabetes mellitus, hyperlipidemia presents to the hospital with worsening skin wounds and shortness of breath.  Patient had history of hidradenitis and  loss, acute illness- received prbc on admission, hb now stable     Urinary incontinence-concern for overflow incontinence, continue Flomax, Ward catheter placed for bladder scan of 799 mL.  Urology consulted, outpatient urodynamic studies, call urology for void trial on discharge    Started discharge planning, now will need precert with insurance change    Kendra Murray MD  3/2/2024  2:54 PM

## 2024-03-02 NOTE — PROGRESS NOTES
Nephrology Progress Note      SUBJECTIVE    Hospitalized with worsening wounds in the axilla and chest wall area along with groin. Investigations demonstrated elevated creatinine prompting nephrology consultation. 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     Patient seen and examined.  Remained afebrile and hemodynamically stable  Pt has no new acute complaints  Saturating well on room air  No leg swelling.  Urine output-3.6 L  Had a right IJ tunnel catheter placement yesterday    No diuretics currently use.  He is on Flomax     Background history:  43-year-old gentleman with a history of diabetes mellitus, essential hypertension presented to the hospital because of necrotic lesions involving axillary, groin as well as chest wall wounds with shortness of breath.  He has a history of hidradenitis.  He underwent extensive debridement of the axillary area, pectoral area and also had Dago's gangrene.  He was in DKA with blood sugars that were in the high 500s, he had high ketones in the serum, his creatinine was up to 8 and he had severe metabolic acidosis.  He did receive 1 hemodialysis treatment.  Urine output seems to have picked up.  He does have history of stage IV CKD with a creatinine that is been running around 3.5 or thereabouts going back almost 10 months from [employer] database.     Did have some problems voiding since Ward came out and had to be reintroduced.  He had a postvoid residual of 650 mL plus.  Patient had 2.85 L of urine out yesterday and has already had 2 L out thus far this shift in 5 hours. He likely will need dialysis for a period of time, although with his improving urine output and relief of the obstruction, hopefully, renal function will improve soon.    OBJECTIVE      CURRENT TEMPERATURE:  Temp: 97.9 °F (36.6 °C)  MAXIMUM TEMPERATURE OVER 24HRS:  Temp (24hrs), Av.7 °F (36.5 °C), Min:97.5 °F (36.4 °C), Max:97.9 °F (36.6

## 2024-03-02 NOTE — PROGRESS NOTES
Physical Therapy  Facility/Department: [address] STEPDOWN  Physical Therapy Treatment Note    Name: Jules Nichols  : 1980  MRN: [id number]  Date of Service: 3/2/2024    Discharge Recommendations:  Patient would benefit from continued therapy after discharge   PT Equipment Recommendations  Equipment Needed: No      Patient Diagnosis(es): Diagnoses of Necrotizing fasciitis (HCC) and Necrotizing soft tissue infection were pertinent to this visit.  Past Medical History:  has a past medical history of Benign essential HTN, Chronic kidney disease, Diabetic keto-acidosis (HCC), Hyperlipemia, and Uncontrolled type 2 diabetes mellitus with nephropathy.  Past Surgical History:  has a past surgical history that includes other surgical history (Bilateral, 2024); Wound Exploration (Bilateral, 2024); Wound debridement; Abdomen surgery (N/A, 2024); and IR TUNNELED CVC PLACE WO SQ PORT/PUMP > 5 YEARS (3/1/2024).    Assessment   Body Structures, Functions, Activity Limitations Requiring Skilled Therapeutic Intervention: Decreased functional mobility ;Decreased ADL status;Decreased body mechanics;Decreased strength;Decreased balance;Decreased endurance;Decreased coordination;Decreased high-level IADLs  Assessment: Pt completed bed mobility and functional transfers with SBA, ambulated ~500ft with RW CGA. Pt most limited by generalized weakness which pt attributes to extended hospital stay. Pt should be safe to return to prior living situation with intermittent support as needed. Pt would benefit from continued therapy to promote endurance, balance, and strengthening.  Therapy Prognosis: Good  Barriers to Learning: none  Requires PT Follow-Up: Yes  Activity Tolerance  Activity Tolerance: Patient tolerated treatment well     Plan   Physical Therapy Plan  General Plan:  (3-4x/wk)  Current Treatment Recommendations: Strengthening, Balance training, Functional mobility training, Transfer training, ADL/Self-care

## 2024-03-02 NOTE — PLAN OF CARE
Problem: Discharge Planning  Goal: Discharge to home or other facility with appropriate resources  3/2/2024 0003 by Guille Castillo, RN  Outcome: Progressing     Problem: Pain  Goal: Verbalizes/displays adequate comfort level or baseline comfort level  3/2/2024 0003 by Guille Castillo, RN  Outcome: Progressing     Problem: Safety - Adult  Goal: Free from fall injury  3/2/2024 0003 by Guille Castillo, RN  Outcome: Progressing

## 2024-03-03 LAB
ANION GAP SERPL CALCULATED.3IONS-SCNC: 12 MMOL/L (ref 9–17)
BASOPHILS # BLD: 0.04 K/UL (ref 0–0.2)
BASOPHILS NFR BLD: 0 % (ref 0–2)
BUN SERPL-MCNC: 39 MG/DL (ref 6–20)
CALCIUM SERPL-MCNC: 8.3 MG/DL (ref 8.6–10.4)
CHLORIDE SERPL-SCNC: 99 MMOL/L (ref 98–107)
CO2 SERPL-SCNC: 22 MMOL/L (ref 20–31)
CREAT SERPL-MCNC: 3.5 MG/DL (ref 0.7–1.2)
CRP SERPL HS-MCNC: 124.2 MG/L (ref 0–5)
EOSINOPHIL # BLD: 0.12 K/UL (ref 0–0.44)
EOSINOPHILS RELATIVE PERCENT: 1 % (ref 1–4)
ERYTHROCYTE [DISTWIDTH] IN BLOOD BY AUTOMATED COUNT: 18.5 % (ref 11.8–14.4)
GFR SERPL CREATININE-BSD FRML MDRD: 21 ML/MIN/1.73M2
GLUCOSE BLD-MCNC: 145 MG/DL (ref 75–110)
GLUCOSE BLD-MCNC: 196 MG/DL (ref 75–110)
GLUCOSE BLD-MCNC: 203 MG/DL (ref 75–110)
GLUCOSE BLD-MCNC: 204 MG/DL (ref 75–110)
GLUCOSE SERPL-MCNC: 212 MG/DL (ref 70–99)
HCT VFR BLD AUTO: 32.7 % (ref 40.7–50.3)
HGB BLD-MCNC: 10 G/DL (ref 13–17)
IMM GRANULOCYTES # BLD AUTO: 0.23 K/UL (ref 0–0.3)
IMM GRANULOCYTES NFR BLD: 2 %
LYMPHOCYTES NFR BLD: 1.48 K/UL (ref 1.1–3.7)
LYMPHOCYTES RELATIVE PERCENT: 9 % (ref 24–43)
MCH RBC QN AUTO: 27.8 PG (ref 25.2–33.5)
MCHC RBC AUTO-ENTMCNC: 30.6 G/DL (ref 28.4–34.8)
MCV RBC AUTO: 90.8 FL (ref 82.6–102.9)
MONOCYTES NFR BLD: 1.06 K/UL (ref 0.1–1.2)
MONOCYTES NFR BLD: 7 % (ref 3–12)
NEUTROPHILS NFR BLD: 81 % (ref 36–65)
NEUTS SEG NFR BLD: 12.88 K/UL (ref 1.5–8.1)
NRBC BLD-RTO: 0 PER 100 WBC
PLATELET # BLD AUTO: 236 K/UL (ref 138–453)
PMV BLD AUTO: 9.5 FL (ref 8.1–13.5)
POTASSIUM SERPL-SCNC: 4.6 MMOL/L (ref 3.7–5.3)
RBC # BLD AUTO: 3.6 M/UL (ref 4.21–5.77)
RBC # BLD: ABNORMAL 10*6/UL
SODIUM SERPL-SCNC: 133 MMOL/L (ref 135–144)
WBC OTHER # BLD: 15.8 K/UL (ref 3.5–11.3)

## 2024-03-03 PROCEDURE — 99232 SBSQ HOSP IP/OBS MODERATE 35: CPT | Performed by: INTERNAL MEDICINE

## 2024-03-03 PROCEDURE — 6370000000 HC RX 637 (ALT 250 FOR IP): Performed by: STUDENT IN AN ORGANIZED HEALTH CARE EDUCATION/TRAINING PROGRAM

## 2024-03-03 PROCEDURE — 80048 BASIC METABOLIC PNL TOTAL CA: CPT

## 2024-03-03 PROCEDURE — 6370000000 HC RX 637 (ALT 250 FOR IP)

## 2024-03-03 PROCEDURE — 85025 COMPLETE CBC W/AUTO DIFF WBC: CPT

## 2024-03-03 PROCEDURE — 82947 ASSAY GLUCOSE BLOOD QUANT: CPT

## 2024-03-03 PROCEDURE — 86140 C-REACTIVE PROTEIN: CPT

## 2024-03-03 PROCEDURE — 36415 COLL VENOUS BLD VENIPUNCTURE: CPT

## 2024-03-03 PROCEDURE — 6370000000 HC RX 637 (ALT 250 FOR IP): Performed by: INTERNAL MEDICINE

## 2024-03-03 PROCEDURE — 6360000002 HC RX W HCPCS: Performed by: STUDENT IN AN ORGANIZED HEALTH CARE EDUCATION/TRAINING PROGRAM

## 2024-03-03 PROCEDURE — 2580000003 HC RX 258: Performed by: STUDENT IN AN ORGANIZED HEALTH CARE EDUCATION/TRAINING PROGRAM

## 2024-03-03 PROCEDURE — 2060000000 HC ICU INTERMEDIATE R&B

## 2024-03-03 PROCEDURE — 99232 SBSQ HOSP IP/OBS MODERATE 35: CPT | Performed by: STUDENT IN AN ORGANIZED HEALTH CARE EDUCATION/TRAINING PROGRAM

## 2024-03-03 RX ORDER — SODIUM CHLORIDE 9 MG/ML
INJECTION, SOLUTION INTRAVENOUS CONTINUOUS
Status: DISCONTINUED | OUTPATIENT
Start: 2024-03-03 | End: 2024-03-05

## 2024-03-03 RX ORDER — AMOXICILLIN AND CLAVULANATE POTASSIUM 500; 125 MG/1; MG/1
1 TABLET, FILM COATED ORAL EVERY 12 HOURS SCHEDULED
Status: DISCONTINUED | OUTPATIENT
Start: 2024-03-03 | End: 2024-03-06 | Stop reason: HOSPADM

## 2024-03-03 RX ADMIN — OXYCODONE 5 MG: 5 TABLET ORAL at 22:00

## 2024-03-03 RX ADMIN — SODIUM CHLORIDE, PRESERVATIVE FREE 10 ML: 5 INJECTION INTRAVENOUS at 20:57

## 2024-03-03 RX ADMIN — Medication 1 CAPSULE: at 08:14

## 2024-03-03 RX ADMIN — HEPARIN SODIUM 5000 UNITS: 5000 INJECTION INTRAVENOUS; SUBCUTANEOUS at 05:52

## 2024-03-03 RX ADMIN — HEPARIN SODIUM 5000 UNITS: 5000 INJECTION INTRAVENOUS; SUBCUTANEOUS at 20:57

## 2024-03-03 RX ADMIN — TAMSULOSIN HYDROCHLORIDE 0.4 MG: 0.4 CAPSULE ORAL at 08:14

## 2024-03-03 RX ADMIN — OXYCODONE 5 MG: 5 TABLET ORAL at 17:37

## 2024-03-03 RX ADMIN — ACETAMINOPHEN 650 MG: 325 TABLET ORAL at 02:53

## 2024-03-03 RX ADMIN — INSULIN GLARGINE 20 UNITS: 100 INJECTION, SOLUTION SUBCUTANEOUS at 08:14

## 2024-03-03 RX ADMIN — Medication: at 08:16

## 2024-03-03 RX ADMIN — AMOXICILLIN AND CLAVULANATE POTASSIUM 1 TABLET: 500; 125 TABLET, FILM COATED ORAL at 21:58

## 2024-03-03 RX ADMIN — SODIUM CHLORIDE: 9 INJECTION, SOLUTION INTRAVENOUS at 08:22

## 2024-03-03 RX ADMIN — SODIUM CHLORIDE, PRESERVATIVE FREE 10 ML: 5 INJECTION INTRAVENOUS at 08:15

## 2024-03-03 RX ADMIN — AMOXICILLIN AND CLAVULANATE POTASSIUM 1 TABLET: 500; 125 TABLET, FILM COATED ORAL at 08:14

## 2024-03-03 RX ADMIN — SODIUM CHLORIDE: 9 INJECTION, SOLUTION INTRAVENOUS at 21:04

## 2024-03-03 ASSESSMENT — PAIN DESCRIPTION - ORIENTATION: ORIENTATION: MID

## 2024-03-03 ASSESSMENT — PAIN SCALES - GENERAL
PAINLEVEL_OUTOF10: 7
PAINLEVEL_OUTOF10: 3
PAINLEVEL_OUTOF10: 3
PAINLEVEL_OUTOF10: 0
PAINLEVEL_OUTOF10: 6

## 2024-03-03 ASSESSMENT — PAIN DESCRIPTION - DESCRIPTORS: DESCRIPTORS: ACHING;DISCOMFORT

## 2024-03-03 ASSESSMENT — PAIN - FUNCTIONAL ASSESSMENT
PAIN_FUNCTIONAL_ASSESSMENT: ACTIVITIES ARE NOT PREVENTED
PAIN_FUNCTIONAL_ASSESSMENT: ACTIVITIES ARE NOT PREVENTED

## 2024-03-03 ASSESSMENT — PAIN DESCRIPTION - LOCATION
LOCATION: BACK
LOCATION: BACK

## 2024-03-03 NOTE — PLAN OF CARE
Problem: Discharge Planning  Goal: Discharge to home or other facility with appropriate resources  Outcome: Progressing  Flowsheets (Taken 3/2/2024 2000)  Discharge to home or other facility with appropriate resources: Identify barriers to discharge with patient and caregiver     Problem: Pain  Goal: Verbalizes/displays adequate comfort level or baseline comfort level  Outcome: Progressing     Problem: Safety - Adult  Goal: Free from fall injury  Outcome: Progressing  Flowsheets (Taken 3/2/2024 2000)  Free From Fall Injury: Instruct family/caregiver on patient safety     Problem: Skin/Tissue Integrity  Goal: Absence of new skin breakdown  Description: 1.  Monitor for areas of redness and/or skin breakdown  2.  Assess vascular access sites hourly  3.  Every 4-6 hours minimum:  Change oxygen saturation probe site  4.  Every 4-6 hours:  If on nasal continuous positive airway pressure, respiratory therapy assess nares and determine need for appliance change or resting period.  Outcome: Progressing     Problem: ABCDS Injury Assessment  Goal: Absence of physical injury  Outcome: Progressing  Flowsheets (Taken 3/2/2024 2000)  Absence of Physical Injury: Implement safety measures based on patient assessment     Problem: Chronic Conditions and Co-morbidities  Goal: Patient's chronic conditions and co-morbidity symptoms are monitored and maintained or improved  Outcome: Progressing  Flowsheets (Taken 3/2/2024 2000)  Care Plan - Patient's Chronic Conditions and Co-Morbidity Symptoms are Monitored and Maintained or Improved: Monitor and assess patient's chronic conditions and comorbid symptoms for stability, deterioration, or improvement     Problem: Nutrition Deficit:  Goal: Optimize nutritional status  Outcome: Progressing     Problem: Neurosensory - Adult  Goal: Achieves maximal functionality and self care  Outcome: Progressing  Flowsheets (Taken 3/2/2024 2000)  Achieves maximal functionality and self care: Monitor

## 2024-03-03 NOTE — PROGRESS NOTES
Nephrology Progress Note      SUBJECTIVE      Patient seen and examined.  Stable vitals, no fevers.   No acute overnight issues.   U/o 4150 last 24 hours, via Ward    Na 133 K 4.6 chloride 99 CO2 22 BUN 39 Cr 3.5    No diuretics currently use.  He is on Flomax     Background history:  43-year-old gentleman with a history of diabetes mellitus, essential hypertension presented to the hospital because of necrotic lesions involving axillary, groin as well as chest wall wounds with shortness of breath.  He has a history of hidradenitis.  He underwent extensive debridement of the axillary area, pectoral area and also had Dgao's gangrene.  He was in DKA with blood sugars that were in the high 500s, he had high ketones in the serum, his creatinine was up to 8 and he had severe metabolic acidosis.  He did receive 1 hemodialysis treatment.  Urine output seems to have picked up.  He does have history of stage IV CKD with a creatinine that is been running around 3.5 or thereabouts going back almost 10 months from [employer] database.     Did have some problems voiding since Ward came out and had to be reintroduced.  He had a postvoid residual of 650 mL plus.  Patient had 2.85 L of urine out yesterday and has already had 2 L out thus far this shift in 5 hours. He likely will need dialysis for a period of time, although with his improving urine output and relief of the obstruction, hopefully, renal function will improve soon.    OBJECTIVE      CURRENT TEMPERATURE:  Temp: 98.5 °F (36.9 °C)  MAXIMUM TEMPERATURE OVER 24HRS:  Temp (24hrs), Av.1 °F (36.7 °C), Min:97.8 °F (36.6 °C), Max:98.5 °F (36.9 °C)    CURRENT RESPIRATORY RATE:  Respirations: 15  CURRENT PULSE:  Pulse: 96  CURRENT BLOOD PRESSURE:  BP: (!) 143/76  24HR BLOOD PRESSURE RANGE:  Systolic (24hrs), Av , Min:128 , Max:156   ; Diastolic (24hrs), Av, Min:70, Max:89    24HR INTAKE/OUTPUT:    Intake/Output Summary (Last 24 hours) at 3/3/2024 1150  Last data  EOSINOPHILS:   Lab Results   Component Value Date/Time    UREO NONE SEEN 02/22/2024 03:14 PM     URINALYSIS:  U/A:   Lab Results   Component Value Date/Time    NITRU NEGATIVE 02/29/2024 08:10 AM    COLORU Yellow 02/29/2024 08:10 AM    PHUR 7.0 02/29/2024 08:10 AM    WBCUA None 02/29/2024 08:10 AM    RBCUA None 02/29/2024 08:10 AM    BACTERIA None 02/29/2024 08:10 AM    SPECGRAV 1.009 02/29/2024 08:10 AM    LEUKOCYTESUR NEGATIVE 02/29/2024 08:10 AM    UROBILINOGEN Normal 02/29/2024 08:10 AM    BILIRUBINUR NEGATIVE 02/29/2024 08:10 AM    GLUCOSEU 2+ 02/29/2024 08:10 AM    KETUA NEGATIVE 02/29/2024 08:10 AM       ASSESSMENT      Acute kidney injury nonoliguric secondary to ischemic ATN from septic shock/DKA-presented with a creatinine of 8.2.  Tunneled catheter in place  Chronic kidney disease stage IV secondary likely to diabetic nephropathy with baseline creatinine 3.3-3.5 till March 2023.  Failed to follow-up with nephrology consultant on a regular basis since then  Right axillary fasciitis with air and soft tissues/underlying hidradenitis suppurativa and foreigners gangrene  Type 2 diabetes  Essential hypertension    PLAN      1. No need for dialysis today.   2. Continue Flomax   3. Follow up labs ordered.   4.  Keep Ward catheter  Will discuss with Dr Trujillo  Please do not hesitate to call with questions.    Electronically signed by STACEY WHARTON on 3/3/2024 at 11:50 AM  [facility]

## 2024-03-03 NOTE — CONSULTS
FLUOROSCOPY DOSE AND TYPE: Radiation Exposure Index: Fluoro time 0.5 minutes .59 uGy m 2 Views: 3 TECHNIQUE: This procedure was performed by Ricco Marques PA-C under indirect supervision of Dr. Bello.  Informed consent was obtained after a detailed explanation of the procedure including risks, benefits, and alternatives. Universal protocol was observed. All elements of maximal sterile barrier technique, including cap, mask, sterile gown, sterile gloves, a large sterile sheet, hand hygiene and 2% chlorhexidine for cutaneous antisepsis were followed. The right neck and chest were prepped and draped in standard sterile fashion using maximum sterile barrier technique. A 0.035 guidewire was inserted through the right internal jugular vein temporary dialysis catheter and under fluoroscopic guidance was removed over the wire.  The tract was dilated to allow the peel away vascular sheath. After localizing the right upper chest with 1% lidocaine a 2nd incision was made.  A 14.5 Portuguese by 19 cm tip to cuff dual-lumen tunneled hemodialysis catheter was inserted through the tract and out of the venotomy site of the previous temporary dialysis catheter. The dialysis catheter was inserted through the sheath with tip terminating in the right atrium.  Both ports flushed and aspirated appropriately and were filled with heparinized saline.  The catheter was sutured to the skin using 2-0 Ethilon suture. The venotomy site was closed using 4-0 Vicryl sutured and tissue adhesive. The catheter was covered with a sterile dressing. Estimated blood loss was 5 mL. The patient tolerated the procedure well and left the department stable condition. FINDINGS: Fluoroscopic image demonstrates the tip of the catheter in the right atrium.     Successful fluoroscopy guided tunneled catheter placement .  Okay to use.       Medical Decision Gopeoc-Tllecpxb-Sdnqw:     Results       Procedure Component Value Units Date/Time    Culture, Tissue  02/26/24 2243     Specimen Description .BLOOD     Special Requests LT WRIST     Culture NO GROWTH 5 DAYS    Culture, Blood 2 [[id number]] Collected: 02/21/24 1700    Order Status: Canceled Specimen: Blood               Medical Decision Making-Other:     Note:    Thank you for allowing us to participate in the care of this patient. Please call with questions.    Mando Kraft MD  Office: [phone]

## 2024-03-03 NOTE — PROGRESS NOTES
Secondary to GIB  Hemoglobin baseline 8-10  Receive 3 units PRBC this admission, last 1/25   Hgb Q12H  History of iron deficiency   Iron panel reviewed.  On Venofer  On iron supplementation   [facility]  Office: [phone]  Deyvi Lyles DO, Chuck Boothe DO, Chris Johnson DO, Helio Olson DO, Andrew Ann MD, Bernarda Brizuela MD, An Valdez MD, Annalisa Mojica MD,  Black Mendoza MD, Anurag Hook MD, Guanakito Griggs MD,  Osmany Bolivar DO, Kendra Murray MD, Pete Castillo MD, Shawn Lyles DO, Neli Looney MD,  Hong Cool DO, Alyse Owusu MD, Eloise Alejandro MD, Shannon Hein MD, Robert José MD,  Blair Rg MD, Pritesh Amaya MD, Melina Bacon MD, Maddie Cho MD, Curtis Crow MD, Steffen Hernandez MD, Benjamin David DO, Les Mcleod DO, Osvaldo Rogers MD,  Reuben Bray MD, Shirley Waterhouse, CNP,  Colleen Rodriguez, CNP, Deo Bo, CNP,  Giana Perry, DNP, Elizabeth Astudillo, CNP, Genna Barron, CNP, Khloe Marlow CNP, Sarah Fierro, CNP, Michelle Wheat, CNP, Rehana Degroot, PA-C, Angelia Baker, PA-C, Jalyn Vail, CNP, Nya Knight, CNP, Isela Keene, CNP, Lucy Quan, CNS, Renata Prado, CNP, Susan Lemons, CNP, Tracy Schwab, CNP         [facility]   IN-PATIENT SERVICE   [facility]    Progress Note    3/3/2024    12:12 PM    Name:   Jules Nichols  MRN:     [id number]     Acct:      [id number]   Room:   0417/0417-02   Day:  11  Admit Date:  2/21/2024 10:41 PM    PCP:   Elvin Kelley MD  Code Status:  Full Code    Subjective:     C/C:   Chief Complaint   Patient presents with    Wound Infection     R. axilla, chest, L axilla, L neck     Interval History Status: not changed.     Patient does not report any acute complaints.  No chest pain or shortness of breath.  Patient became tachycardic while working with therapy yesterday and heart rate was going up to 1 20-1 30.  Patient is having significant amount of urine output, close to 3 to 4 L over the last 2 days.  Concern for reticulocyte phase of ATN.  Discussed with nephrology, will start hydration.  Labs and vitals reviewed  Sodium is 133,  following, continue augmentin, cultures grew mixed bacterial growth, plastic surgery recommends wound VAC on wound on torso by wound care.  Follow-up outpatient in 2 weeks.  Will discuss with infectious disease as white count trending up, can be secondary to dehydration but CRP elevated to 124     JAY on CKD stage IV-dialysis was done 2/22.  Patient with significant urine output, start hydration to compensate for urine output, nephrology following, creatinine remains 2.5, patient has tunneled catheter in place     Type 2 diabetes mellitus-initial DKA has resolved, continue Lantus, change to 20 units, continue sliding scale, continue to monitor blood sugars with meals and at bedtime.    Tachycardia-likely with dehydration, add gentle hydration as discussed with nephrology     Anemia due to blood loss, acute illness- received prbc on admission, hb now stable     Urinary incontinence-concern for overflow incontinence, continue Flomax, Ward catheter placed for bladder scan of 799 mL.  Urology consulted, outpatient urodynamic studies, call urology for void trial on discharge    Started discharge planning, now will need precert with insurance change    Kendra Murray MD  3/3/2024  12:12 PM

## 2024-03-04 LAB
ANION GAP SERPL CALCULATED.3IONS-SCNC: 13 MMOL/L (ref 9–17)
BASOPHILS # BLD: 0.03 K/UL (ref 0–0.2)
BASOPHILS NFR BLD: 0 % (ref 0–2)
BUN SERPL-MCNC: 53 MG/DL (ref 6–20)
CALCIUM SERPL-MCNC: 8.6 MG/DL (ref 8.6–10.4)
CHLORIDE SERPL-SCNC: 102 MMOL/L (ref 98–107)
CO2 SERPL-SCNC: 21 MMOL/L (ref 20–31)
CREAT SERPL-MCNC: 3.8 MG/DL (ref 0.7–1.2)
EKG ATRIAL RATE: 115 BPM
EKG ATRIAL RATE: 120 BPM
EKG P AXIS: 50 DEGREES
EKG P AXIS: 54 DEGREES
EKG P-R INTERVAL: 118 MS
EKG P-R INTERVAL: 120 MS
EKG Q-T INTERVAL: 296 MS
EKG Q-T INTERVAL: 300 MS
EKG QRS DURATION: 56 MS
EKG QRS DURATION: 78 MS
EKG QTC CALCULATION (BAZETT): 415 MS
EKG QTC CALCULATION (BAZETT): 418 MS
EKG R AXIS: 1 DEGREES
EKG R AXIS: 35 DEGREES
EKG T AXIS: 14 DEGREES
EKG T AXIS: 53 DEGREES
EKG VENTRICULAR RATE: 115 BPM
EKG VENTRICULAR RATE: 120 BPM
EOSINOPHIL # BLD: 0.08 K/UL (ref 0–0.44)
EOSINOPHILS RELATIVE PERCENT: 1 % (ref 1–4)
ERYTHROCYTE [DISTWIDTH] IN BLOOD BY AUTOMATED COUNT: 18.6 % (ref 11.8–14.4)
GFR SERPL CREATININE-BSD FRML MDRD: 19 ML/MIN/1.73M2
GLUCOSE BLD-MCNC: 191 MG/DL (ref 75–110)
GLUCOSE BLD-MCNC: 193 MG/DL (ref 75–110)
GLUCOSE BLD-MCNC: 276 MG/DL (ref 75–110)
GLUCOSE BLD-MCNC: 330 MG/DL (ref 75–110)
GLUCOSE SERPL-MCNC: 108 MG/DL (ref 70–99)
HCT VFR BLD AUTO: 29.9 % (ref 40.7–50.3)
HGB BLD-MCNC: 8.8 G/DL (ref 13–17)
IMM GRANULOCYTES # BLD AUTO: 0.2 K/UL (ref 0–0.3)
IMM GRANULOCYTES NFR BLD: 2 %
LYMPHOCYTES NFR BLD: 1.85 K/UL (ref 1.1–3.7)
LYMPHOCYTES RELATIVE PERCENT: 18 % (ref 24–43)
MCH RBC QN AUTO: 27.2 PG (ref 25.2–33.5)
MCHC RBC AUTO-ENTMCNC: 29.4 G/DL (ref 28.4–34.8)
MCV RBC AUTO: 92.6 FL (ref 82.6–102.9)
MONOCYTES NFR BLD: 0.64 K/UL (ref 0.1–1.2)
MONOCYTES NFR BLD: 6 % (ref 3–12)
NEUTROPHILS NFR BLD: 72 % (ref 36–65)
NEUTS SEG NFR BLD: 7.39 K/UL (ref 1.5–8.1)
NRBC BLD-RTO: 0 PER 100 WBC
PLATELET # BLD AUTO: 240 K/UL (ref 138–453)
PMV BLD AUTO: 9.5 FL (ref 8.1–13.5)
POTASSIUM SERPL-SCNC: 4.7 MMOL/L (ref 3.7–5.3)
RBC # BLD AUTO: 3.23 M/UL (ref 4.21–5.77)
RBC # BLD: ABNORMAL 10*6/UL
SODIUM SERPL-SCNC: 136 MMOL/L (ref 135–144)
TROPONIN I SERPL HS-MCNC: 89 NG/L (ref 0–22)
WBC OTHER # BLD: 10.2 K/UL (ref 3.5–11.3)

## 2024-03-04 PROCEDURE — 2580000003 HC RX 258: Performed by: STUDENT IN AN ORGANIZED HEALTH CARE EDUCATION/TRAINING PROGRAM

## 2024-03-04 PROCEDURE — 6360000002 HC RX W HCPCS: Performed by: STUDENT IN AN ORGANIZED HEALTH CARE EDUCATION/TRAINING PROGRAM

## 2024-03-04 PROCEDURE — 6370000000 HC RX 637 (ALT 250 FOR IP)

## 2024-03-04 PROCEDURE — 93010 ELECTROCARDIOGRAM REPORT: CPT | Performed by: INTERNAL MEDICINE

## 2024-03-04 PROCEDURE — 97530 THERAPEUTIC ACTIVITIES: CPT

## 2024-03-04 PROCEDURE — 80048 BASIC METABOLIC PNL TOTAL CA: CPT

## 2024-03-04 PROCEDURE — 84484 ASSAY OF TROPONIN QUANT: CPT

## 2024-03-04 PROCEDURE — 6370000000 HC RX 637 (ALT 250 FOR IP): Performed by: STUDENT IN AN ORGANIZED HEALTH CARE EDUCATION/TRAINING PROGRAM

## 2024-03-04 PROCEDURE — 93005 ELECTROCARDIOGRAM TRACING: CPT | Performed by: NURSE PRACTITIONER

## 2024-03-04 PROCEDURE — 82947 ASSAY GLUCOSE BLOOD QUANT: CPT

## 2024-03-04 PROCEDURE — 2060000000 HC ICU INTERMEDIATE R&B

## 2024-03-04 PROCEDURE — 97535 SELF CARE MNGMENT TRAINING: CPT

## 2024-03-04 PROCEDURE — 97606 NEG PRS WND THER DME>50 SQCM: CPT

## 2024-03-04 PROCEDURE — 36415 COLL VENOUS BLD VENIPUNCTURE: CPT

## 2024-03-04 PROCEDURE — 93005 ELECTROCARDIOGRAM TRACING: CPT | Performed by: STUDENT IN AN ORGANIZED HEALTH CARE EDUCATION/TRAINING PROGRAM

## 2024-03-04 PROCEDURE — 85025 COMPLETE CBC W/AUTO DIFF WBC: CPT

## 2024-03-04 PROCEDURE — 99232 SBSQ HOSP IP/OBS MODERATE 35: CPT | Performed by: STUDENT IN AN ORGANIZED HEALTH CARE EDUCATION/TRAINING PROGRAM

## 2024-03-04 PROCEDURE — 99232 SBSQ HOSP IP/OBS MODERATE 35: CPT | Performed by: INTERNAL MEDICINE

## 2024-03-04 RX ORDER — 0.9 % SODIUM CHLORIDE 0.9 %
1000 INTRAVENOUS SOLUTION INTRAVENOUS ONCE
Status: COMPLETED | OUTPATIENT
Start: 2024-03-04 | End: 2024-03-04

## 2024-03-04 RX ORDER — 0.9 % SODIUM CHLORIDE 0.9 %
500 INTRAVENOUS SOLUTION INTRAVENOUS ONCE
Status: DISCONTINUED | OUTPATIENT
Start: 2024-03-04 | End: 2024-03-04

## 2024-03-04 RX ADMIN — AMOXICILLIN AND CLAVULANATE POTASSIUM 1 TABLET: 500; 125 TABLET, FILM COATED ORAL at 20:20

## 2024-03-04 RX ADMIN — Medication: at 16:51

## 2024-03-04 RX ADMIN — AMOXICILLIN AND CLAVULANATE POTASSIUM 1 TABLET: 500; 125 TABLET, FILM COATED ORAL at 08:16

## 2024-03-04 RX ADMIN — INSULIN LISPRO 6 UNITS: 100 INJECTION, SOLUTION INTRAVENOUS; SUBCUTANEOUS at 12:59

## 2024-03-04 RX ADMIN — HEPARIN SODIUM 5000 UNITS: 5000 INJECTION INTRAVENOUS; SUBCUTANEOUS at 06:25

## 2024-03-04 RX ADMIN — SODIUM CHLORIDE 1000 ML: 9 INJECTION, SOLUTION INTRAVENOUS at 11:04

## 2024-03-04 RX ADMIN — SODIUM CHLORIDE, PRESERVATIVE FREE 10 ML: 5 INJECTION INTRAVENOUS at 08:16

## 2024-03-04 RX ADMIN — OXYCODONE 5 MG: 5 TABLET ORAL at 03:43

## 2024-03-04 RX ADMIN — OXYCODONE 5 MG: 5 TABLET ORAL at 15:21

## 2024-03-04 RX ADMIN — INSULIN GLARGINE 20 UNITS: 100 INJECTION, SOLUTION SUBCUTANEOUS at 08:16

## 2024-03-04 RX ADMIN — TAMSULOSIN HYDROCHLORIDE 0.4 MG: 0.4 CAPSULE ORAL at 08:16

## 2024-03-04 RX ADMIN — HEPARIN SODIUM 5000 UNITS: 5000 INJECTION INTRAVENOUS; SUBCUTANEOUS at 15:23

## 2024-03-04 RX ADMIN — Medication 1 CAPSULE: at 08:17

## 2024-03-04 RX ADMIN — Medication: at 08:17

## 2024-03-04 RX ADMIN — HEPARIN SODIUM 5000 UNITS: 5000 INJECTION INTRAVENOUS; SUBCUTANEOUS at 20:20

## 2024-03-04 ASSESSMENT — PAIN SCALES - GENERAL
PAINLEVEL_OUTOF10: 1
PAINLEVEL_OUTOF10: 7

## 2024-03-04 ASSESSMENT — PAIN - FUNCTIONAL ASSESSMENT: PAIN_FUNCTIONAL_ASSESSMENT: ACTIVITIES ARE NOT PREVENTED

## 2024-03-04 ASSESSMENT — PAIN DESCRIPTION - LOCATION: LOCATION: BACK;GROIN

## 2024-03-04 ASSESSMENT — PAIN DESCRIPTION - DESCRIPTORS: DESCRIPTORS: DISCOMFORT

## 2024-03-04 NOTE — PLAN OF CARE
Problem: Discharge Planning  Goal: Discharge to home or other facility with appropriate resources  Outcome: Progressing     Problem: Pain  Goal: Verbalizes/displays adequate comfort level or baseline comfort level  Outcome: Progressing     Problem: Safety - Adult  Goal: Free from fall injury  Outcome: Progressing     Problem: Skin/Tissue Integrity  Goal: Absence of new skin breakdown  Description: 1.  Monitor for areas of redness and/or skin breakdown  2.  Assess vascular access sites hourly  3.  Every 4-6 hours minimum:  Change oxygen saturation probe site  4.  Every 4-6 hours:  If on nasal continuous positive airway pressure, respiratory therapy assess nares and determine need for appliance change or resting period.  Outcome: Progressing     Problem: ABCDS Injury Assessment  Goal: Absence of physical injury  Outcome: Progressing     Problem: Chronic Conditions and Co-morbidities  Goal: Patient's chronic conditions and co-morbidity symptoms are monitored and maintained or improved  Outcome: Progressing     Problem: Nutrition Deficit:  Goal: Optimize nutritional status  Outcome: Progressing     Problem: Neurosensory - Adult  Goal: Achieves maximal functionality and self care  Outcome: Progressing     Problem: Respiratory - Adult  Goal: Achieves optimal ventilation and oxygenation  Outcome: Progressing     Problem: Cardiovascular - Adult  Goal: Maintains optimal cardiac output and hemodynamic stability  Outcome: Progressing  Flowsheets (Taken 3/3/2024 2000)  Maintains optimal cardiac output and hemodynamic stability: Monitor blood pressure and heart rate  Goal: Absence of cardiac dysrhythmias or at baseline  Outcome: Progressing     Problem: Skin/Tissue Integrity - Adult  Goal: Skin integrity remains intact  Outcome: Progressing  Goal: Incisions, wounds, or drain sites healing without S/S of infection  Outcome: Progressing  Goal: Oral mucous membranes remain intact  Outcome: Progressing     Problem:

## 2024-03-04 NOTE — PROGRESS NOTES
Occupational Therapy  Facility/Department: [address] STEPDOWN  Occupational Daily Treatment Note    Name: Jules Nichols  : 1980  MRN: [id number]  Date of Service: 3/4/2024    Discharge Recommendations:  Patient would benefit from continued therapy after discharge    Patient Diagnosis(es): Diagnoses of Necrotizing fasciitis (HCC) and Necrotizing soft tissue infection were pertinent to this visit.  Past Medical History:  has a past medical history of Benign essential HTN, Chronic kidney disease, Diabetic keto-acidosis (HCC), Hyperlipemia, and Uncontrolled type 2 diabetes mellitus with nephropathy.  Past Surgical History:  has a past surgical history that includes other surgical history (Bilateral, 2024); Wound Exploration (Bilateral, 2024); Wound debridement; Abdomen surgery (N/A, 2024); and IR TUNNELED CVC PLACE WO SQ PORT/PUMP > 5 YEARS (3/1/2024).  Assessment   Performance deficits / Impairments: Decreased functional mobility ;Decreased ADL status;Decreased safe awareness;Decreased endurance;Decreased ROM;Decreased strength;Decreased high-level IADLs  Prognosis: Good  Activity Tolerance  Activity Tolerance: Patient Tolerated treatment well        Plan   Occupational Therapy Plan  Times Per Week: 3x/wk  Current Treatment Recommendations: Strengthening, ROM, Balance training, Functional mobility training, Endurance training, Pain management, Safety education & training, Patient/Caregiver education & training, Equipment evaluation, education, & procurement, Self-Care / ADL, Home management training     Restrictions  Restrictions/Precautions  Restrictions/Precautions: Fall Risk  Required Braces or Orthoses?: No  Position Activity Restriction  Other position/activity restrictions: INCISION AND DRAINAGE OF BILATERAL AXILLA, CHEST WALL AND BILATERAL GROIN . Ward catheter. Wound vac.  Pain assessment: Pt denies pain this day  Subjective   General  Patient assessed for rehabilitation services?:  commands without difficulty  Attention Span: Appears intact  Memory: Appears intact  Safety Judgement: Decreased awareness of need for safety  Insights: Decreased awareness of deficits  Initiation: Requires cues for some  Sequencing: Does not require cues  Orientation  Overall Orientation Status: Within Functional Limits  Education Given To: Patient  Education Provided Comments: OT POC, transfer safety, importance of participation in therapy, EC/WS, non-pharm pain mgnt with good return.  AM-PAC - ADL  AM-PAC Daily Activity - Inpatient   How much help is needed for putting on and taking off regular lower body clothing?: A Little  How much help is needed for bathing (which includes washing, rinsing, drying)?: A Little  How much help is needed for toileting (which includes using toilet, bedpan, or urinal)?: A Little  How much help is needed for putting on and taking off regular upper body clothing?: A Little  How much help is needed for taking care of personal grooming?: None  How much help for eating meals?: None  AM-[facility] Inpatient Daily Activity Raw Score: 20  AM-PAC Inpatient ADL T-Scale Score : 42.03  ADL Inpatient CMS 0-100% Score: 38.32  ADL Inpatient CMS G-Code Modifier : CJ    Goals  Short Term Goals  Time Frame for Short Term Goals: By discharge, pt will:  Short Term Goal 1: demo UB ADLs with SUP, adaptive techniques PRN.  Short Term Goal 2: demo LB ADLs with SBA, adaptive techniques PRN.  Short Term Goal 3: demo functional transfers/mobility with SUP, LRD PRN for engagement in ADLs.  Short Term Goal 4: demo dynamic standing during functional activities for 12+ mins with SUP, LRD PRN.  Short Term Goal 5: demo use of 2 non-pharm pain relieving techniques throughout session independently with no vc's.       Therapy Time   Individual Concurrent Group Co-treatment   Time In 0859         Time Out 0949         Minutes 50         Timed Code Treatment Minutes: 40 Minutes    Pt supine in bed upon therapist arrival.

## 2024-03-04 NOTE — PROGRESS NOTES
[facility]ound Ostomy Continence Nurse  Follow Up    NAME:  Jules Nichols  MEDICAL RECORD NUMBER:  [id number]  AGE: 43 y.o.   GENDER: male  : 1980  TODAY'S DATE:  3/4/2024    Subjective:   Reason for WOCN Evaluation and Assessment: \"extreme HS in all folds\"      Jules Nichols is a 43 y.o. male referred by:   [x] Physician  [] Nursing  [] Other:      Wound Identification:  Wound Type:  hidradenitis Suppurativa, surgical.   Contributing Factors: diabetes, poor glucose control, malnutrition, and non-adherence    L axilla, L groin - Sharp excisional debridement down through and including the removal of epidermis, dermis, and subcutaneous tissue total of  66 sq cm.  R axilla, R groin, chest wall -  Sharp excisional debridement down through and including the removal of epidermis, dermis, subcutaneous tissue, and muscle/fascia tissue total of 605 sq cm  I&D abdominal wall abscess  With Dr Carmichael 2024.     Back I&D of multiple lesions 2023 with Dr Keene. Dakin's solution moistened gauze dressings continued.                                           2024: Plastic surgeon evaluated. Advised NPWT and follow up in two weeks to determine next steps. Orders received.      3/1/2024: small leak in dressing developed. Due for dressing change today.     3/4/2024: dressings intact. Due for dressing change today.             Objective:      /68   Pulse (!) 116   Temp 98.5 °F (36.9 °C) (Oral)   Resp 15   Ht 1.651 m (5' 5\")   Wt 61.1 kg (134 lb 11.2 oz)   SpO2 99%   BMI 22.42 kg/m²   Eduardo Risk Score: Eduardo Scale Score: 19    LABS    CBC:   Lab Results   Component Value Date/Time    WBC 10.2 2024 02:59 AM    RBC 3.23 2024 02:59 AM    HGB 8.8 2024 02:59 AM    HCT 29.9 2024 02:59 AM     CMP:  Albumin:    Lab Results   Component Value Date/Time    LABALBU 1.9 2024 06:49 AM     PT/INR:    Lab Results   Component Value Date/Time    PROTIME 16.4 2024 10:49 PM    INR 1.4

## 2024-03-04 NOTE — PLAN OF CARE
Problem: Discharge Planning  Goal: Discharge to home or other facility with appropriate resources  Outcome: Progressing     Problem: Pain  Goal: Verbalizes/displays adequate comfort level or baseline comfort level  Outcome: Progressing     Problem: Safety - Adult  Goal: Free from fall injury  Outcome: Progressing     Problem: Skin/Tissue Integrity  Goal: Absence of new skin breakdown  Description: 1.  Monitor for areas of redness and/or skin breakdown  2.  Assess vascular access sites hourly  3.  Every 4-6 hours minimum:  Change oxygen saturation probe site  4.  Every 4-6 hours:  If on nasal continuous positive airway pressure, respiratory therapy assess nares and determine need for appliance change or resting period.  Outcome: Progressing     Problem: ABCDS Injury Assessment  Goal: Absence of physical injury  Outcome: Progressing     Problem: Chronic Conditions and Co-morbidities  Goal: Patient's chronic conditions and co-morbidity symptoms are monitored and maintained or improved  Outcome: Progressing     Problem: Nutrition Deficit:  Goal: Optimize nutritional status  Outcome: Progressing     Problem: Neurosensory - Adult  Goal: Achieves maximal functionality and self care  Outcome: Progressing     Problem: Respiratory - Adult  Goal: Achieves optimal ventilation and oxygenation  Outcome: Progressing     Problem: Cardiovascular - Adult  Goal: Maintains optimal cardiac output and hemodynamic stability  Outcome: Progressing  Goal: Absence of cardiac dysrhythmias or at baseline  Outcome: Progressing     Problem: Skin/Tissue Integrity - Adult  Goal: Skin integrity remains intact  Outcome: Progressing  Flowsheets (Taken 3/4/2024 0951)  Skin Integrity Remains Intact:   Monitor for areas of redness and/or skin breakdown   Assess vascular access sites hourly  Goal: Incisions, wounds, or drain sites healing without S/S of infection  Outcome: Progressing  Flowsheets (Taken 3/4/2024 0951)  Incisions, Wounds, or Drain Sites  Healing Without Sign and Symptoms of Infection:   ADMISSION and DAILY: Assess and document risk factors for pressure ulcer development   TWICE DAILY: Assess and document skin integrity   Implement wound care per orders   TWICE DAILY: Assess and document dressing/incision, wound bed, drain sites and surrounding tissue  Goal: Oral mucous membranes remain intact  Outcome: Progressing     Problem: Gastrointestinal - Adult  Goal: Maintains adequate nutritional intake  Outcome: Progressing     Problem: Genitourinary - Adult  Goal: Absence of urinary retention  Outcome: Progressing     Problem: Infection - Adult  Goal: Absence of infection at discharge  Outcome: Progressing  Goal: Absence of infection during hospitalization  Outcome: Progressing     Problem: Metabolic/Fluid and Electrolytes - Adult  Goal: Electrolytes maintained within normal limits  Outcome: Progressing  Goal: Hemodynamic stability and optimal renal function maintained  Outcome: Progressing  Goal: Glucose maintained within prescribed range  Outcome: Progressing     Problem: Hematologic - Adult  Goal: Maintains hematologic stability  Outcome: Progressing     Problem: Musculoskeletal - Adult  Goal: Return mobility to safest level of function  Outcome: Progressing  Goal: Maintain proper alignment of affected body part  Outcome: Progressing  Goal: Return ADL status to a safe level of function  Outcome: Progressing      09-Feb-2019 01:28

## 2024-03-04 NOTE — PROGRESS NOTES
Nephrology Progress Note      SUBJECTIVE      Patient seen and examined.  Stable vitals, no fevers. Tachycardic- 110-120.  Did not respond to fluid bolus.  Oral Augmentin continues.  Wound vacs in place.  No acute overnight issues.   Having good oral intake.  Plastic surgery is planning for wound Vac placement.  U/o 4475 last 24 hours, via Ward  Getting ns bolus of 1 L this morning in addition to continuous ns at 75 ml/hr.    Na 136 K 4.7 chloride 102 CO2 21 BUN 53 Cr 3.5- 3.8  Last dialysis 2024, creatinine seems to have gone up steadily since then.    No diuretics currently use.  He is on Flomax     Background history:  43-year-old gentleman with a history of diabetes mellitus, essential hypertension presented to the hospital because of necrotic lesions involving axillary, groin as well as chest wall wounds with shortness of breath.  He has a history of hidradenitis.  He underwent extensive debridement of the axillary area, pectoral area and also had Dago's gangrene.  He was in DKA with blood sugars that were in the high 500s, he had high ketones in the serum, his creatinine was up to 8 and he had severe metabolic acidosis.  He did receive 1 hemodialysis treatment.  Urine output seems to have picked up.  He does have history of stage IV CKD with a creatinine that is been running around 3.5 or thereabouts going back almost 10 months from [facility] database.     Did have some problems voiding since Ward came out and had to be reintroduced.  He had a postvoid residual of 650 mL plus.  Patient had 2.85 L of urine out yesterday and has already had 2 L out thus far this shift in 5 hours. He likely will need dialysis for a period of time, although with his improving urine output and relief of the obstruction, hopefully, renal function will improve soon.    OBJECTIVE      CURRENT TEMPERATURE:  Temp: 98.9 °F (37.2 °C)  MAXIMUM TEMPERATURE OVER 24HRS:  Temp (24hrs), Av.7 °F (37.1 °C), Min:98.2 °F (36.8 °C),  pathologist:  Zach Bangura D.O.     C3:   Lab Results   Component Value Date    C3 82 (L) 02/22/2024     C4:   Lab Results   Component Value Date    C4 15 02/22/2024       URINE SODIUM:    Lab Results   Component Value Date/Time    KAREEM 81 02/22/2024 03:14 PM      URINE CREATININE:    Lab Results   Component Value Date/Time    LABCREA 17.4 02/22/2024 03:14 PM     URINE EOSINOPHILS:   Lab Results   Component Value Date/Time    UREO NONE SEEN 02/22/2024 03:14 PM     URINALYSIS:  U/A:   Lab Results   Component Value Date/Time    NITRU NEGATIVE 02/29/2024 08:10 AM    COLORU Yellow 02/29/2024 08:10 AM    PHUR 7.0 02/29/2024 08:10 AM    WBCUA None 02/29/2024 08:10 AM    RBCUA None 02/29/2024 08:10 AM    BACTERIA None 02/29/2024 08:10 AM    SPECGRAV 1.009 02/29/2024 08:10 AM    LEUKOCYTESUR NEGATIVE 02/29/2024 08:10 AM    UROBILINOGEN Normal 02/29/2024 08:10 AM    BILIRUBINUR NEGATIVE 02/29/2024 08:10 AM    GLUCOSEU 2+ 02/29/2024 08:10 AM    KETUA NEGATIVE 02/29/2024 08:10 AM       ASSESSMENT      Acute kidney injury nonoliguric secondary to ischemic ATN from septic shock/DKA-presented with a creatinine of 8.2.  Tunneled catheter in place.  Showing signs of renal recovery although clearance is still lagging behind.  Creatinine is steadily gone up after his last dialysis treatment up to 3.8.  Chronic kidney disease stage IV secondary likely to diabetic nephropathy with baseline creatinine 3.3-3.5 till March 2023.  Failed to follow-up with nephrology consultant on a regular basis since then  Right axillary fasciitis with air and soft tissues/underlying hidradenitis suppurativa and foreigners gangrene  Type 2 diabetes  Essential hypertension    PLAN      1.  Discussed with patient the need for ongoing dialysis.  Will hold off treatment today and reassess creatinine tomorrow.  If steadily going up we will continue dialysis.  He might need twice a week treatments.  Given advanced nature of kidney disease to begin with

## 2024-03-04 NOTE — PROGRESS NOTES
[facility]  Office: [phone]  Deyvi Lyles DO, Chuck Boothe DO, Chris Johnson DO, Helio Olson DO, Andrew Ann MD, Bernarda Brizuela MD, An Valdez MD, Annalisa Mojica MD,  Black Mendoza MD, Anurag Hook MD, Guanakito Griggs MD,  Osmany Bolivar DO, Kendra Murray MD, Pete Castillo MD, Shwan Lyles DO, Neli Looney MD,  Hong Cool DO, Alyse Owusu MD, Eloise Alejandro MD, Shannon Hein MD, Robert José MD,  Blair Rg MD, Pritesh Amaya MD, Melina Bacon MD, Maddie Cho MD, Curtis Crow MD, Steffen Hernandez MD, Benjamin David DO, Les Mcleod DO, Osvaldo Rogers MD,  Reuben Bray MD, Shirley Waterhouse, CNP,  Colleen Rodriguez, CNP, Deo Bo, CNP,  Giana Perry, DNP, Elizabeth Astudillo, CNP, Genna Barron, CNP, Khloe Marlow CNP, Sarah Fierro, CNP, Michelle Wheat, CNP, Rehana Degroot, PA-C, Angelia Baker, PA-C, Jalyn Vail, CNP, Nya Knight, CNP, Isela Keene, CNP, Lucy Quan, CNS, Renata Prado, CNP, Susan Lemons, CNP, Tracy Schwab, CNP         [facility]   IN-PATIENT SERVICE   [facility]    Progress Note    3/4/2024    2:15 PM    Name:   Jules Nichols  MRN:     [id number]     Acct:      [id number]   Room:   0417/0417-02  IP Day:  12  Admit Date:  2/21/2024 10:41 PM    PCP:   Elvin Kelley MD  Code Status:  Full Code    Subjective:     C/C:   Chief Complaint   Patient presents with    Wound Infection     R. axilla, chest, L axilla, L neck     Interval History Status: not changed.     Patient does not report any acute complaints.  No chest pain or shortness of breath.  Patient noted to be constantly tachycardic.  Does not report any pain.  He does not report any chest palpitations or shortness of breath.  Labs and vitals reviewed  Tachycardia up to 110-120  Creatinine 3.8  Potassium 4.7  Hemoglobin 8.8, urine output is up to 4400.    Brief History:     43-year-old male with past medical history of CKD,  History:   has a past medical history of Benign essential HTN, Chronic kidney disease, Diabetic keto-acidosis (HCC), Hyperlipemia, and Uncontrolled type 2 diabetes mellitus with nephropathy.    Social History:   reports that he has never smoked. He has never used smokeless tobacco. He reports that he does not drink alcohol and does not use drugs.     Family History: No family history on file.    Vitals:  BP (!) 103/59   Pulse (!) 118   Temp 98.8 °F (37.1 °C) (Temporal)   Resp 21   Ht 1.651 m (5' 5\")   Wt 61.1 kg (134 lb 11.2 oz)   SpO2 96%   BMI 22.42 kg/m²   Temp (24hrs), Av.8 °F (37.1 °C), Min:98.4 °F (36.9 °C), Max:99.1 °F (37.3 °C)    Recent Labs     24  1544 24  1923 24  0806 24  1239   POCGLU 203* 204* 191* 330*         I/O (24Hr):    Intake/Output Summary (Last 24 hours) at 3/4/2024 1415  Last data filed at 3/4/2024 0323  Gross per 24 hour   Intake 574.39 ml   Output 4475 ml   Net -3900.61 ml         Labs:  Hematology:  Recent Labs     24  0452 24  0214 24  0950 24  0259   WBC 11.5* 15.8*  --  10.2   RBC 3.21* 3.60*  --  3.23*   HGB 8.9* 10.0*  --  8.8*   HCT 28.1* 32.7*  --  29.9*   MCV 87.5 90.8  --  92.6   MCH 27.7 27.8  --  27.2   MCHC 31.7 30.6  --  29.4   RDW 17.5* 18.5*  --  18.6*    236  --  240   MPV 9.4 9.5  --  9.5   CRP  --   --  124.2*  --        Chemistry:  Recent Labs     24  0853 24  0950 24  0259    133* 136   K 4.7 4.6 4.7    99 102   CO2 23 22 21   GLUCOSE 84 212* 108*   BUN 33* 39* 53*   CREATININE 3.4* 3.5* 3.8*   ANIONGAP 13 12 13   LABGLOM 22* 21* 19*   CALCIUM 9.1 8.3* 8.6       Recent Labs     24  0737 24  1155 24  1544 24  1923 24  0806 24  1239   POCGLU 145* 196* 203* 204* 191* 330*       ABG:  Lab Results   Component Value Date/Time    POCPH 7.173 2024 05:54 AM    POCPCO2 18.0 2024 05:54 AM    POCPO2 138.8 2024 05:54 AM    POCHCO3 6.6

## 2024-03-05 ENCOUNTER — APPOINTMENT (OUTPATIENT)
Dept: GENERAL RADIOLOGY | Age: 44
DRG: 710 | End: 2024-03-05
Payer: MEDICAID

## 2024-03-05 ENCOUNTER — APPOINTMENT (OUTPATIENT)
Age: 44
DRG: 710 | End: 2024-03-05
Attending: STUDENT IN AN ORGANIZED HEALTH CARE EDUCATION/TRAINING PROGRAM
Payer: MEDICAID

## 2024-03-05 PROBLEM — M79.89 NECROTIZING SOFT TISSUE INFECTION: Status: ACTIVE | Noted: 2024-03-05

## 2024-03-05 PROBLEM — R00.0 SINUS TACHYCARDIA: Status: ACTIVE | Noted: 2024-03-05

## 2024-03-05 PROBLEM — I21.4 NSTEMI (NON-ST ELEVATED MYOCARDIAL INFARCTION) (HCC): Status: ACTIVE | Noted: 2024-03-05

## 2024-03-05 PROBLEM — R35.89 POLYURIA: Status: ACTIVE | Noted: 2024-03-05

## 2024-03-05 PROBLEM — R79.89 ELEVATED TROPONIN: Status: ACTIVE | Noted: 2024-03-05

## 2024-03-05 LAB
ANION GAP SERPL CALCULATED.3IONS-SCNC: 12 MMOL/L (ref 9–17)
BNP SERPL-MCNC: 945 PG/ML (ref 0–300)
BODY TEMPERATURE: 37
BUN SERPL-MCNC: 64 MG/DL (ref 6–20)
CALCIUM SERPL-MCNC: 8.7 MG/DL (ref 8.6–10.4)
CHLORIDE SERPL-SCNC: 110 MMOL/L (ref 98–107)
CO2 SERPL-SCNC: 17 MMOL/L (ref 20–31)
COHGB MFR BLD: 2 % (ref 0–5)
CREAT SERPL-MCNC: 4 MG/DL (ref 0.7–1.2)
ECHO AO ROOT DIAM: 3 CM
ECHO AO ROOT INDEX: 1.8 CM/M2
ECHO AV AREA PEAK VELOCITY: 2.8 CM2
ECHO AV AREA VTI: 3.3 CM2
ECHO AV AREA/BSA PEAK VELOCITY: 1.7 CM2/M2
ECHO AV AREA/BSA VTI: 2 CM2/M2
ECHO AV MEAN GRADIENT: 4 MMHG
ECHO AV MEAN VELOCITY: 0.9 M/S
ECHO AV PEAK GRADIENT: 8 MMHG
ECHO AV PEAK VELOCITY: 1.4 M/S
ECHO AV VELOCITY RATIO: 0.93
ECHO AV VTI: 23.1 CM
ECHO BSA: 1.67 M2
ECHO LA AREA 2C: 15.2 CM2
ECHO LA AREA 4C: 15.7 CM2
ECHO LA DIAMETER INDEX: 2.28 CM/M2
ECHO LA DIAMETER: 3.8 CM
ECHO LA MAJOR AXIS: 5 CM
ECHO LA MINOR AXIS: 4.3 CM
ECHO LA TO AORTIC ROOT RATIO: 1.27
ECHO LA VOL BP: 44 ML (ref 18–58)
ECHO LA VOL MOD A2C: 43 ML (ref 18–58)
ECHO LA VOL MOD A4C: 39 ML (ref 18–58)
ECHO LA VOL/BSA BIPLANE: 26 ML/M2 (ref 16–34)
ECHO LA VOLUME INDEX MOD A2C: 26 ML/M2 (ref 16–34)
ECHO LA VOLUME INDEX MOD A4C: 23 ML/M2 (ref 16–34)
ECHO LV E' LATERAL VELOCITY: 8 CM/S
ECHO LV E' SEPTAL VELOCITY: 6 CM/S
ECHO LV EDV A2C: 35 ML
ECHO LV EDV A4C: 64 ML
ECHO LV EDV INDEX A4C: 38 ML/M2
ECHO LV EDV NDEX A2C: 21 ML/M2
ECHO LV EJECTION FRACTION A2C: 66 %
ECHO LV EJECTION FRACTION A4C: 67 %
ECHO LV EJECTION FRACTION BIPLANE: 67 % (ref 55–100)
ECHO LV ESV A2C: 12 ML
ECHO LV ESV A4C: 21 ML
ECHO LV ESV INDEX A2C: 7 ML/M2
ECHO LV ESV INDEX A4C: 13 ML/M2
ECHO LV FRACTIONAL SHORTENING: 20 % (ref 28–44)
ECHO LV INTERNAL DIMENSION DIASTOLE INDEX: 2.1 CM/M2
ECHO LV INTERNAL DIMENSION DIASTOLIC: 3.5 CM (ref 4.2–5.9)
ECHO LV INTERNAL DIMENSION SYSTOLIC INDEX: 1.68 CM/M2
ECHO LV INTERNAL DIMENSION SYSTOLIC: 2.8 CM
ECHO LV IVSD: 1 CM (ref 0.6–1)
ECHO LV MASS 2D: 95.9 G (ref 88–224)
ECHO LV MASS INDEX 2D: 57.4 G/M2 (ref 49–115)
ECHO LV POSTERIOR WALL DIASTOLIC: 0.9 CM (ref 0.6–1)
ECHO LV RELATIVE WALL THICKNESS RATIO: 0.51
ECHO LVOT AREA: 3.1 CM2
ECHO LVOT AV VTI INDEX: 1.03
ECHO LVOT DIAM: 2 CM
ECHO LVOT MEAN GRADIENT: 3 MMHG
ECHO LVOT PEAK GRADIENT: 6 MMHG
ECHO LVOT PEAK VELOCITY: 1.3 M/S
ECHO LVOT STROKE VOLUME INDEX: 44.9 ML/M2
ECHO LVOT SV: 75 ML
ECHO LVOT VTI: 23.9 CM
ECHO MV A VELOCITY: 1 M/S
ECHO MV AREA VTI: 4.5 CM2
ECHO MV E DECELERATION TIME (DT): 184 MS
ECHO MV E VELOCITY: 0.72 M/S
ECHO MV E/A RATIO: 0.72
ECHO MV E/E' LATERAL: 9
ECHO MV E/E' RATIO (AVERAGED): 10.5
ECHO MV LVOT VTI INDEX: 0.69
ECHO MV MAX VELOCITY: 1.2 M/S
ECHO MV MEAN GRADIENT: 3 MMHG
ECHO MV MEAN VELOCITY: 0.8 M/S
ECHO MV PEAK GRADIENT: 6 MMHG
ECHO MV VTI: 16.5 CM
ECHO PV MAX VELOCITY: 1.1 M/S
ECHO PV PEAK GRADIENT: 5 MMHG
ECHO RV BASAL DIMENSION: 3.7 CM
ECHO RV FREE WALL PEAK S': 22 CM/S
ECHO RV TAPSE: 1.8 CM (ref 1.7–?)
EKG ATRIAL RATE: 107 BPM
EKG ATRIAL RATE: 120 BPM
EKG P AXIS: 52 DEGREES
EKG P AXIS: 54 DEGREES
EKG P-R INTERVAL: 118 MS
EKG P-R INTERVAL: 124 MS
EKG Q-T INTERVAL: 296 MS
EKG Q-T INTERVAL: 306 MS
EKG QRS DURATION: 78 MS
EKG QRS DURATION: 80 MS
EKG QTC CALCULATION (BAZETT): 408 MS
EKG QTC CALCULATION (BAZETT): 418 MS
EKG R AXIS: 30 DEGREES
EKG R AXIS: 35 DEGREES
EKG T AXIS: 39 DEGREES
EKG T AXIS: 53 DEGREES
EKG VENTRICULAR RATE: 107 BPM
EKG VENTRICULAR RATE: 120 BPM
ERYTHROCYTE [DISTWIDTH] IN BLOOD BY AUTOMATED COUNT: 19.4 % (ref 11.8–14.4)
FIO2 ON VENT: ABNORMAL %
GFR SERPL CREATININE-BSD FRML MDRD: 18 ML/MIN/1.73M2
GLUCOSE BLD-MCNC: 135 MG/DL (ref 75–110)
GLUCOSE BLD-MCNC: 198 MG/DL (ref 75–110)
GLUCOSE BLD-MCNC: 204 MG/DL (ref 75–110)
GLUCOSE BLD-MCNC: 262 MG/DL (ref 75–110)
GLUCOSE BLD-MCNC: 90 MG/DL (ref 75–110)
GLUCOSE SERPL-MCNC: 125 MG/DL (ref 70–99)
HCO3 VENOUS: 20.3 MMOL/L (ref 24–30)
HCT VFR BLD AUTO: 26.5 % (ref 40.7–50.3)
HGB BLD-MCNC: 7.9 G/DL (ref 13–17)
LACTIC ACID, SEPSIS WHOLE BLOOD: 1.1 MMOL/L (ref 0.5–1.9)
MCH RBC QN AUTO: 28 PG (ref 25.2–33.5)
MCHC RBC AUTO-ENTMCNC: 29.8 G/DL (ref 28.4–34.8)
MCV RBC AUTO: 94 FL (ref 82.6–102.9)
NEGATIVE BASE EXCESS, VEN: 4.1 MMOL/L (ref 0–2)
NRBC BLD-RTO: 0 PER 100 WBC
O2 SAT, VEN: 98.4 % (ref 60–85)
OSMOLALITY UR: 237 MOSM/KG (ref 80–1300)
PCO2, VEN: 36.5 MM HG (ref 39–55)
PH VENOUS: 7.36 (ref 7.32–7.42)
PLATELET # BLD AUTO: 305 K/UL (ref 138–453)
PMV BLD AUTO: 10.4 FL (ref 8.1–13.5)
PO2, VEN: 121 MM HG (ref 30–50)
POTASSIUM SERPL-SCNC: 4.6 MMOL/L (ref 3.7–5.3)
RBC # BLD AUTO: 2.82 M/UL (ref 4.21–5.77)
SODIUM SERPL-SCNC: 139 MMOL/L (ref 135–144)
SODIUM UR-SCNC: 80 MMOL/L
TROPONIN I SERPL HS-MCNC: 87 NG/L (ref 0–22)
TSH SERPL DL<=0.05 MIU/L-ACNC: 4.42 UIU/ML (ref 0.3–5)
WBC OTHER # BLD: 9 K/UL (ref 3.5–11.3)

## 2024-03-05 PROCEDURE — 6370000000 HC RX 637 (ALT 250 FOR IP)

## 2024-03-05 PROCEDURE — 2060000000 HC ICU INTERMEDIATE R&B

## 2024-03-05 PROCEDURE — 97530 THERAPEUTIC ACTIVITIES: CPT

## 2024-03-05 PROCEDURE — 6370000000 HC RX 637 (ALT 250 FOR IP): Performed by: STUDENT IN AN ORGANIZED HEALTH CARE EDUCATION/TRAINING PROGRAM

## 2024-03-05 PROCEDURE — 84443 ASSAY THYROID STIM HORMONE: CPT

## 2024-03-05 PROCEDURE — 51701 INSERT BLADDER CATHETER: CPT

## 2024-03-05 PROCEDURE — 51798 US URINE CAPACITY MEASURE: CPT

## 2024-03-05 PROCEDURE — 83935 ASSAY OF URINE OSMOLALITY: CPT

## 2024-03-05 PROCEDURE — 99223 1ST HOSP IP/OBS HIGH 75: CPT | Performed by: INTERNAL MEDICINE

## 2024-03-05 PROCEDURE — 80048 BASIC METABOLIC PNL TOTAL CA: CPT

## 2024-03-05 PROCEDURE — 6360000002 HC RX W HCPCS: Performed by: STUDENT IN AN ORGANIZED HEALTH CARE EDUCATION/TRAINING PROGRAM

## 2024-03-05 PROCEDURE — 93306 TTE W/DOPPLER COMPLETE: CPT | Performed by: INTERNAL MEDICINE

## 2024-03-05 PROCEDURE — 2580000003 HC RX 258: Performed by: STUDENT IN AN ORGANIZED HEALTH CARE EDUCATION/TRAINING PROGRAM

## 2024-03-05 PROCEDURE — 93306 TTE W/DOPPLER COMPLETE: CPT

## 2024-03-05 PROCEDURE — 93010 ELECTROCARDIOGRAM REPORT: CPT | Performed by: INTERNAL MEDICINE

## 2024-03-05 PROCEDURE — 99232 SBSQ HOSP IP/OBS MODERATE 35: CPT | Performed by: INTERNAL MEDICINE

## 2024-03-05 PROCEDURE — 84484 ASSAY OF TROPONIN QUANT: CPT

## 2024-03-05 PROCEDURE — 83880 ASSAY OF NATRIURETIC PEPTIDE: CPT

## 2024-03-05 PROCEDURE — 36415 COLL VENOUS BLD VENIPUNCTURE: CPT

## 2024-03-05 PROCEDURE — 83605 ASSAY OF LACTIC ACID: CPT

## 2024-03-05 PROCEDURE — 99232 SBSQ HOSP IP/OBS MODERATE 35: CPT | Performed by: STUDENT IN AN ORGANIZED HEALTH CARE EDUCATION/TRAINING PROGRAM

## 2024-03-05 PROCEDURE — 2580000003 HC RX 258

## 2024-03-05 PROCEDURE — 85027 COMPLETE CBC AUTOMATED: CPT

## 2024-03-05 PROCEDURE — 82805 BLOOD GASES W/O2 SATURATION: CPT

## 2024-03-05 PROCEDURE — 82947 ASSAY GLUCOSE BLOOD QUANT: CPT

## 2024-03-05 PROCEDURE — 71045 X-RAY EXAM CHEST 1 VIEW: CPT

## 2024-03-05 PROCEDURE — 2500000003 HC RX 250 WO HCPCS

## 2024-03-05 PROCEDURE — 84300 ASSAY OF URINE SODIUM: CPT

## 2024-03-05 RX ADMIN — HEPARIN SODIUM 5000 UNITS: 5000 INJECTION INTRAVENOUS; SUBCUTANEOUS at 15:07

## 2024-03-05 RX ADMIN — Medication 1 CAPSULE: at 07:56

## 2024-03-05 RX ADMIN — Medication 1 CAPSULE: at 16:45

## 2024-03-05 RX ADMIN — SODIUM BICARBONATE: 84 INJECTION, SOLUTION INTRAVENOUS at 17:15

## 2024-03-05 RX ADMIN — SODIUM BICARBONATE: 84 INJECTION, SOLUTION INTRAVENOUS at 11:16

## 2024-03-05 RX ADMIN — OXYCODONE 5 MG: 5 TABLET ORAL at 16:45

## 2024-03-05 RX ADMIN — TAMSULOSIN HYDROCHLORIDE 0.4 MG: 0.4 CAPSULE ORAL at 07:56

## 2024-03-05 RX ADMIN — INSULIN LISPRO 4 UNITS: 100 INJECTION, SOLUTION INTRAVENOUS; SUBCUTANEOUS at 12:15

## 2024-03-05 RX ADMIN — AMOXICILLIN AND CLAVULANATE POTASSIUM 1 TABLET: 500; 125 TABLET, FILM COATED ORAL at 20:26

## 2024-03-05 RX ADMIN — INSULIN GLARGINE 20 UNITS: 100 INJECTION, SOLUTION SUBCUTANEOUS at 07:55

## 2024-03-05 RX ADMIN — HEPARIN SODIUM 5000 UNITS: 5000 INJECTION INTRAVENOUS; SUBCUTANEOUS at 04:52

## 2024-03-05 RX ADMIN — HEPARIN SODIUM 5000 UNITS: 5000 INJECTION INTRAVENOUS; SUBCUTANEOUS at 20:26

## 2024-03-05 RX ADMIN — AMOXICILLIN AND CLAVULANATE POTASSIUM 1 TABLET: 500; 125 TABLET, FILM COATED ORAL at 07:56

## 2024-03-05 RX ADMIN — Medication: at 07:57

## 2024-03-05 RX ADMIN — SODIUM CHLORIDE, PRESERVATIVE FREE 10 ML: 5 INJECTION INTRAVENOUS at 07:56

## 2024-03-05 RX ADMIN — ACETAMINOPHEN 650 MG: 325 TABLET ORAL at 02:54

## 2024-03-05 ASSESSMENT — PAIN DESCRIPTION - PAIN TYPE: TYPE: ACUTE PAIN

## 2024-03-05 ASSESSMENT — PAIN SCALES - GENERAL
PAINLEVEL_OUTOF10: 2
PAINLEVEL_OUTOF10: 7

## 2024-03-05 ASSESSMENT — PAIN DESCRIPTION - DESCRIPTORS: DESCRIPTORS: ACHING

## 2024-03-05 ASSESSMENT — PAIN DESCRIPTION - FREQUENCY: FREQUENCY: CONTINUOUS

## 2024-03-05 ASSESSMENT — PAIN DESCRIPTION - LOCATION: LOCATION: BACK

## 2024-03-05 ASSESSMENT — PAIN DESCRIPTION - ONSET: ONSET: ON-GOING

## 2024-03-05 NOTE — PROGRESS NOTES
--   --   --   --   --  4.42   POCGLU 191* 330* 193* 276* 90 135*  --        ABG:  Lab Results   Component Value Date/Time    POCPH 7.173 02/22/2024 05:54 AM    POCPCO2 18.0 02/22/2024 05:54 AM    POCPO2 138.8 02/22/2024 05:54 AM    POCHCO3 6.6 02/22/2024 05:54 AM    NBEA 20.0 02/22/2024 05:54 AM    ZUDD0QUF 98.5 02/22/2024 05:54 AM    FIO2 INFORMATION NOT PROVIDED 03/05/2024 11:02 AM     Lab Results   Component Value Date/Time    SPECIAL LT WRIST 02/21/2024 05:00 PM     Lab Results   Component Value Date/Time    CULTURE NO GROWTH 02/22/2024 02:10 AM       Radiology:  No results found.    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) 2/22/2024 Yes    Acute kidney injury superimposed on CKD ([facility]) 2/29/2024 Yes    Hidradenitis suppurativa of multiple sites 2/22/2024 Yes    Metabolic acidosis 2/22/2024 Yes    Hyperkalemia 2/22/2024 Yes    Acute on chronic anemia 2/22/2024 Yes    Moderate malnutrition ([facility]) 2/22/2024 Yes    Bandemia 2/22/2024 Yes    Septic shock ([facility]) 2/22/2024 Yes    Severe sepsis ([facility]) 2/22/2024 Yes    Dependence on renal dialysis ([facility]) 2/28/2024 Yes   Plan:        Necrotizing fasciitis, bishnu gangrene with septic shock- off pressors, s/p excisional debridement wound bed preparation bilateral groin and armpit, chest and back, plastic  surgery and ID evaluated, continue augmentin, cultures grew mixed bacterial growth, plastic surgery recommends wound VAC on wound on torso by wound care.  Follow-up outpatient in 2 weeks.  White count trending down, discussed with Dr. Mack. Finish Augmentin 3/8.    Tachycardia- patient adequately hydrated, tachycardia persistent with elevated trop, consult cardio, obtain echo     JAY on CKD stage IV- received dialysis but now significant urine output, continue iv fluids, nephrology following, creatinine upto 4, patient has tunneled catheter in place, bladder scan for retention, mulligan removed     Type 2 diabetes mellitus-initial DKA has resolved, continue Lantus, adjust dose, continue sliding scale, continue to monitor blood sugars with meals and at bedtime.    Tachycardia-likely with dehydration,continue iv fluids     Anemia due to blood loss, acute illness- received prbc on admission, watch hb, gi signed off on admission     Urinary incontinence-concern for overflow incontinence, continue Flomax, Mulligan catheter placed for bladder scan of 799 mL.  Urology will follow outpatient urodynamic studies.     Started discharge planning, now will need precert with insurance change    Obtain bladder scan and replace mulligan if retaining  Recheck crp  Consult cardio for tachycardic    Kendra Murray MD  3/5/2024  11:45 AM

## 2024-03-05 NOTE — PROGRESS NOTES
Nutrition Assessment     Type and Reason for Visit: Reassess    Nutrition Recommendations/Plan:   Continue Regular diet with standard ONS TID with meals     Malnutrition Assessment:  Malnutrition Status: Moderate malnutrition    Nutrition Assessment:  Pt remains on Regular diet wtih standard ONS (Ensure) TID with meals.  Though few meal intakes are recorded, Pt appears to have consumed 100% of both breakfast tray and ONS upon visitation.  Pt confirms that he continues to eat very well, 100% of both meals and ONS most days.  This meets estimated needs.  Pt denies any nutritional problems/concerns or questions at this time.    Estimated Daily Nutrient Needs:  Energy (kcal):  1800 to 2000 kcal/day Weight Used for Energy Requirements: Admission     Protein (g):  80 to 100 g/day Weight Used for Protein Requirements: Admission        Fluid (ml/day):  1800 to 2000 ml/day Method Used for Fluid Requirements: 1 ml/kcal    Nutrition Related Findings:   Meds/Labs reviewed. Wound Type: Multiple, Surgical Incision, Wound Vac    Current Nutrition Therapies:    ADULT DIET; Regular  ADULT ORAL NUTRITION SUPPLEMENT; Breakfast, Lunch, Dinner; Standard High Calorie/High Protein Oral Supplement    Anthropometric Measures:  Height: 165.1 cm (5' 5\")  Current Body Wt: 61.1 kg (134 lb 11.2 oz)   BMI: 22.4    Nutrition Diagnosis:   Increased nutrient needs related to increase demand for energy/nutrients as evidenced by wounds    Nutrition Interventions:   Food and/or Nutrient Delivery: Continue Current Diet, Continue Oral Nutrition Supplement  Nutrition Education/Counseling: No recommendation at this time  Coordination of Nutrition Care: Continue to monitor while inpatient  Plan of Care discussed with: Patient    Goals:  Previous Goal Met: Goal(s) Achieved  Goals: Meet at least 75% of estimated needs, prior to discharge       Nutrition Monitoring and Evaluation:   Behavioral-Environmental Outcomes: None Identified  Food/Nutrient Intake

## 2024-03-05 NOTE — PROGRESS NOTES
Nephrology Progress Note      SUBJECTIVE      Patient was seen and examined.  Patient remains tachycardic.  He continues to have large urine output.  His urine output is close to 4 L from last several days and he is 20 L net negative based on intake and output charting.  His pulse is relatively better today and in between 10 2-1 09.  Patient was alert and awake.  Slight increase in creatinine noted  Patient denies any nausea and vomiting.  He states that his appetite is improving.  Cardiac consult reviewed.  Patient will get cardiac echo today.         Background history:  43-year-old gentleman with a history of diabetes mellitus, essential hypertension presented to the hospital because of necrotic lesions involving axillary, groin as well as chest wall wounds with shortness of breath.  He has a history of hidradenitis.  He underwent extensive debridement of the axillary area, pectoral area and also had Dago's gangrene.  He was in DKA with blood sugars that were in the high 500s, he had high ketones in the serum, his creatinine was up to 8 and he had severe metabolic acidosis.  He did receive 1 hemodialysis treatment.  Urine output seems to have picked up.  He does have history of stage IV CKD with a creatinine that is been running around 3.5 or thereabouts going back almost 10 months from [facility] database.      OBJECTIVE      CURRENT TEMPERATURE:  Temp: 98.3 °F (36.8 °C)  MAXIMUM TEMPERATURE OVER 24HRS:  Temp (24hrs), Av.6 °F (37 °C), Min:98.2 °F (36.8 °C), Max:98.9 °F (37.2 °C)    CURRENT RESPIRATORY RATE:  Respirations: (!) 9  CURRENT PULSE:  Pulse: (!) 115  CURRENT BLOOD PRESSURE:  BP: 123/69  24HR BLOOD PRESSURE RANGE:  Systolic (24hrs), Av , Min:98 , Max:123   ; Diastolic (24hrs), Av, Min:59, Max:82    24HR INTAKE/OUTPUT:    Intake/Output Summary (Last 24 hours) at 3/5/2024 1022  Last data filed at 3/5/2024 0924  Gross per 24 hour   Intake 240 ml   Output 4925 ml   Net -4685 ml       WEIGHT

## 2024-03-05 NOTE — CONSULTS
[facility]    Consult / H&P               Today's Date: 3/5/2024  Patient Name: Jules Nichols  Date of admission: 2/21/2024 10:41 PM  Patient's age: 43 y.o., 1980  Admission Dx: Necrotizing fasciitis (HCC) [M72.6]  Dago's gangrene [N49.3]    Requesting Physician: Kendra Murray MD    Cardiac Evaluation Reason: Tachycardia and elevated troponin    History Obtained From: patient and chart review     History of Present Illness:    This patient 43 y.o. years old with past medical history given below.  He has a history of CKD, hypertension, diabetes, dyslipidemia.  The patient was initially admitted with hidradenitis progressing to nec fasc, gangrene and septic shock.  He was also found to have DKA and JAY on CKD.  The patient is status post debridement with wound VAC in place, and is on Augmentin.  The patient has been tachycardic for the last 2 days with heart rate up to 125, troponin was checked and found to be 89 with repeat of 87.  His baseline appears to be around 30-40.  He has not had any prior cardiac workup    Relevant Home meds includes losartan 50.    Past Medical History:   has a past medical history of Benign essential HTN, Chronic kidney disease, Diabetic keto-acidosis (HCC), Hyperlipemia, and Uncontrolled type 2 diabetes mellitus with nephropathy.    Past Surgical History:   has a past surgical history that includes other surgical history (Bilateral, 02/22/2024); Wound Exploration (Bilateral, 02/22/2024); Wound debridement; Abdomen surgery (N/A, 2/24/2024); and IR TUNNELED CVC PLACE WO SQ PORT/PUMP > 5 YEARS (3/1/2024).     Home Medications:    Prior to Admission medications    Medication Sig Start Date End Date Taking? Authorizing Provider   LANTUS SOLOSTAR 100 UNIT/ML injection pen ADMINISTER 18 UNITS UNDER THE SKIN EVERY NIGHT 7/9/18   Elvin Kelley MD   losartan (COZAAR) 50 MG tablet TAKE 1 TABLET BY MOUTH DAILY 5/16/18   Elvin Kelley MD   metFORMIN (GLUCOPHAGE)

## 2024-03-05 NOTE — PLAN OF CARE
Problem: Discharge Planning  Goal: Discharge to home or other facility with appropriate resources  3/5/2024 0917 by Mari Reyes RN  Outcome: Progressing  3/5/2024 0027 by Guille Castillo RN  Outcome: Progressing     Problem: Pain  Goal: Verbalizes/displays adequate comfort level or baseline comfort level  3/5/2024 0917 by Mari Reyes RN  Outcome: Progressing  3/5/2024 0027 by Guille Castillo RN  Outcome: Progressing     Problem: Safety - Adult  Goal: Free from fall injury  3/5/2024 0917 by Mari Reyes RN  Outcome: Progressing  Flowsheets (Taken 3/5/2024 0916)  Free From Fall Injury: Instruct family/caregiver on patient safety  3/5/2024 0027 by Guille Castillo RN  Outcome: Progressing     Problem: Skin/Tissue Integrity  Goal: Absence of new skin breakdown  Description: 1.  Monitor for areas of redness and/or skin breakdown  2.  Assess vascular access sites hourly  3.  Every 4-6 hours minimum:  Change oxygen saturation probe site  4.  Every 4-6 hours:  If on nasal continuous positive airway pressure, respiratory therapy assess nares and determine need for appliance change or resting period.  Outcome: Progressing     Problem: ABCDS Injury Assessment  Goal: Absence of physical injury  Outcome: Progressing  Flowsheets (Taken 3/5/2024 0916)  Absence of Physical Injury: Implement safety measures based on patient assessment     Problem: Chronic Conditions and Co-morbidities  Goal: Patient's chronic conditions and co-morbidity symptoms are monitored and maintained or improved  Outcome: Progressing     Problem: Nutrition Deficit:  Goal: Optimize nutritional status  Outcome: Progressing     Problem: Neurosensory - Adult  Goal: Achieves maximal functionality and self care  Outcome: Progressing     Problem: Respiratory - Adult  Goal: Achieves optimal ventilation and oxygenation  Outcome: Progressing     Problem: Cardiovascular - Adult  Goal: Maintains optimal cardiac output and hemodynamic stability  Outcome:  Progressing  Goal: Absence of cardiac dysrhythmias or at baseline  Outcome: Progressing     Problem: Skin/Tissue Integrity - Adult  Goal: Skin integrity remains intact  Outcome: Progressing  Flowsheets  Taken 3/5/2024 0916  Skin Integrity Remains Intact: Monitor for areas of redness and/or skin breakdown  Taken 3/5/2024 0759  Skin Integrity Remains Intact: Monitor for areas of redness and/or skin breakdown  Goal: Incisions, wounds, or drain sites healing without S/S of infection  Outcome: Progressing  Flowsheets (Taken 3/5/2024 0759)  Incisions, Wounds, or Drain Sites Healing Without Sign and Symptoms of Infection: ADMISSION and DAILY: Assess and document risk factors for pressure ulcer development  Goal: Oral mucous membranes remain intact  Outcome: Progressing     Problem: Gastrointestinal - Adult  Goal: Maintains adequate nutritional intake  Outcome: Progressing     Problem: Genitourinary - Adult  Goal: Absence of urinary retention  Outcome: Progressing  Flowsheets (Taken 3/5/2024 0759)  Absence of urinary retention: Assess patient’s ability to void and empty bladder     Problem: Infection - Adult  Goal: Absence of infection at discharge  Outcome: Progressing  Flowsheets (Taken 3/5/2024 0759)  Absence of infection at discharge: Assess and monitor for signs and symptoms of infection  Goal: Absence of infection during hospitalization  Outcome: Progressing     Problem: Metabolic/Fluid and Electrolytes - Adult  Goal: Electrolytes maintained within normal limits  Outcome: Progressing  Goal: Hemodynamic stability and optimal renal function maintained  Outcome: Progressing  Goal: Glucose maintained within prescribed range  Outcome: Progressing     Problem: Hematologic - Adult  Goal: Maintains hematologic stability  Outcome: Progressing     Problem: Musculoskeletal - Adult  Goal: Return mobility to safest level of function  Outcome: Progressing  Goal: Maintain proper alignment of affected body part  Outcome:

## 2024-03-05 NOTE — CARE COORDINATION
Met with patient to discuss transitional planning. Plan is [facility]ACH. Spoke with christianne in admissions and they have received insurance approval and are able to admit patient to facility tomorrow. Patient agreeable to plan. Perfect serve message to internal medicine to notify that LTACH has auth to see if patient can be discharged.

## 2024-03-05 NOTE — PROGRESS NOTES
denies pain, states \"it's uncomfortable where the wound vacs are sometimes\"  General  Patient assessed for rehabilitation services?: Yes  Response To Previous Treatment: Patient with no complaints from previous session.  Family / Caregiver Present: No  Follows Commands: Within Functional Limits  General Comment  Comments: Pt left in bed with call light within reach; encouraged pt to be up in chair for all meals, and try to spend more time in chair than in bed; pt agreeable to get up for dinner       Cognition   Orientation  Overall Orientation Status: Within Functional Limits  Orientation Level: Oriented X4  Cognition  Overall Cognitive Status: WFL     Objective   Pulse: (!) 102  Heart Rate Source: Monitor  BP: 115/73  BP Location: Right leg  BP Method: Automatic  Patient Position: Supine  MAP (Calculated): 87  Respirations: 13  SpO2: 96 %  O2 Device: None (Room air)  Temp: 98.6 °F (37 °C)     Observation/Palpation  Posture: Good      Bed mobility  Rolling to Left: Modified independent  Rolling to Right: Modified independent  Supine to Sit: Modified independent  Sit to Supine: Modified independent  Scooting: Independent  Transfers  Sit to Stand: Supervision  Stand to Sit: Supervision  Stand Pivot Transfers: Supervision  Ambulation  Surface: Level tile  Device: No Device  Assistance: Supervision  Quality of Gait: normal pace  Gait Deviations: None  Distance: 690'x1  Stairs/Curb  Stairs?: Yes  Stairs  # Steps : 3  Stairs Height: 6\"  Rails: Right ascending  Device: No Device  Assistance: Minimal assistance  Comment: pt had difficulty ascending, using BUEs to help \"pull\" himself up the steps     Balance  Posture: Good  Sitting - Static: Good  Sitting - Dynamic: Good  Standing - Static: Good  Standing - Dynamic: Good      AM-PAC - Mobility    AM-PAC Basic Mobility - Inpatient   How much help is needed turning from your back to your side while in a flat bed without using bedrails?: None  How much help is needed moving  independently  Short Term Goal 3: Complete 2full flight of steps with L handrail and mod I  Short Term Goal 4: Participate in 30 minutes of therapy to promote endurance  Patient Goals   Patient Goals : be able to go up and down stairs better       Education  Patient Education  Education Provided: Role of Therapy;Plan of Care;Home Exercise Program;Energy Conservation  Education Method: Verbal;Demonstration  Barriers to Learning: None  Education Outcome: Verbalized understanding;Demonstrated understanding;Continued education needed      Therapy Time   Individual Concurrent Group Co-treatment   Time In 1523         Time Out 1542         Minutes 19         Timed Code Treatment Minutes: 19 Minutes       Tonio Velázquez, PT

## 2024-03-05 NOTE — PLAN OF CARE
Problem: Discharge Planning  Goal: Discharge to home or other facility with appropriate resources  3/5/2024 0027 by Guille Castillo, RN  Outcome: Progressing     Problem: Pain  Goal: Verbalizes/displays adequate comfort level or baseline comfort level  3/5/2024 0027 by Guille Castillo, RN  Outcome: Progressing     Problem: Safety - Adult  Goal: Free from fall injury  3/5/2024 0027 by Guille Castillo, RN  Outcome: Progressing

## 2024-03-06 ENCOUNTER — APPOINTMENT (OUTPATIENT)
Dept: DIALYSIS | Age: 44
DRG: 710 | End: 2024-03-06
Payer: MEDICAID

## 2024-03-06 VITALS
TEMPERATURE: 97.8 F | RESPIRATION RATE: 18 BRPM | OXYGEN SATURATION: 99 % | HEART RATE: 108 BPM | BODY MASS INDEX: 23.03 KG/M2 | HEIGHT: 65 IN | DIASTOLIC BLOOD PRESSURE: 78 MMHG | WEIGHT: 138.23 LBS | SYSTOLIC BLOOD PRESSURE: 110 MMHG

## 2024-03-06 LAB
ANION GAP SERPL CALCULATED.3IONS-SCNC: 13 MMOL/L (ref 9–16)
BUN SERPL-MCNC: 66 MG/DL (ref 6–20)
CALCIUM SERPL-MCNC: 8.5 MG/DL (ref 8.6–10.4)
CHLORIDE SERPL-SCNC: 106 MMOL/L (ref 98–107)
CO2 SERPL-SCNC: 22 MMOL/L (ref 20–31)
CREAT SERPL-MCNC: 3.9 MG/DL (ref 0.7–1.2)
CRP SERPL HS-MCNC: 54 MG/L (ref 0–5)
GFR SERPL CREATININE-BSD FRML MDRD: 19 ML/MIN/1.73M2
GLUCOSE BLD-MCNC: 128 MG/DL (ref 75–110)
GLUCOSE BLD-MCNC: 166 MG/DL (ref 75–110)
GLUCOSE SERPL-MCNC: 115 MG/DL (ref 74–99)
POTASSIUM SERPL-SCNC: 5 MMOL/L (ref 3.7–5.3)
SODIUM SERPL-SCNC: 141 MMOL/L (ref 136–145)

## 2024-03-06 PROCEDURE — 90935 HEMODIALYSIS ONE EVALUATION: CPT | Performed by: INTERNAL MEDICINE

## 2024-03-06 PROCEDURE — 2500000003 HC RX 250 WO HCPCS

## 2024-03-06 PROCEDURE — 36415 COLL VENOUS BLD VENIPUNCTURE: CPT

## 2024-03-06 PROCEDURE — 82947 ASSAY GLUCOSE BLOOD QUANT: CPT

## 2024-03-06 PROCEDURE — 6370000000 HC RX 637 (ALT 250 FOR IP): Performed by: STUDENT IN AN ORGANIZED HEALTH CARE EDUCATION/TRAINING PROGRAM

## 2024-03-06 PROCEDURE — 2580000003 HC RX 258: Performed by: STUDENT IN AN ORGANIZED HEALTH CARE EDUCATION/TRAINING PROGRAM

## 2024-03-06 PROCEDURE — 6370000000 HC RX 637 (ALT 250 FOR IP)

## 2024-03-06 PROCEDURE — 99239 HOSP IP/OBS DSCHRG MGMT >30: CPT | Performed by: STUDENT IN AN ORGANIZED HEALTH CARE EDUCATION/TRAINING PROGRAM

## 2024-03-06 PROCEDURE — 99233 SBSQ HOSP IP/OBS HIGH 50: CPT | Performed by: NURSE PRACTITIONER

## 2024-03-06 PROCEDURE — 6360000002 HC RX W HCPCS: Performed by: INTERNAL MEDICINE

## 2024-03-06 PROCEDURE — 80048 BASIC METABOLIC PNL TOTAL CA: CPT

## 2024-03-06 PROCEDURE — 90935 HEMODIALYSIS ONE EVALUATION: CPT

## 2024-03-06 PROCEDURE — 2580000003 HC RX 258

## 2024-03-06 PROCEDURE — 86140 C-REACTIVE PROTEIN: CPT

## 2024-03-06 RX ORDER — AMOXICILLIN AND CLAVULANATE POTASSIUM 500; 125 MG/1; MG/1
1 TABLET, FILM COATED ORAL EVERY 12 HOURS SCHEDULED
Qty: 4 TABLET | Refills: 0 | Status: ON HOLD | OUTPATIENT
Start: 2024-03-06 | End: 2024-03-08

## 2024-03-06 RX ORDER — HEPARIN SODIUM 1000 [USP'U]/ML
1600 INJECTION, SOLUTION INTRAVENOUS; SUBCUTANEOUS PRN
Status: DISCONTINUED | OUTPATIENT
Start: 2024-03-06 | End: 2024-03-06 | Stop reason: HOSPADM

## 2024-03-06 RX ORDER — TAMSULOSIN HYDROCHLORIDE 0.4 MG/1
0.4 CAPSULE ORAL DAILY
Qty: 30 CAPSULE | Refills: 3 | Status: ON HOLD | OUTPATIENT
Start: 2024-03-07

## 2024-03-06 RX ORDER — SODIUM HYPOCHLORITE 1.25 MG/ML
SOLUTION TOPICAL DAILY
Qty: 237 ML | Refills: 1 | Status: ON HOLD | OUTPATIENT
Start: 2024-03-07

## 2024-03-06 RX ORDER — INSULIN GLARGINE 100 [IU]/ML
20 INJECTION, SOLUTION SUBCUTANEOUS DAILY
Qty: 15 ML | Refills: 0 | Status: ON HOLD | OUTPATIENT
Start: 2024-03-06

## 2024-03-06 RX ORDER — OXYCODONE HYDROCHLORIDE 5 MG/1
5 TABLET ORAL EVERY 4 HOURS PRN
Qty: 10 TABLET | Refills: 0 | Status: ON HOLD | OUTPATIENT
Start: 2024-03-06 | End: 2024-03-09

## 2024-03-06 RX ORDER — LACTOBACILLUS RHAMNOSUS GG 10B CELL
1 CAPSULE ORAL 2 TIMES DAILY WITH MEALS
Qty: 10 CAPSULE | Refills: 0 | Status: ON HOLD | OUTPATIENT
Start: 2024-03-06

## 2024-03-06 RX ADMIN — SODIUM BICARBONATE: 84 INJECTION, SOLUTION INTRAVENOUS at 00:45

## 2024-03-06 RX ADMIN — Medication 1 CAPSULE: at 08:00

## 2024-03-06 RX ADMIN — SODIUM BICARBONATE: 84 INJECTION, SOLUTION INTRAVENOUS at 08:03

## 2024-03-06 RX ADMIN — Medication: at 08:00

## 2024-03-06 RX ADMIN — INSULIN GLARGINE 20 UNITS: 100 INJECTION, SOLUTION SUBCUTANEOUS at 08:00

## 2024-03-06 RX ADMIN — OXYCODONE 5 MG: 5 TABLET ORAL at 16:01

## 2024-03-06 RX ADMIN — ACETAMINOPHEN 650 MG: 325 TABLET ORAL at 02:49

## 2024-03-06 RX ADMIN — SODIUM CHLORIDE, PRESERVATIVE FREE 10 ML: 5 INJECTION INTRAVENOUS at 08:00

## 2024-03-06 RX ADMIN — TAMSULOSIN HYDROCHLORIDE 0.4 MG: 0.4 CAPSULE ORAL at 08:00

## 2024-03-06 RX ADMIN — HEPARIN SODIUM 1600 UNITS: 1000 INJECTION INTRAVENOUS; SUBCUTANEOUS at 14:49

## 2024-03-06 RX ADMIN — AMOXICILLIN AND CLAVULANATE POTASSIUM 1 TABLET: 500; 125 TABLET, FILM COATED ORAL at 08:00

## 2024-03-06 RX ADMIN — HEPARIN SODIUM 1600 UNITS: 1000 INJECTION INTRAVENOUS; SUBCUTANEOUS at 14:48

## 2024-03-06 ASSESSMENT — PAIN SCALES - GENERAL
PAINLEVEL_OUTOF10: 5
PAINLEVEL_OUTOF10: 1
PAINLEVEL_OUTOF10: 0

## 2024-03-06 ASSESSMENT — PAIN DESCRIPTION - ORIENTATION: ORIENTATION: MID

## 2024-03-06 ASSESSMENT — PAIN DESCRIPTION - DESCRIPTORS: DESCRIPTORS: ACHING

## 2024-03-06 ASSESSMENT — PAIN DESCRIPTION - LOCATION: LOCATION: ARM;BACK

## 2024-03-06 ASSESSMENT — PAIN DESCRIPTION - PAIN TYPE: TYPE: SURGICAL PAIN

## 2024-03-06 ASSESSMENT — PAIN DESCRIPTION - FREQUENCY: FREQUENCY: CONTINUOUS

## 2024-03-06 ASSESSMENT — PAIN DESCRIPTION - ONSET: ONSET: ON-GOING

## 2024-03-06 NOTE — PROGRESS NOTES
Nephrology Progress Note      SUBJECTIVE      Patient was seen and examined.   Heart rate is better .  He continues to have large urine output-1800+ unmeasured occurrences x 6 have been charted.    Patient was alert and awake.  Creatinine is stable at 4-3.9  Patient denies any nausea and vomiting.  He states that his appetite is improving.  Patient is going to be discharged to LTAC.  Will plan for dialysis today before discharge.  Urine osmolality-237, urine sodium-80  Echo-EF 60 to 65%, otherwise unremarkable       Background history:  43-year-old gentleman with a history of diabetes mellitus, essential hypertension presented to the hospital because of necrotic lesions involving axillary, groin as well as chest wall wounds with shortness of breath.  He has a history of hidradenitis.  He underwent extensive debridement of the axillary area, pectoral area and also had Dago's gangrene.  He was in DKA with blood sugars that were in the high 500s, he had high ketones in the serum, his creatinine was up to 8 and he had severe metabolic acidosis.  He did receive 1 hemodialysis treatment.  Urine output seems to have picked up.  He does have history of stage IV CKD with a creatinine that is been running around 3.5 or thereabouts going back almost 10 months from [employer] database.      OBJECTIVE      CURRENT TEMPERATURE:  Temp: 98 °F (36.7 °C)  MAXIMUM TEMPERATURE OVER 24HRS:  Temp (24hrs), Av.3 °F (36.8 °C), Min:98 °F (36.7 °C), Max:98.6 °F (37 °C)    CURRENT RESPIRATORY RATE:  Respirations: 16  CURRENT PULSE:  Pulse: 94  CURRENT BLOOD PRESSURE:  BP: 116/78  24HR BLOOD PRESSURE RANGE:  Systolic (24hrs), Av , Min:115 , Max:148   ; Diastolic (24hrs), Av, Min:52, Max:78    24HR INTAKE/OUTPUT:    Intake/Output Summary (Last 24 hours) at 3/6/2024 0851  Last data filed at 3/6/2024 0805  Gross per 24 hour   Intake --   Output 2476 ml   Net -2476 ml       WEIGHT :Patient Vitals for the past 96 hrs (Last 3  readings):   Weight   03/05/24 1355 60.8 kg (134 lb)       PHYSICAL EXAM      GENERAL APPEARANCE: Awake and alert x 3  SKIN: Warm to touch and no erythema  EYES: conjunctivae normal and sclera anicteric  ENT: no thrush no pharyngeal congestion   NECK: No JVD   PULMONARY: Bilateral air entry and clear    CARDIOVASCULAR: Regular rate and rhythm with positive S1 and S2 and no rub  ABDOMEN: soft nontender, bowel sounds present, no ascites.  Ward in place and draining clear, pale urine       EXTREMITIES: Minimal peripheral edema at this time    CURRENT MEDICATIONS      sodium bicarbonate 75 mEq in sodium chloride 0.45 % 1,000 mL infusion, Continuous  amoxicillin-clavulanate (AUGMENTIN) 500-125 MG per tablet 1 tablet, 2 times per day  insulin glargine (LANTUS) injection vial 20 Units, Daily  prochlorperazine (COMPAZINE) injection 10 mg, Q6H PRN  tamsulosin (FLOMAX) capsule 0.4 mg, Daily  0.9 % sodium chloride infusion, PRN  lactobacillus (CULTURELLE) capsule 1 capsule, BID WC  0.9 % sodium chloride infusion, PRN  insulin lispro (HUMALOG) injection vial 0-8 Units, TID WC  insulin lispro (HUMALOG) injection vial 0-4 Units, Nightly  sodium hypochlorite (DAKINS) 0.125 % external solution, Daily  oxyCODONE (ROXICODONE) immediate release tablet 5 mg, Q4H PRN  glucose chewable tablet 16 g, PRN  dextrose bolus 10% 125 mL, PRN   Or  dextrose bolus 10% 250 mL, PRN  glucagon injection 1 mg, PRN  dextrose 10 % infusion, Continuous PRN  sodium chloride flush 0.9 % injection 5-40 mL, 2 times per day  sodium chloride flush 0.9 % injection 5-40 mL, PRN  0.9 % sodium chloride infusion, PRN  ondansetron (ZOFRAN-ODT) disintegrating tablet 4 mg, Q8H PRN   Or  ondansetron (ZOFRAN) injection 4 mg, Q6H PRN  polyethylene glycol (GLYCOLAX) packet 17 g, Daily PRN  acetaminophen (TYLENOL) tablet 650 mg, Q6H PRN   Or  acetaminophen (TYLENOL) suppository 650 mg, Q6H PRN  heparin (porcine) injection 5,000 Units, 3 times per day  heparin (porcine)

## 2024-03-06 NOTE — PROGRESS NOTES
1650 RN attempted to call report to [facility], no answer. Will try back     1704 RN gave report to OSEAS Gilliland, answered all questions

## 2024-03-06 NOTE — CARE COORDINATION
Transitional planning note: plan is [facility]. Insurance has approved LTACH. Patient will need HD treatment today and then can be discharged per medical team. Faxed request to [facility] for 5pm w/c . Spoke with christianne at [facility] regarding wound vac for patient and she states they would like wound vac dressings taken down and replaced with wet to dry for transport and they will put vac on patient at [facility].     1500: 5pm  time confirmed with samira from [facility]. Notified christianne at [facility] of  time.     1545: faxed cassie to facility

## 2024-03-06 NOTE — PLAN OF CARE
Problem: Discharge Planning  Goal: Discharge to home or other facility with appropriate resources  3/6/2024 0734 by Mari Reyes RN  Outcome: Progressing  3/6/2024 0014 by Guille Castillo RN  Outcome: Progressing     Problem: Pain  Goal: Verbalizes/displays adequate comfort level or baseline comfort level  3/6/2024 0734 by Mari Reyes RN  Outcome: Progressing  3/6/2024 0014 by Guille Castillo RN  Outcome: Progressing     Problem: Safety - Adult  Goal: Free from fall injury  3/6/2024 0734 by Mari Reyes RN  Outcome: Progressing  Flowsheets (Taken 3/6/2024 0734)  Free From Fall Injury: Instruct family/caregiver on patient safety  3/6/2024 0014 by Guille Castillo RN  Outcome: Progressing     Problem: Skin/Tissue Integrity  Goal: Absence of new skin breakdown  Description: 1.  Monitor for areas of redness and/or skin breakdown  2.  Assess vascular access sites hourly  3.  Every 4-6 hours minimum:  Change oxygen saturation probe site  4.  Every 4-6 hours:  If on nasal continuous positive airway pressure, respiratory therapy assess nares and determine need for appliance change or resting period.  Outcome: Progressing     Problem: ABCDS Injury Assessment  Goal: Absence of physical injury  Outcome: Progressing  Flowsheets (Taken 3/6/2024 0734)  Absence of Physical Injury: Implement safety measures based on patient assessment     Problem: Chronic Conditions and Co-morbidities  Goal: Patient's chronic conditions and co-morbidity symptoms are monitored and maintained or improved  Outcome: Progressing     Problem: Nutrition Deficit:  Goal: Optimize nutritional status  Outcome: Progressing     Problem: Neurosensory - Adult  Goal: Achieves maximal functionality and self care  Outcome: Progressing     Problem: Cardiovascular - Adult  Goal: Maintains optimal cardiac output and hemodynamic stability  Outcome: Progressing     Problem: Skin/Tissue Integrity - Adult  Goal: Skin integrity remains intact  Outcome:  Progressing  Flowsheets (Taken 3/6/2024 0871)  Skin Integrity Remains Intact: Monitor for areas of redness and/or skin breakdown  Goal: Incisions, wounds, or drain sites healing without S/S of infection  Outcome: Progressing  Goal: Oral mucous membranes remain intact  Outcome: Progressing     Problem: Gastrointestinal - Adult  Goal: Maintains adequate nutritional intake  Outcome: Progressing     Problem: Genitourinary - Adult  Goal: Absence of urinary retention  Outcome: Progressing     Problem: Infection - Adult  Goal: Absence of infection at discharge  Outcome: Progressing  Goal: Absence of infection during hospitalization  Outcome: Progressing     Problem: Musculoskeletal - Adult  Goal: Return mobility to safest level of function  Outcome: Progressing

## 2024-03-06 NOTE — PROGRESS NOTES
troponin 3/5/2024 Yes    NSTEMI (non-ST elevated myocardial infarction) ([facility]) 3/5/2024 Yes    Necrotizing soft tissue infection 3/5/2024 Yes    Hypertension, essential 2/28/2024 Yes    Stage 4 chronic kidney disease (HCC) 2/29/2024 Yes    Necrotizing fasciitis ([facility]) 2/22/2024 Yes    Acute kidney injury superimposed on CKD ([facility]) 2/29/2024 Yes    Hidradenitis suppurativa of multiple sites 2/22/2024 Yes    Metabolic acidosis 2/22/2024 Yes    Hyperkalemia 2/22/2024 Yes    Acute on chronic anemia 2/22/2024 Yes    Moderate malnutrition ([facility]) 2/22/2024 Yes    Bandemia 2/22/2024 Yes    Septic shock ([facility]) 2/22/2024 Yes    Severe sepsis ([facility]) 2/22/2024 Yes    Dependence on renal dialysis ([facility]) 2/28/2024 Yes    Polyuria 3/5/2024 Yes    Sinus tachycardia 3/5/2024 Yes   Plan:        Necrotizing fasciitis, bishnu gangrene with septic shock- off pressors, s/p excisional debridement wound bed preparation bilateral groin and armpit, chest and back, plastic surgery and ID evaluated, continue augmentin, cultures grew mixed bacterial growth, plastic surgery recommends wound VAC on wound on torso by wound care.  Follow-up outpatient in 2 weeks.  White count trending down, discussed with Dr. Mack. Finish Augmentin 3/8.    Tachycardia with elevated trop-echo shows preserved ef, cardio will follow in office to consider stress test     JAY on CKD stage IV- received dialysis but now significant urine output, continue iv fluids, nephrology following, creatinine upto 4, patient has tunneled catheter in place, bladder scan for retention, mulligan removed, plan to continue dialysis twice a week for now     Type 2 diabetes mellitus-initial DKA has resolved, continue Lantus, adjust dose, continue sliding scale, continue to monitor blood sugars with meals and at bedtime.     Anemia due to blood loss, acute illness- received prbc on admission, watch hb, gi signed off on admission     Urinary incontinence- passed void trial,  Urology will follow  outpatient urodynamic studies.     Discharge today after dialysis session.    Kendra Murray MD  3/6/2024  11:36 AM

## 2024-03-06 NOTE — PROGRESS NOTES
Dialysis Time Out  To be done by RN and tech or 2 RNs  Staff Names Ladan COLEY    [x]  Identity of the patient using 2 patient identifiers  [x]  Consent for treatment  [x]  Equipment-proper machine and dialyzer  [x]  B-Hep B status  [x]  Orders- to include bath, blood flow, dialyzer, time and fluid removal  [x]  Access-Correct site and in working order  [x]  Time for patient to ask questions.

## 2024-03-06 NOTE — PROGRESS NOTES
Dialysis Post Treatment Note  Vitals:    03/06/24 1450   BP: 115/75   Pulse: (!) 115   Resp: 18   Temp: 98.1 °F (36.7 °C)   SpO2:      Pre-Weight = 62.8 kg  Post-weight = Weight - Scale: 62.7 kg (138 lb 3.7 oz)  Total Liters Processed = Blood Volume Processed (Liters): 41.66 L  Rinseback Volume (mL) = Rinseback Volume (ml): 290 ml  Net Removal (mL) = 100   Patient's dry weight=no fluid removal  Type of access used=right perm cath  Length of treatment=180    Post treatment all blood returned, heparin instilled sterile end caps applied.  Pt tolerated treatment well and returning to room via .  Pt wound vac turned off for Mari FAROOQ on floor for discharge when returned to room. Post report called to Mari FAROOQ on floor.

## 2024-03-06 NOTE — DISCHARGE SUMMARY
and nightly)     Insulin Pen Needle 31G X 8 MM Misc  Commonly known as: B-D ULTRAFINE III SHORT PEN  Inject 1 each into the skin daily May substitute with any brand     magnesium oxide 400 MG tablet  Commonly known as: MAG-OX     pantoprazole 40 MG tablet  Commonly known as: PROTONIX     TRUEresult Blood Glucose w/Device Kit  1 kit by Does not apply route daily as needed     [facility] Ultra Thin Lancets Misc  1 each by Does not apply route 4 times daily (before meals and nightly)            STOP taking these medications      losartan 50 MG tablet  Commonly known as: COZAAR     metFORMIN 1000 MG tablet  Commonly known as: GLUCOPHAGE            ASK your doctor about these medications      * amoxicillin-clavulanate 875-125 MG per tablet  Commonly known as: AUGMENTIN  Take 1 tablet by mouth every 12 hours for 10 doses  Ask about: Should I take this medication?           * This list has 1 medication(s) that are the same as other medications prescribed for you. Read the directions carefully, and ask your doctor or other care provider to review them with you.                   Where to Get Your Medications        These medications were sent to [facility] - [place], [place] - [address] [facility] [phone] - F [phone]  [address] [place] [address]      Phone: [phone]   amoxicillin-clavulanate 500-125 MG per tablet  lactobacillus capsule  Lantus SoloStar 100 UNIT/ML injection pen  sodium hypochlorite 0.125 % Soln external solution  tamsulosin 0.4 MG capsule       These medications were sent to [id number] - [place], [place] - [address] - P [phone] - F [phone]  [address] [place] [address]      Phone: [phone]   amoxicillin-clavulanate 875-125 MG per tablet       You can get these medications from any pharmacy    Bring a paper prescription for each of these medications  oxyCODONE 5 MG immediate release tablet         No discharge procedures on  file.    Time Spent on discharge is  35 mins in patient examination, evaluation, counseling as well as medication reconciliation, prescriptions for required medications, discharge plan and follow up.    Electronically signed by   Kendra Murray MD  3/6/2024  11:41 AM      Thank you Elvin Davis MD for the opportunity to be involved in this patient's care.

## 2024-03-06 NOTE — PROGRESS NOTES
[facility]  Progress Note                   Date:   3/6/2024  Patient name: Jules Nichols  Date of admission:  2/21/2024 10:41 PM  MRN:   [id number]  YOB: 1980  PCP: Elvin Kelley MD    Reason for Admission: Necrotizing fasciitis (HCC) [M72.6]  Dago's gangrene [N49.3]    Subjective:       Clinical Changes /Abnormalities: Stable overnight. No acute CV issues/concerns. Labs, vitals, & tele reviewed. Remains SR/ST on tele. Currently SR 94    Review of Systems    Medications:   Scheduled Meds:   amoxicillin-clavulanate  1 tablet Oral 2 times per day    insulin glargine  20 Units SubCUTAneous Daily    tamsulosin  0.4 mg Oral Daily    lactobacillus  1 capsule Oral BID WC    insulin lispro  0-8 Units SubCUTAneous TID WC    insulin lispro  0-4 Units SubCUTAneous Nightly    sodium hypochlorite   Irrigation Daily    sodium chloride flush  5-40 mL IntraVENous 2 times per day    heparin (porcine)  5,000 Units SubCUTAneous 3 times per day     Continuous Infusions:   sodium bicarbonate 75 mEq in sodium chloride 0.45 % 1,000 mL infusion 150 mL/hr at 03/06/24 0803    sodium chloride      sodium chloride Stopped (02/27/24 0738)    dextrose      sodium chloride Stopped (02/28/24 1046)     CBC:   Recent Labs     03/04/24  0259 03/05/24  0845   WBC 10.2 9.0   HGB 8.8* 7.9*    305     BMP:    Recent Labs     03/04/24  0259 03/05/24  0845 03/06/24  0308    139 141   K 4.7 4.6 5.0    110* 106   CO2 21 17* 22   BUN 53* 64* 66*   CREATININE 3.8* 4.0* 3.9*   GLUCOSE 108* 125* 115*     Hepatic:No results for input(s): \"AST\", \"ALT\", \"ALB\", \"BILITOT\", \"ALKPHOS\" in the last 72 hours.  Troponin:   Recent Labs     03/04/24  2251 03/05/24  0023   TROPHS 89* 87*     BNP: No results for input(s): \"BNP\" in the last 72 hours.  Lipids: No results for input(s): \"CHOL\", \"HDL\" in the last 72 hours.    Invalid input(s): \"LDLCALCU\"  INR: No results for input(s): \"INR\" in the last 72  time. Will sign off. Can f/u as OP for further evaluation once acute issues improve.     Electronically signed by EMELY Friend CNP on 3/6/2024 at 12:06 PM  [facility].  [phone]

## 2024-03-25 LAB
MICROORGANISM SPEC CULT: ABNORMAL
MICROORGANISM SPEC CULT: ABNORMAL
MICROORGANISM/AGENT SPEC: ABNORMAL
SPECIMEN DESCRIPTION: ABNORMAL

## 2024-04-04 PROBLEM — R79.89 ELEVATED TROPONIN: Status: RESOLVED | Noted: 2024-03-05 | Resolved: 2024-04-04

## 2024-04-22 ENCOUNTER — APPOINTMENT (OUTPATIENT)
Dept: GENERAL RADIOLOGY | Age: 44
End: 2024-04-22
Attending: EMERGENCY MEDICINE
Payer: MEDICAID

## 2024-04-22 ENCOUNTER — HOSPITAL ENCOUNTER (OUTPATIENT)
Age: 44
Setting detail: OBSERVATION
Discharge: HOME | End: 2024-04-23
Attending: EMERGENCY MEDICINE | Admitting: INTERNAL MEDICINE
Payer: MEDICAID

## 2024-04-22 DIAGNOSIS — N18.6 END STAGE RENAL DISEASE (HCC): Primary | ICD-10-CM

## 2024-04-22 DIAGNOSIS — L73.2 HIDRADENITIS: ICD-10-CM

## 2024-04-22 PROBLEM — Z99.2 ESRD NEEDING DIALYSIS (HCC): Status: ACTIVE | Noted: 2024-04-22

## 2024-04-22 LAB
ALBUMIN SERPL-MCNC: 3.9 G/DL (ref 3.5–5.2)
ALP SERPL-CCNC: 185 U/L (ref 40–129)
ALT SERPL-CCNC: 29 U/L (ref 5–41)
ANION GAP SERPL CALCULATED.3IONS-SCNC: 15 MMOL/L (ref 9–17)
AST SERPL-CCNC: 20 U/L
BACTERIA URNS QL MICRO: ABNORMAL
BASOPHILS # BLD: 0.1 K/UL (ref 0–0.2)
BASOPHILS NFR BLD: 1 % (ref 0–2)
BILIRUB SERPL-MCNC: 0.3 MG/DL (ref 0.3–1.2)
BILIRUB UR QL STRIP: NEGATIVE
BUN SERPL-MCNC: 46 MG/DL (ref 6–20)
CALCIUM SERPL-MCNC: 9.3 MG/DL (ref 8.6–10.4)
CASTS #/AREA URNS LPF: ABNORMAL /LPF
CHLORIDE SERPL-SCNC: 98 MMOL/L (ref 98–107)
CLARITY UR: CLEAR
CO2 SERPL-SCNC: 24 MMOL/L (ref 20–31)
COLOR UR: YELLOW
CREAT SERPL-MCNC: 6.6 MG/DL (ref 0.7–1.2)
EOSINOPHIL # BLD: 0.2 K/UL (ref 0–0.4)
EOSINOPHILS RELATIVE PERCENT: 3 % (ref 0–4)
EPI CELLS #/AREA URNS HPF: ABNORMAL /HPF
ERYTHROCYTE [DISTWIDTH] IN BLOOD BY AUTOMATED COUNT: 18.7 % (ref 11.5–14.9)
GFR SERPL CREATININE-BSD FRML MDRD: 10 ML/MIN/1.73M2
GLUCOSE BLD-MCNC: 158 MG/DL (ref 75–110)
GLUCOSE SERPL-MCNC: 88 MG/DL (ref 70–99)
GLUCOSE UR STRIP-MCNC: ABNORMAL MG/DL
HCT VFR BLD AUTO: 30.7 % (ref 41–53)
HGB BLD-MCNC: 9.7 G/DL (ref 13.5–17.5)
HGB UR QL STRIP.AUTO: NEGATIVE
KETONES UR STRIP-MCNC: NEGATIVE MG/DL
LACTATE BLDV-SCNC: 1.1 MMOL/L (ref 0.5–1.9)
LACTATE BLDV-SCNC: 1.3 MMOL/L (ref 0.5–1.9)
LEUKOCYTE ESTERASE UR QL STRIP: NEGATIVE
LYMPHOCYTES NFR BLD: 1.6 K/UL (ref 1–4.8)
LYMPHOCYTES RELATIVE PERCENT: 20 % (ref 24–44)
MCH RBC QN AUTO: 29.4 PG (ref 26–34)
MCHC RBC AUTO-ENTMCNC: 31.4 G/DL (ref 31–37)
MCV RBC AUTO: 93.4 FL (ref 80–100)
MONOCYTES NFR BLD: 0.5 K/UL (ref 0.1–1.3)
MONOCYTES NFR BLD: 6 % (ref 1–7)
NEUTROPHILS NFR BLD: 70 % (ref 36–66)
NEUTS SEG NFR BLD: 5.8 K/UL (ref 1.3–9.1)
NITRITE UR QL STRIP: NEGATIVE
PH UR STRIP: 7 [PH] (ref 5–8)
PLATELET # BLD AUTO: 289 K/UL (ref 150–450)
PMV BLD AUTO: 7.3 FL (ref 6–12)
POTASSIUM SERPL-SCNC: 4.6 MMOL/L (ref 3.7–5.3)
PROCALCITONIN SERPL-MCNC: 0.45 NG/ML
PROT SERPL-MCNC: 7.6 G/DL (ref 6.4–8.3)
PROT UR STRIP-MCNC: ABNORMAL MG/DL
RBC # BLD AUTO: 3.29 M/UL (ref 4.5–5.9)
RBC #/AREA URNS HPF: ABNORMAL /HPF
SODIUM SERPL-SCNC: 137 MMOL/L (ref 135–144)
SP GR UR STRIP: 1.01 (ref 1–1.03)
TROPONIN I SERPL HS-MCNC: 88 NG/L (ref 0–22)
UROBILINOGEN UR STRIP-ACNC: NORMAL EU/DL (ref 0–1)
WBC #/AREA URNS HPF: ABNORMAL /HPF
WBC OTHER # BLD: 8.3 K/UL (ref 3.5–11)

## 2024-04-22 PROCEDURE — 6360000002 HC RX W HCPCS: Performed by: NURSE PRACTITIONER

## 2024-04-22 PROCEDURE — 36415 COLL VENOUS BLD VENIPUNCTURE: CPT

## 2024-04-22 PROCEDURE — 84145 PROCALCITONIN (PCT): CPT

## 2024-04-22 PROCEDURE — 96360 HYDRATION IV INFUSION INIT: CPT

## 2024-04-22 PROCEDURE — 81001 URINALYSIS AUTO W/SCOPE: CPT

## 2024-04-22 PROCEDURE — 96372 THER/PROPH/DIAG INJ SC/IM: CPT

## 2024-04-22 PROCEDURE — G0378 HOSPITAL OBSERVATION PER HR: HCPCS

## 2024-04-22 PROCEDURE — 6370000000 HC RX 637 (ALT 250 FOR IP): Performed by: NURSE PRACTITIONER

## 2024-04-22 PROCEDURE — 6370000000 HC RX 637 (ALT 250 FOR IP): Performed by: EMERGENCY MEDICINE

## 2024-04-22 PROCEDURE — 1200000000 HC SEMI PRIVATE

## 2024-04-22 PROCEDURE — 82947 ASSAY GLUCOSE BLOOD QUANT: CPT

## 2024-04-22 PROCEDURE — 2580000003 HC RX 258: Performed by: EMERGENCY MEDICINE

## 2024-04-22 PROCEDURE — 83605 ASSAY OF LACTIC ACID: CPT

## 2024-04-22 PROCEDURE — 2580000003 HC RX 258: Performed by: NURSE PRACTITIONER

## 2024-04-22 PROCEDURE — 80053 COMPREHEN METABOLIC PANEL: CPT

## 2024-04-22 PROCEDURE — 99285 EMERGENCY DEPT VISIT HI MDM: CPT

## 2024-04-22 PROCEDURE — 71045 X-RAY EXAM CHEST 1 VIEW: CPT

## 2024-04-22 PROCEDURE — 85025 COMPLETE CBC W/AUTO DIFF WBC: CPT

## 2024-04-22 PROCEDURE — 87040 BLOOD CULTURE FOR BACTERIA: CPT

## 2024-04-22 PROCEDURE — 93005 ELECTROCARDIOGRAM TRACING: CPT | Performed by: EMERGENCY MEDICINE

## 2024-04-22 PROCEDURE — 84484 ASSAY OF TROPONIN QUANT: CPT

## 2024-04-22 RX ORDER — ONDANSETRON 4 MG/1
4 TABLET, ORALLY DISINTEGRATING ORAL EVERY 8 HOURS PRN
Status: DISCONTINUED | OUTPATIENT
Start: 2024-04-22 | End: 2024-04-23 | Stop reason: HOSPADM

## 2024-04-22 RX ORDER — SEVELAMER CARBONATE 800 MG/1
800 TABLET, FILM COATED ORAL
Status: DISCONTINUED | OUTPATIENT
Start: 2024-04-23 | End: 2024-04-23 | Stop reason: HOSPADM

## 2024-04-22 RX ORDER — MIDODRINE HYDROCHLORIDE 5 MG/1
5 TABLET ORAL ONCE
Status: COMPLETED | OUTPATIENT
Start: 2024-04-22 | End: 2024-04-22

## 2024-04-22 RX ORDER — INSULIN LISPRO 100 [IU]/ML
0-8 INJECTION, SOLUTION INTRAVENOUS; SUBCUTANEOUS
Status: DISCONTINUED | OUTPATIENT
Start: 2024-04-23 | End: 2024-04-23 | Stop reason: HOSPADM

## 2024-04-22 RX ORDER — INSULIN LISPRO 100 [IU]/ML
0-4 INJECTION, SOLUTION INTRAVENOUS; SUBCUTANEOUS NIGHTLY
Status: DISCONTINUED | OUTPATIENT
Start: 2024-04-22 | End: 2024-04-23 | Stop reason: HOSPADM

## 2024-04-22 RX ORDER — SEVELAMER CARBONATE 800 MG/1
800 TABLET, FILM COATED ORAL
COMMUNITY
Start: 2024-04-10 | End: 2024-05-10

## 2024-04-22 RX ORDER — ONDANSETRON 2 MG/ML
4 INJECTION INTRAMUSCULAR; INTRAVENOUS EVERY 6 HOURS PRN
Status: DISCONTINUED | OUTPATIENT
Start: 2024-04-22 | End: 2024-04-23 | Stop reason: HOSPADM

## 2024-04-22 RX ORDER — ACETAMINOPHEN 325 MG/1
650 TABLET ORAL EVERY 6 HOURS PRN
Status: DISCONTINUED | OUTPATIENT
Start: 2024-04-22 | End: 2024-04-23 | Stop reason: HOSPADM

## 2024-04-22 RX ORDER — MIDODRINE HYDROCHLORIDE 10 MG/1
10 TABLET ORAL
COMMUNITY
Start: 2024-04-10 | End: 2024-05-10

## 2024-04-22 RX ORDER — SODIUM CHLORIDE 0.9 % (FLUSH) 0.9 %
5-40 SYRINGE (ML) INJECTION EVERY 12 HOURS SCHEDULED
Status: DISCONTINUED | OUTPATIENT
Start: 2024-04-22 | End: 2024-04-23 | Stop reason: HOSPADM

## 2024-04-22 RX ORDER — SODIUM CHLORIDE 9 MG/ML
INJECTION, SOLUTION INTRAVENOUS PRN
Status: DISCONTINUED | OUTPATIENT
Start: 2024-04-22 | End: 2024-04-23 | Stop reason: HOSPADM

## 2024-04-22 RX ORDER — 0.9 % SODIUM CHLORIDE 0.9 %
30 INTRAVENOUS SOLUTION INTRAVENOUS ONCE
Status: COMPLETED | OUTPATIENT
Start: 2024-04-22 | End: 2024-04-22

## 2024-04-22 RX ORDER — ACETAMINOPHEN 650 MG/1
650 SUPPOSITORY RECTAL EVERY 6 HOURS PRN
Status: DISCONTINUED | OUTPATIENT
Start: 2024-04-22 | End: 2024-04-23 | Stop reason: HOSPADM

## 2024-04-22 RX ORDER — MIDODRINE HYDROCHLORIDE 10 MG/1
10 TABLET ORAL PRN
Status: DISCONTINUED | OUTPATIENT
Start: 2024-04-22 | End: 2024-04-23 | Stop reason: HOSPADM

## 2024-04-22 RX ORDER — LANOLIN ALCOHOL/MO/W.PET/CERES
3 CREAM (GRAM) TOPICAL NIGHTLY PRN
Status: DISCONTINUED | OUTPATIENT
Start: 2024-04-22 | End: 2024-04-23 | Stop reason: HOSPADM

## 2024-04-22 RX ORDER — HEPARIN SODIUM 5000 [USP'U]/ML
5000 INJECTION, SOLUTION INTRAVENOUS; SUBCUTANEOUS EVERY 8 HOURS SCHEDULED
Status: DISCONTINUED | OUTPATIENT
Start: 2024-04-22 | End: 2024-04-23 | Stop reason: HOSPADM

## 2024-04-22 RX ORDER — FERROUS SULFATE 325(65) MG
325 TABLET ORAL 2 TIMES DAILY WITH MEALS
COMMUNITY

## 2024-04-22 RX ORDER — POLYETHYLENE GLYCOL 3350 17 G/17G
17 POWDER, FOR SOLUTION ORAL DAILY PRN
Status: DISCONTINUED | OUTPATIENT
Start: 2024-04-22 | End: 2024-04-23 | Stop reason: HOSPADM

## 2024-04-22 RX ORDER — FERROUS SULFATE 325(65) MG
325 TABLET ORAL 2 TIMES DAILY WITH MEALS
Status: DISCONTINUED | OUTPATIENT
Start: 2024-04-23 | End: 2024-04-23 | Stop reason: HOSPADM

## 2024-04-22 RX ORDER — BISACODYL 10 MG
10 SUPPOSITORY, RECTAL RECTAL DAILY PRN
Status: DISCONTINUED | OUTPATIENT
Start: 2024-04-22 | End: 2024-04-23 | Stop reason: HOSPADM

## 2024-04-22 RX ORDER — INSULIN GLARGINE 100 [IU]/ML
20 INJECTION, SOLUTION SUBCUTANEOUS DAILY
Status: DISCONTINUED | OUTPATIENT
Start: 2024-04-23 | End: 2024-04-23 | Stop reason: HOSPADM

## 2024-04-22 RX ORDER — MIDODRINE HYDROCHLORIDE 5 MG/1
15 TABLET ORAL 3 TIMES DAILY
COMMUNITY
Start: 2024-04-10 | End: 2024-05-10

## 2024-04-22 RX ORDER — TAMSULOSIN HYDROCHLORIDE 0.4 MG/1
0.4 CAPSULE ORAL NIGHTLY
Status: DISCONTINUED | OUTPATIENT
Start: 2024-04-22 | End: 2024-04-23 | Stop reason: HOSPADM

## 2024-04-22 RX ORDER — DEXTROSE MONOHYDRATE 100 MG/ML
INJECTION, SOLUTION INTRAVENOUS CONTINUOUS PRN
Status: DISCONTINUED | OUTPATIENT
Start: 2024-04-22 | End: 2024-04-23 | Stop reason: HOSPADM

## 2024-04-22 RX ORDER — SODIUM CHLORIDE 0.9 % (FLUSH) 0.9 %
10 SYRINGE (ML) INJECTION PRN
Status: DISCONTINUED | OUTPATIENT
Start: 2024-04-22 | End: 2024-04-23 | Stop reason: HOSPADM

## 2024-04-22 RX ORDER — LACTOBACILLUS RHAMNOSUS GG 10B CELL
1 CAPSULE ORAL 2 TIMES DAILY WITH MEALS
Status: DISCONTINUED | OUTPATIENT
Start: 2024-04-23 | End: 2024-04-23 | Stop reason: HOSPADM

## 2024-04-22 RX ADMIN — SODIUM CHLORIDE, PRESERVATIVE FREE 10 ML: 5 INJECTION INTRAVENOUS at 23:07

## 2024-04-22 RX ADMIN — TAMSULOSIN HYDROCHLORIDE 0.4 MG: 0.4 CAPSULE ORAL at 23:03

## 2024-04-22 RX ADMIN — MIDODRINE HYDROCHLORIDE 15 MG: 5 TABLET ORAL at 23:03

## 2024-04-22 RX ADMIN — MIDODRINE HYDROCHLORIDE 5 MG: 5 TABLET ORAL at 16:23

## 2024-04-22 RX ADMIN — SODIUM CHLORIDE 2040 ML: 9 INJECTION, SOLUTION INTRAVENOUS at 17:00

## 2024-04-22 RX ADMIN — HEPARIN SODIUM 5000 UNITS: 5000 INJECTION INTRAVENOUS; SUBCUTANEOUS at 23:04

## 2024-04-22 ASSESSMENT — LIFESTYLE VARIABLES
HOW OFTEN DO YOU HAVE A DRINK CONTAINING ALCOHOL: NEVER
HOW MANY STANDARD DRINKS CONTAINING ALCOHOL DO YOU HAVE ON A TYPICAL DAY: PATIENT DOES NOT DRINK

## 2024-04-22 ASSESSMENT — PAIN - FUNCTIONAL ASSESSMENT: PAIN_FUNCTIONAL_ASSESSMENT: NONE - DENIES PAIN

## 2024-04-23 VITALS
SYSTOLIC BLOOD PRESSURE: 96 MMHG | HEART RATE: 94 BPM | HEIGHT: 62 IN | WEIGHT: 137.57 LBS | RESPIRATION RATE: 18 BRPM | TEMPERATURE: 98.8 F | OXYGEN SATURATION: 99 % | DIASTOLIC BLOOD PRESSURE: 67 MMHG | BODY MASS INDEX: 25.32 KG/M2

## 2024-04-23 PROBLEM — N18.6 ESRD (END STAGE RENAL DISEASE) (HCC): Status: ACTIVE | Noted: 2024-04-23

## 2024-04-23 LAB
ANION GAP SERPL CALCULATED.3IONS-SCNC: 12 MMOL/L (ref 9–17)
BASOPHILS # BLD: 0.1 K/UL (ref 0–0.2)
BASOPHILS NFR BLD: 1 % (ref 0–2)
BUN SERPL-MCNC: 47 MG/DL (ref 6–20)
CALCIUM SERPL-MCNC: 8.8 MG/DL (ref 8.6–10.4)
CHLORIDE SERPL-SCNC: 109 MMOL/L (ref 98–107)
CO2 SERPL-SCNC: 21 MMOL/L (ref 20–31)
CREAT SERPL-MCNC: 6.8 MG/DL (ref 0.7–1.2)
EKG ATRIAL RATE: 105 BPM
EKG P AXIS: 20 DEGREES
EKG P-R INTERVAL: 128 MS
EKG Q-T INTERVAL: 326 MS
EKG QRS DURATION: 76 MS
EKG QTC CALCULATION (BAZETT): 430 MS
EKG R AXIS: 20 DEGREES
EKG T AXIS: 44 DEGREES
EKG VENTRICULAR RATE: 105 BPM
EOSINOPHIL # BLD: 0.2 K/UL (ref 0–0.4)
EOSINOPHILS RELATIVE PERCENT: 5 % (ref 0–4)
ERYTHROCYTE [DISTWIDTH] IN BLOOD BY AUTOMATED COUNT: 19.2 % (ref 11.5–14.9)
GFR SERPL CREATININE-BSD FRML MDRD: 9 ML/MIN/1.73M2
GLUCOSE BLD-MCNC: 81 MG/DL (ref 75–110)
GLUCOSE BLD-MCNC: 94 MG/DL (ref 75–110)
GLUCOSE SERPL-MCNC: 113 MG/DL (ref 70–99)
HCT VFR BLD AUTO: 27.1 % (ref 41–53)
HGB BLD-MCNC: 8.7 G/DL (ref 13.5–17.5)
LYMPHOCYTES NFR BLD: 1.4 K/UL (ref 1–4.8)
LYMPHOCYTES RELATIVE PERCENT: 28 % (ref 24–44)
MCH RBC QN AUTO: 30.6 PG (ref 26–34)
MCHC RBC AUTO-ENTMCNC: 32.1 G/DL (ref 31–37)
MCV RBC AUTO: 95.4 FL (ref 80–100)
MONOCYTES NFR BLD: 0.4 K/UL (ref 0.1–1.3)
MONOCYTES NFR BLD: 7 % (ref 1–7)
NEUTROPHILS NFR BLD: 59 % (ref 36–66)
NEUTS SEG NFR BLD: 2.9 K/UL (ref 1.3–9.1)
PLATELET # BLD AUTO: 241 K/UL (ref 150–450)
PMV BLD AUTO: 6.9 FL (ref 6–12)
POTASSIUM SERPL-SCNC: 4.8 MMOL/L (ref 3.7–5.3)
RBC # BLD AUTO: 2.84 M/UL (ref 4.5–5.9)
SODIUM SERPL-SCNC: 142 MMOL/L (ref 135–144)
WBC OTHER # BLD: 5 K/UL (ref 3.5–11)

## 2024-04-23 PROCEDURE — 82947 ASSAY GLUCOSE BLOOD QUANT: CPT

## 2024-04-23 PROCEDURE — 90935 HEMODIALYSIS ONE EVALUATION: CPT

## 2024-04-23 PROCEDURE — 6370000000 HC RX 637 (ALT 250 FOR IP): Performed by: NURSE PRACTITIONER

## 2024-04-23 PROCEDURE — 2580000003 HC RX 258: Performed by: NURSE PRACTITIONER

## 2024-04-23 PROCEDURE — 36415 COLL VENOUS BLD VENIPUNCTURE: CPT

## 2024-04-23 PROCEDURE — 93010 ELECTROCARDIOGRAM REPORT: CPT | Performed by: INTERNAL MEDICINE

## 2024-04-23 PROCEDURE — 80048 BASIC METABOLIC PNL TOTAL CA: CPT

## 2024-04-23 PROCEDURE — 99213 OFFICE O/P EST LOW 20 MIN: CPT

## 2024-04-23 PROCEDURE — 85025 COMPLETE CBC W/AUTO DIFF WBC: CPT

## 2024-04-23 PROCEDURE — G0378 HOSPITAL OBSERVATION PER HR: HCPCS

## 2024-04-23 PROCEDURE — 2500000003 HC RX 250 WO HCPCS: Performed by: INTERNAL MEDICINE

## 2024-04-23 PROCEDURE — 99223 1ST HOSP IP/OBS HIGH 75: CPT | Performed by: INTERNAL MEDICINE

## 2024-04-23 PROCEDURE — 6360000002 HC RX W HCPCS: Performed by: NURSE PRACTITIONER

## 2024-04-23 PROCEDURE — 96372 THER/PROPH/DIAG INJ SC/IM: CPT

## 2024-04-23 RX ADMIN — MIDODRINE HYDROCHLORIDE 15 MG: 5 TABLET ORAL at 13:16

## 2024-04-23 RX ADMIN — SEVELAMER CARBONATE 800 MG: 800 TABLET, FILM COATED ORAL at 08:03

## 2024-04-23 RX ADMIN — Medication 1.6 ML: at 12:22

## 2024-04-23 RX ADMIN — Medication 1 CAPSULE: at 08:04

## 2024-04-23 RX ADMIN — HEPARIN SODIUM 5000 UNITS: 5000 INJECTION INTRAVENOUS; SUBCUTANEOUS at 06:03

## 2024-04-23 RX ADMIN — INSULIN GLARGINE 20 UNITS: 100 INJECTION, SOLUTION SUBCUTANEOUS at 08:03

## 2024-04-23 RX ADMIN — HEPARIN SODIUM 5000 UNITS: 5000 INJECTION INTRAVENOUS; SUBCUTANEOUS at 13:16

## 2024-04-23 RX ADMIN — MIDODRINE HYDROCHLORIDE 15 MG: 5 TABLET ORAL at 08:03

## 2024-04-23 RX ADMIN — Medication 1.6 ML: at 12:18

## 2024-04-23 RX ADMIN — FERROUS SULFATE TAB 325 MG (65 MG ELEMENTAL FE) 325 MG: 325 (65 FE) TAB at 08:04

## 2024-04-23 RX ADMIN — SEVELAMER CARBONATE 800 MG: 800 TABLET, FILM COATED ORAL at 13:16

## 2024-04-23 RX ADMIN — SODIUM CHLORIDE, PRESERVATIVE FREE 10 ML: 5 INJECTION INTRAVENOUS at 08:05

## 2024-04-23 RX ADMIN — MIDODRINE HYDROCHLORIDE 10 MG: 10 TABLET ORAL at 10:23

## 2024-04-23 NOTE — PROGRESS NOTES
HEMODIALYSIS POST TREATMENT NOTE    Treatment time ordered: 3:00    Actual treatment time: 3:00    UltraFiltration Goal: 0  UltraFiltration Removed: 0      Pre Treatment weight: 62.5 kg  Post Treatment weight: 62.4 kg   Estimated Dry Weight: not available    Access used:     Central Venous Catheter: yes     Internal Access: no       Access Flow: good     Sign and symptoms of infection: no       If YES: n/a    Medications or blood products given: yes    Regular outpatient schedule: no out patient schedule yet    Summary of response to treatment: tolerated treatment well,    Explain if orders NOT met, was physician notified:n/a      ACES flowsheet faxed to patient unit/ placed in patient chart: yes    Post assessment completed: yes    Report given to: Nadege Boudreaux RN      * Intra-treatment documented Safety Checks include the followin) Access and face visible at all times.     2) All connections and blood lines are secure with no kinks.     3) NVL alarm engaged.     4) Hemosafe device applied (if applicable).     5) No collapse of Arterial or Venous blood chambers.     6) All blood lines / pump segments in the air detectors.

## 2024-04-23 NOTE — PROGRESS NOTES
Spoke with NP Ladan Raya regarding wounds to pts back, chest, groin and underarms. New order for consult to wound care nurse.

## 2024-04-23 NOTE — H&P
Absolute 2.90 1.3 - 9.1 k/uL    Lymphocytes Absolute 1.40 1.0 - 4.8 k/uL    Monocytes Absolute 0.40 0.1 - 1.3 k/uL    Eosinophils Absolute 0.20 0.0 - 0.4 k/uL    Basophils Absolute 0.10 0.0 - 0.2 k/uL   POC Glucose Fingerstick    Collection Time: 04/23/24  6:11 AM   Result Value Ref Range    POC Glucose 94 75 - 110 mg/dL   POC Glucose Fingerstick    Collection Time: 04/23/24  1:06 PM   Result Value Ref Range    POC Glucose 81 75 - 110 mg/dL       Imaging/Diagnostics:  XR CHEST PORTABLE    Result Date: 4/22/2024  No acute cardiopulmonary process.       Assessment :      Hospital Problems             Last Modified POA    * (Principal) ESRD needing dialysis ([facility]) 4/22/2024 Yes    Type 2 diabetes mellitus with kidney complication, with long-term current use of insulin ([facility]) 4/23/2024 Yes    Dependence on renal dialysis ([facility]) 4/23/2024 Yes       Plan:     Patient status inpatient in the Med/Surge    1.ESRD needing dialysis - On M-W-F schedule.   Missed dialysis   -Social Service consulted for discharge planning to help get dialysis arranged  -Nephrology consulted   DM,sugars AC&HS   HTN , BP running lower side   Getting dialysed   DC with HD set up   Follow with nephro/PCP  2. Disposition 2d      Consultations:   IP CONSULT TO INTERNAL MEDICINE  IP CONSULT TO NEPHROLOGY  IP CONSULT TO SOCIAL WORK     Patient is admitted as inpatient status because of co-morbidities listed above, severity of signs and symptoms as outlined, requirement for current medical therapies and most importantly because of direct risk to patient if care not provided in a hospital setting.  Expected length of stay > 48 hours.    Delia Madison MD  4/23/2024  3:00 PM    Copy sent to Elvin Davis MD    Please note that this chart was generated using voice recognition Dragon dictation software.  Although every effort was made to ensure the accuracy of this automated transcription, some errors in transcription may have occurred.

## 2024-04-23 NOTE — ED PROVIDER NOTES
[facility] ED  EMERGENCY DEPARTMENT ENCOUNTER      Pt Name: Jules Nichols  MRN: [id number]  Birthdate 1980  Date of evaluation: 4/22/24      CHIEF COMPLAINT       Chief Complaint   Patient presents with    TB testing     Pt states he went to a new dialysis facility today and the nurse told him he needed to come here for to have xray done to rule out Tb before getting dialysis done. Pt states they offered him the skin test but he refused it. Pt states there was \"something else they needed\" but he couldn't remember. Pt's answers are very vague and he is very poor historian.     Hypotension     Pt mentioned that his BP has been running low. He denies any dizziness, lightheadedness. Pt denies any chest pain/sob.          HISTORY OF PRESENT ILLNESS   HPI 44 y.o. male presents for evaluation after missing dialysis.  The patient is on hemodialysis his dialysis days are Monday Wednesday Friday his last dialysis session was last Friday.  He was recently in the hospital getting dialysis  was in an LTAC getting dialysis, then went to a nursing home getting dialysis.  He was just discharged from the nursing home, he went to what he thought would be as outpatient dialysis appointment today but they were unable to do dialysis.  They called the nephrologist who recommended that the patient come to the emergency department.  Patient does make urine no chest pain no shortness of breath no lightheadedness or dizziness no fevers or chills.  Patient does have hidradenitis chronic wounds.  He reports that they are healing well he is continues to do his dressing changes.      REVIEW OF SYSTEMS     Review of Systems  10 systems reviewed and negative unless otherwise noted in the HPI  PAST MEDICAL HISTORY     Past Medical History:   Diagnosis Date    Benign essential HTN     Chronic kidney disease 1/20/16    Dr Stafford    Diabetic keto-acidosis (HCC) 1/20/16    hx of     Hyperlipemia     Uncontrolled type 2 diabetes mellitus with

## 2024-04-23 NOTE — PROGRESS NOTES
Jules Nichols is a 44 y.o. Unavailable / unknown male who presents with TB testing (Pt states he went to a new dialysis facility today and the nurse told him he needed to come here for to have xray done to rule out Tb before getting dialysis done. Pt states they offered him the skin test but he refused it. Pt states there was \"something else they needed\" but he couldn't remember. Pt's answers are very vague and he is very poor historian. ) and Hypotension (Pt mentioned that his BP has been running low. He denies any dizziness, lightheadedness. Pt denies any chest pain/sob. )   and is admitted to the hospital for the management of ESRD needing dialysis ([facility]).    Patient's medical history significant for anemia, dependence on renal dialysis, hypertension, necrotizing fasciitis, and uncontrolled DM type 2.     According to patient, he was recently discharged from [facility] (4/10) to a skilled nursing facility, and was discharged home from [facility] yesterday.  Reports that he went to went to his outpatient dialysis center today for his treatment, but was told that the [facility] had not completed all the testing that needed to be done in order for him to be dialyzed today.  Patient is a poor historian, but thinks that he needed to have testing for TB before [facility] will set up his appointment.  Otherwise, patient denies complaints.      Patient is noted to have multiple open sores on his back, chest, axilla and groin, which he states are hidradenitis lesions.  (See images in media tab). Will consult wound care to eval and treat.      Patient status inpatient in the [facility]    Hospital Problems             Last Modified POA    * (Principal) ESRD needing dialysis ([facility]) 4/22/2024 Yes    Type 2 diabetes mellitus with kidney complication, with long-term current use of insulin ([facility]) 4/23/2024 Yes    Dependence on renal dialysis ([facility]) 4/23/2024 Yes       ESRD needing dialysis - On M-W-F schedule.  Went for treatment

## 2024-04-23 NOTE — CONSULTS
meals  lactobacillus (CULTURELLE) capsule 1 capsule, BID WC        Allergies:  Patient has no known allergies.    Social History:   Social History     Socioeconomic History    Marital status: Single     Spouse name: Not on file    Number of children: Not on file    Years of education: Not on file    Highest education level: Not on file   Occupational History    Not on file   Tobacco Use    Smoking status: Never    Smokeless tobacco: Never   Substance and Sexual Activity    Alcohol use: No     Alcohol/week: 0.0 standard drinks of alcohol    Drug use: No    Sexual activity: Not on file   Other Topics Concern    Not on file   Social History Narrative    Not on file     Social Determinants of Health     Financial Resource Strain: Not on file   Food Insecurity: No Food Insecurity (4/22/2024)    Hunger Vital Sign     Worried About Running Out of Food in the Last Year: Never true     Ran Out of Food in the Last Year: Never true   Transportation Needs: No Transportation Needs (4/22/2024)    PRAPARE - Transportation     Lack of Transportation (Medical): No     Lack of Transportation (Non-Medical): No   Physical Activity: Not on file   Stress: Not on file   Social Connections: Not on file   Intimate Partner Violence: Not on file   Housing Stability: Low Risk  (4/22/2024)    Housing Stability Vital Sign     Unable to Pay for Housing in the Last Year: No     Number of Places Lived in the Last Year: 1     Unstable Housing in the Last Year: No       Family History:   History reviewed. No pertinent family history.    Review of Systems:    Constitutional: No fever, no chills, no night sweats, fatigue, generalized weakness, loss of appetite  HEENT:  No headache, otalgia, itchy eyes, epistaxis, nasal discharge or sore throat.  Cardiac:  No chest pain, dyspnea, orthopnea or PND, palpitations  Chest:  No cough, hemoptysis, pleuritic chest pain, wheezing,SOB  Abdomen:  No abdominal pain, nausea, vomiting, diarrhea, melena, dysphagia 
  Margins Defined edges 04/23/24 1355   Number of days: 0       Wound 04/23/24 Chest Mid;Distal (Active)   Wound Etiology Non-Healing Surgical 04/23/24 1355   Dressing Status Old drainage noted;New dressing applied 04/23/24 1355   Wound Cleansed Cleansed with saline 04/23/24 1355   Dressing/Treatment Hydrofiber Ag;Dry dressing 04/23/24 1355   Wound Length (cm) 2.6 cm 04/23/24 1355   Wound Width (cm) 2.3 cm 04/23/24 1355   Wound Depth (cm) 0.1 cm 04/23/24 1355   Wound Surface Area (cm^2) 5.98 cm^2 04/23/24 1355   Wound Volume (cm^3) 0.598 cm^3 04/23/24 1355   Wound Assessment Pink/red;Hyper granulation tissue 04/23/24 1355   Drainage Amount Small (< 25%) 04/23/24 1355   Drainage Description Serosanguinous 04/23/24 1355   Odor None 04/23/24 1355   Sariah-wound Assessment Dry/flaky 04/23/24 1355   Margins Defined edges 04/23/24 1355   Number of days: 0       Wound 04/23/24 Axilla Right (Active)   Wound Image   04/23/24 1355   Wound Etiology Non-Healing Surgical 04/23/24 1355   Dressing Status Old drainage noted;New dressing applied 04/23/24 1355   Wound Cleansed Cleansed with saline 04/23/24 1355   Dressing/Treatment Hydrofiber Ag;Dry dressing 04/23/24 1355   Wound Length (cm) 6.5 cm 04/23/24 1355   Wound Width (cm) 16 cm 04/23/24 1355   Wound Depth (cm) 0.1 cm 04/23/24 1355   Wound Surface Area (cm^2) 104 cm^2 04/23/24 1355   Wound Volume (cm^3) 10.4 cm^3 04/23/24 1355   Wound Assessment Pink/red;Hyper granulation tissue 04/23/24 1355   Drainage Amount Moderate (25-50%) 04/23/24 1355   Drainage Description Serosanguinous 04/23/24 1355   Odor None 04/23/24 1355   Sariah-wound Assessment Dry/flaky 04/23/24 1355   Margins Defined edges 04/23/24 1355   Number of days: 0       Wound 04/23/24 Groin Right (Active)   Wound Image   04/23/24 1355   Wound Etiology Non-Healing Surgical 04/23/24 1355   Dressing Status Old drainage noted;New dressing applied 04/23/24 1355   Wound Cleansed Cleansed with saline 04/23/24 1355

## 2024-04-23 NOTE — CARE COORDINATION
DISCHARGE PLANNING NOTE:    This writer contacted [facility] to discuss what testing was required prior to him being able to receive his hemodialysis at their facility. Per the [facility] they needed a chest x-ray (which was completed 4/22/24). Per [facility] they have everything set up for the first treatment at their facility, Wednesday, 4/24/24, at 11:15 a.m., but request that the patient show up fifteen minutes early to fill out paperwork.    This writer attended bedside and performed assessment. Updated the patient that his treatment is tomorrow at 11:15 a.m. Patient also agreeable to follow up appointment with Wound Care Clinic.    Electronically signed by Mariann Koenig RN on 4/23/2024 at 1:20 PM    
needed at discharge: Other (Comment) (Wound Care follow up)            Potential DME:    Patient expects to discharge to: House  Plan for transportation at discharge: Friends    Financial    Payor: [employer] [place]ICA[place] / Plan: [employer] [place] / Product Type: *No Product type* /     Does insurance require precert for SNF: Yes    Potential assistance Purchasing Medications: No  Meds-to-Beds request: Yes      [id number] [place], [place] - [address] [facility] [phone] - F [phone]  [address] [facility] [address]  [facility] [address]  Phone: [phone] Fax: [phone]      Notes:    Factors facilitating achievement of predicted outcomes: Friend support, Motivated, Cooperative, and Pleasant    Barriers to discharge: Wound care.    Additional Case Management Notes: The patient is from [address] [place] home with roommate (former [employer]) whom helps with dressing changes, independent, drives.    HD on MWF at [facility], first scheduled treatment 4/24/24 at 11:15 AM (they would like him to arrive at 11:00 AM). Patient open to outpatient follow up with [facility]. Appointment scheduled for Tuesday, 4/30/24 at 3:00 PM. Awaiting wound care consult. Patient will need supplies sent home with him.     The Plan for Transition of Care is related to the following treatment goals of Hidradenitis [L73.2]  End stage renal disease (HCC) [N18.6]  ESRD needing dialysis (HCC) [N18.6, Z99.2]    IF APPLICABLE: The Patient and/or patient representative Jules and his family were provided with a choice of provider and agrees with the discharge plan. Freedom of choice list with basic dialogue that supports the patient's individualized plan of care/goals and shares the quality data associated with the providers was provided to: Patient   Patient Representative Name:       The Patient and/or Patient Representative Agree with the Discharge Plan? Yes    Mariann Koenig RN  Case Management Department  Ph:

## 2024-04-23 NOTE — PROGRESS NOTES
HEMODIALYSIS PRE-TREATMENT NOTE    Patient Identifiers prior to treatment: Name, , MRN,     Isolation Required: NO                      Isolation Type: Standard       (please document if patient is being managed as a PUI/COVID-19 patient)        Hepatitis status:                           Date Drawn                             Result  Hepatitis B Surface Antigen 2024     Negative                     Hepatitis B Surface Antibody 2024 negative        Hepatitis B Core Antibody 2024 negative          How was Hepatitis Status verified: yes     Was a copy of the labs you documented provided to facility for the patient's chart: yes    Hemodialysis orders verified: yes    Access Within normal limits ( I.e. s/s of infection,...): yes     Pre-Assessment completed: yes    Pre-dialysis report received from: yes                      Time: 0900

## 2024-04-23 NOTE — DISCHARGE INSTRUCTIONS
Your information:  Name: Jules Nichols  : 1980    Your instructions:    Keep all scheduled appointments.  Follow instructions provided by wound care nurse.    Recommended activity: activity as tolerated    If any problems occur once I leave the hospital I am to contact my physician or go to the emergency room.

## 2024-04-23 NOTE — PROGRESS NOTES
Admitted to room 2062 from ED per ernesto. Oriented to room and call light. Vitals and assessment completed. No distress noted.

## 2024-04-24 ENCOUNTER — TELEPHONE (OUTPATIENT)
Dept: INTERNAL MEDICINE CLINIC | Age: 44
End: 2024-04-24

## 2024-04-24 NOTE — TELEPHONE ENCOUNTER
Care Transitions Initial Follow Up Call    Outreach made within 2 business days of discharge: Yes    Patient: Jules Nichols Patient : 1980   MRN: [id number]  Reason for Admission: There are no discharge diagnoses documented for the most recent discharge.  Discharge Date: 24       Flipped upcoming office visit to hospital follow up     Follow Up  Future Appointments   Date Time Provider Department Center   2024  3:00 PM Мария Ragsdale MD [facility]   2024 12:45 PM [facility]   7/10/2024  2:15 PM Jamar Gordon DO [facility]

## 2024-04-26 NOTE — DISCHARGE INSTRUCTIONS
[facility] WOUND and HYPERBARIC TREATMENT  CENTER      Visit  Discharge Instructions / Physician Orders  DATE:4/29/24     Home Care:NONE     SUPPLIES ORDERED THRU:    [employer]                 DATE LAST SUPPLIED 4/24/24     Wound Location:  Right Axilla, Left Axilla, Sternum Prox, Sternum Distal,                                  Mid back Prox, Mid back Dist. Right upper back Right groin     Cleanse with: Liquid antibacterial soap and water, rinse well      Dressing Orders:  Primary dressing  Silvercel to all wounds   Secondary dressing   Cover with ABD pad secure with paper           x 30days     Frequency: Change daily      Additional Orders: Increase protein to diet (meat, cheese, eggs, fish, peanut butter, nuts and beans)  Multivitamin daily    OFFLOADING [] YES  TYPE:                  [x] NA    Weekly wound care visits until determined otherwise.    Antibiotic therapy-wound care related YES [] NO [x] NA[]    MY CHART []     Smart Device  []     HYPERBARIC TREATMENT-                TREATMENT #                          Your next appointment with the Wound Care Center Make an appointment with    Make an appointment with Dermatology  Dr. Sapp                                                                                                   (Please note your next appointment above and if you are unable to keep, kindly give a 24 hour notice. Thank you.)  If more than 15 min late we cannot guarantee you will be seen due to clinician schedule  Per Policy, Excessive cancellation will call for dismissal from program.  If you experience any of the following, please call the Wound Care Center during business hours:  [phone]     * Increase in Pain  * Temperature over 101  * Increase in drainage from your wound  * Drainage with a foul odor  * Bleeding  * Increase in swelling  * Need for compression bandage changes due to slippage, breakthrough drainage.     If you need medical attention outside

## 2024-04-27 LAB
MICROORGANISM SPEC CULT: NORMAL
MICROORGANISM SPEC CULT: NORMAL
SERVICE CMNT-IMP: NORMAL
SERVICE CMNT-IMP: NORMAL
SPECIMEN DESCRIPTION: NORMAL
SPECIMEN DESCRIPTION: NORMAL

## 2024-04-30 ENCOUNTER — HOSPITAL ENCOUNTER (OUTPATIENT)
Dept: WOUND CARE | Age: 44
Discharge: HOME | End: 2024-04-30
Payer: MEDICAID

## 2024-04-30 VITALS
TEMPERATURE: 98.1 F | RESPIRATION RATE: 18 BRPM | BODY MASS INDEX: 25.21 KG/M2 | HEIGHT: 62 IN | WEIGHT: 137 LBS | HEART RATE: 111 BPM | DIASTOLIC BLOOD PRESSURE: 65 MMHG | SYSTOLIC BLOOD PRESSURE: 91 MMHG

## 2024-04-30 PROBLEM — L73.2 HIDRADENITIS SUPPURATIVA: Status: ACTIVE | Noted: 2024-04-30

## 2024-04-30 PROCEDURE — 11042 DBRDMT SUBQ TIS 1ST 20SQCM/<: CPT

## 2024-04-30 PROCEDURE — 99214 OFFICE O/P EST MOD 30 MIN: CPT

## 2024-05-01 ENCOUNTER — TELEPHONE (OUTPATIENT)
Dept: WOUND CARE | Age: 44
End: 2024-05-01

## 2024-05-01 NOTE — TELEPHONE ENCOUNTER
Call from Stephania at [facility] plastics stating that she spoke with  Dr. Siddiqi and although a referral was made to them when this patient was in the hospital., Dr. Campbell does not work with Hidradinits patients-she stated that it is usually general surgery. Will make Dr. Ragsdale aware of situation.

## 2024-05-03 DIAGNOSIS — L73.2 HIDRADENITIS SUPPURATIVA: Primary | ICD-10-CM

## 2024-05-07 ENCOUNTER — OFFICE VISIT (OUTPATIENT)
Dept: INTERNAL MEDICINE CLINIC | Age: 44
End: 2024-05-07
Payer: MEDICAID

## 2024-05-07 VITALS
HEIGHT: 62 IN | DIASTOLIC BLOOD PRESSURE: 68 MMHG | OXYGEN SATURATION: 99 % | BODY MASS INDEX: 25.47 KG/M2 | SYSTOLIC BLOOD PRESSURE: 104 MMHG | WEIGHT: 138.4 LBS | HEART RATE: 105 BPM

## 2024-05-07 DIAGNOSIS — E11.29 TYPE 2 DIABETES MELLITUS WITH OTHER DIABETIC KIDNEY COMPLICATION, WITH LONG-TERM CURRENT USE OF INSULIN (HCC): ICD-10-CM

## 2024-05-07 DIAGNOSIS — Z12.11 COLON CANCER SCREENING: ICD-10-CM

## 2024-05-07 DIAGNOSIS — Z09 HOSPITAL DISCHARGE FOLLOW-UP: ICD-10-CM

## 2024-05-07 DIAGNOSIS — N18.6 ESRD (END STAGE RENAL DISEASE) (HCC): Primary | ICD-10-CM

## 2024-05-07 DIAGNOSIS — N40.0 BENIGN PROSTATIC HYPERPLASIA WITHOUT LOWER URINARY TRACT SYMPTOMS: ICD-10-CM

## 2024-05-07 DIAGNOSIS — Z79.4 TYPE 2 DIABETES MELLITUS WITH OTHER DIABETIC KIDNEY COMPLICATION, WITH LONG-TERM CURRENT USE OF INSULIN (HCC): ICD-10-CM

## 2024-05-07 DIAGNOSIS — L73.2 HIDRADENITIS SUPPURATIVA OF MULTIPLE SITES: ICD-10-CM

## 2024-05-07 PROCEDURE — 99214 OFFICE O/P EST MOD 30 MIN: CPT | Performed by: INTERNAL MEDICINE

## 2024-05-07 PROCEDURE — 1111F DSCHRG MED/CURRENT MED MERGE: CPT | Performed by: INTERNAL MEDICINE

## 2024-05-07 RX ORDER — INSULIN GLARGINE 100 [IU]/ML
20 INJECTION, SOLUTION SUBCUTANEOUS DAILY
Qty: 15 ML | Refills: 0 | Status: SHIPPED | OUTPATIENT
Start: 2024-05-07

## 2024-05-07 RX ORDER — TAMSULOSIN HYDROCHLORIDE 0.4 MG/1
0.4 CAPSULE ORAL DAILY
Qty: 30 CAPSULE | Refills: 3 | Status: SHIPPED | OUTPATIENT
Start: 2024-05-07

## 2024-05-07 SDOH — ECONOMIC STABILITY: FOOD INSECURITY: WITHIN THE PAST 12 MONTHS, YOU WORRIED THAT YOUR FOOD WOULD RUN OUT BEFORE YOU GOT MONEY TO BUY MORE.: PATIENT DECLINED

## 2024-05-07 SDOH — ECONOMIC STABILITY: HOUSING INSECURITY
IN THE LAST 12 MONTHS, WAS THERE A TIME WHEN YOU DID NOT HAVE A STEADY PLACE TO SLEEP OR SLEPT IN A SHELTER (INCLUDING NOW)?: PATIENT DECLINED

## 2024-05-07 SDOH — ECONOMIC STABILITY: INCOME INSECURITY: HOW HARD IS IT FOR YOU TO PAY FOR THE VERY BASICS LIKE FOOD, HOUSING, MEDICAL CARE, AND HEATING?: PATIENT DECLINED

## 2024-05-07 SDOH — ECONOMIC STABILITY: FOOD INSECURITY: WITHIN THE PAST 12 MONTHS, THE FOOD YOU BOUGHT JUST DIDN'T LAST AND YOU DIDN'T HAVE MONEY TO GET MORE.: PATIENT DECLINED

## 2024-05-07 ASSESSMENT — PATIENT HEALTH QUESTIONNAIRE - PHQ9
SUM OF ALL RESPONSES TO PHQ QUESTIONS 1-9: 0
2. FEELING DOWN, DEPRESSED OR HOPELESS: NOT AT ALL
1. LITTLE INTEREST OR PLEASURE IN DOING THINGS: NOT AT ALL
SUM OF ALL RESPONSES TO PHQ QUESTIONS 1-9: 0
SUM OF ALL RESPONSES TO PHQ9 QUESTIONS 1 & 2: 0

## 2024-05-07 NOTE — PROGRESS NOTES
Subjective     Patient ID: Jules Nichols is a 44 y.o. male.    HPI  Review of Systems       Objective   Physical Exam       Assessment & Plan            Visit Information    Have you changed or started any medications since your last visit including any over-the-counter medicines, vitamins, or herbal medicines? no   Are you having any side effects from any of your medications? -  no  Have you stopped taking any of your medications? Is so, why? -  no    Have you seen any other physician or provider since your last visit? Yes - Records Obtained  Have you had any other diagnostic tests since your last visit? No  Have you been seen in the emergency room and/or had an admission to a hospital since we last saw you? Yes - Records Obtained  Have you had your routine dental cleaning in the past 6 months? no    Have you activated your [employer] account? If not, what are your barriers? No     Patient Care Team:  Elvin Kelley MD as PCP - General (Internal Medicine)    Medical History Review  Past Medical, Family, and Social History reviewed and does not contribute to the patient presenting condition    Health Maintenance   Topic Date Due    COVID-19 Vaccine (1) Never done    Diabetic foot exam  Never done    Depression Screen  Never done    DTaP/Tdap/Td vaccine (1 - Tdap) Never done    Hepatitis B vaccine (1 of 3 - Risk Dialysis 4-dose series) Never done    Diabetic retinal exam  01/28/2015    Pneumococcal 0-64 years Vaccine (2 of 2 - PCV) 01/21/2017    Lipids  04/26/2017    Colorectal Cancer Screen  Never done    Flu vaccine (Season Ended) 08/01/2024    A1C test (Diabetic or Prediabetic)  03/07/2025    Shingles vaccine (1 of 2) 04/07/2029    Hepatitis C screen  Completed    HIV screen  Completed    Hepatitis A vaccine  Aged Out    Hib vaccine  Aged Out    HPV vaccine  Aged Out    Polio vaccine  Aged Out    Meningococcal (ACWY) vaccine  Aged Out    Diabetic Alb to Cr ratio (uACR) test  Discontinued    GFR test 
skin daily     metoprolol tartrate 25 MG tablet  Commonly known as: LOPRESSOR     * midodrine 10 MG tablet  Commonly known as: PROAMATINE     * midodrine 5 MG tablet  Commonly known as: PROAMATINE     sevelamer 800 MG tablet  Commonly known as: RENVELA     TRUEresult Blood Glucose w/Device Kit  1 kit by Does not apply route daily as needed     [employer] Ultra Thin Lancets Misc  1 each by Does not apply route 4 times daily (before meals and nightly)           * This list has 2 medication(s) that are the same as other medications prescribed for you. Read the directions carefully, and ask your doctor or other care provider to review them with you.                   Where to Get Your Medications        These medications were sent to [id number] - [place], [place] - [address] - P [phone] - F [phone] [address] [facility] [address]      Phone: [phone]   Lantus SoloStar 100 UNIT/ML injection pen  tamsulosin 0.4 MG capsule           Medications marked \"taking\" at this time  Outpatient Medications Marked as Taking for the 5/7/24 encounter (Office Visit) with Ty Tee MD   Medication Sig Dispense Refill    tamsulosin (FLOMAX) 0.4 MG capsule Take 1 capsule by mouth daily 30 capsule 3    insulin glargine (LANTUS SOLOSTAR) 100 UNIT/ML injection pen Inject 20 Units into the skin daily 15 mL 0    metoprolol tartrate (LOPRESSOR) 25 MG tablet Take 0.5 tablets by mouth 2 times daily      midodrine (PROAMATINE) 10 MG tablet Take 1 tablet by mouth three times a week Before dialysis for SBP < 100      sevelamer (RENVELA) 800 MG tablet Take 1 tablet by mouth 3 times daily (with meals)      midodrine (PROAMATINE) 5 MG tablet Take 3 tablets by mouth 3 times daily      Blood Glucose Monitoring Suppl (TRUERESULT BLOOD GLUCOSE) W/DEVICE KIT 1 kit by Does not apply route daily as needed 1 kit 0    [employer] Ultra Thin Lancets MISC 1 each by Does not apply route 4 times daily (before

## 2024-06-16 LAB — NONINV COLON CA DNA+OCC BLD SCRN STL QL: NEGATIVE

## 2024-07-02 ENCOUNTER — TELEPHONE (OUTPATIENT)
Dept: INTERNAL MEDICINE CLINIC | Age: 44
End: 2024-07-02

## 2024-07-09 ENCOUNTER — TELEPHONE (OUTPATIENT)
Dept: VASCULAR SURGERY | Age: 44
End: 2024-07-09

## 2024-07-09 ENCOUNTER — INITIAL CONSULT (OUTPATIENT)
Dept: VASCULAR SURGERY | Age: 44
End: 2024-07-09
Payer: MEDICAID

## 2024-07-09 VITALS
BODY MASS INDEX: 24.55 KG/M2 | SYSTOLIC BLOOD PRESSURE: 110 MMHG | DIASTOLIC BLOOD PRESSURE: 80 MMHG | WEIGHT: 133.4 LBS | OXYGEN SATURATION: 98 % | TEMPERATURE: 97 F | HEIGHT: 62 IN | HEART RATE: 102 BPM

## 2024-07-09 DIAGNOSIS — N18.6 END STAGE RENAL DISEASE (HCC): Primary | ICD-10-CM

## 2024-07-09 PROCEDURE — 3074F SYST BP LT 130 MM HG: CPT | Performed by: STUDENT IN AN ORGANIZED HEALTH CARE EDUCATION/TRAINING PROGRAM

## 2024-07-09 PROCEDURE — 99204 OFFICE O/P NEW MOD 45 MIN: CPT | Performed by: STUDENT IN AN ORGANIZED HEALTH CARE EDUCATION/TRAINING PROGRAM

## 2024-07-09 PROCEDURE — 3079F DIAST BP 80-89 MM HG: CPT | Performed by: STUDENT IN AN ORGANIZED HEALTH CARE EDUCATION/TRAINING PROGRAM

## 2024-07-09 NOTE — PROGRESS NOTES
intolerance.   Genitourinary:  Negative for dysuria, flank pain and hematuria.   Musculoskeletal:  Negative for joint swelling and neck pain.   Skin:  Negative for color change and rash.   Allergic/Immunologic: Negative for immunocompromised state.   Neurological:  Negative for syncope, speech difficulty, weakness, numbness and headaches.   Hematological:  Negative for adenopathy.   Psychiatric/Behavioral:  Negative for behavioral problems and suicidal ideas.        Vitals:    07/09/24 1005   BP: 110/80   Site: Left Upper Arm   Position: Sitting   Cuff Size: Medium Adult   Pulse: (!) 102   Temp: 97 °F (36.1 °C)   TempSrc: Temporal   SpO2: 98%   Weight: 60.5 kg (133 lb 6.4 oz)   Height: 1.575 m (5' 2\")          Physical Exam  Constitutional:       General: He is not in acute distress.  HENT:      Mouth/Throat:      Mouth: Mucous membranes are moist.      Pharynx: Oropharynx is clear.   Eyes:      General: No scleral icterus.     Extraocular Movements: Extraocular movements intact.      Conjunctiva/sclera: Conjunctivae normal.   Cardiovascular:      Rate and Rhythm: Normal rate and regular rhythm.      Heart sounds: No murmur heard.     Comments: Bilateral axillary hidradenitis. Motor/sensory intact. Radial pulses palpable.  Pulmonary:      Effort: No respiratory distress.      Breath sounds: No rales.   Abdominal:      General: There is no distension.      Palpations: There is no mass.      Tenderness: There is no abdominal tenderness. There is no guarding.   Musculoskeletal:      Cervical back: No rigidity or tenderness.   Lymphadenopathy:      Cervical: No cervical adenopathy.   Skin:     Coloration: Skin is not jaundiced.      Findings: No rash.   Neurological:      General: No focal deficit present.      Mental Status: He is alert and oriented to person, place, and time.      Cranial Nerves: No cranial nerve deficit.   Psychiatric:         Mood and Affect: Mood normal.               Assessment:  1. End stage

## 2024-07-17 ENCOUNTER — ANESTHESIA EVENT (OUTPATIENT)
Dept: OPERATING ROOM | Age: 44
End: 2024-07-17
Payer: MEDICAID

## 2024-07-17 NOTE — ANESTHESIA PRE PROCEDURE
HCT 27.1 04/23/2024 05:53 AM    MCV 95.4 04/23/2024 05:53 AM    RDW 19.2 04/23/2024 05:53 AM     04/23/2024 05:53 AM       CMP:   Lab Results   Component Value Date/Time     04/23/2024 05:53 AM    K 4.8 04/23/2024 05:53 AM     04/23/2024 05:53 AM    CO2 21 04/23/2024 05:53 AM    BUN 47 04/23/2024 05:53 AM    CREATININE 6.8 04/23/2024 05:53 AM    GFRAA 23 04/26/2016 04:55 PM    LABGLOM 9 04/23/2024 05:53 AM    GLUCOSE 113 04/23/2024 05:53 AM    CALCIUM 8.8 04/23/2024 05:53 AM    BILITOT 0.3 04/22/2024 03:30 PM    ALKPHOS 185 04/22/2024 03:30 PM    AST 20 04/22/2024 03:30 PM    ALT 29 04/22/2024 03:30 PM       POC Tests: No results for input(s): \"POCGLU\", \"POCNA\", \"POCK\", \"POCCL\", \"POCBUN\", \"POCHEMO\", \"POCHCT\" in the last 72 hours.      Coags:   Lab Results   Component Value Date/Time    PROTIME 16.4 02/21/2024 10:49 PM    INR 1.4 02/21/2024 10:49 PM    APTT 25.7 02/21/2024 10:49 PM       HCG (If Applicable): No results found for: \"PREGTESTUR\", \"PREGSERUM\", \"HCG\", \"HCGQUANT\"     ABGs: No results found for: \"PHART\", \"PO2ART\", \"PMW0OUR\", \"JOU2CVO\", \"BEART\", \"R8FIWVKT\"     Type & Screen (If Applicable):  No results found for: \"LABABO\"    Drug/Infectious Status (If Applicable):  Lab Results   Component Value Date/Time    HEPCAB NONREACTIVE 04/09/2024 04:37 AM       COVID-19 Screening (If Applicable): No results found for: \"COVID19\"        Anesthesia Evaluation  Patient summary reviewed and Nursing notes reviewed   no history of anesthetic complications:   Airway: Mallampati: II  TM distance: >3 FB   Neck ROM: full  Comment: Red beard  Mouth opening: > = 3 FB   Dental: normal exam         Pulmonary:Negative Pulmonary ROS and normal exam                               Cardiovascular:  Exercise tolerance: good (>4 METS)  (+) hypertension:, past MI: 0-3 months, hyperlipidemia               ROS comment: 3/5/24    ·  Left Ventricle: Normal left ventricular systolic function with a visually estimated EF of 60 -

## 2024-07-18 ENCOUNTER — HOSPITAL ENCOUNTER (OUTPATIENT)
Age: 44
Setting detail: OUTPATIENT SURGERY
Discharge: HOME | End: 2024-07-18
Attending: STUDENT IN AN ORGANIZED HEALTH CARE EDUCATION/TRAINING PROGRAM
Payer: MEDICAID

## 2024-07-18 ENCOUNTER — ANESTHESIA (OUTPATIENT)
Dept: OPERATING ROOM | Age: 44
End: 2024-07-18
Payer: MEDICAID

## 2024-07-18 VITALS
DIASTOLIC BLOOD PRESSURE: 78 MMHG | RESPIRATION RATE: 13 BRPM | TEMPERATURE: 98.2 F | SYSTOLIC BLOOD PRESSURE: 103 MMHG | HEART RATE: 67 BPM | OXYGEN SATURATION: 100 %

## 2024-07-18 DIAGNOSIS — Z98.890 S/P ARTERIOVENOUS (AV) FISTULA CREATION: Primary | ICD-10-CM

## 2024-07-18 LAB
BUN BLD-MCNC: NORMAL MG/DL (ref 8–26)
BUN BLD-MCNC: NORMAL MG/DL (ref 8–26)
CHLORIDE BLD-SCNC: 103 MMOL/L (ref 98–107)
EGFR, POC: 18 ML/MIN/1.73M2
EGFR, POC: NORMAL ML/MIN/1.73M2
GLUCOSE BLD-MCNC: 193 MG/DL (ref 74–100)
HCT VFR BLD AUTO: 43 % (ref 41–53)
HCT VFR BLD AUTO: NORMAL % (ref 41–53)
POC CREATININE: 4 MG/DL (ref 0.51–1.19)
POC CREATININE: NORMAL MG/DL (ref 0.51–1.19)
POC HEMOGLOBIN (CALC): 14.6 G/DL (ref 13.5–17.5)
POC HEMOGLOBIN (CALC): NORMAL G/DL (ref 13.5–17.5)
POTASSIUM BLD-SCNC: 4.1 MMOL/L (ref 3.5–4.5)
SODIUM BLD-SCNC: 141 MMOL/L (ref 138–146)
SODIUM BLD-SCNC: NORMAL MMOL/L (ref 138–146)

## 2024-07-18 PROCEDURE — 3600000005 HC SURGERY LEVEL 5 BASE: Performed by: STUDENT IN AN ORGANIZED HEALTH CARE EDUCATION/TRAINING PROGRAM

## 2024-07-18 PROCEDURE — 6370000000 HC RX 637 (ALT 250 FOR IP): Performed by: STUDENT IN AN ORGANIZED HEALTH CARE EDUCATION/TRAINING PROGRAM

## 2024-07-18 PROCEDURE — 6360000002 HC RX W HCPCS: Performed by: STUDENT IN AN ORGANIZED HEALTH CARE EDUCATION/TRAINING PROGRAM

## 2024-07-18 PROCEDURE — 2580000003 HC RX 258: Performed by: STUDENT IN AN ORGANIZED HEALTH CARE EDUCATION/TRAINING PROGRAM

## 2024-07-18 PROCEDURE — 7100000010 HC PHASE II RECOVERY - FIRST 15 MIN: Performed by: STUDENT IN AN ORGANIZED HEALTH CARE EDUCATION/TRAINING PROGRAM

## 2024-07-18 PROCEDURE — 82565 ASSAY OF CREATININE: CPT

## 2024-07-18 PROCEDURE — 2580000003 HC RX 258: Performed by: NURSE ANESTHETIST, CERTIFIED REGISTERED

## 2024-07-18 PROCEDURE — 3700000000 HC ANESTHESIA ATTENDED CARE: Performed by: STUDENT IN AN ORGANIZED HEALTH CARE EDUCATION/TRAINING PROGRAM

## 2024-07-18 PROCEDURE — 85014 HEMATOCRIT: CPT

## 2024-07-18 PROCEDURE — 3700000001 HC ADD 15 MINUTES (ANESTHESIA): Performed by: STUDENT IN AN ORGANIZED HEALTH CARE EDUCATION/TRAINING PROGRAM

## 2024-07-18 PROCEDURE — 6360000002 HC RX W HCPCS: Performed by: NURSE ANESTHETIST, CERTIFIED REGISTERED

## 2024-07-18 PROCEDURE — 84295 ASSAY OF SERUM SODIUM: CPT

## 2024-07-18 PROCEDURE — 2709999900 HC NON-CHARGEABLE SUPPLY: Performed by: STUDENT IN AN ORGANIZED HEALTH CARE EDUCATION/TRAINING PROGRAM

## 2024-07-18 PROCEDURE — 7100000011 HC PHASE II RECOVERY - ADDTL 15 MIN: Performed by: STUDENT IN AN ORGANIZED HEALTH CARE EDUCATION/TRAINING PROGRAM

## 2024-07-18 PROCEDURE — 3600000015 HC SURGERY LEVEL 5 ADDTL 15MIN: Performed by: STUDENT IN AN ORGANIZED HEALTH CARE EDUCATION/TRAINING PROGRAM

## 2024-07-18 PROCEDURE — 2500000003 HC RX 250 WO HCPCS: Performed by: STUDENT IN AN ORGANIZED HEALTH CARE EDUCATION/TRAINING PROGRAM

## 2024-07-18 PROCEDURE — 84132 ASSAY OF SERUM POTASSIUM: CPT

## 2024-07-18 PROCEDURE — 82947 ASSAY GLUCOSE BLOOD QUANT: CPT

## 2024-07-18 PROCEDURE — 82435 ASSAY OF BLOOD CHLORIDE: CPT

## 2024-07-18 PROCEDURE — 36821 AV FUSION DIRECT ANY SITE: CPT | Performed by: STUDENT IN AN ORGANIZED HEALTH CARE EDUCATION/TRAINING PROGRAM

## 2024-07-18 RX ORDER — MIDODRINE HYDROCHLORIDE 5 MG/1
10 TABLET ORAL DAILY
COMMUNITY

## 2024-07-18 RX ORDER — SODIUM CHLORIDE 0.9 % (FLUSH) 0.9 %
5-40 SYRINGE (ML) INJECTION EVERY 12 HOURS SCHEDULED
Status: DISCONTINUED | OUTPATIENT
Start: 2024-07-18 | End: 2024-07-19 | Stop reason: HOSPADM

## 2024-07-18 RX ORDER — LIDOCAINE HYDROCHLORIDE 10 MG/ML
INJECTION, SOLUTION EPIDURAL; INFILTRATION; INTRACAUDAL; PERINEURAL PRN
Status: DISCONTINUED | OUTPATIENT
Start: 2024-07-18 | End: 2024-07-18 | Stop reason: ALTCHOICE

## 2024-07-18 RX ORDER — FENTANYL CITRATE 50 UG/ML
INJECTION, SOLUTION INTRAMUSCULAR; INTRAVENOUS PRN
Status: DISCONTINUED | OUTPATIENT
Start: 2024-07-18 | End: 2024-07-18 | Stop reason: SDUPTHER

## 2024-07-18 RX ORDER — OXYCODONE HYDROCHLORIDE 5 MG/1
5 TABLET ORAL EVERY 6 HOURS PRN
Qty: 3 TABLET | Refills: 0 | Status: SHIPPED | OUTPATIENT
Start: 2024-07-18 | End: 2024-07-21

## 2024-07-18 RX ORDER — MIDAZOLAM HYDROCHLORIDE 1 MG/ML
INJECTION INTRAMUSCULAR; INTRAVENOUS PRN
Status: DISCONTINUED | OUTPATIENT
Start: 2024-07-18 | End: 2024-07-18 | Stop reason: SDUPTHER

## 2024-07-18 RX ORDER — MAGNESIUM HYDROXIDE 1200 MG/15ML
LIQUID ORAL CONTINUOUS PRN
Status: COMPLETED | OUTPATIENT
Start: 2024-07-18 | End: 2024-07-18

## 2024-07-18 RX ORDER — HEPARIN SODIUM 1000 [USP'U]/ML
1600 INJECTION, SOLUTION INTRAVENOUS; SUBCUTANEOUS ONCE
Status: COMPLETED | OUTPATIENT
Start: 2024-07-18 | End: 2024-07-18

## 2024-07-18 RX ORDER — HEPARIN SODIUM 1000 [USP'U]/ML
INJECTION, SOLUTION INTRAVENOUS; SUBCUTANEOUS PRN
Status: DISCONTINUED | OUTPATIENT
Start: 2024-07-18 | End: 2024-07-18 | Stop reason: ALTCHOICE

## 2024-07-18 RX ORDER — SODIUM CHLORIDE 9 MG/ML
INJECTION, SOLUTION INTRAVENOUS PRN
Status: DISCONTINUED | OUTPATIENT
Start: 2024-07-18 | End: 2024-07-19 | Stop reason: HOSPADM

## 2024-07-18 RX ORDER — CEFAZOLIN SODIUM 1 G/3ML
INJECTION, POWDER, FOR SOLUTION INTRAMUSCULAR; INTRAVENOUS PRN
Status: DISCONTINUED | OUTPATIENT
Start: 2024-07-18 | End: 2024-07-18 | Stop reason: SDUPTHER

## 2024-07-18 RX ORDER — PHENYLEPHRINE HCL IN 0.9% NACL 1 MG/10 ML
SYRINGE (ML) INTRAVENOUS PRN
Status: DISCONTINUED | OUTPATIENT
Start: 2024-07-18 | End: 2024-07-18 | Stop reason: SDUPTHER

## 2024-07-18 RX ORDER — SODIUM CHLORIDE 0.9 % (FLUSH) 0.9 %
5-40 SYRINGE (ML) INJECTION PRN
Status: DISCONTINUED | OUTPATIENT
Start: 2024-07-18 | End: 2024-07-19 | Stop reason: HOSPADM

## 2024-07-18 RX ORDER — SODIUM CHLORIDE 9 MG/ML
INJECTION, SOLUTION INTRAVENOUS CONTINUOUS
Status: DISCONTINUED | OUTPATIENT
Start: 2024-07-18 | End: 2024-07-19 | Stop reason: HOSPADM

## 2024-07-18 RX ORDER — HYDRALAZINE HYDROCHLORIDE 20 MG/ML
10 INJECTION INTRAMUSCULAR; INTRAVENOUS
Status: DISCONTINUED | OUTPATIENT
Start: 2024-07-18 | End: 2024-07-19 | Stop reason: HOSPADM

## 2024-07-18 RX ORDER — MIDODRINE HYDROCHLORIDE 5 MG/1
10 TABLET ORAL ONCE
Status: COMPLETED | OUTPATIENT
Start: 2024-07-18 | End: 2024-07-18

## 2024-07-18 RX ORDER — SODIUM CHLORIDE 9 MG/ML
INJECTION, SOLUTION INTRAVENOUS CONTINUOUS PRN
Status: DISCONTINUED | OUTPATIENT
Start: 2024-07-18 | End: 2024-07-18 | Stop reason: SDUPTHER

## 2024-07-18 RX ORDER — PROPOFOL 10 MG/ML
INJECTION, EMULSION INTRAVENOUS
Status: COMPLETED
Start: 2024-07-18 | End: 2024-07-18

## 2024-07-18 RX ORDER — ONDANSETRON 2 MG/ML
4 INJECTION INTRAMUSCULAR; INTRAVENOUS
Status: DISCONTINUED | OUTPATIENT
Start: 2024-07-18 | End: 2024-07-19 | Stop reason: HOSPADM

## 2024-07-18 RX ORDER — LABETALOL HYDROCHLORIDE 5 MG/ML
10 INJECTION, SOLUTION INTRAVENOUS
Status: DISCONTINUED | OUTPATIENT
Start: 2024-07-18 | End: 2024-07-19 | Stop reason: HOSPADM

## 2024-07-18 RX ORDER — PROPOFOL 10 MG/ML
INJECTION, EMULSION INTRAVENOUS CONTINUOUS PRN
Status: DISCONTINUED | OUTPATIENT
Start: 2024-07-18 | End: 2024-07-18 | Stop reason: SDUPTHER

## 2024-07-18 RX ORDER — PHENYLEPHRINE HCL IN 0.9% NACL 1 MG/10 ML
SYRINGE (ML) INTRAVENOUS
Status: COMPLETED
Start: 2024-07-18 | End: 2024-07-18

## 2024-07-18 RX ORDER — HEPARIN SODIUM 1000 [USP'U]/ML
INJECTION, SOLUTION INTRAVENOUS; SUBCUTANEOUS PRN
Status: DISCONTINUED | OUTPATIENT
Start: 2024-07-18 | End: 2024-07-18 | Stop reason: SDUPTHER

## 2024-07-18 RX ORDER — NALOXONE HYDROCHLORIDE 0.4 MG/ML
INJECTION, SOLUTION INTRAMUSCULAR; INTRAVENOUS; SUBCUTANEOUS PRN
Status: DISCONTINUED | OUTPATIENT
Start: 2024-07-18 | End: 2024-07-19 | Stop reason: HOSPADM

## 2024-07-18 RX ADMIN — MIDAZOLAM 2 MG: 1 INJECTION INTRAMUSCULAR; INTRAVENOUS at 09:59

## 2024-07-18 RX ADMIN — FENTANYL CITRATE 50 MCG: 50 INJECTION, SOLUTION INTRAMUSCULAR; INTRAVENOUS at 10:05

## 2024-07-18 RX ADMIN — PHENYLEPHRINE HYDROCHLORIDE 150 MCG: 10 INJECTION INTRAVENOUS at 12:04

## 2024-07-18 RX ADMIN — PROPOFOL 150 MCG/KG/MIN: 10 INJECTION, EMULSION INTRAVENOUS at 10:02

## 2024-07-18 RX ADMIN — SODIUM CHLORIDE: 9 INJECTION, SOLUTION INTRAVENOUS at 07:53

## 2024-07-18 RX ADMIN — PHENYLEPHRINE HYDROCHLORIDE 100 MCG: 10 INJECTION INTRAVENOUS at 12:07

## 2024-07-18 RX ADMIN — MIDODRINE HYDROCHLORIDE 10 MG: 5 TABLET ORAL at 13:15

## 2024-07-18 RX ADMIN — PHENYLEPHRINE HYDROCHLORIDE 200 MCG: 10 INJECTION INTRAVENOUS at 12:13

## 2024-07-18 RX ADMIN — SODIUM CHLORIDE: 9 INJECTION, SOLUTION INTRAVENOUS at 09:57

## 2024-07-18 RX ADMIN — PHENYLEPHRINE HYDROCHLORIDE 100 MCG/MIN: 10 INJECTION INTRAVENOUS at 10:21

## 2024-07-18 RX ADMIN — HEPARIN SODIUM 5000 UNITS: 1000 INJECTION INTRAVENOUS; SUBCUTANEOUS at 10:57

## 2024-07-18 RX ADMIN — FENTANYL CITRATE 50 MCG: 50 INJECTION, SOLUTION INTRAMUSCULAR; INTRAVENOUS at 09:59

## 2024-07-18 RX ADMIN — HEPARIN SODIUM 1600 UNITS: 1000 INJECTION INTRAVENOUS; SUBCUTANEOUS at 14:49

## 2024-07-18 RX ADMIN — CEFAZOLIN 2 G: 1 INJECTION, POWDER, FOR SOLUTION INTRAMUSCULAR; INTRAVENOUS at 10:23

## 2024-07-18 RX ADMIN — Medication 100 MCG: at 10:18

## 2024-07-18 NOTE — DISCHARGE INSTRUCTIONS
Remove dressing 48 hours after surgery  Resume regular diet  Tylenol for pain control  Perform stress ball hand exercises for the first 24-48hrs post op  Okay to shower, keep incision covered in shower and change/remove dressing after shower         SEDATION / ANALGESIA INFORMATION / HOME GOING ADVICE  You have received the sedation/analgesia medication during your visit    Sedation/analgesia is used during short medical procedures under controlled supervision. The medication will produce a strong relaxation. You will be able to hear, speak and follow instructions, but your memory and alertness will be decreased.    You will be able to swallow and breathe on your own. During sedation/analgesia your blood pressure, heart and breathing will be watched closely. After the procedure, you may not remember what was said or done.    You may have the following effects from the medication.  \" Drowsiness, dizziness, sleepiness or confusion.  \" Difficulty remembering or delayed reaction times.  \" Loss of fine muscle control or difficulty with your balance especially while walking.  \" Difficulty focusing or blurred vision.  You may not be aware of slight changes in your behavior and/or your reaction time because of the medication used during the procedure. Therefore you should follow these instructions.  \" Have someone responsible help you with your care.  \" Do not drive for 24 hours.  \" Do not operate equipment for 24 hours (lawnmowers, power tools, kitchen accessories, stove).  \" Do not drink any alcoholic beverages for a minimum of 24 hours.  \" Do not make important personal, legal or business decisions for 24 hours.  \" You may experience dizziness or lightheadedness. Move slowly and carefully, do not make sudden position changes.  \" Drink extra amounts of fluids today.  \" Increase your diet as tolerated (unless you have received specific instructions from your doctor).  \" If you feel nauseated, continue with liquids until

## 2024-07-18 NOTE — PROGRESS NOTES
Pt. Received s/p Rt. AVF creation.  Pt. Hooked up to monitor, Jonah as charted.  BP 65/45, Anesthesia at bedside to give meds.  Will monitor.

## 2024-07-18 NOTE — PROGRESS NOTES
Phoned Anesthesia regarding BP 85/58.  Awaiting someone to come and eval.  IVF infusing at 75ml/hr.  Pt. Asymptomatic.

## 2024-07-18 NOTE — PROGRESS NOTES
Patient admitted, consent signed and questions answered. Patient ready for procedure. Call light to reach with side rails up 2 of 2. B/L Arms wiped down. No family at bedside with patient, Dr. Hook updated that pt. Drove himself.  Dr. Hook phoned and spoke with pt. Who phoned his cousin to pick him up when she gets off work.  History and physical needs completed.

## 2024-07-18 NOTE — H&P
Division of Vascular Surgery          Vascular H&P      Name: Jules Nichols  MRN: [id number]     7/18/2024  8:35 AM        Chief Complaint:     Patient on dialysis requires long-term dialysis access    History of Present Illness:      Jules Nichols is a 44 y.o.  male who presents with medical history of DM/HTN/CKD on hemodialysis since April 2024 with tunnel dialysis catheter.  Patient was evaluated for AV fistula creation for long-term access for dialysis.  Patient has medical history of hidradenitis suppurative as well.  Patient does not take any steroids for the same.  Not on any blood thinners.    Past Medical History:     Past Medical History:   Diagnosis Date    Benign essential HTN     Chronic kidney disease 1/20/16    Dr Stafford    Diabetic keto-acidosis (HCC) 1/20/16    hx of     Hyperlipemia     Uncontrolled type 2 diabetes mellitus with nephropathy 1/20/16        Past Surgical History:     Past Surgical History:   Procedure Laterality Date    ABDOMEN SURGERY N/A 02/24/2024    WOUND BED PREPARATION BILATERAL GROIN AND ARMPIT, CHEST AND BACK performed by Boyd Keene MD at [facility] OR    AV FISTULA CREATION  07/18/2024    DR. WOODRUFF    DEBRIDEMENT      Irrigation and Debridement BILATERAL GROIN AND ARMPIT, and buttocks, chest    IR TUNNELED CATHETER PLACEMENT GREATER THAN 5 YEARS  03/01/2024    IR TUNNELED CATHETER PLACEMENT GREATER THAN 5 YEARS 3/1/2024 [facility] SPECIAL PROCEDURES    IR TUNNELED CATHETER PLACEMENT GREATER THAN 5 YEARS  03/28/2024    IR TUNNELED CATHETER PLACEMENT GREATER THAN 5 YEARS 3/28/2024 [facility] SPECIAL PROCEDURES    OTHER SURGICAL HISTORY Bilateral 02/22/2024    INCISION AND DRAINAGE OF AXILLAS, CHEST WALL AND GROIN (YELLOW FINS, LITHOTOMY, CYSTO TUBING, WINTER GREEN) (Bilateral)    WOUND EXPLORATION Bilateral 02/22/2024    INCISION AND DRAINAGE OF BILATERAL AXILLA, CHEST WALL AND BILATERAL GROIN performed by Sandy Carmichael MD at [facility] OR        Medications Prior to Admission:

## 2024-07-18 NOTE — ANESTHESIA POSTPROCEDURE EVALUATION
Department of Anesthesiology  Postprocedure Note    Patient: Jules Nichols  MRN: [id number]  YOB: 1980  Date of evaluation: 7/18/2024    Procedure Summary       Date: 07/18/24 Room / Location: [place] [address] / [facility]    Anesthesia Start: 0957 Anesthesia Stop: 1216    Procedure: ARTERIOVENOUS FISTULA CREATION RIGHT (Right) Diagnosis:       ESRD (end stage renal disease) (HCC)      (ESRD (end stage renal disease) (HCC) [N18.6])    Surgeons: Kevin Hook MD Responsible Provider: Phillip Houston MD    Anesthesia Type: MAC ASA Status: 4            Anesthesia Type: No value filed.    Jonah Phase I: Jonah Score: 9    Jonah Phase II:      Anesthesia Post Evaluation    Patient location during evaluation: PACU  Patient participation: complete - patient participated  Level of consciousness: awake  Airway patency: patent  Nausea & Vomiting: no nausea and no vomiting  Cardiovascular status: blood pressure returned to baseline  Respiratory status: acceptable  Hydration status: euvolemic  Comments:        No notable events documented.

## 2024-07-18 NOTE — PROGRESS NOTES
All discharge instructions reviewed with pt.  All questions answered, understanding verb.  Pt. Ready for d/c, awaiting his ride.

## 2024-07-30 ENCOUNTER — OFFICE VISIT (OUTPATIENT)
Dept: VASCULAR SURGERY | Age: 44
End: 2024-07-30

## 2024-07-30 VITALS
DIASTOLIC BLOOD PRESSURE: 74 MMHG | WEIGHT: 135 LBS | RESPIRATION RATE: 16 BRPM | OXYGEN SATURATION: 98 % | HEIGHT: 62 IN | BODY MASS INDEX: 24.84 KG/M2 | HEART RATE: 82 BPM | SYSTOLIC BLOOD PRESSURE: 105 MMHG

## 2024-07-30 DIAGNOSIS — N18.6 END STAGE RENAL DISEASE (HCC): Primary | ICD-10-CM

## 2024-07-30 PROCEDURE — 99024 POSTOP FOLLOW-UP VISIT: CPT | Performed by: STUDENT IN AN ORGANIZED HEALTH CARE EDUCATION/TRAINING PROGRAM

## 2024-07-30 NOTE — PROGRESS NOTES
[facility] PHYSIC[facility]ANS [facility]  [address]  [place] [address]  Dept: [phone]     Patient: Jules Nichols  : 1980  MRN: [id number]  DOS: 2024    HPI:  24: Jules Nichols is a 44 y.o. male who comes to the office regarding evaluation for long term HD access creation. Last echo 3/2024, EF 60-65%.  History of diabetes and hypertension.  Has been on dialysis since 2024.  He is left hand dominant.  He has history of hidradenitis in both axillas, sternal and back.  Bedside ultrasound shows adequate cephalic vein on the right and marginal on the left at the level of the antecubital fossa.    24: S/p right brachial/cephalic AV fistula creation on 24. Has palpable thrill. Hand is ok, no steal symptoms. Sutures removed today.    Past Medical History:   Diagnosis Date    Benign essential HTN     Chronic kidney disease 16    Dr Stafford    Diabetic keto-acidosis (HCC) 16    hx of     Hyperlipemia     Uncontrolled type 2 diabetes mellitus with nephropathy 16     History reviewed. No pertinent family history.   Social History     Socioeconomic History    Marital status: Single     Spouse name: Not on file    Number of children: Not on file    Years of education: Not on file    Highest education level: Not on file   Occupational History    Not on file   Tobacco Use    Smoking status: Never    Smokeless tobacco: Never   Vaping Use    Vaping Use: Never used   Substance and Sexual Activity    Alcohol use: No     Alcohol/week: 0.0 standard drinks of alcohol    Drug use: No    Sexual activity: Defer   Other Topics Concern    Not on file   Social History Narrative    Not on file     Social Determinants of Health     Financial Resource Strain: Patient Declined (2024)    Overall Financial Resource Strain (CARDIA)     Difficulty of Paying Living Expenses: Patient declined   Food Insecurity: Patient

## 2024-08-07 ENCOUNTER — TELEPHONE (OUTPATIENT)
Dept: VASCULAR SURGERY | Age: 44
End: 2024-08-07

## 2024-08-07 NOTE — TELEPHONE ENCOUNTER
Patients dialysis unit called asking what they can do about the fistula can they use it or not? His next follow up is not till oct.

## 2024-08-12 NOTE — OP NOTE
Operative Note      Patient: Jules Nichols  YOB: 1980  MRN: [id number]    Date of Procedure: 7/18/2024    Pre-Op Diagnosis Codes:     * ESRD (end stage renal disease) ([facility]) [N18.6]    Post-Op Diagnosis: Same       Procedure: Right brachial artery to cephalic vein AV fistula creation    Surgeon(s):  Kevin Hook MD    Assistant: Osiris Hernandez DO (Resident)    Anesthesia: Monitor Anesthesia Care    Estimated Blood Loss (mL): 25 mL    Complications: None    Specimens: None    Implants: None      Drains: None    Findings:  Infection Present At Time Of Surgery (PATOS) (choose all levels that have infection present):  No infection present  Other Findings: The cephalic vein was borderline size. The brachial artery was adequate size. There was thrill present at the end of the case. Good hemostasis.    Detailed Description of Procedure:   Mr. Nichols was brought to the operating placed in supine position with right arm out.  His airway and sedation were managed by the anesthesia team for this case.  I evaluated his right arm with ultrasound and the brachial artery was adequate size for fistula creation but the cephalic vein was very borderline I would say maybe 2.5 to 3 mm vein.  Unfortunately he has bilateral hidradenitis of the axilla.  A brachial axillary AV graft creation is likely not possible in the near future due to risk for infection.  Since he is left-handed I decided to proceed with a right arm brachial cephalic fistula creation.  His right arm was prepped and draped in standard sterile fashion.  Timeout was performed.  I made a slightly oblique incision just below the elbow above the cephalic vein and brachial artery.  He is electrocautery I dissected down and cleared off the cephalic vein.  I then opened up the fascia and got proximal distal control the brachial artery.  Heparin bolus was given.  I then clamped the distal cephalic vein and transected it there.  The distal stump was tied  No

## 2024-09-16 ENCOUNTER — TELEPHONE (OUTPATIENT)
Dept: VASCULAR SURGERY | Age: 44
End: 2024-09-16

## 2024-09-25 ENCOUNTER — TELEPHONE (OUTPATIENT)
Dept: VASCULAR SURGERY | Age: 44
End: 2024-09-25

## 2024-09-26 ENCOUNTER — TELEPHONE (OUTPATIENT)
Dept: VASCULAR SURGERY | Age: 44
End: 2024-09-26

## 2024-10-01 ENCOUNTER — HOSPITAL ENCOUNTER (OUTPATIENT)
Dept: VASCULAR LAB | Age: 44
Discharge: HOME | End: 2024-10-03
Attending: STUDENT IN AN ORGANIZED HEALTH CARE EDUCATION/TRAINING PROGRAM
Payer: MEDICAID

## 2024-10-01 ENCOUNTER — OFFICE VISIT (OUTPATIENT)
Dept: VASCULAR SURGERY | Age: 44
End: 2024-10-01

## 2024-10-01 VITALS
HEIGHT: 62 IN | BODY MASS INDEX: 24.84 KG/M2 | OXYGEN SATURATION: 96 % | DIASTOLIC BLOOD PRESSURE: 73 MMHG | WEIGHT: 135 LBS | HEART RATE: 106 BPM | RESPIRATION RATE: 18 BRPM | SYSTOLIC BLOOD PRESSURE: 107 MMHG

## 2024-10-01 DIAGNOSIS — N18.6 END STAGE RENAL DISEASE (HCC): ICD-10-CM

## 2024-10-01 DIAGNOSIS — N18.6 END STAGE RENAL DISEASE (HCC): Primary | ICD-10-CM

## 2024-10-01 LAB
VAS RIGHT ARTERIAL PROX ANASTOMOSIS AVF EDV: 252 CM/S
VAS RIGHT ARTERIAL PROX ANASTOMOSIS AVF PSV: 370 CM/S
VAS RIGHT AVF AVG DIAMETER 1: 0.3 CM
VAS RIGHT AVF AVG DIAMETER 2: 0.8 CM
VAS RIGHT AVF AVG DIAMETER 3: 0.7 CM
VAS RIGHT AVF AVG GRAFT NAME: NORMAL
VAS RIGHT AVF AVG INFLOW VOL FLOW: 648 ML/MIN
VAS RIGHT AVF AVG MID OUTFLOW VOL FLOW: 1208 ML/MIN
VAS RIGHT AVF AVG OUTFLOW VESSEL NAME: NORMAL
VAS RIGHT AVG AVF DEPTH 1: 0.6 CM
VAS RIGHT DIST OUTFLOW AVF EDV: 150 CM/S
VAS RIGHT DIST OUTFLOW AVF PSV: 280 CM/S
VAS RIGHT INFLOW ARTERY AVF EDV: 89.4 CM/S
VAS RIGHT INFLOW ARTERY AVF PSV: 157.5 CM/S
VAS RIGHT MID OUTFLOW AVF EDV: 71 CM/S
VAS RIGHT MID OUTFLOW AVF PSV: 94 CM/S
VAS RIGHT PROX OUTFLOW AVF EDV: 86 CM/S
VAS RIGHT PROX OUTFLOW AVF PSV: 126 CM/S

## 2024-10-01 PROCEDURE — 93990 DOPPLER FLOW TESTING: CPT

## 2024-10-01 PROCEDURE — 99024 POSTOP FOLLOW-UP VISIT: CPT | Performed by: STUDENT IN AN ORGANIZED HEALTH CARE EDUCATION/TRAINING PROGRAM

## 2024-10-01 PROCEDURE — 93990 DOPPLER FLOW TESTING: CPT | Performed by: SURGERY

## 2024-10-01 ASSESSMENT — ENCOUNTER SYMPTOMS
ABDOMINAL DISTENTION: 0
COLOR CHANGE: 0
TROUBLE SWALLOWING: 0
COUGH: 0
VOICE CHANGE: 0
EYE PAIN: 0
ABDOMINAL PAIN: 0
VOMITING: 0
CHEST TIGHTNESS: 0

## 2024-10-01 NOTE — PROGRESS NOTES
at [facility]    IR TUNNELED CATHETER PLACEMENT GREATER THAN 5 YEARS  03/01/2024    IR TUNNELED CATHETER PLACEMENT GREATER THAN 5 YEARS 3/1/2024 [facility] SPECIAL PROCEDURES    IR TUNNELED CATHETER PLACEMENT GREATER THAN 5 YEARS  03/28/2024    IR TUNNELED CATHETER PLACEMENT GREATER THAN 5 YEARS 3/28/2024 [facility] SPECIAL PROCEDURES    OTHER SURGICAL HISTORY Bilateral 02/22/2024    INCISION AND DRAINAGE OF AXILLAS, CHEST WALL AND GROIN (YELLOW FINS, LITHOTOMY, CYSTO TUBING, WINTER GREEN) (Bilateral)    WOUND EXPLORATION Bilateral 02/22/2024    INCISION AND DRAINAGE OF BILATERAL AXILLA, CHEST WALL AND BILATERAL GROIN performed by Sandy Carmichael MD at [facility] OR      Review of Systems   Constitutional:  Negative for activity change, fever and unexpected weight change.   HENT:  Negative for trouble swallowing and voice change.    Eyes:  Negative for pain and visual disturbance.   Respiratory:  Negative for cough and chest tightness.    Cardiovascular:  Negative for chest pain and palpitations.   Gastrointestinal:  Negative for abdominal distention, abdominal pain and vomiting.   Endocrine: Negative for cold intolerance and heat intolerance.   Genitourinary:  Negative for dysuria, flank pain and hematuria.   Musculoskeletal:  Negative for joint swelling and neck pain.   Skin:  Negative for color change and rash.   Allergic/Immunologic: Negative for immunocompromised state.   Neurological:  Negative for syncope, speech difficulty, weakness, numbness and headaches.   Hematological:  Negative for adenopathy.   Psychiatric/Behavioral:  Negative for behavioral problems and suicidal ideas.        Vitals:    10/01/24 1457   BP: 107/73  Comment: NIBP 82   Site: Left Upper Arm   Position: Sitting   Cuff Size: Large Adult   Pulse: (!) 106   Resp: 18   SpO2: 96%   Weight: 61.2 kg (135 lb)   Height: 1.575 m (5' 2\")          Physical Exam  Constitutional:       General: He is not in acute distress.  HENT:      Mouth/Throat:      Mouth:

## 2024-11-12 ENCOUNTER — PROCEDURE VISIT (OUTPATIENT)
Dept: VASCULAR SURGERY | Age: 44
End: 2024-11-12

## 2024-11-12 VITALS
DIASTOLIC BLOOD PRESSURE: 73 MMHG | SYSTOLIC BLOOD PRESSURE: 117 MMHG | HEART RATE: 102 BPM | OXYGEN SATURATION: 98 % | WEIGHT: 135 LBS | BODY MASS INDEX: 24.84 KG/M2 | HEIGHT: 62 IN | RESPIRATION RATE: 18 BRPM

## 2024-11-12 DIAGNOSIS — N18.6 END STAGE RENAL DISEASE (HCC): Primary | ICD-10-CM

## 2024-11-12 ASSESSMENT — ENCOUNTER SYMPTOMS
COUGH: 0
ABDOMINAL PAIN: 0
VOMITING: 0
COLOR CHANGE: 0
EYE PAIN: 0
TROUBLE SWALLOWING: 0
ABDOMINAL DISTENTION: 0
VOICE CHANGE: 0
CHEST TIGHTNESS: 0

## 2024-11-12 NOTE — PROGRESS NOTES
Physical Abuse: Denies     [facility] Domestic Abuse - Type of Abuse: Not on file     [facility] Domestic Abuse - Time Frame: Not on file     [facility] Domestic Abuse - Signs and Symptoms: Not on file     Verbal Abuse: Denies     [facility] Domestic Abuse - Reported To: Not on file   Housing Stability: Unknown (5/7/2024)    Housing Stability Vital Sign     Unable to Pay for Housing in the Last Year: No     Number of Places Lived in the Last Year: 1     Unstable Housing in the Last Year: Patient declined      Past Surgical History:   Procedure Laterality Date    ABDOMEN SURGERY N/A 02/24/2024    WOUND BED PREPARATION BILATERAL GROIN AND ARMPIT, CHEST AND BACK performed by Boyd Keene MD at [facility] OR    AV FISTULA CREATION Right 07/18/2024    DR. HOOK    DEBRIDEMENT      Irrigation and Debridement BILATERAL GROIN AND ARMPIT, and buttocks, chest    DIALYSIS FISTULA CREATION Right 7/18/2024    ARTERIOVENOUS FISTULA CREATION RIGHT performed by Kevin Hook MD at [facility]    IR TUNNELED CATHETER PLACEMENT GREATER THAN 5 YEARS  03/01/2024    IR TUNNELED CATHETER PLACEMENT GREATER THAN 5 YEARS 3/1/2024 [facility] SPECIAL PROCEDURES    IR TUNNELED CATHETER PLACEMENT GREATER THAN 5 YEARS  03/28/2024    IR TUNNELED CATHETER PLACEMENT GREATER THAN 5 YEARS 3/28/2024 [facility] SPECIAL PROCEDURES    OTHER SURGICAL HISTORY Bilateral 02/22/2024    INCISION AND DRAINAGE OF AXILLAS, CHEST WALL AND GROIN (YELLOW FINS, LITHOTOMY, CYSTO TUBING, WINTER GREEN) (Bilateral)    WOUND EXPLORATION Bilateral 02/22/2024    INCISION AND DRAINAGE OF BILATERAL AXILLA, CHEST WALL AND BILATERAL GROIN performed by Sandy Carmichael MD at [facility] OR      Review of Systems   Constitutional:  Negative for activity change, fever and unexpected weight change.   HENT:  Negative for trouble swallowing and voice change.    Eyes:  Negative for pain and visual disturbance.   Respiratory:  Negative for cough and chest tightness.    Cardiovascular:  Negative for chest pain and

## 2024-12-26 ENCOUNTER — APPOINTMENT (OUTPATIENT)
Dept: VASCULAR LAB | Age: 44
End: 2024-12-26
Payer: MEDICAID

## 2024-12-26 ENCOUNTER — APPOINTMENT (OUTPATIENT)
Dept: INTERVENTIONAL RADIOLOGY/VASCULAR | Age: 44
End: 2024-12-26
Payer: MEDICAID

## 2024-12-26 ENCOUNTER — HOSPITAL ENCOUNTER (OUTPATIENT)
Age: 44
Setting detail: OBSERVATION
Discharge: HOME | End: 2024-12-27
Attending: STUDENT IN AN ORGANIZED HEALTH CARE EDUCATION/TRAINING PROGRAM | Admitting: INTERNAL MEDICINE
Payer: MEDICAID

## 2024-12-26 ENCOUNTER — APPOINTMENT (OUTPATIENT)
Dept: DIALYSIS | Age: 44
End: 2024-12-26
Payer: MEDICAID

## 2024-12-26 DIAGNOSIS — Z99.2 ESRD NEEDING DIALYSIS (HCC): ICD-10-CM

## 2024-12-26 DIAGNOSIS — Z98.890 S/P ARTERIOVENOUS (AV) FISTULA CREATION: ICD-10-CM

## 2024-12-26 DIAGNOSIS — T82.590A MALFUNCTION OF ARTERIOVENOUS DIALYSIS FISTULA, INITIAL ENCOUNTER (HCC): Primary | ICD-10-CM

## 2024-12-26 DIAGNOSIS — N18.6 END STAGE RENAL DISEASE (HCC): ICD-10-CM

## 2024-12-26 DIAGNOSIS — N18.6 ESRD NEEDING DIALYSIS (HCC): ICD-10-CM

## 2024-12-26 LAB
ANION GAP SERPL CALCULATED.3IONS-SCNC: 16 MMOL/L (ref 9–16)
BASOPHILS # BLD: 0.04 K/UL (ref 0–0.2)
BASOPHILS NFR BLD: 1 % (ref 0–2)
BNP SERPL-MCNC: 974 PG/ML (ref 0–125)
BUN SERPL-MCNC: 59 MG/DL (ref 6–20)
CALCIUM SERPL-MCNC: 9.5 MG/DL (ref 8.6–10.4)
CHLORIDE SERPL-SCNC: 102 MMOL/L (ref 98–107)
CO2 SERPL-SCNC: 20 MMOL/L (ref 20–31)
CREAT SERPL-MCNC: 6.4 MG/DL (ref 0.7–1.2)
EOSINOPHIL # BLD: 0.15 K/UL (ref 0–0.44)
EOSINOPHILS RELATIVE PERCENT: 2 % (ref 1–4)
ERYTHROCYTE [DISTWIDTH] IN BLOOD BY AUTOMATED COUNT: 12.9 % (ref 11.8–14.4)
GFR, ESTIMATED: 10 ML/MIN/1.73M2
GLUCOSE BLD-MCNC: 198 MG/DL (ref 75–110)
GLUCOSE SERPL-MCNC: 194 MG/DL (ref 74–99)
HBV SURFACE AG SERPL QL IA: NONREACTIVE
HCT VFR BLD AUTO: 42 % (ref 40.7–50.3)
HGB BLD-MCNC: 13.5 G/DL (ref 13–17)
IMM GRANULOCYTES # BLD AUTO: 0.04 K/UL (ref 0–0.3)
IMM GRANULOCYTES NFR BLD: 1 %
INR PPP: 1
LYMPHOCYTES NFR BLD: 1.62 K/UL (ref 1.1–3.7)
LYMPHOCYTES RELATIVE PERCENT: 24 % (ref 24–43)
MAGNESIUM SERPL-MCNC: 2.6 MG/DL (ref 1.6–2.6)
MCH RBC QN AUTO: 32.4 PG (ref 25.2–33.5)
MCHC RBC AUTO-ENTMCNC: 32.1 G/DL (ref 28.4–34.8)
MCV RBC AUTO: 100.7 FL (ref 82.6–102.9)
MONOCYTES NFR BLD: 0.45 K/UL (ref 0.1–1.2)
MONOCYTES NFR BLD: 7 % (ref 3–12)
NEUTROPHILS NFR BLD: 65 % (ref 36–65)
NEUTS SEG NFR BLD: 4.51 K/UL (ref 1.5–8.1)
NRBC BLD-RTO: 0 PER 100 WBC
PHOSPHATE SERPL-MCNC: 5.5 MG/DL (ref 2.5–4.5)
PLATELET # BLD AUTO: 187 K/UL (ref 138–453)
PMV BLD AUTO: 10.3 FL (ref 8.1–13.5)
POTASSIUM SERPL-SCNC: 5 MMOL/L (ref 3.7–5.3)
PROTHROMBIN TIME: 12.6 SEC (ref 11.7–14.9)
RBC # BLD AUTO: 4.17 M/UL (ref 4.21–5.77)
SODIUM SERPL-SCNC: 138 MMOL/L (ref 136–145)
VAS RIGHT ARTERIAL PROX ANASTOMOSIS AVF EDV: 266 CM/S
VAS RIGHT ARTERIAL PROX ANASTOMOSIS AVF PSV: 424 CM/S
VAS RIGHT AVF AVG DIAMETER 1: 1.01 CM
VAS RIGHT AVF AVG DIAMETER 2: 0.62 CM
VAS RIGHT AVF AVG DIAMETER 3: 0.22 CM
VAS RIGHT AVF AVG GRAFT NAME: NORMAL
VAS RIGHT AVF AVG INFLOW VOL FLOW: 635 ML/MIN
VAS RIGHT AVF AVG OUTFLOW VESSEL NAME: NORMAL
VAS RIGHT AVG AVF DEPTH 1: 0.27 CM
VAS RIGHT AVG AVF DEPTH 2: 0.42 CM
VAS RIGHT AVG AVF DEPTH 3: 0.45 CM
VAS RIGHT BRACHIAL A DIST EDV: 87.8 CM/S
VAS RIGHT BRACHIAL A DIST PSV: 136 CM/S
VAS RIGHT INFLOW ARTERY AVF EDV: 87.8 CM/S
VAS RIGHT INFLOW ARTERY AVF PSV: 136 CM/S
VAS RIGHT PROX OUTFLOW AVF EDV: 39.9 CM/S
VAS RIGHT PROX OUTFLOW AVF PSV: 91 CM/S
WBC OTHER # BLD: 6.8 K/UL (ref 3.5–11.3)

## 2024-12-26 PROCEDURE — 93005 ELECTROCARDIOGRAM TRACING: CPT

## 2024-12-26 PROCEDURE — 76937 US GUIDE VASCULAR ACCESS: CPT

## 2024-12-26 PROCEDURE — 6370000000 HC RX 637 (ALT 250 FOR IP)

## 2024-12-26 PROCEDURE — 83735 ASSAY OF MAGNESIUM: CPT

## 2024-12-26 PROCEDURE — 84100 ASSAY OF PHOSPHORUS: CPT

## 2024-12-26 PROCEDURE — 96374 THER/PROPH/DIAG INJ IV PUSH: CPT

## 2024-12-26 PROCEDURE — 80048 BASIC METABOLIC PNL TOTAL CA: CPT

## 2024-12-26 PROCEDURE — 93990 DOPPLER FLOW TESTING: CPT

## 2024-12-26 PROCEDURE — G0378 HOSPITAL OBSERVATION PER HR: HCPCS

## 2024-12-26 PROCEDURE — 6360000002 HC RX W HCPCS: Performed by: INTERNAL MEDICINE

## 2024-12-26 PROCEDURE — 77001 FLUOROGUIDE FOR VEIN DEVICE: CPT

## 2024-12-26 PROCEDURE — 99285 EMERGENCY DEPT VISIT HI MDM: CPT

## 2024-12-26 PROCEDURE — 90935 HEMODIALYSIS ONE EVALUATION: CPT

## 2024-12-26 PROCEDURE — 83880 ASSAY OF NATRIURETIC PEPTIDE: CPT

## 2024-12-26 PROCEDURE — 93990 DOPPLER FLOW TESTING: CPT | Performed by: SURGERY

## 2024-12-26 PROCEDURE — 2500000003 HC RX 250 WO HCPCS

## 2024-12-26 PROCEDURE — 85610 PROTHROMBIN TIME: CPT

## 2024-12-26 PROCEDURE — C1769 GUIDE WIRE: HCPCS

## 2024-12-26 PROCEDURE — 87340 HEPATITIS B SURFACE AG IA: CPT

## 2024-12-26 PROCEDURE — 82947 ASSAY GLUCOSE BLOOD QUANT: CPT

## 2024-12-26 PROCEDURE — 96372 THER/PROPH/DIAG INJ SC/IM: CPT

## 2024-12-26 PROCEDURE — 6360000002 HC RX W HCPCS

## 2024-12-26 PROCEDURE — 36558 INSERT TUNNELED CV CATH: CPT

## 2024-12-26 PROCEDURE — 85025 COMPLETE CBC W/AUTO DIFF WBC: CPT

## 2024-12-26 PROCEDURE — 6360000002 HC RX W HCPCS: Performed by: PHYSICIAN ASSISTANT

## 2024-12-26 RX ORDER — HEPARIN SODIUM 1000 [USP'U]/ML
INJECTION, SOLUTION INTRAVENOUS; SUBCUTANEOUS PRN
Status: COMPLETED | OUTPATIENT
Start: 2024-12-26 | End: 2024-12-26

## 2024-12-26 RX ORDER — HEPARIN SODIUM 1000 [USP'U]/ML
1600 INJECTION, SOLUTION INTRAVENOUS; SUBCUTANEOUS ONCE
Status: COMPLETED | OUTPATIENT
Start: 2024-12-26 | End: 2024-12-26

## 2024-12-26 RX ORDER — ONDANSETRON 2 MG/ML
4 INJECTION INTRAMUSCULAR; INTRAVENOUS EVERY 6 HOURS PRN
Status: DISCONTINUED | OUTPATIENT
Start: 2024-12-26 | End: 2024-12-27 | Stop reason: HOSPADM

## 2024-12-26 RX ORDER — ACETAMINOPHEN 650 MG/1
650 SUPPOSITORY RECTAL EVERY 6 HOURS PRN
Status: DISCONTINUED | OUTPATIENT
Start: 2024-12-26 | End: 2024-12-27 | Stop reason: HOSPADM

## 2024-12-26 RX ORDER — SODIUM CHLORIDE 9 MG/ML
INJECTION, SOLUTION INTRAVENOUS PRN
Status: DISCONTINUED | OUTPATIENT
Start: 2024-12-26 | End: 2024-12-27 | Stop reason: HOSPADM

## 2024-12-26 RX ORDER — HEPARIN SODIUM 5000 [USP'U]/ML
5000 INJECTION, SOLUTION INTRAVENOUS; SUBCUTANEOUS EVERY 8 HOURS SCHEDULED
Status: DISCONTINUED | OUTPATIENT
Start: 2024-12-26 | End: 2024-12-27 | Stop reason: HOSPADM

## 2024-12-26 RX ORDER — ACETAMINOPHEN 325 MG/1
650 TABLET ORAL EVERY 6 HOURS PRN
Status: DISCONTINUED | OUTPATIENT
Start: 2024-12-26 | End: 2024-12-27 | Stop reason: HOSPADM

## 2024-12-26 RX ORDER — POLYETHYLENE GLYCOL 3350 17 G/17G
17 POWDER, FOR SOLUTION ORAL DAILY PRN
Status: DISCONTINUED | OUTPATIENT
Start: 2024-12-26 | End: 2024-12-27 | Stop reason: HOSPADM

## 2024-12-26 RX ORDER — MIDODRINE HYDROCHLORIDE 5 MG/1
10 TABLET ORAL 3 TIMES DAILY PRN
Status: DISCONTINUED | OUTPATIENT
Start: 2024-12-26 | End: 2024-12-27 | Stop reason: HOSPADM

## 2024-12-26 RX ORDER — SODIUM CHLORIDE 0.9 % (FLUSH) 0.9 %
5-40 SYRINGE (ML) INJECTION EVERY 12 HOURS SCHEDULED
Status: DISCONTINUED | OUTPATIENT
Start: 2024-12-26 | End: 2024-12-27 | Stop reason: HOSPADM

## 2024-12-26 RX ORDER — GLUCAGON 1 MG/ML
1 KIT INJECTION PRN
Status: DISCONTINUED | OUTPATIENT
Start: 2024-12-26 | End: 2024-12-27 | Stop reason: HOSPADM

## 2024-12-26 RX ORDER — INSULIN LISPRO 100 [IU]/ML
0-8 INJECTION, SOLUTION INTRAVENOUS; SUBCUTANEOUS
Status: DISCONTINUED | OUTPATIENT
Start: 2024-12-26 | End: 2024-12-27 | Stop reason: HOSPADM

## 2024-12-26 RX ORDER — DEXTROSE MONOHYDRATE 100 MG/ML
INJECTION, SOLUTION INTRAVENOUS CONTINUOUS PRN
Status: DISCONTINUED | OUTPATIENT
Start: 2024-12-26 | End: 2024-12-27 | Stop reason: HOSPADM

## 2024-12-26 RX ORDER — ONDANSETRON 4 MG/1
4 TABLET, ORALLY DISINTEGRATING ORAL EVERY 8 HOURS PRN
Status: DISCONTINUED | OUTPATIENT
Start: 2024-12-26 | End: 2024-12-27 | Stop reason: HOSPADM

## 2024-12-26 RX ORDER — SODIUM CHLORIDE 0.9 % (FLUSH) 0.9 %
5-40 SYRINGE (ML) INJECTION PRN
Status: DISCONTINUED | OUTPATIENT
Start: 2024-12-26 | End: 2024-12-27 | Stop reason: HOSPADM

## 2024-12-26 RX ADMIN — HEPARIN SODIUM 1600 UNITS: 1000 INJECTION, SOLUTION INTRAVENOUS; SUBCUTANEOUS at 16:05

## 2024-12-26 RX ADMIN — SODIUM CHLORIDE, PRESERVATIVE FREE 10 ML: 5 INJECTION INTRAVENOUS at 22:17

## 2024-12-26 RX ADMIN — HEPARIN SODIUM 5000 UNITS: 5000 INJECTION INTRAVENOUS; SUBCUTANEOUS at 22:14

## 2024-12-26 RX ADMIN — INSULIN LISPRO 2 UNITS: 100 INJECTION, SOLUTION INTRAVENOUS; SUBCUTANEOUS at 22:13

## 2024-12-26 RX ADMIN — HEPARIN SODIUM 1600 UNITS: 1000 INJECTION INTRAVENOUS; SUBCUTANEOUS at 19:36

## 2024-12-26 RX ADMIN — Medication 2000 MG: at 15:54

## 2024-12-26 RX ADMIN — HEPARIN SODIUM 1600 UNITS: 1000 INJECTION INTRAVENOUS; SUBCUTANEOUS at 19:38

## 2024-12-26 ASSESSMENT — ENCOUNTER SYMPTOMS
GASTROINTESTINAL NEGATIVE: 1
RESPIRATORY NEGATIVE: 1

## 2024-12-26 NOTE — PROGRESS NOTES
Dialysis Time Out  To be done by RN and tech or 2 RNs  Staff Names Page HEATH RN and Kanika COLEY OCDT    [x]  Identity of the patient using 2 patient identifiers  [x]  Consent for treatment  [x]  Equipment-proper machine and dialyzer  [x]  B-Hep B status (12/26/2024)  [x]  Orders- to include bath, blood flow, dialyzer, time and fluid removal  [x]  Access-Correct site and in working order  [x]  Time for patient to ask questions.

## 2024-12-26 NOTE — ED PROVIDER NOTES
[facility] EMERGENCY DEPARTMENT  Emergency Department Encounter  Emergency Medicine Resident     Pt Name:Jules Nichols  MRN: [id number]  Birthdate 1980  Date of evaluation: 12/26/24  PCP:  Elvin Kelley MD  Note Started: 10:47 AM EST      CHIEF COMPLAINT       Chief Complaint   Patient presents with    Vascular Access Problem       HISTORY OF PRESENT ILLNESS  (Location/Symptom, Timing/Onset, Context/Setting, Quality, Duration, Modifying Factors, Severity.)      Jules Nichols is a 44 y.o. male who presents with vascular access problem.  Patient is a hemodialysis patient, scheduled MWF.  For the holidays patient did undergo dialysis on Sunday.  He presented to dialysis center on Tuesday and they were unable to dialyze him due to difficulties with access, he presented today and also was unable to have dialysis due to access difficulties.  Therefore he has missed 2 sessions.  He was sent to the emergency department for evaluation of his fistula.  He has no other complaints.  Denies chest pain, shortness of breath, weakness, nausea/vomiting.    PAST MEDICAL / SURGICAL / SOCIAL / FAMILY HISTORY      has a past medical history of Benign essential HTN, Chronic kidney disease, Diabetic keto-acidosis (HCC), Hyperlipemia, and Uncontrolled type 2 diabetes mellitus with nephropathy.       has a past surgical history that includes other surgical history (Bilateral, 02/22/2024); Wound Exploration (Bilateral, 02/22/2024); Wound debridement; Abdomen surgery (N/A, 02/24/2024); IR TUNNELED CVC PLACE WO SQ PORT/PUMP > 5 YEARS (03/01/2024); IR TUNNELED CVC PLACE WO SQ PORT/PUMP > 5 YEARS (03/28/2024); AV fistula creation (Right, 07/18/2024); and Dialysis fistula creation (Right, 7/18/2024).      Social History     Socioeconomic History    Marital status: Single     Spouse name: Not on file    Number of children: Not on file    Years of education: Not on file    Highest education level: Not on file   Occupational

## 2024-12-26 NOTE — ED NOTES
Writer spoke with dialysis RN. Per RN, the pt is complete with IR and is being transported to Dialysis then back to the ED or admitted to the hospital.   Writer notified Dr. Rodrigues

## 2024-12-26 NOTE — ED TRIAGE NOTES
Pt presented to ED 1 ambulatory from triage.  Pt presents with C/O Fistula issue.  Pt states he was at dialysis on Tuesday and his fistula was acting funny and was unable to complete treatment.   Pt states he normally receives treatment MWF, but due to the holiday its Sunday, Tuesday, Thursday.  Pt states he went to dialysis today and was unable to receive treatment due to the fistula not working properly.   Therefore the pt has not had treatment since Sunday.  Pt denies any other medical issues at this time.  Pt is Alert and oriented. Pt is resting comfortably on stretcher with call light in reach.  No acute distress noted. Respirations are even and unlabored.  White board updated. Will continue to follow plan of care.

## 2024-12-26 NOTE — DISCHARGE INSTR - COC
Continuity of Care Form    Patient Name: Jules Nichols   :  1980  MRN:  [id number]    Admit date:  2024  Discharge date:  ***    Code Status Order: Prior   Advance Directives:   Advance Care Flowsheet Documentation             Admitting Physician:  No admitting provider for patient encounter.  PCP: Elvin Kelley MD    Discharging Nurse: ***  Discharging Hospital Unit/Room#:   Discharging Unit Phone Number: ***    Emergency Contact:   Extended Emergency Contact Information  Primary Emergency Contact: Isela Santos   [facility]  Home Phone: [phone]  Relation: Other  Secondary Emergency Contact: Eddi Nichols  Mobile Phone: [phone]  Relation: Brother/Sister   needed? No    Past Surgical History:  Past Surgical History:   Procedure Laterality Date    ABDOMEN SURGERY N/A 2024    WOUND BED PREPARATION BILATERAL GROIN AND ARMPIT, CHEST AND BACK performed by Boyd Keene MD at [facility] OR    AV FISTULA CREATION Right 2024    DR. HOOK    DEBRIDEMENT      Irrigation and Debridement BILATERAL GROIN AND ARMPIT, and buttocks, chest    DIALYSIS FISTULA CREATION Right 2024    ARTERIOVENOUS FISTULA CREATION RIGHT performed by Kevin Hook MD at [facility] CVOR    IR TUNNELED CVC PLACE WO SQ PORT/PUMP > 5 YEARS  2024    IR TUNNELED CATHETER PLACEMENT GREATER THAN 5 YEARS 3/1/2024 [facility] SPECIAL PROCEDURES    IR TUNNELED CVC PLACE WO SQ PORT/PUMP > 5 YEARS  2024    IR TUNNELED CATHETER PLACEMENT GREATER THAN 5 YEARS 3/28/2024 [facility] SPECIAL PROCEDURES    OTHER SURGICAL HISTORY Bilateral 2024    INCISION AND DRAINAGE OF AXILLAS, CHEST WALL AND GROIN (YELLOW FINS, LITHOTOMY, CYSTO TUBING, WINTER GREEN) (Bilateral)    WOUND EXPLORATION Bilateral 2024    INCISION AND DRAINAGE OF BILATERAL AXILLA, CHEST WALL AND BILATERAL GROIN performed by Sandy Carmichael MD at [facility] OR       Immunization History:   Immunization History   Administered  {[facility] MED Delivery:349302995}    Wound Care Documentation and Therapy:  Incision 24 Radial Proximal;Right (Active)   Number of days: 161        Elimination:  Continence:   Bowel: {YES / NO:}  Bladder: {YES / NO:}  Urinary Catheter: {Urinary Catheter:698170226}   Colostomy/Ileostomy/Ileal Conduit: {YES / NO:}       Date of Last BM: ***  No intake or output data in the 24 hours ending 24 1047  No intake/output data recorded.    Safety Concerns:     {[facility] Safety Concerns:037655111}    Impairments/Disabilities:      {[facility] Impairments/Disabilities:128160853}    Nutrition Therapy:  Current Nutrition Therapy:   {[facility] Diet List:475203399}    Routes of Feeding: {[facility] Other Feedings:526700086}  Liquids: {Slp liquid thickness:73604}  Daily Fluid Restriction: {[facility] DME Yes amt example:074725706}  Last Modified Barium Swallow with Video (Video Swallowing Test): {Done Not Done Date:118067490}    Treatments at the Time of Hospital Discharge:   Respiratory Treatments: ***  Oxygen Therapy:  {Therapy; copd oxygen:68862}  Ventilator:    {[facility] Vent List:015712417}    Rehab Therapies: {THERAPEUTIC INTERVENTION:5043503699}  Weight Bearing Status/Restrictions: {[facility] Weight Bearin}  Other Medical Equipment (for information only, NOT a DME order):  {EQUIPMENT:115597608}  Other Treatments: ***    Patient's personal belongings (please select all that are sent with patient):  {[facility] Belongings:623453043}    RN SIGNATURE:  {Esignature:370806939}    CASE MANAGEMENT/SOCIAL WORK SECTION    Inpatient Status Date: ***    Readmission Risk Assessment Score:  [facility] RISK OF UNPLANNED READMISSION 2.0             0 Total Score        Discharging to Facility/ Agency   Name:   Address:  Phone:  Fax:    Dialysis Facility (if applicable)   Name:  Address:  Dialysis Schedule:  Phone:  Fax:    / signature: {Esignature:690511518}    PHYSICIAN SECTION    Prognosis:

## 2024-12-26 NOTE — BRIEF OP NOTE
Brief Postoperative Note    Jules Nichols  YOB: 1980  [id number]    Pre-operative Diagnosis: Chronic Renal Failure      Post-operative Diagnosis: Same    Procedure: Tunneled Dialysis Catheter    Medication Given: none    Anesthesia: 1%Lidocaine     Surgeons/Assistants: LYNDSAY Lambert    Estimated Blood Loss: Minimal    Complications: none    14 Fr x 19 cm tip to cuff palindrome tunneled HD Catheter placed successfully via the Site:  Right Internal Jugular Vein.  Catheter secured to skin, dressing applied.  Catheter locked with Heparin.  May use catheter.    Electronically signed by LYNDSAY Lambert on 12/26/2024 at 4:18 PM

## 2024-12-26 NOTE — ED PROVIDER NOTES
FACULTY SIGN-OUT  ADDENDUM     Care of this patient was assumed from previous attending physician. The patient's initial evaluation and plan have been discussed with the prior provider who initially evaluated the patient.      Attestation  I was available and discussed any additional care issues that arose and coordinated the management plans with the resident(s) caring for the patient during my duty period. Any areas of disagreement with resident's documentation of care or procedures are noted on the chart. I was personally present for the key portions of any/all procedures, during my duty period. I have documented in the chart those procedures where I was not present during the key portions.       ED COURSE      The patient was given the following medications:  Orders Placed This Encounter   Medications    ceFAZolin (ANCEF) 2000 mg in sterile water 20 mL IV syringe       RECENT VITALS:   Temp: 97.7 °F (36.5 °C), Pulse: 80, Respirations: 15, BP: 116/76    MEDICAL DECISION MAKING        Jules Nichols is a 44 y.o. male who presents to the Emergency Department with complaints of Fistula evaluation       Mack Campos MD, MD, F.A.C.E.P.  Attending Emergency Physician    (Please note that portions of this note were completed with a voice recognition program.  Efforts were made to edit the dictations but occasionally words are mis-transcribed.)         Mack Campos MD  12/26/24 0727

## 2024-12-26 NOTE — CONSULTS
Division of Vascular Surgery        New Consult      Physician Requesting Consult:  Dr. Weir    Reason for Consult:   \"Unable to use right AV fistula at Dialysis today\"     Chief Complaint:      Unable to use AV fistula at HD    History of Present Illness:      Jules Nichols is a 44 y.o. gentleman with a medical history of ESRD, HTN, HLD, and DM type II who presents with difficulty with his fistula access. He reports now missing 1 full Dialysis session, his last full dialysis session was Sunday, 12/22/24.  Right brachial/cephalic AV fistula was created on 7/30/24 by Dr. Hook. Duplex 10/1/24 showed flow ranging from 648 to 1.2L/min with depth 6mm. Patient is afebrile with stable vital signs.   Patient denies fevers, chills, chest pain or shortness of breath. He does notice that his thrill is decreased from previous Dialysis sessions.      Medical History:     Past Medical History:   Diagnosis Date    Benign essential HTN     Chronic kidney disease 1/20/16    Dr Stafford    Diabetic keto-acidosis (HCC) 1/20/16    hx of     Hyperlipemia     Uncontrolled type 2 diabetes mellitus with nephropathy 1/20/16       Surgical History:     Past Surgical History:   Procedure Laterality Date    ABDOMEN SURGERY N/A 02/24/2024    WOUND BED PREPARATION BILATERAL GROIN AND ARMPIT, CHEST AND BACK performed by Boyd Keene MD at [facility] OR    AV FISTULA CREATION Right 07/18/2024    DR. HOOK    DEBRIDEMENT      Irrigation and Debridement BILATERAL GROIN AND ARMPIT, and buttocks, chest    DIALYSIS FISTULA CREATION Right 7/18/2024    ARTERIOVENOUS FISTULA CREATION RIGHT performed by Kevin Hook MD at [facility] CVOR    IR TUNNELED CVC PLACE WO SQ PORT/PUMP > 5 YEARS  03/01/2024    IR TUNNELED CATHETER PLACEMENT GREATER THAN 5 YEARS 3/1/2024 [facility] SPECIAL PROCEDURES    IR TUNNELED CVC PLACE WO SQ PORT/PUMP > 5 YEARS  03/28/2024    IR TUNNELED CATHETER PLACEMENT GREATER THAN 5 YEARS 3/28/2024 [facility] SPECIAL PROCEDURES    OTHER

## 2024-12-26 NOTE — ED PROVIDER NOTES
[facility]     Emergency Department     Faculty Attestation    I performed a history and physical examination of the patient and discussed management with the resident. I have reviewed and agree with the resident’s findings including all diagnostic interpretations, and treatment plans as written at the time of my review. Any areas of disagreement are noted on the chart. I was personally present for the key portions of any procedures. I have documented in the chart those procedures where I was not present during the key portions. For Physician Assistant/ Nurse Practitioner cases/documentation I have personally evaluated this patient and have completed at least one if not all key elements of the E/M (history, physical exam, and MDM). Additional findings are as noted.    PtName: Jules Nichols  MRN: [id number]  Birthdate 1980  Date of evaluation: 12/26/24  Note Started: 10:30 AM EST    Primary Care Physician: Elvin Kelley MD    Brief HPI:  44-year-old male presents emergency department with difficult fistula access.  The patient reports that the dialysis center was unable to access his fistula for 2 sessions.  His last session was Sunday.    Pertinent Physical Exam Findings:  Vitals:    12/26/24 1021   BP: 126/87   Pulse: (!) 104   Resp: 20   Temp: 97.7 °F (36.5 °C)   SpO2: 100%   Appears well, resting comfortably, no acute distress.  Palpable thrill to the right upper extremity fistula.  Area of firmness to the superior portion of the fistula    Medical Decision Making: Patient is a 44 y.o. male presenting to the emergency department with right upper extremity fistula access difficulty. The chart was reviewed for pertinent history relating to the chief complaint.  There is a palpable thrill in the fistula.  Plan for vascular surgery evaluation.    All results, including labs (if ordered), imaging (if ordered), and EKGs (if ordered) were independently interpreted

## 2024-12-26 NOTE — ED NOTES
ED to inpatient nurses report      Chief Complaint:  Chief Complaint   Patient presents with    Vascular Access Problem     Present to ED from: Home    MOA:     LOC: alert and orientated to name, place, date  Mobility: Independent  Oxygen Baseline: room air    Current needs required: n/a   Pending ED orders: none  Present condition: stable    Why did the patient come to the ED?     Pt presented to ED 1 ambulatory from triage.  Pt presents with C/O Fistula issue.  Pt states he was at dialysis on Tuesday and his fistula was acting funny and was unable to complete treatment.   Pt states he normally receives treatment MWF, but due to the holiday its Sunday, Tuesday, Thursday.  Pt states he went to dialysis today and was unable to receive treatment due to the fistula not working properly.   Therefore the pt has not had treatment since Sunday.  Pt denies any other medical issues at this time.     What is the plan? Admit to observation  Any procedures or intervention occur? Imaging, labs, IR  Any safety concerns??    Mental Status:  Level of Consciousness: Alert (0)    Psych Assessment:   Psychosocial  Psychosocial (WDL): Within Defined Limits  Vital signs   Vitals:    12/26/24 1605 12/26/24 1637 12/26/24 1651 12/26/24 1726   BP: 116/76 127/82 120/81 114/84   Pulse: 80 85 83 91   Resp: 15 16     Temp:  97.7 °F (36.5 °C)     SpO2: 100%      Weight:  64.5 kg (142 lb 3.2 oz)          Vitals:  Patient Vitals for the past 24 hrs:   BP Temp Pulse Resp SpO2 Weight   12/26/24 1726 114/84 -- 91 -- -- --   12/26/24 1651 120/81 -- 83 -- -- --   12/26/24 1637 127/82 97.7 °F (36.5 °C) 85 16 -- 64.5 kg (142 lb 3.2 oz)   12/26/24 1605 116/76 -- 80 15 100 % --   12/26/24 1600 118/78 -- 82 13 100 % --   12/26/24 1557 119/81 -- 81 16 100 % --   12/26/24 1550 117/76 -- 86 17 100 % --   12/26/24 1021 126/87 97.7 °F (36.5 °C) (!) 104 20 100 % --      Visit Vitals  /84   Pulse 91   Temp 97.7 °F (36.5 °C)   Resp 16   Wt 64.5 kg (142 lb 3.2

## 2024-12-27 ENCOUNTER — ANESTHESIA (OUTPATIENT)
Dept: OPERATING ROOM | Age: 44
End: 2024-12-27
Payer: MEDICAID

## 2024-12-27 ENCOUNTER — OFFICE VISIT (OUTPATIENT)
Dept: OPERATING ROOM | Age: 44
End: 2024-12-27
Attending: STUDENT IN AN ORGANIZED HEALTH CARE EDUCATION/TRAINING PROGRAM

## 2024-12-27 ENCOUNTER — APPOINTMENT (OUTPATIENT)
Dept: DIALYSIS | Age: 44
End: 2024-12-27
Payer: MEDICAID

## 2024-12-27 ENCOUNTER — ANESTHESIA EVENT (OUTPATIENT)
Dept: OPERATING ROOM | Age: 44
End: 2024-12-27
Payer: MEDICAID

## 2024-12-27 VITALS
DIASTOLIC BLOOD PRESSURE: 60 MMHG | OXYGEN SATURATION: 100 % | HEART RATE: 105 BPM | TEMPERATURE: 98.1 F | BODY MASS INDEX: 25.32 KG/M2 | SYSTOLIC BLOOD PRESSURE: 90 MMHG | HEIGHT: 62 IN | RESPIRATION RATE: 18 BRPM | WEIGHT: 137.57 LBS

## 2024-12-27 PROBLEM — Z99.2 ESRD ON DIALYSIS (HCC): Status: ACTIVE | Noted: 2024-04-23

## 2024-12-27 LAB
25(OH)D3 SERPL-MCNC: 13.3 NG/ML (ref 30–100)
ANION GAP SERPL CALCULATED.3IONS-SCNC: 15 MMOL/L (ref 9–16)
BASOPHILS # BLD: 0.04 K/UL (ref 0–0.2)
BASOPHILS NFR BLD: 1 % (ref 0–2)
BUN SERPL-MCNC: 31 MG/DL (ref 6–20)
CA-I BLD-SCNC: 1.08 MMOL/L (ref 1.13–1.33)
CALCIUM SERPL-MCNC: 9.5 MG/DL (ref 8.6–10.4)
CHLORIDE SERPL-SCNC: 96 MMOL/L (ref 98–107)
CO2 SERPL-SCNC: 26 MMOL/L (ref 20–31)
CREAT SERPL-MCNC: 4.2 MG/DL (ref 0.7–1.2)
ECHO BSA: 1.66 M2
EOSINOPHIL # BLD: 0.13 K/UL (ref 0–0.44)
EOSINOPHILS RELATIVE PERCENT: 2 % (ref 1–4)
ERYTHROCYTE [DISTWIDTH] IN BLOOD BY AUTOMATED COUNT: 13 % (ref 11.8–14.4)
GFR, ESTIMATED: 17 ML/MIN/1.73M2
GLUCOSE BLD-MCNC: 138 MG/DL (ref 75–110)
GLUCOSE BLD-MCNC: 155 MG/DL (ref 75–110)
GLUCOSE BLD-MCNC: 158 MG/DL (ref 75–110)
GLUCOSE BLD-MCNC: 163 MG/DL (ref 75–110)
GLUCOSE SERPL-MCNC: 197 MG/DL (ref 74–99)
HCT VFR BLD AUTO: 38.7 % (ref 40.7–50.3)
HGB BLD-MCNC: 12.6 G/DL (ref 13–17)
IMM GRANULOCYTES # BLD AUTO: 0.03 K/UL (ref 0–0.3)
IMM GRANULOCYTES NFR BLD: 0 %
LYMPHOCYTES NFR BLD: 2.23 K/UL (ref 1.1–3.7)
LYMPHOCYTES RELATIVE PERCENT: 30 % (ref 24–43)
MCH RBC QN AUTO: 31.7 PG (ref 25.2–33.5)
MCHC RBC AUTO-ENTMCNC: 32.6 G/DL (ref 28.4–34.8)
MCV RBC AUTO: 97.5 FL (ref 82.6–102.9)
MONOCYTES NFR BLD: 0.52 K/UL (ref 0.1–1.2)
MONOCYTES NFR BLD: 7 % (ref 3–12)
NEUTROPHILS NFR BLD: 60 % (ref 36–65)
NEUTS SEG NFR BLD: 4.48 K/UL (ref 1.5–8.1)
NRBC BLD-RTO: 0 PER 100 WBC
PLATELET # BLD AUTO: 202 K/UL (ref 138–453)
PMV BLD AUTO: 10 FL (ref 8.1–13.5)
POTASSIUM SERPL-SCNC: 4.2 MMOL/L (ref 3.7–5.3)
PTH-INTACT SERPL-MCNC: 479 PG/ML (ref 15–65)
RBC # BLD AUTO: 3.97 M/UL (ref 4.21–5.77)
SODIUM SERPL-SCNC: 137 MMOL/L (ref 136–145)
WBC OTHER # BLD: 7.4 K/UL (ref 3.5–11.3)

## 2024-12-27 PROCEDURE — 3600000007 HC SURGERY HYBRID BASE: Performed by: STUDENT IN AN ORGANIZED HEALTH CARE EDUCATION/TRAINING PROGRAM

## 2024-12-27 PROCEDURE — 82306 VITAMIN D 25 HYDROXY: CPT

## 2024-12-27 PROCEDURE — C1725 CATH, TRANSLUMIN NON-LASER: HCPCS | Performed by: STUDENT IN AN ORGANIZED HEALTH CARE EDUCATION/TRAINING PROGRAM

## 2024-12-27 PROCEDURE — 36415 COLL VENOUS BLD VENIPUNCTURE: CPT

## 2024-12-27 PROCEDURE — 2720000010 HC SURG SUPPLY STERILE: Performed by: STUDENT IN AN ORGANIZED HEALTH CARE EDUCATION/TRAINING PROGRAM

## 2024-12-27 PROCEDURE — 6360000002 HC RX W HCPCS

## 2024-12-27 PROCEDURE — 6370000000 HC RX 637 (ALT 250 FOR IP)

## 2024-12-27 PROCEDURE — 6370000000 HC RX 637 (ALT 250 FOR IP): Performed by: INTERNAL MEDICINE

## 2024-12-27 PROCEDURE — 3600000017 HC SURGERY HYBRID ADDL 15MIN: Performed by: STUDENT IN AN ORGANIZED HEALTH CARE EDUCATION/TRAINING PROGRAM

## 2024-12-27 PROCEDURE — 82330 ASSAY OF CALCIUM: CPT

## 2024-12-27 PROCEDURE — 6360000002 HC RX W HCPCS: Performed by: STUDENT IN AN ORGANIZED HEALTH CARE EDUCATION/TRAINING PROGRAM

## 2024-12-27 PROCEDURE — 2709999900 HC NON-CHARGEABLE SUPPLY: Performed by: STUDENT IN AN ORGANIZED HEALTH CARE EDUCATION/TRAINING PROGRAM

## 2024-12-27 PROCEDURE — C1894 INTRO/SHEATH, NON-LASER: HCPCS | Performed by: STUDENT IN AN ORGANIZED HEALTH CARE EDUCATION/TRAINING PROGRAM

## 2024-12-27 PROCEDURE — 99232 SBSQ HOSP IP/OBS MODERATE 35: CPT | Performed by: INTERNAL MEDICINE

## 2024-12-27 PROCEDURE — G0378 HOSPITAL OBSERVATION PER HR: HCPCS

## 2024-12-27 PROCEDURE — 85025 COMPLETE CBC W/AUTO DIFF WBC: CPT

## 2024-12-27 PROCEDURE — 90935 HEMODIALYSIS ONE EVALUATION: CPT

## 2024-12-27 PROCEDURE — 96372 THER/PROPH/DIAG INJ SC/IM: CPT

## 2024-12-27 PROCEDURE — 7100000011 HC PHASE II RECOVERY - ADDTL 15 MIN: Performed by: STUDENT IN AN ORGANIZED HEALTH CARE EDUCATION/TRAINING PROGRAM

## 2024-12-27 PROCEDURE — 83970 ASSAY OF PARATHORMONE: CPT

## 2024-12-27 PROCEDURE — 82947 ASSAY GLUCOSE BLOOD QUANT: CPT

## 2024-12-27 PROCEDURE — 6360000002 HC RX W HCPCS: Performed by: INTERNAL MEDICINE

## 2024-12-27 PROCEDURE — 99233 SBSQ HOSP IP/OBS HIGH 50: CPT | Performed by: INTERNAL MEDICINE

## 2024-12-27 PROCEDURE — 6360000004 HC RX CONTRAST MEDICATION: Performed by: STUDENT IN AN ORGANIZED HEALTH CARE EDUCATION/TRAINING PROGRAM

## 2024-12-27 PROCEDURE — 7100000010 HC PHASE II RECOVERY - FIRST 15 MIN: Performed by: STUDENT IN AN ORGANIZED HEALTH CARE EDUCATION/TRAINING PROGRAM

## 2024-12-27 PROCEDURE — 2500000003 HC RX 250 WO HCPCS

## 2024-12-27 PROCEDURE — C1892 INTRO/SHEATH,FIXED,PEEL-AWAY: HCPCS | Performed by: STUDENT IN AN ORGANIZED HEALTH CARE EDUCATION/TRAINING PROGRAM

## 2024-12-27 PROCEDURE — 80048 BASIC METABOLIC PNL TOTAL CA: CPT

## 2024-12-27 PROCEDURE — C1769 GUIDE WIRE: HCPCS | Performed by: STUDENT IN AN ORGANIZED HEALTH CARE EDUCATION/TRAINING PROGRAM

## 2024-12-27 PROCEDURE — 3700000001 HC ADD 15 MINUTES (ANESTHESIA): Performed by: STUDENT IN AN ORGANIZED HEALTH CARE EDUCATION/TRAINING PROGRAM

## 2024-12-27 PROCEDURE — 2580000003 HC RX 258

## 2024-12-27 PROCEDURE — 3700000000 HC ANESTHESIA ATTENDED CARE: Performed by: STUDENT IN AN ORGANIZED HEALTH CARE EDUCATION/TRAINING PROGRAM

## 2024-12-27 RX ORDER — SEVELAMER CARBONATE 800 MG/1
800 TABLET, FILM COATED ORAL
Status: DISCONTINUED | OUTPATIENT
Start: 2024-12-27 | End: 2024-12-27 | Stop reason: HOSPADM

## 2024-12-27 RX ORDER — HEPARIN SODIUM 200 [USP'U]/100ML
INJECTION, SOLUTION INTRAVENOUS CONTINUOUS PRN
Status: COMPLETED | OUTPATIENT
Start: 2024-12-27 | End: 2024-12-27

## 2024-12-27 RX ORDER — ERGOCALCIFEROL 1.25 MG/1
50000 CAPSULE, LIQUID FILLED ORAL WEEKLY
Status: DISCONTINUED | OUTPATIENT
Start: 2024-12-27 | End: 2024-12-27 | Stop reason: HOSPADM

## 2024-12-27 RX ORDER — ERGOCALCIFEROL 1.25 MG/1
50000 CAPSULE ORAL WEEKLY
Qty: 5 CAPSULE | Refills: 0 | Status: SHIPPED | OUTPATIENT
Start: 2024-12-27 | End: 2025-01-18

## 2024-12-27 RX ORDER — PHENYLEPHRINE HCL IN 0.9% NACL 1 MG/10 ML
SYRINGE (ML) INTRAVENOUS
Status: DISCONTINUED | OUTPATIENT
Start: 2024-12-27 | End: 2024-12-27 | Stop reason: SDUPTHER

## 2024-12-27 RX ORDER — SEVELAMER CARBONATE 800 MG/1
800 TABLET, FILM COATED ORAL
Qty: 90 TABLET | Refills: 3 | Status: SHIPPED | OUTPATIENT
Start: 2024-12-27

## 2024-12-27 RX ORDER — LIDOCAINE HYDROCHLORIDE 10 MG/ML
INJECTION, SOLUTION EPIDURAL; INFILTRATION; INTRACAUDAL; PERINEURAL
Status: DISCONTINUED | OUTPATIENT
Start: 2024-12-27 | End: 2024-12-27 | Stop reason: SDUPTHER

## 2024-12-27 RX ORDER — PROPOFOL 10 MG/ML
INJECTION, EMULSION INTRAVENOUS
Status: DISCONTINUED | OUTPATIENT
Start: 2024-12-27 | End: 2024-12-27 | Stop reason: SDUPTHER

## 2024-12-27 RX ORDER — PROTAMINE SULFATE 10 MG/ML
INJECTION, SOLUTION INTRAVENOUS
Status: DISCONTINUED | OUTPATIENT
Start: 2024-12-27 | End: 2024-12-27 | Stop reason: SDUPTHER

## 2024-12-27 RX ORDER — ERGOCALCIFEROL 1.25 MG/1
50000 CAPSULE ORAL WEEKLY
Qty: 5 CAPSULE | Refills: 0 | Status: CANCELLED | OUTPATIENT
Start: 2024-12-27 | End: 2025-01-18

## 2024-12-27 RX ORDER — IODIXANOL 320 MG/ML
INJECTION, SOLUTION INTRAVASCULAR PRN
Status: DISCONTINUED | OUTPATIENT
Start: 2024-12-27 | End: 2024-12-27 | Stop reason: ALTCHOICE

## 2024-12-27 RX ORDER — PROPOFOL 10 MG/ML
INJECTION, EMULSION INTRAVENOUS
Status: COMPLETED
Start: 2024-12-27 | End: 2024-12-27

## 2024-12-27 RX ORDER — FENTANYL CITRATE 50 UG/ML
INJECTION, SOLUTION INTRAMUSCULAR; INTRAVENOUS
Status: DISCONTINUED | OUTPATIENT
Start: 2024-12-27 | End: 2024-12-27 | Stop reason: SDUPTHER

## 2024-12-27 RX ORDER — LIDOCAINE HYDROCHLORIDE 10 MG/ML
INJECTION, SOLUTION EPIDURAL; INFILTRATION; INTRACAUDAL; PERINEURAL PRN
Status: DISCONTINUED | OUTPATIENT
Start: 2024-12-27 | End: 2024-12-27 | Stop reason: ALTCHOICE

## 2024-12-27 RX ORDER — MIDODRINE HYDROCHLORIDE 5 MG/1
5 TABLET ORAL
Status: DISCONTINUED | OUTPATIENT
Start: 2024-12-27 | End: 2024-12-27 | Stop reason: HOSPADM

## 2024-12-27 RX ORDER — MIDODRINE HYDROCHLORIDE 5 MG/1
5 TABLET ORAL ONCE
Status: COMPLETED | OUTPATIENT
Start: 2024-12-27 | End: 2024-12-27

## 2024-12-27 RX ORDER — MIDODRINE HYDROCHLORIDE 5 MG/1
10 TABLET ORAL 3 TIMES DAILY
Qty: 90 TABLET | Refills: 3 | Status: SHIPPED | OUTPATIENT
Start: 2024-12-27 | End: 2024-12-27 | Stop reason: HOSPADM

## 2024-12-27 RX ORDER — MIDODRINE HYDROCHLORIDE 10 MG/1
10 TABLET ORAL 3 TIMES DAILY PRN
Qty: 90 TABLET | Refills: 1 | Status: SHIPPED | OUTPATIENT
Start: 2024-12-27

## 2024-12-27 RX ORDER — IODIXANOL 320 MG/ML
INJECTION, SOLUTION INTRAVASCULAR
Status: DISPENSED
Start: 2024-12-27 | End: 2024-12-27

## 2024-12-27 RX ORDER — MIDAZOLAM HYDROCHLORIDE 1 MG/ML
INJECTION, SOLUTION INTRAMUSCULAR; INTRAVENOUS
Status: DISCONTINUED | OUTPATIENT
Start: 2024-12-27 | End: 2024-12-27 | Stop reason: SDUPTHER

## 2024-12-27 RX ORDER — SEVOFLURANE 250 ML/250ML
LIQUID RESPIRATORY (INHALATION)
Status: DISPENSED
Start: 2024-12-27 | End: 2024-12-27

## 2024-12-27 RX ORDER — SODIUM CHLORIDE 9 MG/ML
INJECTION, SOLUTION INTRAVENOUS
Status: DISCONTINUED | OUTPATIENT
Start: 2024-12-27 | End: 2024-12-27 | Stop reason: SDUPTHER

## 2024-12-27 RX ORDER — HEPARIN SODIUM 1000 [USP'U]/ML
INJECTION, SOLUTION INTRAVENOUS; SUBCUTANEOUS
Status: DISCONTINUED | OUTPATIENT
Start: 2024-12-27 | End: 2024-12-27 | Stop reason: SDUPTHER

## 2024-12-27 RX ORDER — HEPARIN SODIUM 1000 [USP'U]/ML
1600 INJECTION, SOLUTION INTRAVENOUS; SUBCUTANEOUS PRN
Status: DISCONTINUED | OUTPATIENT
Start: 2024-12-27 | End: 2024-12-27 | Stop reason: HOSPADM

## 2024-12-27 RX ORDER — CEFAZOLIN SODIUM 1 G/3ML
INJECTION, POWDER, FOR SOLUTION INTRAMUSCULAR; INTRAVENOUS
Status: DISCONTINUED | OUTPATIENT
Start: 2024-12-27 | End: 2024-12-27 | Stop reason: SDUPTHER

## 2024-12-27 RX ORDER — PHENYLEPHRINE HCL IN 0.9% NACL 50MG/250ML
PLASTIC BAG, INJECTION (ML) INTRAVENOUS
Status: DISPENSED
Start: 2024-12-27 | End: 2024-12-27

## 2024-12-27 RX ADMIN — MIDODRINE HYDROCHLORIDE 5 MG: 5 TABLET ORAL at 17:19

## 2024-12-27 RX ADMIN — Medication 200 MCG: at 08:35

## 2024-12-27 RX ADMIN — HEPARIN SODIUM 5000 UNITS: 5000 INJECTION INTRAVENOUS; SUBCUTANEOUS at 20:58

## 2024-12-27 RX ADMIN — MIDODRINE HYDROCHLORIDE 10 MG: 5 TABLET ORAL at 16:20

## 2024-12-27 RX ADMIN — SEVELAMER CARBONATE 800 MG: 800 TABLET, FILM COATED ORAL at 20:57

## 2024-12-27 RX ADMIN — HEPARIN SODIUM 6200 UNITS: 1000 INJECTION, SOLUTION INTRAVENOUS; SUBCUTANEOUS at 08:32

## 2024-12-27 RX ADMIN — HEPARIN SODIUM 1600 UNITS: 1000 INJECTION INTRAVENOUS; SUBCUTANEOUS at 18:23

## 2024-12-27 RX ADMIN — MIDODRINE HYDROCHLORIDE 5 MG: 5 TABLET ORAL at 19:32

## 2024-12-27 RX ADMIN — Medication 200 MCG: at 09:01

## 2024-12-27 RX ADMIN — PROPOFOL 80 MCG/KG/MIN: 10 INJECTION, EMULSION INTRAVENOUS at 08:08

## 2024-12-27 RX ADMIN — CEFAZOLIN 2 G: 1 INJECTION, POWDER, FOR SOLUTION INTRAMUSCULAR; INTRAVENOUS at 08:02

## 2024-12-27 RX ADMIN — Medication 200 MCG: at 08:24

## 2024-12-27 RX ADMIN — PHENYLEPHRINE HYDROCHLORIDE 50 MCG/MIN: 10 INJECTION INTRAVENOUS at 08:26

## 2024-12-27 RX ADMIN — PROTAMINE SULFATE 30 MG: 10 INJECTION, SOLUTION INTRAVENOUS at 09:00

## 2024-12-27 RX ADMIN — FENTANYL CITRATE 25 MCG: 50 INJECTION, SOLUTION INTRAMUSCULAR; INTRAVENOUS at 09:02

## 2024-12-27 RX ADMIN — MIDAZOLAM 2 MG: 1 INJECTION INTRAMUSCULAR; INTRAVENOUS at 07:58

## 2024-12-27 RX ADMIN — SODIUM CHLORIDE, PRESERVATIVE FREE 10 ML: 5 INJECTION INTRAVENOUS at 06:20

## 2024-12-27 RX ADMIN — FENTANYL CITRATE 25 MCG: 50 INJECTION, SOLUTION INTRAMUSCULAR; INTRAVENOUS at 08:18

## 2024-12-27 RX ADMIN — ONDANSETRON 4 MG: 2 INJECTION INTRAMUSCULAR; INTRAVENOUS at 10:16

## 2024-12-27 RX ADMIN — FENTANYL CITRATE 25 MCG: 50 INJECTION, SOLUTION INTRAMUSCULAR; INTRAVENOUS at 08:07

## 2024-12-27 RX ADMIN — Medication 200 MCG: at 08:33

## 2024-12-27 RX ADMIN — SODIUM CHLORIDE, PRESERVATIVE FREE 10 ML: 5 INJECTION INTRAVENOUS at 19:17

## 2024-12-27 RX ADMIN — SODIUM CHLORIDE: 0.9 INJECTION, SOLUTION INTRAVENOUS at 07:58

## 2024-12-27 RX ADMIN — ERGOCALCIFEROL 50000 UNITS: 1.25 CAPSULE ORAL at 20:59

## 2024-12-27 RX ADMIN — Medication 100 MCG: at 08:30

## 2024-12-27 RX ADMIN — FENTANYL CITRATE 25 MCG: 50 INJECTION, SOLUTION INTRAMUSCULAR; INTRAVENOUS at 08:49

## 2024-12-27 RX ADMIN — LIDOCAINE HYDROCHLORIDE 50 MG: 10 INJECTION, SOLUTION EPIDURAL; INFILTRATION; INTRACAUDAL; PERINEURAL at 08:08

## 2024-12-27 RX ADMIN — MIDODRINE HYDROCHLORIDE 5 MG: 5 TABLET ORAL at 14:06

## 2024-12-27 RX ADMIN — Medication 100 MCG: at 08:05

## 2024-12-27 NOTE — PLAN OF CARE
Problem: Chronic Conditions and Co-morbidities  Goal: Patient's chronic conditions and co-morbidity symptoms are monitored and maintained or improved  12/26/2024 2224 by Valeria Jensen RN  Outcome: Progressing  12/26/2024 2224 by Valeria Jensen RN  Outcome: Progressing     Problem: Discharge Planning  Goal: Discharge to home or other facility with appropriate resources  12/26/2024 2224 by Valeria Jensen RN  Outcome: Progressing  12/26/2024 2224 by Valeria Jensen RN  Outcome: Progressing     Problem: Safety - Adult  Goal: Free from fall injury  12/26/2024 2224 by Valeria Jensen RN  Outcome: Progressing  12/26/2024 2224 by Valeria Jensen RN  Outcome: Progressing     Problem: Pain  Goal: Verbalizes/displays adequate comfort level or baseline comfort level  Outcome: Progressing

## 2024-12-27 NOTE — ANESTHESIA PRE PROCEDURE
Department of Anesthesiology  Preprocedure Note       Name:  Jules Nichols   Age:  44 y.o.  :  1980                                          MRN:  [id number]         Date:  2024      Surgeon: Surgeon(s):  Kevin Hook MD    Procedure: Procedure(s):  UPPER EXTREMITY FISTULAGRAM, THROMBECTOMY    Medications prior to admission:   Prior to Admission medications    Medication Sig Start Date End Date Taking? Authorizing Provider   midodrine (PROAMATINE) 5 MG tablet Take 2 tablets by mouth daily    Provider, MD Flora   tamsulosin (FLOMAX) 0.4 MG capsule Take 1 capsule by mouth daily 24   Ty Tee MD   insulin glargine (LANTUS SOLOSTAR) 100 UNIT/ML injection pen Inject 20 Units into the skin daily 24   Ty Tee MD   lactobacillus (CULTURELLE) capsule Take 1 capsule by mouth 2 times daily (with meals) 3/6/24   Kendra Murray MD   Blood Glucose Monitoring Suppl (TRUERESULT BLOOD GLUCOSE) W/DEVICE KIT 1 kit by Does not apply route daily as needed 16   Elvin Kelley MD [facility] Ultra Thin Lancets MISC 1 each by Does not apply route 4 times daily (before meals and nightly) 16   Elvin Kelley MD   Glucose Blood (BLOOD GLUCOSE TEST STRIPS) STRP 1 each by Does not apply route 4 times daily (before meals and nightly) 16   Elvin Kelley MD   Alcohol Swabs 70 % PADS Inject 1 each into the skin 3 times daily as needed 16   Elvin Kelley MD   Insulin Pen Needle (B-D ULTRAFINE III SHORT PEN) 31G X 8 MM MISC Inject 1 each into the skin daily May substitute with any brand 16   Elvin Kelley MD       Current medications:    Current Facility-Administered Medications   Medication Dose Route Frequency Provider Last Rate Last Admin    sodium chloride flush 0.9 % injection 5-40 mL  5-40 mL IntraVENous 2 times per day Arnulfo Hutson MD   10 mL at 242    sodium chloride flush 0.9 % injection 5-40 mL  5-40 mL IntraVENous PRN  GROIN (YELLOW FINS, LITHOTOMY, CYSTO TUBING, WINTER GREEN) (Bilateral)    WOUND EXPLORATION Bilateral 02/22/2024    INCISION AND DRAINAGE OF BILATERAL AXILLA, CHEST WALL AND BILATERAL GROIN performed by Sandy Carmichael MD at [facility] OR       Social History:    Social History     Tobacco Use    Smoking status: Never    Smokeless tobacco: Never   Substance Use Topics    Alcohol use: No     Alcohol/week: 0.0 standard drinks of alcohol                                Counseling given: Not Answered      Vital Signs (Current):   Vitals:    12/26/24 2245 12/27/24 0017 12/27/24 0030 12/27/24 0355   BP:  (!) 87/60 98/66 94/62   Pulse: (!) 115 (!) 106 97 85   Resp:  16  16   Temp:  98.1 °F (36.7 °C)  98 °F (36.7 °C)   TempSrc:  Oral  Oral   SpO2:  97%  98%   Weight:       Height:                                                  BP Readings from Last 3 Encounters:   12/27/24 94/62   11/12/24 117/73   10/01/24 107/73       NPO Status:                                                                                 BMI:   Wt Readings from Last 3 Encounters:   12/26/24 62.5 kg (137 lb 12.6 oz)   11/12/24 61.2 kg (135 lb)   10/01/24 61.2 kg (135 lb)     Body mass index is 25.04 kg/m².    CBC:   Lab Results   Component Value Date/Time    WBC 7.4 12/27/2024 04:52 AM    RBC 3.97 12/27/2024 04:52 AM    HGB 12.6 12/27/2024 04:52 AM    HCT 38.7 12/27/2024 04:52 AM    MCV 97.5 12/27/2024 04:52 AM    RDW 13.0 12/27/2024 04:52 AM     12/27/2024 04:52 AM       CMP:   Lab Results   Component Value Date/Time     12/27/2024 04:52 AM    K 4.2 12/27/2024 04:52 AM    CL 96 12/27/2024 04:52 AM    CO2 26 12/27/2024 04:52 AM    BUN 31 12/27/2024 04:52 AM    CREATININE 4.2 12/27/2024 04:52 AM    GFRAA 23 04/26/2016 04:55 PM    LABGLOM 17 12/27/2024 04:52 AM    LABGLOM 9 04/23/2024 05:53 AM    GLUCOSE 197 12/27/2024 04:52 AM    CALCIUM 9.5 12/27/2024 04:52 AM    BILITOT 0.3 04/22/2024 03:30 PM    ALKPHOS 185 04/22/2024 03:30 PM    AST 20

## 2024-12-27 NOTE — PLAN OF CARE
Problem: Chronic Conditions and Co-morbidities  Goal: Patient's chronic conditions and co-morbidity symptoms are monitored and maintained or improved  12/27/2024 0204 by Hardy Conteh RN  Outcome: Progressing  12/26/2024 2224 by Valeria Jensen RN  Outcome: Progressing  12/26/2024 2224 by Valeria Jensen RN  Outcome: Progressing     Problem: Discharge Planning  Goal: Discharge to home or other facility with appropriate resources  12/27/2024 0204 by Hardy Conteh RN  Outcome: Progressing  12/26/2024 2224 by Valeria Jensen RN  Outcome: Progressing  12/26/2024 2224 by Valeria Jensen RN  Outcome: Progressing     Problem: Safety - Adult  Goal: Free from fall injury  12/27/2024 0204 by Hardy Conteh RN  Outcome: Progressing  12/26/2024 2224 by Valeria Jensen RN  Outcome: Progressing  12/26/2024 2224 by Valeria Jensen RN  Outcome: Progressing     Problem: Pain  Goal: Verbalizes/displays adequate comfort level or baseline comfort level  12/27/2024 0204 by Hardy Conteh RN  Outcome: Progressing  12/26/2024 2224 by Valeria Jensen RN  Outcome: Progressing      [facility]  Pulmonary, Critical Care, and Sleep Medicine    Initial Patient Consult    Name: Jose Griggs MRN: [id number]   : 1951 Hospital: . [place]    [facility]     Hypertension     Lung cancer ([facility]     Olya Washington NP

## 2024-12-27 NOTE — DISCHARGE INSTRUCTIONS
You came in with concerns of left AV fistula malfunction.  You are a tunneled cath placed and got 1 round of dialysis yesterday.    Please start taking sevelamer concerns of increased phosphorus.  Follow-up on the phosphorus level in 1 week  Continue midodrine 10 1 tablet by mouth 3 times daily for concerns of low blood pressure.  Hold the medication if the blood pressure SBP is more than 120  Continue previous medications as advised  Continue dialysis Monday Wednesday Friday and please follow-up with nephrology as scheduled   Please schedule an appointment with vascular surgeon and follow-up as recommended  Please follow with PCP in 1 week  In case of any worsening symptoms please visit the nearest ED

## 2024-12-27 NOTE — PROGRESS NOTES
Dialysis Post Treatment Note  Vitals:    12/26/24 1955   BP: 93/63   Pulse: (!) 121   Resp: 16   Temp: 97.7 °F (36.5 °C)   SpO2:      Pre-Weight = 64.5 kg  Post-weight = Weight - Scale: 62.1 kg (136 lb 14.5 oz)  Total Liters Processed = Blood Volume Processed (Liters): 66 L  Rinseback Volume (mL) = Rinseback Volume (ml): 300 ml  Net Removal (mL) = 1892 mL   Patient's dry weight= TBD  Type of access used=Tunneled Cath (Right internal jugular)  Length of treatment=180 mins    Patient tolerated HD treatment and rinseback with out incident. 2 L of fluid removed.   Patient has a Tunneled cath right internal jugular. A new CHG transparent dressing applied in IR today. Report given Alicia FAROOQ.

## 2024-12-27 NOTE — PROGRESS NOTES
[facility]  Internal Medicine Teaching Residency Program  Inpatient Daily Progress Note  ______________________________________________________________________________    Patient: Jules Nichols  YOB: 1980   MRN:[id number]    Acct: [id number]     Room: 0326/0326-02  Admit date: 12/26/2024  Today's date: 12/27/24  Number of days in the hospital: 0    SUBJECTIVE   Admitting Diagnosis: Malfunction of arteriovenous dialysis fistula, initial encounter ([facility])  CC: Vascular access problem   Pt examined at bedside. Chart & results reviewed.   No acute events overnight.  AOx4.  No active  Hemodynamically stable with 98 over 66 mmHg maintaining saturation at around 100%    Monitor labs reviewed, BMP revealed creatinine 4.2, hemoglobin 12.6  Patient underwent tunneled catheter placement yesterday and 1 session of dialysis yesterday.  Patient undergoing fistulogram today.    ROS:  Constitutional:  negative for chills, fevers, sweats  Respiratory:  negative for cough, dyspnea on exertion, hemoptysis, shortness of breath, wheezing  Cardiovascular:  negative for chest pain, chest pressure/discomfort, lower extremity edema, palpitations  Gastrointestinal:  negative for abdominal pain, constipation, diarrhea, nausea, vomiting  Neurological:  negative for dizziness, headache    Plan :-    Follow-up on fistulogram results.        BRIEF HISTORY     The patient is a pleasant 44 y.o. male with a PMHx significant for      Hypertension  CKD stage IV  Type 2 diabetes mellitus  Hyperlipidemia     presents with a chief complaint of missed dialysis due to vascular access problem.  Patient mentioned that he usually goes dialysis on Monday Wednesday Friday schedule, because of the modified holiday schedule he was asked to come in on Tuesday but missed dialysis session on Tuesday because of vascular access problem.  Patient was asked to come back again on Thursday as he missed dialysis

## 2024-12-27 NOTE — PROGRESS NOTES
Dialysis Time Out  To be done by RN and tech or 2 RNs  Staff Names uGerda CALDERON RN--German SCHERER RN    [x]  Identity of the patient using 2 patient identifiers  [x]  Consent for treatment  [x]  Equipment-proper machine and dialyzer  [x]  B-Hep B status Hep B Ag negative 12/26/24  [x]  Orders- to include bath, blood flow, dialyzer, time and fluid removal  [x]  Access-Correct site and in working order  [x]  Time for patient to ask questions.

## 2024-12-27 NOTE — CONSULTS
Renal Consult Note    Patient :  Jules Nichols; 44 y.o. MRN# [id number]  Location:  0326/0326-02  Attending:  Alyse Owusu MD  Admit Date:  12/26/2024   Hospital Day: 0    Reason for Consult:     Asked by Alyse Rees MD to see for ESRD on MWF schedule    History Obtained From:     patient, electronic medical record    History of Present Illness:     Jules Nichols; 44 y.o. male with past medical history of essential hypertension, hyperlipidemia, type 2 diabetes mellitus, BPH, ESRD on MWF schedule presented to the hospital with the chief complaint of vascular access problem.    As per patient, he has a known history of ESRD and he was initiated on hemodialysis in March 2024 secondary to acute tubular necrosis due to septic shock.  Patient is currently on Monday Wednesday Friday schedule using right upper extremity AV fistula.  As per patient, he was on holiday schedule Tuesday Thursday and Saturday.  Dialysis unit was unable to access his side on Tuesday and recommended him come back on Thursday due to his missed dialysis session.  However, they were unable to access him on Thursday as well and hence they sent him to the ER for further evaluation by vascular surgery for possible fistulogram and placement of a temporary dialysis catheter for his missed dialysis session.  Patient underwent IR guided right subclavian tunneled catheter placement on 12/26.  He also underwent 1 session of hemodialysis with net 2 L fluid removal.  Patient tolerated the procedure fairly well.  Vascular surgery is on board for fistulogram and possible thrombectomy.    His BMP today shows sodium 137, potassium 4.0, chloride 96, bicarbonate 26, BUN 81, creatinine 4.0, calcium 9.5 and glucose 163.  His CBC shows WBC 7.4, hemoglobin 12.6, hematocrit 38.6 and platelet count of 202.  Vascular duplex shows occlusion in the mid and distal segments of the AV fistula.    Of note, patient had a right AV fistula creation in July 2024 under

## 2024-12-27 NOTE — H&P
[facility]     Department of Internal Medicine - Staff Internal Medicine Teaching Service          ADMISSION NOTE/HISTORY AND PHYSICAL EXAMINATION   Date: 12/26/2024  Patient Name: Jules Nichols  Date of admission: 12/26/2024 10:17 AM  YOB: 1980  PCP: Elvin Kelley MD  History Obtained From:  patient    CHIEF COMPLAINT     Chief complaint: Vascular access problem    HISTORY OF PRESENTING ILLNESS     The patient is a pleasant 44 y.o. male with a PMHx significant for     Hypertension  CKD stage IV  Type 2 diabetes mellitus  Hyperlipidemia    presents with a chief complaint of missed dialysis due to vascular access problem.  Patient mentioned that he usually goes dialysis on Monday Wednesday Friday schedule, because of the modified holiday schedule he was asked to come in on Tuesday but missed dialysis session on Tuesday because of vascular access problem.  Patient was asked to come back again on Thursday as he missed dialysis session.  Patient was stressed on Thursday due to similar vascular access problem.    In the emergency department patient was afebrile to touch, hemodynamically stable.  Initial labs revealed creatinine 6.4, BUN 59, unremarkable BMP.  proBNP 974, WBC 6.8, hemoglobin 13.5.  Vascular surgery, nephrology and internal medicine was consulted for further management.  Vascular surgery recommended vascular duplex of hemodialysis access of right side.  Patient was found to be having occlusion of AV fistula in the right upper extremity due to thrombosis.  Subsequently patient underwent IR guided tunneled catheter placement.    On my evaluation patient was afebrile at rest.  Patient was alert and oriented to time, place, person, self.  Patient was hemodynamically stable.  Patient had Ad catheter in place, no tenderness or had edema appreciated in the surrounding area.    Patient denies chest pain, shortness of breath, abdominal pain, constipation,

## 2024-12-27 NOTE — PLAN OF CARE
Problem: Chronic Conditions and Co-morbidities  Goal: Patient's chronic conditions and co-morbidity symptoms are monitored and maintained or improved  12/27/2024 1302 by Fariha Acuña RN  Outcome: Progressing  12/27/2024 0204 by Hardy Conteh RN  Outcome: Progressing     Problem: Discharge Planning  Goal: Discharge to home or other facility with appropriate resources  12/27/2024 1302 by Fariha Acuña RN  Outcome: Progressing  12/27/2024 0204 by Hardy Conteh RN  Outcome: Progressing     Problem: Safety - Adult  Goal: Free from fall injury  12/27/2024 1302 by Fariha Acuña RN  Outcome: Progressing  12/27/2024 0204 by Hardy Conteh RN  Outcome: Progressing     Problem: Pain  Goal: Verbalizes/displays adequate comfort level or baseline comfort level  12/27/2024 1302 by Fariha Acuña RN  Outcome: Progressing  12/27/2024 0204 by Hardy Conteh RN  Outcome: Progressing     Problem: Metabolic/Fluid and Electrolytes - Adult  Goal: Hemodynamic stability and optimal renal function maintained  Outcome: Progressing

## 2024-12-27 NOTE — PROGRESS NOTES
Division of Vascular Surgery             Progress Note      Name: Jules Nichols  MRN: [id number]         Overnight Events:      No acute events overnight.       Subjective:     Patient examined in room. Afebrile, vital signs stable.  Patient resting comfortably in bed, no acute complaints.  He reports he was able to complete dialysis yesterday through tunneled cath placed by IR. Patient has been NPO for procedure today.     Physical Exam:     Vitals:  BP 93/71   Pulse (!) 115   Temp 98.2 °F (36.8 °C) (Oral)   Resp 16   Ht 1.58 m (5' 2.21\")   Wt 62.5 kg (137 lb 12.6 oz)   SpO2 100%   BMI 25.04 kg/m²     Physical Exam  HENT:      Head: Normocephalic.      Nose: Nose normal.      Mouth/Throat:      Pharynx: Oropharynx is clear.   Eyes:      Pupils: Pupils are equal, round, and reactive to light.   Cardiovascular:      Rate and Rhythm: Normal rate.   Pulmonary:      Effort: Pulmonary effort is normal.   Musculoskeletal:         General: Normal range of motion.      Cervical back: Normal range of motion.      Comments: Right upper AV fistula with palpable thrill at anastomosis    Skin:     General: Skin is warm.   Neurological:      Mental Status: He is alert and oriented to person, place, and time.   Psychiatric:         Mood and Affect: Mood normal.         Thought Content: Thought content normal.         Judgment: Judgment normal.         Imaging:   Vascular US Duplex HD access right 12/26/24  The AV fistula in the right upper extremity is occluded at the mid and distal segments.       Assessment:     44 year-old male who presents with Right upper AV fistula that is occluded at the mid and distal segments as seen on Vascular Duplex. Tunneled cath placed by IR for HD on 12/26/24.       Plan:     Right upper extremity Fistulagram/thrombectomy today. Keep NPO.   Tunneled cath placed by IR 12/26/24  Rest of care per [facility]  Electronically signed by Fariha  EMELY Mckeon - CNP on 12/27/2024 at 8:34 AM

## 2024-12-27 NOTE — OP NOTE
Operative Note      Patient: Jules Nichols  YOB: 1980  MRN: [id number]    Date of Procedure: 12/27/2024    Pre-Op Diagnosis Codes:      * Malfunction of arteriovenous dialysis fistula, initial encounter ([facility]) [T82.590A]    Post-Op Diagnosis: Same       Procedures:  1) Ultrasound guided access of right arm AV fistula  2) Right arm fistulagram    Surgeon(s):  Kevin Hook MD    Assistant: None    Anesthesia: Monitor Anesthesia Care    Estimated Blood Loss (mL): 10 mL    Complications: None    Specimens: None    Implants: None      Drains: None    Findings:  Infection Present At Time Of Surgery (PATOS) (choose all levels that have infection present):  No infection present  Other Findings: Right brachial cephalic AV fistula is patent proximally due to presence of side branch and then occludes. There is a near total occlusion of the fistula and remainder of fistula appeared severely stenotic to the subclavian vein. The subclavian and innominate veins are patent. The superior vena cava is patent. I was unable to traverse the near occlusion with my wire and there was local perforation that was contained since there was no flow in that part of the fistula. There was good hemostasis at the end of case.    Plan: Patient will require revision of the fistula with a graft or a brachial/axillary AV graft as this fistula is not able to be salvaged. He will use the right IJ tunneled dialysis catheter in the meantime for HD.    Detailed Description of Procedure:   Mr. Nichols was brought to the hybrid room and placed in supine position with right arm out.  His airway and sedation were managed by the anesthesia team.  Right arm was prepped and draped in standard sterile fashion.  A timeout was performed.  I evaluated his fistula with ultrasound and it was patent proximally near the arterial anastomosis.  I anesthetized the overlying skin with local anesthetic.  Under direct ultrasound guidance I accessed the

## 2024-12-28 NOTE — PLAN OF CARE
Problem: Chronic Conditions and Co-morbidities  Goal: Patient's chronic conditions and co-morbidity symptoms are monitored and maintained or improved  12/27/2024 2104 by Valeria Jensen RN  Outcome: Completed  12/27/2024 1939 by Valeria Jensen RN  Outcome: Progressing  12/27/2024 1302 by Fariha Acuña RN  Outcome: Progressing     Problem: Discharge Planning  Goal: Discharge to home or other facility with appropriate resources  12/27/2024 2104 by Valeria Jensen RN  Outcome: Completed  12/27/2024 1939 by Valeria Jensen RN  Outcome: Progressing  12/27/2024 1302 by Fariha Acuña RN  Outcome: Progressing     Problem: Safety - Adult  Goal: Free from fall injury  12/27/2024 2104 by Valeria Jensen RN  Outcome: Completed  12/27/2024 1939 by Valeria Jensen RN  Outcome: Progressing  12/27/2024 1302 by Fariha Acuña RN  Outcome: Progressing     Problem: Pain  Goal: Verbalizes/displays adequate comfort level or baseline comfort level  12/27/2024 2104 by Valeria Jensen RN  Outcome: Completed  12/27/2024 1939 by Valeria Jensen RN  Outcome: Progressing  12/27/2024 1302 by Fariha Acuña RN  Outcome: Progressing     Problem: Metabolic/Fluid and Electrolytes - Adult  Goal: Hemodynamic stability and optimal renal function maintained  12/27/2024 2104 by Valeria Jensen RN  Outcome: Completed  12/27/2024 1939 by Valeria Jensen RN  Outcome: Progressing  12/27/2024 1302 by Fariha Acuña RN  Outcome: Progressing     Problem: Cardiovascular - Adult  Goal: Maintains optimal cardiac output and hemodynamic stability  12/27/2024 2104 by Valeria Jensen RN  Outcome: Completed  12/27/2024 1939 by Valeria Jensen RN  Outcome: Progressing  Goal: Absence of cardiac dysrhythmias or at baseline  12/27/2024 2104 by Valeria Jensen RN  Outcome: Completed  12/27/2024 1939 by Valeria Jensen RN  Outcome: Progressing

## 2024-12-28 NOTE — PROGRESS NOTES
Dialysis Post Treatment Note  Vitals:    12/27/24 1914   BP: (!) 82/64   Pulse: (!) 108   Resp: 16   Temp: 97.9 °F (36.6 °C)   SpO2: 99%     Pre-Weight = 63.2kg  Post-weight = Weight - Scale: 62.4 kg (137 lb 9.1 oz)  Total Liters Processed = Blood Volume Processed (Liters): 78.09 L  Rinseback Volume (mL) = Rinseback Volume (ml): 300 ml  Net Removal (mL) = 900mL   Patient's dry weight=TBD  Type of access used=Right tunneled cath  Length of treatment=212 minutes    Pt tolerated HD TX good. Decreased temp to 35 due to SBP, Midodrine 10 mg given 1 hour into TX for SBP along with 100mL bolus and decreased UF goal to 1L from 2L. Gave midodrine 5mg an hour later for SBP. Pt hypotensive throughout TX but remained asymptomatic. Dressing changed. Report called into Fariha ELISE RN

## 2024-12-28 NOTE — PLAN OF CARE
Problem: Chronic Conditions and Co-morbidities  Goal: Patient's chronic conditions and co-morbidity symptoms are monitored and maintained or improved  12/27/2024 1939 by Valeria Jensen RN  Outcome: Progressing  12/27/2024 1302 by Fariha Acuña RN  Outcome: Progressing     Problem: Discharge Planning  Goal: Discharge to home or other facility with appropriate resources  12/27/2024 1939 by Valeria Jensen RN  Outcome: Progressing  12/27/2024 1302 by Fariha Acuña RN  Outcome: Progressing     Problem: Safety - Adult  Goal: Free from fall injury  12/27/2024 1939 by Valeria Jensen RN  Outcome: Progressing  12/27/2024 1302 by Fariha Acuña RN  Outcome: Progressing    Problem: Metabolic/Fluid and Electrolytes - Adult  Goal: Hemodynamic stability and optimal renal function maintained  12/27/2024 1939 by Valeria Jensen RN  Outcome: Progressing  12/27/2024 1302 by Fariha Acuña RN  Outcome: Progressing     Problem: Cardiovascular - Adult  Goal: Maintains optimal cardiac output and hemodynamic stability  Outcome: Progressing  Goal: Absence of cardiac dysrhythmias or at baseline  Outcome: Progressing

## 2024-12-28 NOTE — PROGRESS NOTES
At 1900 hour patient returned from hemodialysis noted low blood pressure 82/64 mmHg with heart rate 108 beats per minute. Upon assessment patient no complained, on call resident Dr. Mack Alford informed and noted at 1916 hour with order made and carried out. Midodrine served as order at 1932 hour and instructed to recheck blood pressure after an hour  and if MAP >65 mmHg can go home as per primary team.    Continue to monitor patient. Patient verbalized he wants to go home, instructed will monitor and will update.    At 2017 hour patient blood pressure 90/60 mmHg heart rate 105 with MAP 70 mmHg,upon assessment patient no complained and asymptomatic, on call teaching resident informed and noted, instructed to  discharge patient as plan.    Home medications and discharge instructions explained and given to patient, all questions answered.

## 2024-12-29 LAB
EKG ATRIAL RATE: 93 BPM
EKG P AXIS: 33 DEGREES
EKG P-R INTERVAL: 134 MS
EKG Q-T INTERVAL: 330 MS
EKG QRS DURATION: 74 MS
EKG QTC CALCULATION (BAZETT): 410 MS
EKG R AXIS: 19 DEGREES
EKG T AXIS: 48 DEGREES
EKG VENTRICULAR RATE: 93 BPM

## 2024-12-30 ENCOUNTER — TELEPHONE (OUTPATIENT)
Dept: INTERNAL MEDICINE CLINIC | Age: 44
End: 2024-12-30

## 2024-12-30 NOTE — ANESTHESIA POSTPROCEDURE EVALUATION
Department of Anesthesiology  Postprocedure Note    Patient: Juels Nichols  MRN: [id number]  YOB: 1980  Date of evaluation: 12/30/2024    Procedure Summary       Date: 12/27/24 Room / Location: [place] [address] / [facility]    Anesthesia Start: 0758 Anesthesia Stop: 0920    Procedure: UPPER EXTREMITY FISTULAGRAM (Right: Arm Upper) Diagnosis:       Malfunction of arteriovenous dialysis fistula, initial encounter ([facility])      (Malfunction of arteriovenous dialysis fistula, initial encounter ([facility]) [T82.590A])    Surgeons: Kevin Hook MD Responsible Provider: Zenon Arreola MD    Anesthesia Type: MAC ASA Status: 3            Anesthesia Type: No value filed.    Jonah Phase I: Jonah Score: 10    Jonah Phase II:      Anesthesia Post Evaluation    Patient location during evaluation: bedside  Patient participation: complete - patient participated  Level of consciousness: awake  Airway patency: patent  Nausea & Vomiting: no nausea and no vomiting  Cardiovascular status: blood pressure returned to baseline  Respiratory status: acceptable  Hydration status: euvolemic  Comments: BP 90/60   Pulse (!) 105   Temp 98.1 °F (36.7 °C) (Oral)   Resp 18   Ht 1.58 m (5' 2.21\")   Wt 62.4 kg (137 lb 9.1 oz)   SpO2 100%   BMI 25.00 kg/m²     Pain management: adequate        No notable events documented.

## 2024-12-30 NOTE — DISCHARGE SUMMARY
R-11, NormalMay substitute with any brand             Activity: activity as tolerated    Diet: renal diet    Follow-up:    Elvin Kelley MD  [address] [facility] [address]  [phone]    Schedule an appointment as soon as possible for a visit      Kevin Hook MD  [address]  [facility] [id number] [phone]    Schedule an appointment as soon as possible for a visit      Sudarshan Garcia MD  [address]  [facility] [id number] [phone]    Schedule an appointment as soon as possible for a visit        Patient Instructions: You came in with concerns of left AV fistula malfunction.  You are a tunneled cath placed and got 1 round of dialysis yesterday.    Please start taking sevelamer concerns of increased phosphorus.  Follow-up on the phosphorus level in 1 week  Continue midodrine 10 1 tablet by mouth 3 times daily for concerns of low blood pressure.  Hold the medication if the blood pressure SBP is more than 120  Continue previous medications as advised  Continue dialysis Monday Wednesday Friday and please follow-up with nephrology as scheduled   Please schedule an appointment with vascular surgeon and follow-up as recommended  Please follow with PCP in 1 week  In case of any worsening symptoms please visit the nearest ED    Follow up labs: None    Follow up imaging: None    Note that over 30 minutes was spent in preparing discharge papers, discussing discharge with patient, medication review, etc.      Arnulfo Hutson MD, MD  Internal Medicine Resident, PGY-1  [facility]; [place], [place]  12/30/2024, 11:38 AM

## 2024-12-30 NOTE — TELEPHONE ENCOUNTER
Care Transitions Initial Follow Up Call    Outreach made within 2 business days of discharge: Yes    Patient: Jules Nichols Patient : 1980   MRN: [id number]  Reason for Admission: fistula malfunction   Discharge Date: 24       Spoke with: self    Discharge department/facility: home    TCM Interactive Patient Contact:  Was patient able to fill all prescriptions: Yes  Was patient instructed to bring all medications to the follow-up visit: Yes  Is patient taking all medications as directed in the discharge summary? Yes  Does patient understand their discharge instructions: Yes  Does patient have questions or concerns that need addressed prior to 7-14 day follow up office visit: no    Additional needs identified to be addressed with provider  No needs identified             Scheduled out of 2 week range     Follow Up  Future Appointments   Date Time Provider Department Center   2025  9:45 AM Kevin Hook MD [id number]       Quyen Kaiser MA

## 2025-01-14 ENCOUNTER — PREP FOR PROCEDURE (OUTPATIENT)
Dept: VASCULAR SURGERY | Age: 45
End: 2025-01-14

## 2025-01-14 ENCOUNTER — OFFICE VISIT (OUTPATIENT)
Dept: VASCULAR SURGERY | Age: 45
End: 2025-01-14
Payer: MEDICAID

## 2025-01-14 VITALS
DIASTOLIC BLOOD PRESSURE: 79 MMHG | HEART RATE: 90 BPM | HEIGHT: 62 IN | RESPIRATION RATE: 18 BRPM | BODY MASS INDEX: 25.21 KG/M2 | WEIGHT: 137 LBS | OXYGEN SATURATION: 98 % | SYSTOLIC BLOOD PRESSURE: 111 MMHG

## 2025-01-14 DIAGNOSIS — N18.6 ESRD (END STAGE RENAL DISEASE) (HCC): ICD-10-CM

## 2025-01-14 DIAGNOSIS — T82.590A MALFUNCTION OF ARTERIOVENOUS DIALYSIS FISTULA, INITIAL ENCOUNTER (HCC): ICD-10-CM

## 2025-01-14 DIAGNOSIS — N18.6 END STAGE RENAL DISEASE (HCC): Primary | ICD-10-CM

## 2025-01-14 PROCEDURE — 99214 OFFICE O/P EST MOD 30 MIN: CPT | Performed by: STUDENT IN AN ORGANIZED HEALTH CARE EDUCATION/TRAINING PROGRAM

## 2025-01-14 PROCEDURE — 3074F SYST BP LT 130 MM HG: CPT | Performed by: STUDENT IN AN ORGANIZED HEALTH CARE EDUCATION/TRAINING PROGRAM

## 2025-01-14 PROCEDURE — 3078F DIAST BP <80 MM HG: CPT | Performed by: STUDENT IN AN ORGANIZED HEALTH CARE EDUCATION/TRAINING PROGRAM

## 2025-01-14 ASSESSMENT — ENCOUNTER SYMPTOMS
ABDOMINAL DISTENTION: 0
COUGH: 0
TROUBLE SWALLOWING: 0
ABDOMINAL PAIN: 0
VOICE CHANGE: 0
EYE PAIN: 0
VOMITING: 0
COLOR CHANGE: 0
CHEST TIGHTNESS: 0

## 2025-01-14 NOTE — PROGRESS NOTES
[facility]  [address]  [place] [address]  Dept: [phone]     Patient: Jules Nichols  : 1980  MRN: [id number]  DOS: 2025    HPI:  24: Jules Nichols is a 44 y.o. male who comes to the office regarding evaluation for long term HD access creation. Last echo 3/2024, EF 60-65%.  History of diabetes and hypertension.  Has been on dialysis since 2024.  He is left hand dominant.  He has history of hidradenitis in both axillas, sternal and back.  Bedside ultrasound shows adequate cephalic vein on the right and marginal on the left at the level of the antecubital fossa.    24: S/p right brachial/cephalic AV fistula creation on 24. Has palpable thrill. Hand is ok, no steal symptoms. Sutures removed today.    10/1/24: Duplex reviewed. Has flow ranging from 648 to 1.2 L. Depth is 6 mm. As the fistula moves up to the shoulder it starts to get a little deeper than that. I marked his fistula with ultrasound.    24: Here for HD catheter removal as his right arm fistula has been working well in HD.    25: His right arm fistula occluded and a fistulagram was attempted on 24. Unfortunately this was not successful in salvaging his fistula and he was discharged with tunneled HD catheter. Bedside ultrasound shows patent robust proximal fistula but then severely stenotic and occluded mid to distal fistula.    Past Medical History:   Diagnosis Date    Benign essential HTN     Chronic kidney disease 16    Dr Stafford    Diabetic keto-acidosis (HCC) 16    hx of     Hyperlipemia     Uncontrolled type 2 diabetes mellitus with nephropathy 16     History reviewed. No pertinent family history.   Social History     Socioeconomic History    Marital status: Single     Spouse name: Not on file    Number of children: Not on file    Years of education: Not on file    Highest

## 2025-01-14 NOTE — H&P (VIEW-ONLY)
[facility]  [address]  [place] [address]  Dept: [phone]     Patient: Jules Nichols  : 1980  MRN: [id number]  DOS: 2025    HPI:  24: Jules Nichols is a 44 y.o. male who comes to the office regarding evaluation for long term HD access creation. Last echo 3/2024, EF 60-65%.  History of diabetes and hypertension.  Has been on dialysis since 2024.  He is left hand dominant.  He has history of hidradenitis in both axillas, sternal and back.  Bedside ultrasound shows adequate cephalic vein on the right and marginal on the left at the level of the antecubital fossa.    24: S/p right brachial/cephalic AV fistula creation on 24. Has palpable thrill. Hand is ok, no steal symptoms. Sutures removed today.    10/1/24: Duplex reviewed. Has flow ranging from 648 to 1.2 L. Depth is 6 mm. As the fistula moves up to the shoulder it starts to get a little deeper than that. I marked his fistula with ultrasound.    24: Here for HD catheter removal as his right arm fistula has been working well in HD.    25: His right arm fistula occluded and a fistulagram was attempted on 24. Unfortunately this was not successful in salvaging his fistula and he was discharged with tunneled HD catheter. Bedside ultrasound shows patent robust proximal fistula but then severely stenotic and occluded mid to distal fistula.    Past Medical History:   Diagnosis Date    Benign essential HTN     Chronic kidney disease 16    Dr Stafford    Diabetic keto-acidosis (HCC) 16    hx of     Hyperlipemia     Uncontrolled type 2 diabetes mellitus with nephropathy 16     History reviewed. No pertinent family history.   Social History     Socioeconomic History    Marital status: Single     Spouse name: Not on file    Number of children: Not on file    Years of education: Not on file    Highest

## 2025-01-31 ENCOUNTER — OFFICE VISIT (OUTPATIENT)
Dept: INTERNAL MEDICINE CLINIC | Age: 45
End: 2025-01-31
Payer: MEDICAID

## 2025-01-31 VITALS
SYSTOLIC BLOOD PRESSURE: 100 MMHG | BODY MASS INDEX: 26.31 KG/M2 | WEIGHT: 143 LBS | HEART RATE: 96 BPM | DIASTOLIC BLOOD PRESSURE: 62 MMHG | HEIGHT: 62 IN | OXYGEN SATURATION: 97 %

## 2025-01-31 DIAGNOSIS — E11.29 TYPE 2 DIABETES MELLITUS WITH OTHER DIABETIC KIDNEY COMPLICATION, WITH LONG-TERM CURRENT USE OF INSULIN (HCC): Primary | ICD-10-CM

## 2025-01-31 DIAGNOSIS — E78.2 MIXED HYPERLIPIDEMIA: ICD-10-CM

## 2025-01-31 DIAGNOSIS — Z79.4 TYPE 2 DIABETES MELLITUS WITH OTHER DIABETIC KIDNEY COMPLICATION, WITH LONG-TERM CURRENT USE OF INSULIN (HCC): Primary | ICD-10-CM

## 2025-01-31 PROCEDURE — 3074F SYST BP LT 130 MM HG: CPT | Performed by: INTERNAL MEDICINE

## 2025-01-31 PROCEDURE — 3078F DIAST BP <80 MM HG: CPT | Performed by: INTERNAL MEDICINE

## 2025-01-31 PROCEDURE — 99214 OFFICE O/P EST MOD 30 MIN: CPT | Performed by: INTERNAL MEDICINE

## 2025-01-31 ASSESSMENT — ENCOUNTER SYMPTOMS
ABDOMINAL DISTENTION: 0
EYE DISCHARGE: 0
WHEEZING: 0
EYE PAIN: 0
COUGH: 0
SHORTNESS OF BREATH: 0
DIARRHEA: 0
BLOOD IN STOOL: 0
TROUBLE SWALLOWING: 0
COLOR CHANGE: 0

## 2025-01-31 ASSESSMENT — PATIENT HEALTH QUESTIONNAIRE - PHQ9
SUM OF ALL RESPONSES TO PHQ QUESTIONS 1-9: 0
SUM OF ALL RESPONSES TO PHQ QUESTIONS 1-9: 0
2. FEELING DOWN, DEPRESSED OR HOPELESS: NOT AT ALL
SUM OF ALL RESPONSES TO PHQ QUESTIONS 1-9: 0
SUM OF ALL RESPONSES TO PHQ QUESTIONS 1-9: 0
1. LITTLE INTEREST OR PLEASURE IN DOING THINGS: NOT AT ALL
SUM OF ALL RESPONSES TO PHQ9 QUESTIONS 1 & 2: 0

## 2025-01-31 NOTE — PROGRESS NOTES
[facility]  [address] [id number]  Dept: [phone]  Dept Fax: [phone]    Jules Nichols is a 44 y.o. male who presents today for his medical conditions/complaintsas noted below.  Jules Nichols is c/o of   Chief Complaint   Patient presents with    Chronic Kidney Disease     Stcz 12/26-12/27 fistula malfunction, getting replaced next week         HPI:     Esrd  Due to dm  On HD  On lantus          Hemoglobin A1C (%)   Date Value   03/07/2024 7.9 (H)   02/22/2024 10.2 (H)   05/05/2017 14.0             ( goal A1Cis < 7)   No components found for: \"LABMICR\"  No components found for: \"LDLCHOLESTEROL\", \"LDLCALC\"    (goal LDL is <100)   AST (U/L)   Date Value   04/22/2024 20     ALT (U/L)   Date Value   04/22/2024 29     BUN (mg/dL)   Date Value   12/27/2024 31 (H)     BP Readings from Last 3 Encounters:   01/31/25 100/62   01/14/25 111/79   12/27/24 90/60          (goal 120/80)    Past Medical History:   Diagnosis Date    Benign essential HTN     Chronic kidney disease 1/20/16    Dr Stafford    Diabetic keto-acidosis (HCC) 1/20/16    hx of     Hyperlipemia     Uncontrolled type 2 diabetes mellitus with nephropathy 1/20/16      Past Surgical History:   Procedure Laterality Date    ABDOMEN SURGERY N/A 02/24/2024    WOUND BED PREPARATION BILATERAL GROIN AND ARMPIT, CHEST AND BACK performed by Boyd Keene MD at [facility] OR    AV FISTULA CREATION Right 07/18/2024    DR. HOOK    DEBRIDEMENT      Irrigation and Debridement BILATERAL GROIN AND ARMPIT, and buttocks, chest    DIALYSIS FISTULA CREATION Right 7/18/2024    ARTERIOVENOUS FISTULA CREATION RIGHT performed by Kevin Hook MD at [facility] CVOR    DIALYSIS FISTULA CREATION Right 12/27/2024    UPPER EXTREMITY FISTULAGRAM performed by Kevin Hook MD at [facility] CVOR    IR TUNNELED CVC PLACE WO SQ PORT/PUMP > 5 YEARS  03/01/2024    IR TUNNELED CATHETER PLACEMENT GREATER THAN 5 YEARS 3/1/2024 [facility] SPECIAL

## 2025-01-31 NOTE — PROGRESS NOTES
\"Have you been to the ER, urgent care clinic since your last visit?  Hospitalized since your last visit?\"    Jenny 12/26-12/27 fistula malfunction     “Have you seen or consulted any other health care providers outside our system since your last visit?”    NO      “Have you had a diabetic eye exam?”    NO     No diabetic eye exam on file

## 2025-02-05 ENCOUNTER — ANESTHESIA EVENT (OUTPATIENT)
Dept: OPERATING ROOM | Age: 45
End: 2025-02-05
Payer: MEDICAID

## 2025-02-05 NOTE — ANESTHESIA PRE PROCEDURE
Department of Anesthesiology  Preprocedure Note       Name:  Jules Nichols   Age:  44 y.o.  :  1980                                          MRN:  [id number]         Date:  2025      Surgeon: Surgeon(s):  Kevin Hook MD    Procedure: Procedure(s):  UPPER EXTREMITY ARTERIOVENOUS FISTULA CREATION VS ARTERIOVENOUS GRAFT CREATION    Medications prior to admission:   Prior to Admission medications    Medication Sig Start Date End Date Taking? Authorizing Provider   sevelamer (RENVELA) 800 MG tablet Take 1 tablet by mouth 3 times daily (with meals) 24   Kallie Joseph MD   Ergocalciferol (VITAMIN D) 31860 units CAPS Take 50,000 Units by mouth once a week for 4 doses 24  Kallie Joseph MD   midodrine (PROAMATINE) 10 MG tablet Take 1 tablet by mouth 3 times daily as needed (for SBP<90) 24   Kallie Joseph MD   tamsulosin (FLOMAX) 0.4 MG capsule Take 1 capsule by mouth daily 24   Ty Tee MD   insulin glargine (LANTUS SOLOSTAR) 100 UNIT/ML injection pen Inject 20 Units into the skin daily 24   Ty Tee MD   lactobacillus (CULTURELLE) capsule Take 1 capsule by mouth 2 times daily (with meals) 3/6/24   Kendra Murray MD   Blood Glucose Monitoring Suppl (TRUERESULT BLOOD GLUCOSE) W/DEVICE KIT 1 kit by Does not apply route daily as needed 16   Elvin Kelley MD   [facility] Ultra Thin Lancets MISC 1 each by Does not apply route 4 times daily (before meals and nightly) 16   Elvin Kelley MD   Glucose Blood (BLOOD GLUCOSE TEST STRIPS) STRP 1 each by Does not apply route 4 times daily (before meals and nightly) 16   Elvin Kelley MD   Alcohol Swabs 70 % PADS Inject 1 each into the skin 3 times daily as needed 16   Elvin Kelley MD   Insulin Pen Needle (B-D ULTRAFINE III SHORT PEN) 31G X 8 MM MISC Inject 1 each into the skin daily May substitute with any brand 16   Elvin Kelley MD       Current medications:    No

## 2025-02-06 ENCOUNTER — ANESTHESIA (OUTPATIENT)
Dept: OPERATING ROOM | Age: 45
End: 2025-02-06
Payer: MEDICAID

## 2025-02-06 ENCOUNTER — HOSPITAL ENCOUNTER (OUTPATIENT)
Age: 45
Setting detail: OUTPATIENT SURGERY
Discharge: HOME | End: 2025-02-06
Attending: STUDENT IN AN ORGANIZED HEALTH CARE EDUCATION/TRAINING PROGRAM | Admitting: STUDENT IN AN ORGANIZED HEALTH CARE EDUCATION/TRAINING PROGRAM
Payer: MEDICAID

## 2025-02-06 VITALS
WEIGHT: 144 LBS | DIASTOLIC BLOOD PRESSURE: 51 MMHG | HEIGHT: 62 IN | OXYGEN SATURATION: 98 % | BODY MASS INDEX: 26.5 KG/M2 | RESPIRATION RATE: 17 BRPM | HEART RATE: 100 BPM | SYSTOLIC BLOOD PRESSURE: 98 MMHG | TEMPERATURE: 98.4 F

## 2025-02-06 DIAGNOSIS — Z98.890 S/P ARTERIOVENOUS (AV) FISTULA CREATION: Primary | ICD-10-CM

## 2025-02-06 LAB
BUN BLD-MCNC: 34 MG/DL (ref 8–26)
EGFR, POC: 13 ML/MIN/1.73M2
GLUCOSE BLD-MCNC: 149 MG/DL (ref 74–100)
HCO3 VENOUS: 28.8 MMOL/L (ref 22–29)
HCT VFR BLD AUTO: 35 % (ref 41–53)
O2 SAT, VEN: 68.4 % (ref 60–85)
PCO2 VENOUS: 48.7 MM HG (ref 41–51)
PH VENOUS: 7.38 (ref 7.32–7.43)
PO2 VENOUS: 36.8 MM HG (ref 30–50)
POC CREATININE: 5.3 MG/DL (ref 0.51–1.19)
POC HEMOGLOBIN (CALC): 11.8 G/DL (ref 13.5–17.5)
POSITIVE BASE EXCESS, VEN: 2.9 MMOL/L (ref 0–3)
POTASSIUM BLD-SCNC: 4 MMOL/L (ref 3.5–4.5)
SODIUM BLD-SCNC: 139 MMOL/L (ref 138–146)

## 2025-02-06 PROCEDURE — 6360000002 HC RX W HCPCS: Performed by: NURSE ANESTHETIST, CERTIFIED REGISTERED

## 2025-02-06 PROCEDURE — 82803 BLOOD GASES ANY COMBINATION: CPT

## 2025-02-06 PROCEDURE — C1768 GRAFT, VASCULAR: HCPCS | Performed by: STUDENT IN AN ORGANIZED HEALTH CARE EDUCATION/TRAINING PROGRAM

## 2025-02-06 PROCEDURE — 2580000003 HC RX 258: Performed by: NURSE ANESTHETIST, CERTIFIED REGISTERED

## 2025-02-06 PROCEDURE — 2720000010 HC SURG SUPPLY STERILE: Performed by: STUDENT IN AN ORGANIZED HEALTH CARE EDUCATION/TRAINING PROGRAM

## 2025-02-06 PROCEDURE — 85014 HEMATOCRIT: CPT

## 2025-02-06 PROCEDURE — 3600000002 HC SURGERY LEVEL 2 BASE: Performed by: STUDENT IN AN ORGANIZED HEALTH CARE EDUCATION/TRAINING PROGRAM

## 2025-02-06 PROCEDURE — 84520 ASSAY OF UREA NITROGEN: CPT

## 2025-02-06 PROCEDURE — 2500000003 HC RX 250 WO HCPCS: Performed by: NURSE ANESTHETIST, CERTIFIED REGISTERED

## 2025-02-06 PROCEDURE — 3700000001 HC ADD 15 MINUTES (ANESTHESIA): Performed by: STUDENT IN AN ORGANIZED HEALTH CARE EDUCATION/TRAINING PROGRAM

## 2025-02-06 PROCEDURE — 2500000003 HC RX 250 WO HCPCS: Performed by: STUDENT IN AN ORGANIZED HEALTH CARE EDUCATION/TRAINING PROGRAM

## 2025-02-06 PROCEDURE — 6360000002 HC RX W HCPCS: Performed by: STUDENT IN AN ORGANIZED HEALTH CARE EDUCATION/TRAINING PROGRAM

## 2025-02-06 PROCEDURE — 2580000003 HC RX 258: Performed by: STUDENT IN AN ORGANIZED HEALTH CARE EDUCATION/TRAINING PROGRAM

## 2025-02-06 PROCEDURE — 3700000000 HC ANESTHESIA ATTENDED CARE: Performed by: STUDENT IN AN ORGANIZED HEALTH CARE EDUCATION/TRAINING PROGRAM

## 2025-02-06 PROCEDURE — 82565 ASSAY OF CREATININE: CPT

## 2025-02-06 PROCEDURE — 3600000012 HC SURGERY LEVEL 2 ADDTL 15MIN: Performed by: STUDENT IN AN ORGANIZED HEALTH CARE EDUCATION/TRAINING PROGRAM

## 2025-02-06 PROCEDURE — 2709999900 HC NON-CHARGEABLE SUPPLY: Performed by: STUDENT IN AN ORGANIZED HEALTH CARE EDUCATION/TRAINING PROGRAM

## 2025-02-06 PROCEDURE — 82947 ASSAY GLUCOSE BLOOD QUANT: CPT

## 2025-02-06 PROCEDURE — 7100000010 HC PHASE II RECOVERY - FIRST 15 MIN: Performed by: STUDENT IN AN ORGANIZED HEALTH CARE EDUCATION/TRAINING PROGRAM

## 2025-02-06 PROCEDURE — 84132 ASSAY OF SERUM POTASSIUM: CPT

## 2025-02-06 PROCEDURE — 6370000000 HC RX 637 (ALT 250 FOR IP): Performed by: STUDENT IN AN ORGANIZED HEALTH CARE EDUCATION/TRAINING PROGRAM

## 2025-02-06 PROCEDURE — 84295 ASSAY OF SERUM SODIUM: CPT

## 2025-02-06 PROCEDURE — 7100000011 HC PHASE II RECOVERY - ADDTL 15 MIN: Performed by: STUDENT IN AN ORGANIZED HEALTH CARE EDUCATION/TRAINING PROGRAM

## 2025-02-06 DEVICE — 6MM X 35 CM
Type: IMPLANTABLE DEVICE | Site: ARM | Status: FUNCTIONAL
Brand: ARTEGRAFT VASCULAR GRAFT

## 2025-02-06 RX ORDER — HEPARIN SODIUM 1000 [USP'U]/ML
INJECTION, SOLUTION INTRAVENOUS; SUBCUTANEOUS
Status: DISCONTINUED | OUTPATIENT
Start: 2025-02-06 | End: 2025-02-06 | Stop reason: SDUPTHER

## 2025-02-06 RX ORDER — FENTANYL CITRATE 50 UG/ML
INJECTION, SOLUTION INTRAMUSCULAR; INTRAVENOUS
Status: DISCONTINUED | OUTPATIENT
Start: 2025-02-06 | End: 2025-02-06 | Stop reason: SDUPTHER

## 2025-02-06 RX ORDER — OXYCODONE AND ACETAMINOPHEN 5; 325 MG/1; MG/1
1 TABLET ORAL EVERY 6 HOURS PRN
Qty: 28 TABLET | Refills: 0 | Status: SHIPPED | OUTPATIENT
Start: 2025-02-06 | End: 2025-02-13

## 2025-02-06 RX ORDER — HEPARIN SODIUM 1000 [USP'U]/ML
1600 INJECTION, SOLUTION INTRAVENOUS; SUBCUTANEOUS ONCE
Status: COMPLETED | OUTPATIENT
Start: 2025-02-06 | End: 2025-02-06

## 2025-02-06 RX ORDER — PROPOFOL 10 MG/ML
INJECTION, EMULSION INTRAVENOUS
Status: DISCONTINUED | OUTPATIENT
Start: 2025-02-06 | End: 2025-02-06 | Stop reason: SDUPTHER

## 2025-02-06 RX ORDER — EPHEDRINE SULFATE/0.9% NACL/PF 25 MG/5 ML
SYRINGE (ML) INTRAVENOUS
Status: DISCONTINUED | OUTPATIENT
Start: 2025-02-06 | End: 2025-02-06 | Stop reason: SDUPTHER

## 2025-02-06 RX ORDER — LIDOCAINE HYDROCHLORIDE 10 MG/ML
INJECTION, SOLUTION EPIDURAL; INFILTRATION; INTRACAUDAL; PERINEURAL PRN
Status: DISCONTINUED | OUTPATIENT
Start: 2025-02-06 | End: 2025-02-06 | Stop reason: ALTCHOICE

## 2025-02-06 RX ORDER — CEFAZOLIN SODIUM 1 G/3ML
INJECTION, POWDER, FOR SOLUTION INTRAMUSCULAR; INTRAVENOUS
Status: DISCONTINUED | OUTPATIENT
Start: 2025-02-06 | End: 2025-02-06 | Stop reason: SDUPTHER

## 2025-02-06 RX ORDER — LIDOCAINE HYDROCHLORIDE 10 MG/ML
INJECTION, SOLUTION INFILTRATION; PERINEURAL
Status: DISCONTINUED
Start: 2025-02-06 | End: 2025-02-06 | Stop reason: HOSPADM

## 2025-02-06 RX ORDER — SODIUM CHLORIDE 9 MG/ML
INJECTION, SOLUTION INTRAVENOUS CONTINUOUS
Status: DISCONTINUED | OUTPATIENT
Start: 2025-02-06 | End: 2025-02-06 | Stop reason: HOSPADM

## 2025-02-06 RX ORDER — MIDAZOLAM HYDROCHLORIDE 1 MG/ML
INJECTION, SOLUTION INTRAMUSCULAR; INTRAVENOUS
Status: DISCONTINUED | OUTPATIENT
Start: 2025-02-06 | End: 2025-02-06 | Stop reason: SDUPTHER

## 2025-02-06 RX ORDER — MAGNESIUM HYDROXIDE 1200 MG/15ML
LIQUID ORAL CONTINUOUS PRN
Status: COMPLETED | OUTPATIENT
Start: 2025-02-06 | End: 2025-02-06

## 2025-02-06 RX ADMIN — PHENYLEPHRINE HYDROCHLORIDE 50 MCG/MIN: 10 INJECTION INTRAVENOUS at 08:13

## 2025-02-06 RX ADMIN — PHENYLEPHRINE HYDROCHLORIDE 100 MCG: 10 INJECTION INTRAVENOUS at 08:04

## 2025-02-06 RX ADMIN — PHENYLEPHRINE HYDROCHLORIDE 100 MCG: 10 INJECTION INTRAVENOUS at 10:34

## 2025-02-06 RX ADMIN — PHENYLEPHRINE HYDROCHLORIDE 100 MCG: 10 INJECTION INTRAVENOUS at 07:58

## 2025-02-06 RX ADMIN — EPHEDRINE SULFATE 10 MG: 5 INJECTION INTRAVENOUS at 08:25

## 2025-02-06 RX ADMIN — HEPARIN SODIUM 1600 UNITS: 1000 INJECTION INTRAVENOUS; SUBCUTANEOUS at 11:52

## 2025-02-06 RX ADMIN — EPHEDRINE SULFATE 5 MG: 5 INJECTION INTRAVENOUS at 09:02

## 2025-02-06 RX ADMIN — EPHEDRINE SULFATE 5 MG: 5 INJECTION INTRAVENOUS at 09:18

## 2025-02-06 RX ADMIN — FENTANYL CITRATE 50 MCG: 50 INJECTION, SOLUTION INTRAMUSCULAR; INTRAVENOUS at 08:21

## 2025-02-06 RX ADMIN — SODIUM CHLORIDE: 9 INJECTION, SOLUTION INTRAVENOUS at 07:39

## 2025-02-06 RX ADMIN — PHENYLEPHRINE HYDROCHLORIDE 200 MCG: 10 INJECTION INTRAVENOUS at 08:07

## 2025-02-06 RX ADMIN — PROPOFOL 50 MG: 10 INJECTION, EMULSION INTRAVENOUS at 07:58

## 2025-02-06 RX ADMIN — SODIUM CHLORIDE: 9 INJECTION, SOLUTION INTRAVENOUS at 08:41

## 2025-02-06 RX ADMIN — HEPARIN SODIUM 6000 UNITS: 1000 INJECTION, SOLUTION INTRAVENOUS; SUBCUTANEOUS at 09:40

## 2025-02-06 RX ADMIN — FENTANYL CITRATE 25 MCG: 50 INJECTION, SOLUTION INTRAMUSCULAR; INTRAVENOUS at 09:48

## 2025-02-06 RX ADMIN — PROPOFOL 75 MCG/KG/MIN: 10 INJECTION, EMULSION INTRAVENOUS at 08:00

## 2025-02-06 RX ADMIN — PHENYLEPHRINE HYDROCHLORIDE 100 MCG: 10 INJECTION INTRAVENOUS at 10:07

## 2025-02-06 RX ADMIN — CEFAZOLIN 2 G: 1 INJECTION, POWDER, FOR SOLUTION INTRAMUSCULAR; INTRAVENOUS at 08:14

## 2025-02-06 RX ADMIN — EPHEDRINE SULFATE 5 MG: 5 INJECTION INTRAVENOUS at 09:23

## 2025-02-06 RX ADMIN — MIDAZOLAM 2 MG: 1 INJECTION INTRAMUSCULAR; INTRAVENOUS at 07:52

## 2025-02-06 RX ADMIN — PHENYLEPHRINE HYDROCHLORIDE 200 MCG: 10 INJECTION INTRAVENOUS at 08:01

## 2025-02-06 RX ADMIN — FENTANYL CITRATE 50 MCG: 50 INJECTION, SOLUTION INTRAMUSCULAR; INTRAVENOUS at 09:16

## 2025-02-06 ASSESSMENT — PAIN DESCRIPTION - ONSET: ONSET: GRADUAL

## 2025-02-06 ASSESSMENT — PAIN DESCRIPTION - DESCRIPTORS: DESCRIPTORS: ACHING

## 2025-02-06 ASSESSMENT — PAIN SCALES - GENERAL: PAINLEVEL_OUTOF10: 2

## 2025-02-06 ASSESSMENT — PAIN DESCRIPTION - PAIN TYPE: TYPE: SURGICAL PAIN

## 2025-02-06 ASSESSMENT — PAIN DESCRIPTION - LOCATION: LOCATION: ARM

## 2025-02-06 ASSESSMENT — PAIN DESCRIPTION - ORIENTATION: ORIENTATION: RIGHT

## 2025-02-06 NOTE — PROGRESS NOTES
Returned to [facility] post procedure. Alert. Bed in low position, side rails are up and call light within reach.drsg at right upper arm intact and dry

## 2025-02-06 NOTE — PROGRESS NOTES
Patient ambulates in halls / assist x1 with no distress at 1230.  Patient able to reach friend Isela for .  Assist with dressing.  All discharge instructions reviewed with patient, questions answered.  Patient discharged per walk out with friend Isela and all belongings.  Dressings to right arm x2 / no change to scant drainage to right antecubital area / right upper arm dressing remains dry and secure.

## 2025-02-06 NOTE — DISCHARGE INSTRUCTIONS
Patient Discharge Instructions  Discharge Date:  2/6/2025    HYGEINE: Ok to shower 24 hours after surgery, no soaking in a tub/pool until seen at your follow up appointment. Clean incision daily with gentle soap and water, do not use alcohol/peroxide or any harsh cleansers. There is surgical suture over your incision(s), we will remove in clinic     DRIVING: No driving while taking narcotic medications or while in pain    ACTIVITY: No lifting greater than 10lbs for 6 weeks or any strenuous activity.     DIET: Resume your normal diet as advised by your PCP.    MEDICATIONS: Take all medications as prescribed. Take medications as prescribed by physician. For adequate pain control, take tylenol and motrin in alternating cycles (e.g. take 500mg tylenol at 8am, take 400mg motrin at 12pm, take 500mg tylenol at 4pm, take 400mg motrin at 8pm, etc.). If prescribed narcotic medications (Norco, Percocet, or Roxicodone), use for breakthrough pain and attempt to wean off medications as soon as possible. Do not take more than 4000mg tylenol in 24 hours.     SPECIAL INSTRUCTIONS:     Follow up with Dr. Hook as scheduled, call the clinic for appointment. Call sooner if fever above 101.4 degrees Celsius, increase in swelling or redness, thick purulent discharge or pain not controlled with medications.      SEDATION / ANALGESIA INFORMATION / HOME GOING ADVICE  You have received the sedation/analgesia medication during your visit    Sedation/analgesia is used during short medical procedures under controlled supervision. The medication will produce a strong relaxation. You will be able to hear, speak and follow instructions, but your memory and alertness will be decreased.    You will be able to swallow and breathe on your own. During sedation/analgesia your blood pressure, heart and breathing will be watched closely. After the procedure, you may not remember what was said or done.    You may have the following effects from the

## 2025-02-06 NOTE — INTERVAL H&P NOTE
Update History & Physical    The patient's History and Physical of January 14, 2025 was reviewed with the patient and I examined the patient. There was no change. The surgical site was confirmed by the patient and me.     Plan: The risks, benefits, expected outcome, and alternative to the recommended procedure have been discussed with the patient. Patient understands and wants to proceed with the procedure.     Electronically signed by GUZMAN WOODRUFF MD on 2/6/2025 at 07:30AM

## 2025-02-06 NOTE — ANESTHESIA POSTPROCEDURE EVALUATION
Department of Anesthesiology  Postprocedure Note    Patient: Jules Nichols  MRN: [id number]  YOB: 1980  Date of evaluation: 2/6/2025    Procedure Summary       Date: 02/06/25 Room / Location: [place] [address] / [facility]    Anesthesia Start: 0748 Anesthesia Stop: 1127    Procedure: RIGHT UPPER EXTREMITY ARTERIOVENOUS GRAFT IMPLANTATION WITH ARTEGRAFT 6mm X 38cm GRAFT (Right) Diagnosis:       ESRD (end stage renal disease) (HCC)      (ESRD (end stage renal disease) (HCC) [N18.6])    Surgeons: Kevin Hook MD Responsible Provider: Logan Do MD    Anesthesia Type: MAC ASA Status: 4            Anesthesia Type: No value filed.    Jonah Phase I: Jonah Score: 10    Jonah Phase II:      Anesthesia Post Evaluation    Patient location during evaluation: PACU  Patient participation: complete - patient participated  Level of consciousness: awake  Pain score: 1  Airway patency: patent  Nausea & Vomiting: no nausea and no vomiting  Cardiovascular status: blood pressure returned to baseline and hemodynamically stable  Respiratory status: acceptable  Hydration status: euvolemic  Pain management: adequate    No notable events documented.

## 2025-02-06 NOTE — PROGRESS NOTES
Dressing change to right tunnel cath completed.  Port flushed and new cap on.  Taking fluids well and declines light meal.  Attempt to contact ride home without success.  MD perfect served at patients request.  Patient complains of aching to right arm 2-3 on 0-10 scale.  Scant drainage to lower dressing unchanged.  Vital to monitor.

## 2025-02-06 NOTE — OP NOTE
Operative Note      Patient: Jules Nichols  YOB: 1980  MRN: [id number]    Date of Procedure: 2/6/2025    Pre-Op Diagnosis Codes:      * ESRD (end stage renal disease) ([facility]) [N18.6]    Post-Op Diagnosis: Same       Procedure(s):  RIGHT UPPER EXTREMITY ARTERIOVENOUS GRAFT IMPLANTATION WITH ARTEGRAFT 6mm X 38cm GRAFT    Surgeon(s):  Kevin Hook MD    Assistant: Jose Mendoza DO (Resident)    Anesthesia: Monitor Anesthesia Care    Estimated Blood Loss (mL): 80 mL    Complications: None    Specimens: None    Implants:  Implant Name Type Inv. Item Serial No.  Lot No. LRB No. Used Action   GRAFT VASC L35CM ID6MM OD7MM CLLGN BOV CAR ART NONANTIGENIC - [id number] Vascular grafts GRAFT VASC L35CM ID6MM OD7MM CLLGN BOV CAR ART NONANTIGENIC  [employer] [id number] 1 Implanted         Drains: None    Findings:  Infection Present At Time Of Surgery (PATOS) (choose all levels that have infection present):  No infection present  Other Findings: The previous brachiocephalic fistula was patent proximally just up to a segment above the elbow.  There was a prominent sidebranch keeping the fistula patent as it was occluded distally.  Due to previous hidradenitis surgery, the right axilla was extremely scarred.  There was scarring present up to the mid arm however it was less than the axilla.  I was able to locate the distal basilic vein.  The basilic vein was likely 3 to 4 mm. Basilic vein was small near the elbow and transposition was not an option. The proximal anastomosis was to the previous fistula and the distal anastomosis was to the basilic vein.  After graft was complete there was palpable thrill in the graft.  There was good hemostasis.      Detailed Description of Procedure:   Mr. Nichols was brought to the operating room and placed in supine position with the right arm out.  His airway and sedation were managed by the anesthesia team.  His right arm was

## 2025-02-06 NOTE — PROGRESS NOTES
Patient admitted, consent signed and questions answered with Dr Hook. Patient ready for procedure. Call light to reach with side rails up 2 of 2. No family at bedside with patient / states does have ride home.  History and physical to be completed as Dr Hook at bedside and site marked.  Anesthesia in to see.  2% Chlorhexidine cloths used to prep site / right arm

## 2025-02-07 ENCOUNTER — TELEPHONE (OUTPATIENT)
Dept: VASCULAR SURGERY | Age: 45
End: 2025-02-07

## 2025-02-10 ENCOUNTER — TELEPHONE (OUTPATIENT)
Dept: VASCULAR SURGERY | Age: 45
End: 2025-02-10

## 2025-02-11 ENCOUNTER — TELEPHONE (OUTPATIENT)
Dept: VASCULAR SURGERY | Age: 45
End: 2025-02-11

## 2025-02-18 ENCOUNTER — OFFICE VISIT (OUTPATIENT)
Dept: VASCULAR SURGERY | Age: 45
End: 2025-02-18

## 2025-02-18 VITALS
OXYGEN SATURATION: 98 % | HEART RATE: 97 BPM | HEIGHT: 62 IN | SYSTOLIC BLOOD PRESSURE: 95 MMHG | RESPIRATION RATE: 18 BRPM | DIASTOLIC BLOOD PRESSURE: 66 MMHG | BODY MASS INDEX: 26.5 KG/M2 | WEIGHT: 144 LBS

## 2025-02-18 DIAGNOSIS — N18.6 ESRD (END STAGE RENAL DISEASE) (HCC): Primary | ICD-10-CM

## 2025-02-18 PROCEDURE — 99024 POSTOP FOLLOW-UP VISIT: CPT | Performed by: STUDENT IN AN ORGANIZED HEALTH CARE EDUCATION/TRAINING PROGRAM

## 2025-02-18 ASSESSMENT — ENCOUNTER SYMPTOMS
CHEST TIGHTNESS: 0
VOICE CHANGE: 0
ABDOMINAL DISTENTION: 0
COLOR CHANGE: 0
ABDOMINAL PAIN: 0
VOMITING: 0
TROUBLE SWALLOWING: 0
EYE PAIN: 0
COUGH: 0

## 2025-02-18 NOTE — PROGRESS NOTES
[facility], [facility]  [address]  [place] [address]  Dept: [phone]     Patient: Jules Nichols  : 1980  MRN: [id number]  DOS: 2025    HPI:  24: Jules Nichols is a 44 y.o. male who comes to the office regarding evaluation for long term HD access creation. Last echo 3/2024, EF 60-65%.  History of diabetes and hypertension.  Has been on dialysis since 2024.  He is left hand dominant.  He has history of hidradenitis in both axillas, sternal and back.  Bedside ultrasound shows adequate cephalic vein on the right and marginal on the left at the level of the antecubital fossa.    24: S/p right brachial/cephalic AV fistula creation on 24. Has palpable thrill. Hand is ok, no steal symptoms. Sutures removed today.    10/1/24: Duplex reviewed. Has flow ranging from 648 to 1.2 L. Depth is 6 mm. As the fistula moves up to the shoulder it starts to get a little deeper than that. I marked his fistula with ultrasound.    24: Here for HD catheter removal as his right arm fistula has been working well in HD.    25: His right arm fistula occluded and a fistulagram was attempted on 24. Unfortunately this was not successful in salvaging his fistula and he was discharged with tunneled HD catheter. Bedside ultrasound shows patent robust proximal fistula but then severely stenotic and occluded mid to distal fistula.    25: Underwent right AV graft placement on 25 which was from patent stump of previous cephalic vein to right basilic vein. No issues in right hand.    Past Medical History:   Diagnosis Date    Benign essential HTN     Chronic kidney disease 2016    Dr Stafford    Diabetic keto-acidosis (HCC) 2016    hx of     Dialysis patient (HCC)      AND   /  [facility]    Hyperlipemia     Uncontrolled type 2 diabetes mellitus with nephropathy

## 2025-02-19 ENCOUNTER — HOSPITAL ENCOUNTER (OUTPATIENT)
Dept: DIALYSIS | Age: 45
Setting detail: DIALYSIS SERIES
Discharge: HOME | End: 2025-02-19
Payer: MEDICAID

## 2025-02-19 ENCOUNTER — HOSPITAL ENCOUNTER (EMERGENCY)
Age: 45
Discharge: HOME | End: 2025-02-19
Attending: EMERGENCY MEDICINE
Payer: MEDICAID

## 2025-02-19 ENCOUNTER — APPOINTMENT (OUTPATIENT)
Dept: INTERVENTIONAL RADIOLOGY/VASCULAR | Age: 45
End: 2025-02-19
Payer: MEDICAID

## 2025-02-19 ENCOUNTER — APPOINTMENT (OUTPATIENT)
Dept: GENERAL RADIOLOGY | Age: 45
End: 2025-02-19
Payer: MEDICAID

## 2025-02-19 ENCOUNTER — TELEPHONE (OUTPATIENT)
Dept: VASCULAR SURGERY | Age: 45
End: 2025-02-19

## 2025-02-19 VITALS
RESPIRATION RATE: 16 BRPM | BODY MASS INDEX: 26.5 KG/M2 | WEIGHT: 144 LBS | TEMPERATURE: 97.5 F | DIASTOLIC BLOOD PRESSURE: 66 MMHG | HEIGHT: 62 IN | OXYGEN SATURATION: 100 % | SYSTOLIC BLOOD PRESSURE: 98 MMHG | HEART RATE: 81 BPM

## 2025-02-19 VITALS
TEMPERATURE: 97.7 F | WEIGHT: 145.5 LBS | BODY MASS INDEX: 26.61 KG/M2 | RESPIRATION RATE: 15 BRPM | DIASTOLIC BLOOD PRESSURE: 71 MMHG | SYSTOLIC BLOOD PRESSURE: 109 MMHG | HEART RATE: 98 BPM

## 2025-02-19 DIAGNOSIS — T82.9XXS: Primary | ICD-10-CM

## 2025-02-19 LAB
ANION GAP SERPL CALCULATED.3IONS-SCNC: 15 MMOL/L (ref 9–16)
BASOPHILS # BLD: <0.03 K/UL (ref 0–0.2)
BASOPHILS NFR BLD: 0 % (ref 0–2)
BUN SERPL-MCNC: 47 MG/DL (ref 6–20)
CALCIUM SERPL-MCNC: 8.5 MG/DL (ref 8.6–10.4)
CHLORIDE SERPL-SCNC: 99 MMOL/L (ref 98–107)
CO2 SERPL-SCNC: 26 MMOL/L (ref 20–31)
CREAT SERPL-MCNC: 7 MG/DL (ref 0.7–1.2)
EOSINOPHIL # BLD: 0.09 K/UL (ref 0–0.44)
EOSINOPHILS RELATIVE PERCENT: 2 % (ref 1–4)
ERYTHROCYTE [DISTWIDTH] IN BLOOD BY AUTOMATED COUNT: 13.2 % (ref 11.8–14.4)
GFR, ESTIMATED: 9 ML/MIN/1.73M2
GLUCOSE SERPL-MCNC: 130 MG/DL (ref 74–99)
HCT VFR BLD AUTO: 27.6 % (ref 40.7–50.3)
HGB BLD-MCNC: 9.1 G/DL (ref 13–17)
IMM GRANULOCYTES # BLD AUTO: <0.03 K/UL (ref 0–0.3)
IMM GRANULOCYTES NFR BLD: 0 %
LYMPHOCYTES NFR BLD: 1.49 K/UL (ref 1.1–3.7)
LYMPHOCYTES RELATIVE PERCENT: 28 % (ref 24–43)
MCH RBC QN AUTO: 31.9 PG (ref 25.2–33.5)
MCHC RBC AUTO-ENTMCNC: 33 G/DL (ref 28.4–34.8)
MCV RBC AUTO: 96.8 FL (ref 82.6–102.9)
MONOCYTES NFR BLD: 0.33 K/UL (ref 0.1–1.2)
MONOCYTES NFR BLD: 6 % (ref 3–12)
NEUTROPHILS NFR BLD: 64 % (ref 36–65)
NEUTS SEG NFR BLD: 3.33 K/UL (ref 1.5–8.1)
NRBC BLD-RTO: 0 PER 100 WBC
PLATELET # BLD AUTO: 173 K/UL (ref 138–453)
PMV BLD AUTO: 10.2 FL (ref 8.1–13.5)
POTASSIUM SERPL-SCNC: 4.6 MMOL/L (ref 3.7–5.3)
RBC # BLD AUTO: 2.85 M/UL (ref 4.21–5.77)
SODIUM SERPL-SCNC: 140 MMOL/L (ref 136–145)
WBC OTHER # BLD: 5.3 K/UL (ref 3.5–11.3)

## 2025-02-19 PROCEDURE — 85025 COMPLETE CBC W/AUTO DIFF WBC: CPT

## 2025-02-19 PROCEDURE — 6360000002 HC RX W HCPCS: Performed by: RADIOLOGY

## 2025-02-19 PROCEDURE — 99284 EMERGENCY DEPT VISIT MOD MDM: CPT

## 2025-02-19 PROCEDURE — 6370000000 HC RX 637 (ALT 250 FOR IP): Performed by: INTERNAL MEDICINE

## 2025-02-19 PROCEDURE — C1769 GUIDE WIRE: HCPCS

## 2025-02-19 PROCEDURE — 6360000002 HC RX W HCPCS: Performed by: INTERNAL MEDICINE

## 2025-02-19 PROCEDURE — 96374 THER/PROPH/DIAG INJ IV PUSH: CPT

## 2025-02-19 PROCEDURE — 36581 REPLACE TUNNELED CV CATH: CPT

## 2025-02-19 PROCEDURE — 6370000000 HC RX 637 (ALT 250 FOR IP): Performed by: EMERGENCY MEDICINE

## 2025-02-19 PROCEDURE — 77001 FLUOROGUIDE FOR VEIN DEVICE: CPT

## 2025-02-19 PROCEDURE — 80048 BASIC METABOLIC PNL TOTAL CA: CPT

## 2025-02-19 PROCEDURE — 71046 X-RAY EXAM CHEST 2 VIEWS: CPT

## 2025-02-19 PROCEDURE — 90935 HEMODIALYSIS ONE EVALUATION: CPT

## 2025-02-19 RX ORDER — HEPARIN SODIUM 1000 [USP'U]/ML
INJECTION, SOLUTION INTRAVENOUS; SUBCUTANEOUS PRN
Status: COMPLETED | OUTPATIENT
Start: 2025-02-19 | End: 2025-02-19

## 2025-02-19 RX ORDER — ONDANSETRON 4 MG/1
4 TABLET, ORALLY DISINTEGRATING ORAL EVERY 8 HOURS PRN
Status: DISCONTINUED | OUTPATIENT
Start: 2025-02-19 | End: 2025-02-19 | Stop reason: HOSPADM

## 2025-02-19 RX ORDER — MIDODRINE HYDROCHLORIDE 5 MG/1
10 TABLET ORAL ONCE
Status: COMPLETED | OUTPATIENT
Start: 2025-02-19 | End: 2025-02-19

## 2025-02-19 RX ORDER — HEPARIN SODIUM 1000 [USP'U]/ML
1600 INJECTION, SOLUTION INTRAVENOUS; SUBCUTANEOUS ONCE
Status: COMPLETED | OUTPATIENT
Start: 2025-02-19 | End: 2025-02-19

## 2025-02-19 RX ORDER — 0.9 % SODIUM CHLORIDE 0.9 %
250 INTRAVENOUS SOLUTION INTRAVENOUS PRN
Status: CANCELLED | OUTPATIENT
Start: 2025-02-19

## 2025-02-19 RX ORDER — 0.9 % SODIUM CHLORIDE 0.9 %
150 INTRAVENOUS SOLUTION INTRAVENOUS PRN
Status: CANCELLED | OUTPATIENT
Start: 2025-02-19

## 2025-02-19 RX ADMIN — ONDANSETRON 4 MG: 4 TABLET, ORALLY DISINTEGRATING ORAL at 14:58

## 2025-02-19 RX ADMIN — HEPARIN SODIUM 1600 UNITS: 1000 INJECTION INTRAVENOUS; SUBCUTANEOUS at 19:09

## 2025-02-19 RX ADMIN — HEPARIN SODIUM 1600 UNITS: 1000 INJECTION, SOLUTION INTRAVENOUS; SUBCUTANEOUS at 14:31

## 2025-02-19 RX ADMIN — Medication 2000 MG: at 13:17

## 2025-02-19 RX ADMIN — MIDODRINE HYDROCHLORIDE 10 MG: 5 TABLET ORAL at 18:19

## 2025-02-19 ASSESSMENT — ENCOUNTER SYMPTOMS
EYE PAIN: 0
VOMITING: 0
WHEEZING: 0
DIARRHEA: 0
ALLERGIC/IMMUNOLOGIC NEGATIVE: 1
SINUS PAIN: 0
SHORTNESS OF BREATH: 0
ABDOMINAL PAIN: 0
RESPIRATORY NEGATIVE: 1
CHEST TIGHTNESS: 0
COUGH: 0
COLOR CHANGE: 0
EYES NEGATIVE: 1
NAUSEA: 0
VOICE CHANGE: 0
GASTROINTESTINAL NEGATIVE: 1
FACIAL SWELLING: 0
EYE REDNESS: 0

## 2025-02-19 ASSESSMENT — PAIN SCALES - GENERAL
PAINLEVEL_OUTOF10: 0
PAINLEVEL_OUTOF10: 0

## 2025-02-19 ASSESSMENT — PAIN - FUNCTIONAL ASSESSMENT: PAIN_FUNCTIONAL_ASSESSMENT: NONE - DENIES PAIN

## 2025-02-19 NOTE — PROGRESS NOTES
Dialysis Time Out  To be done by RN and tech or 2 RNs  Staff Names: Brynn QUINONEZ RN and Josey BROWN RN    [x]  Identity of the patient using 2 patient identifiers  [x]  Consent for treatment  [x]  Equipment-proper machine and dialyzer  [x]  B-Hep B status: 1/22/25. Non-reactive. Per OP records.   [x]  Orders- to include bath, blood flow, dialyzer, time and fluid removal  [x]  Access-Correct site and in working order  [x]  Time for patient to ask questions.

## 2025-02-19 NOTE — ED NOTES
Pt arrives alert and oriented x4 and ambulatory from triage  Pt complains of his port having trouble being accessed for dialysis   Pt states he normally goes Monday, Wednesdays, and Fridays, and has not missed any sessions   Pt reports he got this port placed about a month ago, and the dialysis center told him \"its leaking air\" and referred him to come to the ER   Pt denies any current pain or injury   RR even and unlabored.   NAD noted.   Whiteboard updated.  Will continue with plan of care.

## 2025-02-19 NOTE — TELEPHONE ENCOUNTER
Dialysis called stating that they are pulling air only through dialysis catheter. Writer advised to send patient to [facility]

## 2025-02-19 NOTE — BRIEF OP NOTE
Brief Postoperative Note    Jules Nichols  YOB: 1980  [id number]    Pre-operative Diagnosis: Chronic Renal Failure/Malfunctioning HD Catheter    Post-operative Diagnosis: Same    Procedure: Tunneled HD Catheter Exchange    Anesthesia: Local 1% Lidocaine    Medications Given: none    Surgeons/Assistants: Dr. Garrido and Ashley Tavares PA-C    Estimated Blood Loss: Minimal    Complications: none    Specimens: were not obtained    Tunneled HD catheter exchanged for new 14.5 Fr x 19 cm tip to cuff Palindrome HD catheter.  Catheter locked with heparin. Dressing applied.  May use HD catheter for dialysis.   Vitals signs were reviewed and were stable after the procedure.  Patient tolerated procedure well.    Electronically signed by Ashley Tavares PA-C on 2/19/2025 at 2:31 PM

## 2025-02-19 NOTE — ED PROVIDER NOTES
[facility] EMERGENCY DEPARTMENT  Emergency Department Encounter  Emergency Medicine Attending     Pt Name:Jules Nichols  MRN: [id number]  Birthdate 1980  Date of evaluation: 2/19/25  PCP:  Elvin Kelley MD  Note Started: 9:44 AM EST      CHIEF COMPLAINT       Chief Complaint   Patient presents with    Vascular Access Problem       HISTORY OF PRESENT ILLNESS  (Location/Symptom, Timing/Onset, Context/Setting, Quality, Duration, Modifying Factors, Severity.)      Jules Nichols is a 44 y.o. male who presents with right chest tunneled dialysis cath.  Dysfunction, states dialysis center told him the catheter is sucking air.  He has no problems, no leak, no symptoms, no pain.  Attempted to have his regular scheduled dialysis session today but could not complete the session    He states the catheter has been in for approximately 1 month, he has no pain or other concern      PAST MEDICAL / SURGICAL / SOCIAL / FAMILY HISTORY      has a past medical history of Benign essential HTN, Chronic kidney disease, Diabetic keto-acidosis (HCC), Dialysis patient, Hyperlipemia, and Uncontrolled type 2 diabetes mellitus with nephropathy.       has a past surgical history that includes other surgical history (Bilateral, 02/22/2024); Wound Exploration (Bilateral, 02/22/2024); Wound debridement; Abdomen surgery (N/A, 02/24/2024); IR TUNNELED CVC PLACE WO SQ PORT/PUMP > 5 YEARS (03/01/2024); IR TUNNELED CVC PLACE WO SQ PORT/PUMP > 5 YEARS (03/28/2024); AV fistula creation (Right, 07/18/2024); Dialysis fistula creation (Right, 07/18/2024); IR TUNNELED CVC PLACE WO SQ PORT/PUMP > 5 YEARS (12/26/2024); Dialysis fistula creation (Right, 12/27/2024); Dialysis fistula creation (Right, 02/06/2025); and Dialysis fistula creation (Right, 2/6/2025).      Social History     Socioeconomic History    Marital status: Single     Spouse name: Not on file    Number of children: Not on file    Years of education: Not on file    Highest

## 2025-02-19 NOTE — ED NOTES
Labeled blood specimens sent to lab via tube system.    [x] Green/yellow  [x] Lavender   [] on ice   [] Blue   [] Green/black [] on ice  [] Yellow  [] on ice  [] Red  [] Pink  [] Blood Cultures  [] x1 [] X2    [] Ped Green  [] on ice  [] Ped Lavender  [] on ice    [] Ped Yellow  [] on ice  [] Ped Red

## 2025-02-19 NOTE — CONSULTS
Division of Vascular Surgery        New Consult      Physician Requesting Consult:  Dr. Hunter    Reason for Consult:   \"patient states his tunneled dialysis catheter is sucking air, he has no symptoms, catheter has been placed for 1 month\"     Chief Complaint:      Tunneled Dialysis Catheter Malfunction     History of Present Illness:      Jules Nichols is a 44 y.o. gentleman who presents from Dialysis with tunneled catheter \"sucking air\". Patient was seen in Vascular office 2/18/25 to follow up on his right AV graft that was placed on 2/6/25. Per Vascular progress note, patient was to continue to use tunneled catheter for additional week and return to office in one week for suture removal at AV graft site. Patient reports that catheter has been sucking air at dialysis for the last few weeks but the techs can usually get to work after manual manipulation. Tunneled catheter has been in place for 1 month. Patient denies fever, chills, chest pain, or shortness of breath.     Medical History:     Past Medical History:   Diagnosis Date    Benign essential HTN     Chronic kidney disease 01/20/2016    Dr Stafford    Diabetic keto-acidosis (HCC) 01/20/2016    hx of     Dialysis patient     MONDAY WEDNESDAY AND FRIDAYS  /  [facility]    Hyperlipemia     Uncontrolled type 2 diabetes mellitus with nephropathy 01/20/2016       Surgical History:     Past Surgical History:   Procedure Laterality Date    ABDOMEN SURGERY N/A 02/24/2024    WOUND BED PREPARATION BILATERAL GROIN AND ARMPIT, CHEST AND BACK performed by Boyd Keene MD at [facility] OR    AV FISTULA CREATION Right 07/18/2024    DR. HOOK    DEBRIDEMENT      Irrigation and Debridement BILATERAL GROIN AND ARMPIT, and buttocks, chest    DIALYSIS FISTULA CREATION Right 07/18/2024    ARTERIOVENOUS FISTULA CREATION RIGHT performed by Kevin Hook MD at [facility] CVOR    DIALYSIS FISTULA CREATION Right 12/27/2024    UPPER EXTREMITY FISTULAGRAM performed by Kevin Hook  Does not apply route 4 times daily (before meals and nightly) 120 each 11    Glucose Blood (BLOOD GLUCOSE TEST STRIPS) STRP 1 each by Does not apply route 4 times daily (before meals and nightly) 100 strip 11    Alcohol Swabs 70 % PADS Inject 1 each into the skin 3 times daily as needed 100 each 3    Insulin Pen Needle (B-D ULTRAFINE III SHORT PEN) 31G X 8 MM MISC Inject 1 each into the skin daily May substitute with any brand 100 each 11       Social History:     Tobacco:    reports that he has never smoked. He has never used smokeless tobacco.  Alcohol:      reports no history of alcohol use.  Drug Use:  reports no history of drug use.      Review of Systems:     Review of Systems   Constitutional: Negative.  Negative for chills and fever.   HENT: Negative.  Negative for postnasal drip and sinus pain.    Eyes: Negative.  Negative for pain and redness.   Respiratory: Negative.  Negative for chest tightness and shortness of breath.    Cardiovascular: Negative.  Negative for chest pain and palpitations.   Gastrointestinal: Negative.  Negative for nausea and vomiting.   Endocrine: Negative.  Negative for cold intolerance and heat intolerance.   Genitourinary: Negative.  Negative for flank pain and frequency.   Musculoskeletal: Negative.  Negative for joint swelling and neck pain.   Skin: Negative.  Negative for color change and rash.   Allergic/Immunologic: Negative.  Negative for immunocompromised state.   Neurological: Negative.  Negative for light-headedness and numbness.   Hematological: Negative.    Psychiatric/Behavioral: Negative.         Physical Exam:     Vitals:  /79   Pulse 99   Temp 97.5 °F (36.4 °C) (Oral)   Resp 19   Ht 1.575 m (5' 2\")   Wt 65.3 kg (144 lb)   SpO2 100%   BMI 26.34 kg/m²     Physical Exam  Vitals reviewed.   HENT:      Head: Normocephalic.      Nose: Nose normal.      Mouth/Throat:      Pharynx: Oropharynx is clear.   Eyes:      Pupils: Pupils are equal, round, and reactive

## 2025-02-20 NOTE — PROGRESS NOTES
Dialysis Post Treatment Note  Vitals:    02/19/25 1922   BP: 109/71   Pulse: 98   Resp: 15   Temp: 97.7 °F (36.5 °C)     Pre-Weight = 66.7 kg  Post-weight = Weight - Scale: 66 kg (145 lb 8.1 oz)  Total Liters Processed = Blood Volume Processed (Liters): 79.21 L  Rinseback Volume (mL) = Rinseback Volume (ml): 300 ml  Net Removal (mL) =  698 mL  Patient's dry weight= 65 kg  Type of access used= R subclavian tunneled cath.   Length of treatment= 212.     Pt tolerated tx well. Pt hypotensive throughout tx. UF stopped periodically d/t BP. Goal turned down. BP meds given per MAR. VS stable remainder of tx. Pt left HD with transport to ED parking. Pt d/c home. 0.7 L removed.

## 2025-02-25 ENCOUNTER — OFFICE VISIT (OUTPATIENT)
Dept: VASCULAR SURGERY | Age: 45
End: 2025-02-25

## 2025-02-25 VITALS
DIASTOLIC BLOOD PRESSURE: 65 MMHG | BODY MASS INDEX: 26.68 KG/M2 | WEIGHT: 145 LBS | HEART RATE: 122 BPM | SYSTOLIC BLOOD PRESSURE: 104 MMHG | HEIGHT: 62 IN | RESPIRATION RATE: 18 BRPM | OXYGEN SATURATION: 96 %

## 2025-02-25 DIAGNOSIS — N18.6 ESRD (END STAGE RENAL DISEASE) (HCC): Primary | ICD-10-CM

## 2025-02-25 PROCEDURE — 99024 POSTOP FOLLOW-UP VISIT: CPT | Performed by: STUDENT IN AN ORGANIZED HEALTH CARE EDUCATION/TRAINING PROGRAM

## 2025-02-25 ASSESSMENT — ENCOUNTER SYMPTOMS
ABDOMINAL DISTENTION: 0
EYE PAIN: 0
VOICE CHANGE: 0
TROUBLE SWALLOWING: 0
ABDOMINAL PAIN: 0
CHEST TIGHTNESS: 0
COLOR CHANGE: 0
COUGH: 0
VOMITING: 0

## 2025-02-25 NOTE — PROGRESS NOTES
[facility], [facility]  [address]  [place] [address]  Dept: [phone]     Patient: Jules Nichols  : 1980  MRN: [id number]  DOS: 2025    HPI:  24: Jules Nichols is a 44 y.o. male who comes to the office regarding evaluation for long term HD access creation. Last echo 3/2024, EF 60-65%.  History of diabetes and hypertension.  Has been on dialysis since 2024.  He is left hand dominant.  He has history of hidradenitis in both axillas, sternal and back.  Bedside ultrasound shows adequate cephalic vein on the right and marginal on the left at the level of the antecubital fossa.    24: S/p right brachial/cephalic AV fistula creation on 24. Has palpable thrill. Hand is ok, no steal symptoms. Sutures removed today.    10/1/24: Duplex reviewed. Has flow ranging from 648 to 1.2 L. Depth is 6 mm. As the fistula moves up to the shoulder it starts to get a little deeper than that. I marked his fistula with ultrasound.    24: Here for HD catheter removal as his right arm fistula has been working well in HD.    25: His right arm fistula occluded and a fistulagram was attempted on 24. Unfortunately this was not successful in salvaging his fistula and he was discharged with tunneled HD catheter. Bedside ultrasound shows patent robust proximal fistula but then severely stenotic and occluded mid to distal fistula.    25: Underwent right AV graft placement on 25 which was from patent stump of previous cephalic vein to right basilic vein. No issues in right hand.    25: Ok to use graft. Sutures removed.    Past Medical History:   Diagnosis Date    Benign essential HTN     Chronic kidney disease 2016    Dr Stafford    Diabetic keto-acidosis (HCC) 2016    hx of     Dialysis patient      AND   /  [facility]    Hyperlipemia     Uncontrolled type 2

## 2025-03-18 ENCOUNTER — OFFICE VISIT (OUTPATIENT)
Dept: VASCULAR SURGERY | Age: 45
End: 2025-03-18
Payer: MEDICAID

## 2025-03-18 VITALS
HEART RATE: 92 BPM | SYSTOLIC BLOOD PRESSURE: 114 MMHG | OXYGEN SATURATION: 98 % | RESPIRATION RATE: 18 BRPM | WEIGHT: 145 LBS | BODY MASS INDEX: 26.68 KG/M2 | HEIGHT: 62 IN | DIASTOLIC BLOOD PRESSURE: 72 MMHG

## 2025-03-18 DIAGNOSIS — N18.6 ESRD (END STAGE RENAL DISEASE) (HCC): Primary | ICD-10-CM

## 2025-03-18 PROCEDURE — 36589 REMOVAL TUNNELED CV CATH: CPT | Performed by: STUDENT IN AN ORGANIZED HEALTH CARE EDUCATION/TRAINING PROGRAM

## 2025-03-18 ASSESSMENT — ENCOUNTER SYMPTOMS
CHEST TIGHTNESS: 0
VOMITING: 0
EYE PAIN: 0
VOICE CHANGE: 0
COLOR CHANGE: 0
TROUBLE SWALLOWING: 0
ABDOMINAL DISTENTION: 0
ABDOMINAL PAIN: 0
COUGH: 0

## 2025-03-18 NOTE — PROGRESS NOTES
[facility], [facility]ASCULAR [facility]  [address] [place] [address] [address]  [place] [address]  Dept: [phone]     Patient: Jules Nichols  : 1980  MRN: [id number]  DOS: 3/18/2025    HPI:  24: Jules Nichols is a 44 y.o. male who comes to the office regarding evaluation for long term HD access creation. Last echo 3/2024, EF 60-65%.  History of diabetes and hypertension.  Has been on dialysis since 2024.  He is left hand dominant.  He has history of hidradenitis in both axillas, sternal and back.  Bedside ultrasound shows adequate cephalic vein on the right and marginal on the left at the level of the antecubital fossa.    24: S/p right brachial/cephalic AV fistula creation on 24. Has palpable thrill. Hand is ok, no steal symptoms. Sutures removed today.    10/1/24: Duplex reviewed. Has flow ranging from 648 to 1.2 L. Depth is 6 mm. As the fistula moves up to the shoulder it starts to get a little deeper than that. I marked his fistula with ultrasound.    24: Here for HD catheter removal as his right arm fistula has been working well in HD.    25: His right arm fistula occluded and a fistulagram was attempted on 24. Unfortunately this was not successful in salvaging his fistula and he was discharged with tunneled HD catheter. Bedside ultrasound shows patent robust proximal fistula but then severely stenotic and occluded mid to distal fistula.    25: Underwent right AV graft placement on 25 which was from patent stump of previous cephalic vein to right basilic vein. No issues in right hand.    25: Ok to use graft. Sutures removed.    3/18/25: Here for tunneled HD catheter removal.    Procedure: Right internal jugular tunneled dialysis catheter removal     Surgeon: Kevin Hook MD     Description: Mr. Nichols was brought to the procedure room and placed in supine position.  Consent was

## 2025-04-15 ENCOUNTER — PREP FOR PROCEDURE (OUTPATIENT)
Dept: VASCULAR SURGERY | Age: 45
End: 2025-04-15

## 2025-04-15 ENCOUNTER — OFFICE VISIT (OUTPATIENT)
Dept: VASCULAR SURGERY | Age: 45
End: 2025-04-15

## 2025-04-15 VITALS
WEIGHT: 145 LBS | RESPIRATION RATE: 18 BRPM | DIASTOLIC BLOOD PRESSURE: 76 MMHG | BODY MASS INDEX: 26.68 KG/M2 | HEART RATE: 60 BPM | SYSTOLIC BLOOD PRESSURE: 110 MMHG | OXYGEN SATURATION: 98 % | HEIGHT: 62 IN

## 2025-04-15 DIAGNOSIS — T82.590A DIALYSIS AV FISTULA MALFUNCTION, INITIAL ENCOUNTER: ICD-10-CM

## 2025-04-15 DIAGNOSIS — N18.6 ESRD (END STAGE RENAL DISEASE) (HCC): Primary | ICD-10-CM

## 2025-04-15 PROCEDURE — 99024 POSTOP FOLLOW-UP VISIT: CPT | Performed by: STUDENT IN AN ORGANIZED HEALTH CARE EDUCATION/TRAINING PROGRAM

## 2025-04-15 ASSESSMENT — ENCOUNTER SYMPTOMS
VOICE CHANGE: 0
COLOR CHANGE: 0
CHEST TIGHTNESS: 0
VOMITING: 0
ABDOMINAL PAIN: 0
COUGH: 0
ABDOMINAL DISTENTION: 0
TROUBLE SWALLOWING: 0
EYE PAIN: 0

## 2025-04-15 NOTE — PROGRESS NOTES
DRAINAGE OF BILATERAL AXILLA, CHEST WALL AND BILATERAL GROIN performed by Sandy Carmichael MD at [facility] OR      Review of Systems   Constitutional:  Negative for activity change, fever and unexpected weight change.   HENT:  Negative for trouble swallowing and voice change.    Eyes:  Negative for pain and visual disturbance.   Respiratory:  Negative for cough and chest tightness.    Cardiovascular:  Negative for chest pain and palpitations.   Gastrointestinal:  Negative for abdominal distention, abdominal pain and vomiting.   Endocrine: Negative for cold intolerance and heat intolerance.   Genitourinary:  Negative for dysuria, flank pain and hematuria.   Musculoskeletal:  Negative for joint swelling and neck pain.   Skin:  Negative for color change and rash.   Allergic/Immunologic: Negative for immunocompromised state.   Neurological:  Negative for syncope, speech difficulty, weakness, numbness and headaches.   Hematological:  Negative for adenopathy.   Psychiatric/Behavioral:  Negative for behavioral problems and suicidal ideas.        Vitals:    04/15/25 0907 04/15/25 0920   BP: (!) 87/67 110/76   BP Site: Left Upper Arm Left Lower Arm   Patient Position: Sitting Sitting   BP Cuff Size: Large Adult Large Adult   Pulse: 60 60   Resp: 18    SpO2: 98%    Weight: 65.8 kg (145 lb)    Height: 1.575 m (5' 2\")           Physical Exam  Constitutional:       General: He is not in acute distress.  HENT:      Mouth/Throat:      Mouth: Mucous membranes are moist.      Pharynx: Oropharynx is clear.   Eyes:      General: No scleral icterus.     Extraocular Movements: Extraocular movements intact.      Conjunctiva/sclera: Conjunctivae normal.   Cardiovascular:      Rate and Rhythm: Normal rate and regular rhythm.      Heart sounds: No murmur heard.     Comments: Palpable thrill in right arm AV graft.  Pulmonary:      Effort: No respiratory distress.      Breath sounds: No rales.   Abdominal:      General: There is no distension.

## 2025-04-17 ENCOUNTER — HOSPITAL ENCOUNTER (OUTPATIENT)
Age: 45
Setting detail: OUTPATIENT SURGERY
Discharge: HOME | End: 2025-04-17
Attending: STUDENT IN AN ORGANIZED HEALTH CARE EDUCATION/TRAINING PROGRAM | Admitting: STUDENT IN AN ORGANIZED HEALTH CARE EDUCATION/TRAINING PROGRAM
Payer: MEDICAID

## 2025-04-17 ENCOUNTER — APPOINTMENT (OUTPATIENT)
Age: 45
End: 2025-04-17
Attending: STUDENT IN AN ORGANIZED HEALTH CARE EDUCATION/TRAINING PROGRAM
Payer: MEDICAID

## 2025-04-17 VITALS
DIASTOLIC BLOOD PRESSURE: 74 MMHG | HEART RATE: 87 BPM | SYSTOLIC BLOOD PRESSURE: 110 MMHG | BODY MASS INDEX: 25.76 KG/M2 | HEIGHT: 62 IN | TEMPERATURE: 98.2 F | OXYGEN SATURATION: 100 % | WEIGHT: 140 LBS

## 2025-04-17 DIAGNOSIS — T82.590A DIALYSIS AV FISTULA MALFUNCTION, INITIAL ENCOUNTER: Primary | ICD-10-CM

## 2025-04-17 LAB — ECHO BSA: 1.67 M2

## 2025-04-17 PROCEDURE — C1769 GUIDE WIRE: HCPCS | Performed by: STUDENT IN AN ORGANIZED HEALTH CARE EDUCATION/TRAINING PROGRAM

## 2025-04-17 PROCEDURE — 2709999900 HC NON-CHARGEABLE SUPPLY: Performed by: STUDENT IN AN ORGANIZED HEALTH CARE EDUCATION/TRAINING PROGRAM

## 2025-04-17 PROCEDURE — 2500000003 HC RX 250 WO HCPCS: Performed by: STUDENT IN AN ORGANIZED HEALTH CARE EDUCATION/TRAINING PROGRAM

## 2025-04-17 PROCEDURE — 7100000010 HC PHASE II RECOVERY - FIRST 15 MIN: Performed by: STUDENT IN AN ORGANIZED HEALTH CARE EDUCATION/TRAINING PROGRAM

## 2025-04-17 PROCEDURE — 36902 INTRO CATH DIALYSIS CIRCUIT: CPT | Performed by: STUDENT IN AN ORGANIZED HEALTH CARE EDUCATION/TRAINING PROGRAM

## 2025-04-17 PROCEDURE — 3600000017 HC SURGERY HYBRID ADDL 15MIN: Performed by: STUDENT IN AN ORGANIZED HEALTH CARE EDUCATION/TRAINING PROGRAM

## 2025-04-17 PROCEDURE — 6360000002 HC RX W HCPCS: Performed by: STUDENT IN AN ORGANIZED HEALTH CARE EDUCATION/TRAINING PROGRAM

## 2025-04-17 PROCEDURE — C1894 INTRO/SHEATH, NON-LASER: HCPCS | Performed by: STUDENT IN AN ORGANIZED HEALTH CARE EDUCATION/TRAINING PROGRAM

## 2025-04-17 PROCEDURE — 7100000011 HC PHASE II RECOVERY - ADDTL 15 MIN: Performed by: STUDENT IN AN ORGANIZED HEALTH CARE EDUCATION/TRAINING PROGRAM

## 2025-04-17 PROCEDURE — C2623 CATH, TRANSLUMIN, DRUG-COAT: HCPCS | Performed by: STUDENT IN AN ORGANIZED HEALTH CARE EDUCATION/TRAINING PROGRAM

## 2025-04-17 PROCEDURE — C1892 INTRO/SHEATH,FIXED,PEEL-AWAY: HCPCS | Performed by: STUDENT IN AN ORGANIZED HEALTH CARE EDUCATION/TRAINING PROGRAM

## 2025-04-17 PROCEDURE — 6360000004 HC RX CONTRAST MEDICATION: Performed by: STUDENT IN AN ORGANIZED HEALTH CARE EDUCATION/TRAINING PROGRAM

## 2025-04-17 PROCEDURE — 3600000007 HC SURGERY HYBRID BASE: Performed by: STUDENT IN AN ORGANIZED HEALTH CARE EDUCATION/TRAINING PROGRAM

## 2025-04-17 RX ORDER — IODIXANOL 320 MG/ML
INJECTION, SOLUTION INTRAVASCULAR PRN
Status: DISCONTINUED | OUTPATIENT
Start: 2025-04-17 | End: 2025-04-17 | Stop reason: ALTCHOICE

## 2025-04-17 RX ORDER — LIDOCAINE HYDROCHLORIDE 10 MG/ML
INJECTION, SOLUTION EPIDURAL; INFILTRATION; INTRACAUDAL; PERINEURAL PRN
Status: DISCONTINUED | OUTPATIENT
Start: 2025-04-17 | End: 2025-04-17 | Stop reason: ALTCHOICE

## 2025-04-17 RX ORDER — IODIXANOL 320 MG/ML
INJECTION, SOLUTION INTRAVASCULAR
Status: DISCONTINUED
Start: 2025-04-17 | End: 2025-04-17 | Stop reason: HOSPADM

## 2025-04-17 RX ORDER — LIDOCAINE HYDROCHLORIDE 10 MG/ML
INJECTION, SOLUTION INFILTRATION; PERINEURAL
Status: DISCONTINUED
Start: 2025-04-17 | End: 2025-04-17 | Stop reason: HOSPADM

## 2025-04-17 NOTE — H&P
Palpable thrill in right arm AV graft.  Pulmonary:      Effort: No respiratory distress.      Breath sounds: No rales.   Abdominal:      General: There is no distension.      Palpations: There is no mass.      Tenderness: There is no abdominal tenderness. There is no guarding.   Musculoskeletal:      Cervical back: No rigidity or tenderness.   Lymphadenopathy:      Cervical: No cervical adenopathy.   Skin:     Coloration: Skin is not jaundiced.      Findings: No rash.   Neurological:      General: No focal deficit present.      Mental Status: He is alert and oriented to person, place, and time.      Cranial Nerves: No cranial nerve deficit.   Psychiatric:         Mood and Affect: Mood normal.         Assessment:  No diagnosis found.     Plan:  Ok to use right arm graft.     Will perform RUE fistulagram today, possible intervention    Planned procedure explained in detail including discussion of all associated risks/benefits.  All questions/concerns were addressed and informed consent was obtained, witnessed by nurse, and placed on the pt's chart.                 [facility] [place]  O: [phone]

## 2025-04-17 NOTE — OP NOTE
Operative Note      Patient: Jules Nichols  YOB: 1980  MRN: [id number]    Date of Procedure: 4/17/2025    Pre-Op Diagnosis Codes:      * Dialysis AV fistula malfunction, initial encounter [T82.590A]    Post-Op Diagnosis: Same       Procedures:  1) Ultrasound guided access of right arm AV graft  2) Right arm fistulagram with balloon angioplasty of venous anastomosis (6 mm x 80 mm drug coated balloon)    Surgeon(s):  Kevin Hook MD    Assistant: None    Anesthesia: Local    Estimated Blood Loss (mL): 10 mL    Complications: None    Specimens: None    Implants: None      Drains: None    Findings:  Infection Present At Time Of Surgery (PATOS) (choose all levels that have infection present):  No infection present  Other Findings: The graft is patent. There is severe stenosis of the distal basilic vein just distal to the venous anastomosis. After PTA there is mild to no stenosis of this segment. The axillary vein is patent. The subclavian and innominate vein are patent. The superior vena cava are patent. The proximal anastomosis is patent which is about 4 mm. Good thrill in graft at the end.    Detailed Description of Procedure:   Mr. Nichols was brought to the hybrid room placed in supine position with right arm out.  His right arm was prepped and draped in standard sterile fashion.  A timeout was performed.  I evaluated the right arm graft with ultrasound and it was patent and compressible.  I anesthestized the overlying skin with local anesthetic.  I first accessed the graft at its most proximal segment which was actually the previous cephalic vein.  However my wire would not pass into the graft so I removed the micro sheath and placed a nylon suture here.  I then accessed the graft distally which was proximal aspect of the interposition graft. This was done under direct ultrasound guidance with micropuncture needle, images saved. Then 4 Lao micro sheath was placed.  Now I was able to perform

## 2025-04-17 NOTE — PROGRESS NOTES
Patient admitted, consent signed and questions answered. Patient ready for procedure. Call light to reach with side rails up 2 of 2.Arm/ fistula site cleansed with chlorhexadine wipes.

## 2025-04-17 NOTE — PROGRESS NOTES
Dr Hook in, speaks with patient. VSS stable. Reviewed discharge instructions with patient. Discharged ambulatory to home.

## 2025-04-17 NOTE — H&P
[facility], [facility]  [address]  [place] [address]  Dept: [phone]      Patient: Jules Nichols  : 1980  MRN: [id number]  DOS: 4/15/2025     HPI:  24: Jules Nichols is a 45 y.o. male who comes to the office regarding evaluation for long term HD access creation. Last echo 3/2024, EF 60-65%.  History of diabetes and hypertension.  Has been on dialysis since 2024.  He is left hand dominant.  He has history of hidradenitis in both axillas, sternal and back.  Bedside ultrasound shows adequate cephalic vein on the right and marginal on the left at the level of the antecubital fossa.     24: S/p right brachial/cephalic AV fistula creation on 24. Has palpable thrill. Hand is ok, no steal symptoms. Sutures removed today.     10/1/24: Duplex reviewed. Has flow ranging from 648 to 1.2 L. Depth is 6 mm. As the fistula moves up to the shoulder it starts to get a little deeper than that. I marked his fistula with ultrasound.     24: Here for HD catheter removal as his right arm fistula has been working well in HD.     25: His right arm fistula occluded and a fistulagram was attempted on 24. Unfortunately this was not successful in salvaging his fistula and he was discharged with tunneled HD catheter. Bedside ultrasound shows patent robust proximal fistula but then severely stenotic and occluded mid to distal fistula.     25: Underwent right AV graft placement on 25 which was from patent stump of previous cephalic vein to right basilic vein. No issues in right hand.     25: Ok to use graft. Sutures removed.     3/18/25: Here for tunneled HD catheter removal.     4/15/25: Needs right arm fistulagram.     Past Medical History        Past Medical History:   Diagnosis Date    Benign essential HTN      Chronic kidney disease 2016     Dr Stafford    Diabetic

## 2025-04-17 NOTE — PROGRESS NOTES
Received post fistulagram. No IV sedation used. Dressing on right arm dry and intact. Audible bruit, palpable thrill.

## 2025-04-18 ENCOUNTER — TELEPHONE (OUTPATIENT)
Age: 45
End: 2025-04-18

## 2025-04-30 ENCOUNTER — OFFICE VISIT (OUTPATIENT)
Dept: INTERNAL MEDICINE CLINIC | Age: 45
End: 2025-04-30
Payer: MEDICAID

## 2025-04-30 VITALS
DIASTOLIC BLOOD PRESSURE: 83 MMHG | SYSTOLIC BLOOD PRESSURE: 118 MMHG | WEIGHT: 147.2 LBS | OXYGEN SATURATION: 95 % | HEART RATE: 68 BPM | BODY MASS INDEX: 27.09 KG/M2 | HEIGHT: 62 IN

## 2025-04-30 DIAGNOSIS — Z79.4 TYPE 2 DIABETES MELLITUS WITH OTHER DIABETIC KIDNEY COMPLICATION, WITH LONG-TERM CURRENT USE OF INSULIN (HCC): Primary | ICD-10-CM

## 2025-04-30 DIAGNOSIS — Z99.2 ESRD NEEDING DIALYSIS (HCC): ICD-10-CM

## 2025-04-30 DIAGNOSIS — E78.2 MIXED HYPERLIPIDEMIA: ICD-10-CM

## 2025-04-30 DIAGNOSIS — N18.6 ESRD (END STAGE RENAL DISEASE) (HCC): ICD-10-CM

## 2025-04-30 DIAGNOSIS — Z98.890 S/P ARTERIOVENOUS (AV) FISTULA CREATION: ICD-10-CM

## 2025-04-30 DIAGNOSIS — N18.6 ESRD NEEDING DIALYSIS (HCC): ICD-10-CM

## 2025-04-30 DIAGNOSIS — E11.29 TYPE 2 DIABETES MELLITUS WITH OTHER DIABETIC KIDNEY COMPLICATION, WITH LONG-TERM CURRENT USE OF INSULIN (HCC): Primary | ICD-10-CM

## 2025-04-30 PROBLEM — A41.9 SEPTIC SHOCK (HCC): Status: RESOLVED | Noted: 2024-02-22 | Resolved: 2025-04-30

## 2025-04-30 PROBLEM — A41.9 SEVERE SEPSIS (HCC): Status: RESOLVED | Noted: 2024-02-22 | Resolved: 2025-04-30

## 2025-04-30 PROBLEM — R65.20 SEVERE SEPSIS (HCC): Status: RESOLVED | Noted: 2024-02-22 | Resolved: 2025-04-30

## 2025-04-30 PROBLEM — N49.3: Status: RESOLVED | Noted: 2024-02-21 | Resolved: 2025-04-30

## 2025-04-30 PROBLEM — M72.6 NECROTIZING FASCIITIS (HCC): Status: RESOLVED | Noted: 2024-02-22 | Resolved: 2025-04-30

## 2025-04-30 PROBLEM — R65.21 SEPTIC SHOCK (HCC): Status: RESOLVED | Noted: 2024-02-22 | Resolved: 2025-04-30

## 2025-04-30 PROBLEM — N18.9 ACUTE KIDNEY INJURY SUPERIMPOSED ON CKD: Status: RESOLVED | Noted: 2024-02-22 | Resolved: 2025-04-30

## 2025-04-30 PROBLEM — N17.9 ACUTE KIDNEY INJURY SUPERIMPOSED ON CKD: Status: RESOLVED | Noted: 2024-02-22 | Resolved: 2025-04-30

## 2025-04-30 PROBLEM — I21.4 NSTEMI (NON-ST ELEVATED MYOCARDIAL INFARCTION) (HCC): Status: RESOLVED | Noted: 2024-03-05 | Resolved: 2025-04-30

## 2025-04-30 LAB — HBA1C MFR BLD: 6.6 %

## 2025-04-30 PROCEDURE — 3074F SYST BP LT 130 MM HG: CPT | Performed by: INTERNAL MEDICINE

## 2025-04-30 PROCEDURE — 3079F DIAST BP 80-89 MM HG: CPT | Performed by: INTERNAL MEDICINE

## 2025-04-30 PROCEDURE — 99214 OFFICE O/P EST MOD 30 MIN: CPT | Performed by: INTERNAL MEDICINE

## 2025-04-30 PROCEDURE — 83036 HEMOGLOBIN GLYCOSYLATED A1C: CPT | Performed by: INTERNAL MEDICINE

## 2025-04-30 RX ORDER — SUCROFERRIC OXYHYDROXIDE 500 MG/1
500 TABLET, CHEWABLE ORAL
COMMUNITY
Start: 2025-02-26

## 2025-04-30 NOTE — PROGRESS NOTES
[facility]  [address] [address]  Dept: [phone]  Dept Fax: [phone]     Name: Jules Nichols  : 1980           Chief Complaint   Patient presents with    Diabetes     Follow up with A1C       History of Present Illness  The patient is a 45-year-old male who presents for management of chronic kidney disease, diabetes mellitus, hypertension, and end-stage kidney disease.    He is under the care of Dr. Malcolm for his end-stage kidney disease and is currently on hemodialysis. A fistula is present in his right arm, and he undergoes dialysis on , , and  at the [facility]. No chest pain, nausea, vomiting, fever, or chills are reported. An upcoming appointment with Dr. Hook is scheduled next week for suture removal.    For diabetes management, Lantus insulin is administered at a dose of 20 units daily. Blood glucose levels are monitored via fingerstick testing. No foot-related issues such as wounds, ulcers, numbness, or tingling are reported.    A recent ophthalmological examination has not been conducted. A colonoscopy was performed in the past, and the next one is not due until . He resides with a roommate and does not endorse any depressive symptoms.    SOCIAL HISTORY  He does not smoke.           Past Medical History:    Past Medical History:   Diagnosis Date    Benign essential HTN     Chronic kidney disease 2016    Dr Stafford    Diabetic keto-acidosis (HCC) 2016    hx of     Dialysis patient      AND   /  [facility]    Hyperlipemia     Uncontrolled type 2 diabetes mellitus with nephropathy 2016      Reviewed all health maintenance requirements and ordered appropriate tests  Health Maintenance Due   Topic Date Due    Diabetic foot exam  Never done    DTaP/Tdap/Td vaccine (1 - Tdap) Never done    Hepatitis B vaccine (1 of 3 - Risk Dialysis 4-dose series) Never done

## 2025-05-13 ENCOUNTER — OFFICE VISIT (OUTPATIENT)
Dept: VASCULAR SURGERY | Age: 45
End: 2025-05-13
Payer: MEDICAID

## 2025-05-13 VITALS
SYSTOLIC BLOOD PRESSURE: 94 MMHG | BODY MASS INDEX: 28.08 KG/M2 | TEMPERATURE: 97.9 F | OXYGEN SATURATION: 100 % | HEIGHT: 62 IN | WEIGHT: 152.6 LBS | HEART RATE: 100 BPM | DIASTOLIC BLOOD PRESSURE: 77 MMHG

## 2025-05-13 DIAGNOSIS — T82.590A DIALYSIS AV FISTULA MALFUNCTION, INITIAL ENCOUNTER: Primary | ICD-10-CM

## 2025-05-13 PROCEDURE — 99212 OFFICE O/P EST SF 10 MIN: CPT | Performed by: STUDENT IN AN ORGANIZED HEALTH CARE EDUCATION/TRAINING PROGRAM

## 2025-05-13 PROCEDURE — 3078F DIAST BP <80 MM HG: CPT | Performed by: STUDENT IN AN ORGANIZED HEALTH CARE EDUCATION/TRAINING PROGRAM

## 2025-05-13 PROCEDURE — 3074F SYST BP LT 130 MM HG: CPT | Performed by: STUDENT IN AN ORGANIZED HEALTH CARE EDUCATION/TRAINING PROGRAM

## 2025-05-13 ASSESSMENT — ENCOUNTER SYMPTOMS
TROUBLE SWALLOWING: 0
ABDOMINAL PAIN: 0
EYE PAIN: 0
VOICE CHANGE: 0
COUGH: 0
COLOR CHANGE: 0
CHEST TIGHTNESS: 0
ABDOMINAL DISTENTION: 0
VOMITING: 0

## 2025-05-13 NOTE — PROGRESS NOTES
heard.     Comments: Palpable thrill in right arm AV graft.  Pulmonary:      Effort: No respiratory distress.      Breath sounds: No rales.   Abdominal:      General: There is no distension.      Palpations: There is no mass.      Tenderness: There is no abdominal tenderness. There is no guarding.   Musculoskeletal:      Cervical back: No rigidity or tenderness.   Lymphadenopathy:      Cervical: No cervical adenopathy.   Skin:     Coloration: Skin is not jaundiced.      Findings: No rash.   Neurological:      General: No focal deficit present.      Mental Status: He is alert and oriented to person, place, and time.      Cranial Nerves: No cranial nerve deficit.   Psychiatric:         Mood and Affect: Mood normal.       Assessment:  1. Dialysis AV fistula malfunction, initial encounter      Plan:  Continue HD via right arm AV graft. Follow up as needed.    Electronically signed by:  GUZMAN WOODRUFF MD

## 2025-05-28 ENCOUNTER — HOSPITAL ENCOUNTER (INPATIENT)
Age: 45
LOS: 2 days | Discharge: HOME | DRG: 182 | End: 2025-05-30
Attending: STUDENT IN AN ORGANIZED HEALTH CARE EDUCATION/TRAINING PROGRAM | Admitting: INTERNAL MEDICINE
Payer: MEDICAID

## 2025-05-28 ENCOUNTER — TELEPHONE (OUTPATIENT)
Dept: VASCULAR SURGERY | Age: 45
End: 2025-05-28

## 2025-05-28 DIAGNOSIS — T82.868A AV FISTULA THROMBOSIS, INITIAL ENCOUNTER: Primary | ICD-10-CM

## 2025-05-28 DIAGNOSIS — Z98.890 S/P ARTERIOVENOUS (AV) FISTULA CREATION: ICD-10-CM

## 2025-05-28 DIAGNOSIS — T82.898A AV FISTULA OCCLUSION, INITIAL ENCOUNTER: ICD-10-CM

## 2025-05-28 DIAGNOSIS — T82.590A DIALYSIS AV FISTULA MALFUNCTION, INITIAL ENCOUNTER: ICD-10-CM

## 2025-05-28 LAB
ANION GAP SERPL CALCULATED.3IONS-SCNC: 17 MMOL/L (ref 9–16)
BASOPHILS # BLD: 0.04 K/UL (ref 0–0.2)
BASOPHILS NFR BLD: 0 % (ref 0–2)
BUN SERPL-MCNC: 61 MG/DL (ref 6–20)
CALCIUM SERPL-MCNC: 10.1 MG/DL (ref 8.6–10.4)
CHLORIDE SERPL-SCNC: 100 MMOL/L (ref 98–107)
CO2 SERPL-SCNC: 22 MMOL/L (ref 20–31)
CREAT SERPL-MCNC: 7.1 MG/DL (ref 0.7–1.2)
EOSINOPHIL # BLD: 0.11 K/UL (ref 0–0.44)
EOSINOPHILS RELATIVE PERCENT: 1 % (ref 1–4)
ERYTHROCYTE [DISTWIDTH] IN BLOOD BY AUTOMATED COUNT: 14.1 % (ref 11.8–14.4)
GFR, ESTIMATED: 9 ML/MIN/1.73M2
GLUCOSE BLD-MCNC: 103 MG/DL (ref 75–110)
GLUCOSE BLD-MCNC: 171 MG/DL (ref 75–110)
GLUCOSE SERPL-MCNC: 139 MG/DL (ref 74–99)
HCT VFR BLD AUTO: 41.1 % (ref 40.7–50.3)
HGB BLD-MCNC: 13.7 G/DL (ref 13–17)
IMM GRANULOCYTES # BLD AUTO: <0.03 K/UL (ref 0–0.3)
IMM GRANULOCYTES NFR BLD: 0 %
INR PPP: 1
LYMPHOCYTES NFR BLD: 1.7 K/UL (ref 1.1–3.7)
LYMPHOCYTES RELATIVE PERCENT: 18 % (ref 24–43)
MCH RBC QN AUTO: 33 PG (ref 25.2–33.5)
MCHC RBC AUTO-ENTMCNC: 33.3 G/DL (ref 28.4–34.8)
MCV RBC AUTO: 99 FL (ref 82.6–102.9)
MONOCYTES NFR BLD: 0.49 K/UL (ref 0.1–1.2)
MONOCYTES NFR BLD: 5 % (ref 3–12)
NEUTROPHILS NFR BLD: 76 % (ref 36–65)
NEUTS SEG NFR BLD: 6.99 K/UL (ref 1.5–8.1)
NRBC BLD-RTO: 0 PER 100 WBC
PLATELET # BLD AUTO: 184 K/UL (ref 138–453)
PMV BLD AUTO: 10.1 FL (ref 8.1–13.5)
POTASSIUM SERPL-SCNC: 5.3 MMOL/L (ref 3.7–5.3)
PROTHROMBIN TIME: 13.8 SEC (ref 11.7–14.9)
RBC # BLD AUTO: 4.15 M/UL (ref 4.21–5.77)
SODIUM SERPL-SCNC: 139 MMOL/L (ref 136–145)
WBC OTHER # BLD: 9.4 K/UL (ref 3.5–11.3)

## 2025-05-28 PROCEDURE — 2500000003 HC RX 250 WO HCPCS

## 2025-05-28 PROCEDURE — 6370000000 HC RX 637 (ALT 250 FOR IP): Performed by: INTERNAL MEDICINE

## 2025-05-28 PROCEDURE — 82947 ASSAY GLUCOSE BLOOD QUANT: CPT

## 2025-05-28 PROCEDURE — 80048 BASIC METABOLIC PNL TOTAL CA: CPT

## 2025-05-28 PROCEDURE — 85025 COMPLETE CBC W/AUTO DIFF WBC: CPT

## 2025-05-28 PROCEDURE — 2060000000 HC ICU INTERMEDIATE R&B

## 2025-05-28 PROCEDURE — 36415 COLL VENOUS BLD VENIPUNCTURE: CPT

## 2025-05-28 PROCEDURE — 6360000002 HC RX W HCPCS

## 2025-05-28 PROCEDURE — 99222 1ST HOSP IP/OBS MODERATE 55: CPT | Performed by: INTERNAL MEDICINE

## 2025-05-28 PROCEDURE — 99254 IP/OBS CNSLTJ NEW/EST MOD 60: CPT

## 2025-05-28 PROCEDURE — 85610 PROTHROMBIN TIME: CPT

## 2025-05-28 PROCEDURE — 99285 EMERGENCY DEPT VISIT HI MDM: CPT

## 2025-05-28 PROCEDURE — 99233 SBSQ HOSP IP/OBS HIGH 50: CPT | Performed by: INTERNAL MEDICINE

## 2025-05-28 RX ORDER — HEPARIN 100 UNIT/ML
100 SYRINGE INTRAVENOUS ONCE
Status: DISCONTINUED | OUTPATIENT
Start: 2025-05-28 | End: 2025-05-28

## 2025-05-28 RX ORDER — INSULIN LISPRO 100 [IU]/ML
0-8 INJECTION, SOLUTION INTRAVENOUS; SUBCUTANEOUS
Status: DISCONTINUED | OUTPATIENT
Start: 2025-05-28 | End: 2025-05-30 | Stop reason: HOSPADM

## 2025-05-28 RX ORDER — ACETAMINOPHEN 325 MG/1
650 TABLET ORAL EVERY 6 HOURS PRN
Status: DISCONTINUED | OUTPATIENT
Start: 2025-05-28 | End: 2025-05-30 | Stop reason: HOSPADM

## 2025-05-28 RX ORDER — SODIUM CHLORIDE 0.9 % (FLUSH) 0.9 %
5-40 SYRINGE (ML) INJECTION EVERY 12 HOURS SCHEDULED
Status: DISCONTINUED | OUTPATIENT
Start: 2025-05-28 | End: 2025-05-30 | Stop reason: HOSPADM

## 2025-05-28 RX ORDER — ONDANSETRON 4 MG/1
4 TABLET, ORALLY DISINTEGRATING ORAL EVERY 8 HOURS PRN
Status: DISCONTINUED | OUTPATIENT
Start: 2025-05-28 | End: 2025-05-30 | Stop reason: HOSPADM

## 2025-05-28 RX ORDER — POLYETHYLENE GLYCOL 3350 17 G/17G
17 POWDER, FOR SOLUTION ORAL DAILY PRN
Status: DISCONTINUED | OUTPATIENT
Start: 2025-05-28 | End: 2025-05-30 | Stop reason: HOSPADM

## 2025-05-28 RX ORDER — ACETAMINOPHEN 650 MG/1
650 SUPPOSITORY RECTAL EVERY 6 HOURS PRN
Status: DISCONTINUED | OUTPATIENT
Start: 2025-05-28 | End: 2025-05-30 | Stop reason: HOSPADM

## 2025-05-28 RX ORDER — POTASSIUM CHLORIDE 1500 MG/1
40 TABLET, EXTENDED RELEASE ORAL PRN
Status: DISCONTINUED | OUTPATIENT
Start: 2025-05-28 | End: 2025-05-30

## 2025-05-28 RX ORDER — POTASSIUM CHLORIDE 7.45 MG/ML
10 INJECTION INTRAVENOUS PRN
Status: DISCONTINUED | OUTPATIENT
Start: 2025-05-28 | End: 2025-05-30

## 2025-05-28 RX ORDER — HEPARIN SODIUM 5000 [USP'U]/ML
5000 INJECTION, SOLUTION INTRAVENOUS; SUBCUTANEOUS EVERY 8 HOURS SCHEDULED
Status: DISCONTINUED | OUTPATIENT
Start: 2025-05-28 | End: 2025-05-30 | Stop reason: HOSPADM

## 2025-05-28 RX ORDER — MAGNESIUM SULFATE IN WATER 40 MG/ML
2000 INJECTION, SOLUTION INTRAVENOUS PRN
Status: DISCONTINUED | OUTPATIENT
Start: 2025-05-28 | End: 2025-05-30 | Stop reason: HOSPADM

## 2025-05-28 RX ORDER — ONDANSETRON 2 MG/ML
4 INJECTION INTRAMUSCULAR; INTRAVENOUS EVERY 6 HOURS PRN
Status: DISCONTINUED | OUTPATIENT
Start: 2025-05-28 | End: 2025-05-30 | Stop reason: HOSPADM

## 2025-05-28 RX ORDER — SODIUM CHLORIDE 9 MG/ML
INJECTION, SOLUTION INTRAVENOUS PRN
Status: DISCONTINUED | OUTPATIENT
Start: 2025-05-28 | End: 2025-05-30 | Stop reason: HOSPADM

## 2025-05-28 RX ORDER — SODIUM CHLORIDE 0.9 % (FLUSH) 0.9 %
5-40 SYRINGE (ML) INJECTION PRN
Status: DISCONTINUED | OUTPATIENT
Start: 2025-05-28 | End: 2025-05-30 | Stop reason: HOSPADM

## 2025-05-28 RX ADMIN — HEPARIN SODIUM 5000 UNITS: 5000 INJECTION INTRAVENOUS; SUBCUTANEOUS at 22:05

## 2025-05-28 RX ADMIN — HEPARIN SODIUM 5000 UNITS: 5000 INJECTION INTRAVENOUS; SUBCUTANEOUS at 15:47

## 2025-05-28 RX ADMIN — SODIUM CHLORIDE, PRESERVATIVE FREE 10 ML: 5 INJECTION INTRAVENOUS at 22:05

## 2025-05-28 RX ADMIN — SODIUM ZIRCONIUM CYCLOSILICATE 5 G: 5 POWDER, FOR SUSPENSION ORAL at 14:25

## 2025-05-28 ASSESSMENT — ENCOUNTER SYMPTOMS
SHORTNESS OF BREATH: 0
ALLERGIC/IMMUNOLOGIC NEGATIVE: 1
EYE DISCHARGE: 0
ABDOMINAL DISTENTION: 0
EYES NEGATIVE: 1
CHEST TIGHTNESS: 0
GASTROINTESTINAL NEGATIVE: 1
ABDOMINAL PAIN: 0

## 2025-05-28 NOTE — TELEPHONE ENCOUNTER
Dialysis called and stated that patients fistula is clotted.  Called Dr Hook and he advised to send patient to the [facility] ER.  Dialysis advised, and told them if patient is NPO tell him to stay that way.

## 2025-05-28 NOTE — ED NOTES
Pt to ED via triage with complaints of needing his dialysis catheter to be assessed. Pt states that he was at dialysis this morning when he was sent here due to being unable to access his port. Pt was due for dialysis today and has missed no other sessions than today. Pt goes MWF, he denies any complaints at this time. Pt is on stretcher with call light.    100

## 2025-05-28 NOTE — ED PROVIDER NOTES
[facility] STEPDOWN  EMERGENCY DEPARTMENT ENCOUNTER    Pt Name: Jules Nichols  MRN: [id number]  Birthdate1980  Date of evaluation: 5/28/2025    Note Started: 9:48 AM EDT    CHIEF COMPLAINT       Chief Complaint   Patient presents with    Vascular Access Problem     Dialysis sent     HISTORY OF PRESENT ILLNESS    Jules Nichols is a 45 y.o. male who presents with inability to access his right arm fistula for dialysis today.  Patient noted that the thrill is gone.  His last session of dialysis was on Monday.  This was a full session.  No difficulty accessing at that time.  He typically receives dialysis Monday Wednesday and Friday.  Denies pain symptoms. No chest pain shortness of breath or abdominal pain.  No lower extremity edema.  No fevers.  Otherwise no complaint.    2/6/25- Right AV graft placement.   2/25/25- ok to use graft.   3/18/25- HD cath removed.   4/15/25- right arm fistulagram showed severe stenosis of distal basilic vein just distal to venous anastomosis. Balloon angioplasty performed.     REVIEW OF SYSTEMS       Review of Systems   Constitutional:  Negative for fever.   Respiratory:  Negative for shortness of breath.    Cardiovascular:  Negative for chest pain and leg swelling.   Gastrointestinal:  Negative for abdominal pain.       PAST MEDICAL HISTORY    has a past medical history of Benign essential HTN, Chronic kidney disease, Diabetic keto-acidosis (HCC), Dialysis patient, Hyperlipemia, and Uncontrolled type 2 diabetes mellitus with nephropathy.    SURGICAL HISTORY      has a past surgical history that includes other surgical history (Bilateral, 02/22/2024); Wound Exploration (Bilateral, 02/22/2024); Wound debridement; Abdomen surgery (N/A, 02/24/2024); IR TUNNELED CVC PLACE WO SQ PORT/PUMP > 5 YEARS (03/01/2024); IR TUNNELED CVC PLACE WO SQ PORT/PUMP > 5 YEARS (03/28/2024); AV fistula creation (Right, 07/18/2024); Dialysis fistula creation (Right, 07/18/2024); IR TUNNELED CVC PLACE

## 2025-05-28 NOTE — ED NOTES
ED to inpatient nurses report      Chief Complaint:  Chief Complaint   Patient presents with    Vascular Access Problem     Dialysis sent     Present to ED from: home     MOA:     LOC: alert and orientated to name, place, date  Mobility: Independent  Oxygen Baseline: 99%    Current needs required: room air    Pending ED orders:  none   Present condition: stable     Why did the patient come to the ED? Sent by dialysis for difficulty accessing his fistula   What is the plan? Fix fistula, surgery   Any procedures or intervention occur? Labs   Any safety concerns?? No     Mental Status:       Psych Assessment:      Vital signs   Vitals:    05/28/25 0931 05/28/25 1330   BP: 129/88 96/70   Pulse: 85 73   Resp: 18 14   Temp: 98.1 °F (36.7 °C) 98.4 °F (36.9 °C)   SpO2: 100% 99%        Vitals:  Patient Vitals for the past 24 hrs:   BP Temp Pulse Resp SpO2   05/28/25 1330 96/70 98.4 °F (36.9 °C) 73 14 99 %   05/28/25 0931 129/88 98.1 °F (36.7 °C) 85 18 100 %      Visit Vitals  BP 96/70   Pulse 73   Temp 98.4 °F (36.9 °C)   Resp 14   SpO2 99%        LDAs:      Ambulatory Status:  Presents to emergency department  because of falls (Syncope, seizure, or loss of consciousness): No, Age > 70: No, Altered Mental Status, Intoxication with alcohol or substance confusion (Disorientation, impaired judgment, poor safety awaremess, or inability to follow instructions): No, Impaired Mobility: Ambulates or transfers with assistive devices or assistance; Unable to ambulate or transer.: No, Nursing Judgement: No    Diagnosis:  DISPOSITION Admitted 05/28/2025 11:56:31 AM   Final diagnoses:   AV fistula thrombosis, initial encounter        Consults:  IP CONSULT TO VASCULAR SURGERY  IP CONSULT TO INTERNAL MEDICINE  IP CONSULT TO INTERVENTIONAL RADIOLOGY  IP CONSULT TO NEPHROLOGY     Treatment Team:   Treatment Team:   Black Mendoza MD Bates, Victoria, RN Khan, MD Reji Yang Brandon, MD Brar, MD Miladys Bhagat Muhammad

## 2025-05-28 NOTE — CONSULTS
Renal Consult Note    Patient :  Jules Nichols; 45 y.o. MRN# [id number]  Location:  [facility]  Attending:  Black Mendoza MD  Admit Date:  5/28/2025   Hospital Day: 0    Reason for Consult:     Asked by Black Painting MD to see for JAY/Elevated Creatinine.    History Obtained From:     patient    HD Access:     previous  Right AV graft    HD Unit:     [facility]    Nephrologist:     Seth    Dry Weight:     67 kgs    Admission Weight:     69 kgs    History of Present Illness:     Jules Nichols; 45 y.o. male with past medical history as mentioned below presented to the hospital with the chief complaint of clotted right brachiocephalic AV graft.  Known history of ESRD on hemodialysis Monday Wednesday Friday at the [facility] unit via right upper extremity brachiocephalic AV graft under Dr. Ann.  Also has known history of type 2 diabetes, hypertension anemia of chronic disease.  He recently had an angioplasty in April 2025 right upper extremity fistulogram for maintain patency of his AV graft.  Procedure was uneventful.  His dialysis been uneventful so far.  He continues to make good urine.  His last dialysis was on Monday, 5/26/2025 which was uneventful.  Symptom wise he denies any shortness of breath or orthopnea.  Labs showed mild hypokalemia with a potassium of 5.3.  Vascular surgery has been consulted they plan a declot procedure tomorrow.  Labs today show sodium 139 potassium 5.3 chloride 100 bicarb 22 BUN 61 creatinine 7.1 glucose 139 calcium 10.1 13.7 white count 9.4    Past History/Allergies?Social History:     Past Medical History:   Diagnosis Date    Benign essential HTN     Chronic kidney disease 01/20/2016    Dr Stafford    Diabetic keto-acidosis (HCC) 01/20/2016    hx of     Dialysis patient     MONDAY WEDNESDAY AND FRIDAYS  /  [facility]    Hyperlipemia     Uncontrolled type 2 diabetes mellitus with nephropathy 01/20/2016       No Known Allergies    Social History     Socioeconomic History

## 2025-05-28 NOTE — H&P
[facility]     Department of Internal Medicine - Staff Internal Medicine Teaching Service          ADMISSION NOTE/HISTORY AND PHYSICAL EXAMINATION   Date: 5/28/2025  Patient Name: Jules Nichols  Date of admission: 5/28/2025  9:27 AM  YOB: 1980  PCP: Elvin Kelley MD  History Obtained From:  patient, electronic medical record    CHIEF COMPLAINT     Chief complaint: AV graft  clot    HISTORY OF PRESENTING ILLNESS     The patient is a pleasant 45 y.o. male presents with a chief complaint of AV malfunction    Past medical history significant for type II DM, ESRD on hemodialysis, right AV fistula.    He presents to the ED from his dialysis center as they were unable to obtain access from his right AV fistula.  Vascular surgery was consulted they recommended medicine admission for possible surgical intervention tomorrow.  Labs in ED were significant for creatinine of 7.1, potassium is stable at 5.3.  Other labs are within normal limits    On examination patient denies any acute complaints, hemodynamically stable, on room air, afebrile. Denies any acute complaints, his last dialysis session was on 5/26.      Review of Systems:  General ROS: Completed and except as mentioned above were negative   HEENT ROS: Completed and except as mentioned above were negative   Allergy and Immunology ROS:  Completed and except as mentioned above were negative  Hematological and Lymphatic ROS:  Completed and except as mentioned above were negative  Respiratory ROS:  Completed and except as mentioned above were negative  Cardiovascular ROS:  Completed and except as mentioned above were negative  Gastrointestinal ROS: Completed and except as mentioned above were negative  Genito-Urinary ROS:  Completed and except as mentioned above were negative  Musculoskeletal ROS:  Completed and except as mentioned above were negative  Neurological ROS:  Completed and except as mentioned above were  m/uL    Hemoglobin 13.7 13.0 - 17.0 g/dL    Hematocrit 41.1 40.7 - 50.3 %    MCV 99.0 82.6 - 102.9 fL    MCH 33.0 25.2 - 33.5 pg    MCHC 33.3 28.4 - 34.8 g/dL    RDW 14.1 11.8 - 14.4 %    Platelets 184 138 - 453 k/uL    MPV 10.1 8.1 - 13.5 fL    NRBC Automated 0.0 0.0 per 100 WBC    Neutrophils % 76 (H) 36 - 65 %    Lymphocytes % 18 (L) 24 - 43 %    Monocytes % 5 3 - 12 %    Eosinophils % 1 1 - 4 %    Basophils % 0 0 - 2 %    Immature Granulocytes % 0 0 %    Neutrophils Absolute 6.99 1.50 - 8.10 k/uL    Lymphocytes Absolute 1.70 1.10 - 3.70 k/uL    Monocytes Absolute 0.49 0.10 - 1.20 k/uL    Eosinophils Absolute 0.11 0.00 - 0.44 k/uL    Basophils Absolute 0.04 0.00 - 0.20 k/uL    Immature Granulocytes Absolute <0.03 0.00 - 0.30 k/uL   Basic Metabolic Panel    Collection Time: 05/28/25 10:10 AM   Result Value Ref Range    Sodium 139 136 - 145 mmol/L    Potassium 5.3 3.7 - 5.3 mmol/L    Chloride 100 98 - 107 mmol/L    CO2 22 20 - 31 mmol/L    Anion Gap 17 (H) 9 - 16 mmol/L    Glucose 139 (H) 74 - 99 mg/dL    BUN 61 (H) 6 - 20 mg/dL    Creatinine 7.1 (HH) 0.7 - 1.2 mg/dL    Est, Glom Filt Rate 9 (L) >60 mL/min/1.73m2    Calcium 10.1 8.6 - 10.4 mg/dL       Imaging:   No results found.    ASSESSMENT & PLAN     ASSESSMENT / PLAN:     Principal Problem:    AV fistula occlusion, initial encounter  Active Problems:    Type 2 diabetes mellitus with kidney complication, with long-term current use of insulin (HCC)    ESRD needing dialysis (HCC)  Resolved Problems:    * No resolved hospital problems. *      #AV graft occlusion  - Vascular surgery planning for possible surgical intervention. Vascular surgery planning to place temporary dialysis catheter in the meantime    #ESRD on hemodialysis  - Nephrology following. Last HD session on 5/26    #Type 2 DM  - On insulin 20 units daily at home. Started on medium dose sliding scale. Hypoglycemia protocol.         DVT ppx: Heparin   GI ppx: Not indicated    PT/OT/SW  Discharge

## 2025-05-28 NOTE — CONSULTS
Division of Vascular Surgery        New Consult      Physician Requesting Consult:  Dr. Kendrick    Reason for Consult:   \"dialysis fistula inaccessible, lost thrill\"     Chief Complaint:      AV graft malfunction     History of Present Illness:      Jules Nichols is a 45 y.o. gentleman who presents with AV graft malfunction. Patient was a dialysis today and tech noticed no thrill or bruit present. Per patient, tech attempted to access AV graft but was unsuccessful. Patient recently had Right arm fistulagram with PTA on 4/17/25 with no issues at following HD sessions until today. AV graft placed 2/6/25 from patent stump of previous cephalic vein to right basilic vein. No issues in right hand, Palpable radial pulse. No signs or symptoms of subclavian hawk syndrome. Last HD session was Monday 5/26/25.     Medical History:     Past Medical History:   Diagnosis Date    Benign essential HTN     Chronic kidney disease 01/20/2016    Dr Stafford    Diabetic keto-acidosis (HCC) 01/20/2016    hx of     Dialysis patient     MONDAY WEDNESDAY AND FRIDAYS  /  [facility]    Hyperlipemia     Uncontrolled type 2 diabetes mellitus with nephropathy 01/20/2016       Surgical History:     Past Surgical History:   Procedure Laterality Date    ABDOMEN SURGERY N/A 02/24/2024    WOUND BED PREPARATION BILATERAL GROIN AND ARMPIT, CHEST AND BACK performed by Boyd Keene MD at [facility] OR    AV FISTULA CREATION Right 07/18/2024    DR. HOOK    DEBRIDEMENT      Irrigation and Debridement BILATERAL GROIN AND ARMPIT, and buttocks, chest    DIALYSIS FISTULA CREATION Right 07/18/2024    ARTERIOVENOUS FISTULA CREATION RIGHT performed by Kevin Hook MD at [facility] CVOR    DIALYSIS FISTULA CREATION Right 12/27/2024    UPPER EXTREMITY FISTULAGRAM performed by Kevin Hook MD at [facility] CVOR    DIALYSIS FISTULA CREATION Right 02/06/2025    DR FERRELL  /  GRAFT TO RIGHT UPPER ARM    DIALYSIS FISTULA CREATION Right 2/6/2025    RIGHT UPPER  needed (Patient not taking: Reported on 5/13/2025) 1 kit 0    [facility] Ultra Thin Lancets MISC 1 each by Does not apply route 4 times daily (before meals and nightly) 120 each 11    Glucose Blood (BLOOD GLUCOSE TEST STRIPS) STRP 1 each by Does not apply route 4 times daily (before meals and nightly) (Patient not taking: Reported on 5/13/2025) 100 strip 11    Alcohol Swabs 70 % PADS Inject 1 each into the skin 3 times daily as needed (Patient not taking: Reported on 5/13/2025) 100 each 3    Insulin Pen Needle (B-D ULTRAFINE III SHORT PEN) 31G X 8 MM MISC Inject 1 each into the skin daily May substitute with any brand 100 each 11       Social History:     Tobacco:    reports that he has never smoked. He has never used smokeless tobacco.  Alcohol:      reports no history of alcohol use.  Drug Use:  reports no history of drug use.      Review of Systems:     Review of Systems   Constitutional: Negative.  Negative for activity change.   HENT: Negative.     Eyes: Negative.  Negative for discharge.   Respiratory:  Negative for chest tightness and shortness of breath.    Cardiovascular: Negative.  Negative for chest pain and palpitations.   Gastrointestinal: Negative.  Negative for abdominal distention and abdominal pain.   Endocrine: Negative for cold intolerance and heat intolerance.   Genitourinary: Negative.    Musculoskeletal: Negative.    Skin: Negative.  Negative for rash.   Allergic/Immunologic: Negative.    Neurological: Negative.  Negative for weakness.   Hematological: Negative.    Psychiatric/Behavioral: Negative.         Physical Exam:     Vitals:  /88   Pulse 85   Temp 98.1 °F (36.7 °C)   Resp 18   SpO2 100%     Physical Exam  Vitals reviewed.   HENT:      Head: Normocephalic.      Right Ear: External ear normal.      Left Ear: External ear normal.      Nose: Nose normal.      Mouth/Throat:      Mouth: Mucous membranes are moist.   Eyes:      Extraocular Movements: Extraocular movements intact.

## 2025-05-29 ENCOUNTER — ANESTHESIA (OUTPATIENT)
Dept: OPERATING ROOM | Age: 45
End: 2025-05-29
Payer: MEDICAID

## 2025-05-29 ENCOUNTER — ANESTHESIA EVENT (OUTPATIENT)
Dept: OPERATING ROOM | Age: 45
End: 2025-05-29
Payer: MEDICAID

## 2025-05-29 ENCOUNTER — APPOINTMENT (OUTPATIENT)
Age: 45
DRG: 182 | End: 2025-05-29
Attending: STUDENT IN AN ORGANIZED HEALTH CARE EDUCATION/TRAINING PROGRAM
Payer: MEDICAID

## 2025-05-29 LAB
ANION GAP SERPL CALCULATED.3IONS-SCNC: 16 MMOL/L (ref 9–16)
BASOPHILS # BLD: 0.04 K/UL (ref 0–0.2)
BASOPHILS NFR BLD: 1 % (ref 0–2)
BUN SERPL-MCNC: 68 MG/DL (ref 6–20)
CALCIUM SERPL-MCNC: 9.2 MG/DL (ref 8.6–10.4)
CHLORIDE SERPL-SCNC: 101 MMOL/L (ref 98–107)
CO2 SERPL-SCNC: 21 MMOL/L (ref 20–31)
CREAT SERPL-MCNC: 7.9 MG/DL (ref 0.7–1.2)
ECHO BSA: 1.74 M2
EOSINOPHIL # BLD: 0.14 K/UL (ref 0–0.44)
EOSINOPHILS RELATIVE PERCENT: 2 % (ref 1–4)
ERYTHROCYTE [DISTWIDTH] IN BLOOD BY AUTOMATED COUNT: 14.1 % (ref 11.8–14.4)
GFR, ESTIMATED: 8 ML/MIN/1.73M2
GLUCOSE BLD-MCNC: 130 MG/DL (ref 75–110)
GLUCOSE BLD-MCNC: 81 MG/DL (ref 75–110)
GLUCOSE SERPL-MCNC: 158 MG/DL (ref 74–99)
HCT VFR BLD AUTO: 35 % (ref 40.7–50.3)
HGB BLD-MCNC: 11.6 G/DL (ref 13–17)
IMM GRANULOCYTES # BLD AUTO: <0.03 K/UL (ref 0–0.3)
IMM GRANULOCYTES NFR BLD: 0 %
LYMPHOCYTES NFR BLD: 2.08 K/UL (ref 1.1–3.7)
LYMPHOCYTES RELATIVE PERCENT: 29 % (ref 24–43)
MCH RBC QN AUTO: 32.5 PG (ref 25.2–33.5)
MCHC RBC AUTO-ENTMCNC: 33.1 G/DL (ref 28.4–34.8)
MCV RBC AUTO: 98 FL (ref 82.6–102.9)
MONOCYTES NFR BLD: 0.44 K/UL (ref 0.1–1.2)
MONOCYTES NFR BLD: 6 % (ref 3–12)
NEUTROPHILS NFR BLD: 62 % (ref 36–65)
NEUTS SEG NFR BLD: 4.46 K/UL (ref 1.5–8.1)
NRBC BLD-RTO: 0 PER 100 WBC
PLATELET # BLD AUTO: 163 K/UL (ref 138–453)
PMV BLD AUTO: 10.4 FL (ref 8.1–13.5)
POTASSIUM SERPL-SCNC: 4.3 MMOL/L (ref 3.7–5.3)
RBC # BLD AUTO: 3.57 M/UL (ref 4.21–5.77)
SODIUM SERPL-SCNC: 138 MMOL/L (ref 136–145)
WBC OTHER # BLD: 7.2 K/UL (ref 3.5–11.3)

## 2025-05-29 PROCEDURE — 05CY0ZZ EXTIRPATION OF MATTER FROM UPPER VEIN, OPEN APPROACH: ICD-10-PCS | Performed by: STUDENT IN AN ORGANIZED HEALTH CARE EDUCATION/TRAINING PROGRAM

## 2025-05-29 PROCEDURE — C1881 DIALYSIS ACCESS SYSTEM: HCPCS | Performed by: STUDENT IN AN ORGANIZED HEALTH CARE EDUCATION/TRAINING PROGRAM

## 2025-05-29 PROCEDURE — 3700000000 HC ANESTHESIA ATTENDED CARE: Performed by: STUDENT IN AN ORGANIZED HEALTH CARE EDUCATION/TRAINING PROGRAM

## 2025-05-29 PROCEDURE — 3600000012 HC SURGERY LEVEL 2 ADDTL 15MIN: Performed by: STUDENT IN AN ORGANIZED HEALTH CARE EDUCATION/TRAINING PROGRAM

## 2025-05-29 PROCEDURE — 6360000002 HC RX W HCPCS

## 2025-05-29 PROCEDURE — C1768 GRAFT, VASCULAR: HCPCS | Performed by: STUDENT IN AN ORGANIZED HEALTH CARE EDUCATION/TRAINING PROGRAM

## 2025-05-29 PROCEDURE — 99232 SBSQ HOSP IP/OBS MODERATE 35: CPT | Performed by: INTERNAL MEDICINE

## 2025-05-29 PROCEDURE — C1725 CATH, TRANSLUMIN NON-LASER: HCPCS | Performed by: STUDENT IN AN ORGANIZED HEALTH CARE EDUCATION/TRAINING PROGRAM

## 2025-05-29 PROCEDURE — 6360000002 HC RX W HCPCS: Performed by: STUDENT IN AN ORGANIZED HEALTH CARE EDUCATION/TRAINING PROGRAM

## 2025-05-29 PROCEDURE — B51M1ZZ FLUOROSCOPY OF RIGHT UPPER EXTREMITY VEINS USING LOW OSMOLAR CONTRAST: ICD-10-PCS | Performed by: STUDENT IN AN ORGANIZED HEALTH CARE EDUCATION/TRAINING PROGRAM

## 2025-05-29 PROCEDURE — C1757 CATH, THROMBECTOMY/EMBOLECT: HCPCS | Performed by: STUDENT IN AN ORGANIZED HEALTH CARE EDUCATION/TRAINING PROGRAM

## 2025-05-29 PROCEDURE — 77001 FLUOROGUIDE FOR VEIN DEVICE: CPT | Performed by: STUDENT IN AN ORGANIZED HEALTH CARE EDUCATION/TRAINING PROGRAM

## 2025-05-29 PROCEDURE — 7100000010 HC PHASE II RECOVERY - FIRST 15 MIN: Performed by: STUDENT IN AN ORGANIZED HEALTH CARE EDUCATION/TRAINING PROGRAM

## 2025-05-29 PROCEDURE — 05HN33Z INSERTION OF INFUSION DEVICE INTO LEFT INTERNAL JUGULAR VEIN, PERCUTANEOUS APPROACH: ICD-10-PCS | Performed by: INTERNAL MEDICINE

## 2025-05-29 PROCEDURE — 1200000000 HC SEMI PRIVATE

## 2025-05-29 PROCEDURE — 6370000000 HC RX 637 (ALT 250 FOR IP): Performed by: STUDENT IN AN ORGANIZED HEALTH CARE EDUCATION/TRAINING PROGRAM

## 2025-05-29 PROCEDURE — 2580000003 HC RX 258

## 2025-05-29 PROCEDURE — C1769 GUIDE WIRE: HCPCS | Performed by: STUDENT IN AN ORGANIZED HEALTH CARE EDUCATION/TRAINING PROGRAM

## 2025-05-29 PROCEDURE — 3600000002 HC SURGERY LEVEL 2 BASE: Performed by: STUDENT IN AN ORGANIZED HEALTH CARE EDUCATION/TRAINING PROGRAM

## 2025-05-29 PROCEDURE — 36558 INSERT TUNNELED CV CATH: CPT | Performed by: STUDENT IN AN ORGANIZED HEALTH CARE EDUCATION/TRAINING PROGRAM

## 2025-05-29 PROCEDURE — 99232 SBSQ HOSP IP/OBS MODERATE 35: CPT

## 2025-05-29 PROCEDURE — 80048 BASIC METABOLIC PNL TOTAL CA: CPT

## 2025-05-29 PROCEDURE — 05HM33Z INSERTION OF INFUSION DEVICE INTO RIGHT INTERNAL JUGULAR VEIN, PERCUTANEOUS APPROACH: ICD-10-PCS | Performed by: INTERNAL MEDICINE

## 2025-05-29 PROCEDURE — 2709999900 HC NON-CHARGEABLE SUPPLY: Performed by: STUDENT IN AN ORGANIZED HEALTH CARE EDUCATION/TRAINING PROGRAM

## 2025-05-29 PROCEDURE — 2720000010 HC SURG SUPPLY STERILE: Performed by: STUDENT IN AN ORGANIZED HEALTH CARE EDUCATION/TRAINING PROGRAM

## 2025-05-29 PROCEDURE — 05UB0JZ SUPPLEMENT RIGHT BASILIC VEIN WITH SYNTHETIC SUBSTITUTE, OPEN APPROACH: ICD-10-PCS | Performed by: INTERNAL MEDICINE

## 2025-05-29 PROCEDURE — 6360000004 HC RX CONTRAST MEDICATION: Performed by: STUDENT IN AN ORGANIZED HEALTH CARE EDUCATION/TRAINING PROGRAM

## 2025-05-29 PROCEDURE — 36833 AV FISTULA REVISION: CPT | Performed by: STUDENT IN AN ORGANIZED HEALTH CARE EDUCATION/TRAINING PROGRAM

## 2025-05-29 PROCEDURE — 05WY3JZ REVISION OF SYNTHETIC SUBSTITUTE IN UPPER VEIN, PERCUTANEOUS APPROACH: ICD-10-PCS | Performed by: INTERNAL MEDICINE

## 2025-05-29 PROCEDURE — 37607 LIG/BANDING ANGIOACS AV FSTL: CPT | Performed by: STUDENT IN AN ORGANIZED HEALTH CARE EDUCATION/TRAINING PROGRAM

## 2025-05-29 PROCEDURE — C1894 INTRO/SHEATH, NON-LASER: HCPCS | Performed by: STUDENT IN AN ORGANIZED HEALTH CARE EDUCATION/TRAINING PROGRAM

## 2025-05-29 PROCEDURE — 7100000011 HC PHASE II RECOVERY - ADDTL 15 MIN: Performed by: STUDENT IN AN ORGANIZED HEALTH CARE EDUCATION/TRAINING PROGRAM

## 2025-05-29 PROCEDURE — 2500000003 HC RX 250 WO HCPCS: Performed by: STUDENT IN AN ORGANIZED HEALTH CARE EDUCATION/TRAINING PROGRAM

## 2025-05-29 PROCEDURE — 05LB0ZZ OCCLUSION OF RIGHT BASILIC VEIN, OPEN APPROACH: ICD-10-PCS | Performed by: INTERNAL MEDICINE

## 2025-05-29 PROCEDURE — 85025 COMPLETE CBC W/AUTO DIFF WBC: CPT

## 2025-05-29 PROCEDURE — 76937 US GUIDE VASCULAR ACCESS: CPT | Performed by: STUDENT IN AN ORGANIZED HEALTH CARE EDUCATION/TRAINING PROGRAM

## 2025-05-29 PROCEDURE — 82947 ASSAY GLUCOSE BLOOD QUANT: CPT

## 2025-05-29 PROCEDURE — 36415 COLL VENOUS BLD VENIPUNCTURE: CPT

## 2025-05-29 PROCEDURE — 3700000001 HC ADD 15 MINUTES (ANESTHESIA): Performed by: STUDENT IN AN ORGANIZED HEALTH CARE EDUCATION/TRAINING PROGRAM

## 2025-05-29 DEVICE — VASCU-GUARD IS PREPARED FROM BOVINE PERICARDIUM CROSS-LINKED WITH GLUTARALDEHYDE, AND TREATED WITH 1 MOLAR SODIUM HYDROXIDE FOR A MINIMUM OF 60 MINUTES AT 20-25 DEGREES C. VASCU-GUARD IS TERMINALLY STERILIZED USING GAMMA IRRADIATION. AND PACKAGED WITHIN A DOUBLE-POUCH SYSTEM. THE CONTENTS OF THE UNOPENED, UNDAMAGED OUTER POUCH ARE STERILE.
Type: IMPLANTABLE DEVICE | Site: ARM | Status: FUNCTIONAL
Brand: VASCU-GUARD

## 2025-05-29 RX ORDER — HEPARIN SODIUM 1000 [USP'U]/ML
INJECTION, SOLUTION INTRAVENOUS; SUBCUTANEOUS
Status: DISPENSED
Start: 2025-05-29 | End: 2025-05-30

## 2025-05-29 RX ORDER — LIDOCAINE HYDROCHLORIDE 10 MG/ML
INJECTION, SOLUTION EPIDURAL; INFILTRATION; INTRACAUDAL; PERINEURAL PRN
Status: DISCONTINUED | OUTPATIENT
Start: 2025-05-29 | End: 2025-05-29 | Stop reason: ALTCHOICE

## 2025-05-29 RX ORDER — PHENYLEPHRINE HCL IN 0.9% NACL 1 MG/10 ML
SYRINGE (ML) INTRAVENOUS
Status: DISCONTINUED | OUTPATIENT
Start: 2025-05-29 | End: 2025-05-29 | Stop reason: SDUPTHER

## 2025-05-29 RX ORDER — SODIUM CHLORIDE 0.9 % (FLUSH) 0.9 %
5-40 SYRINGE (ML) INJECTION PRN
Status: CANCELLED | OUTPATIENT
Start: 2025-05-29

## 2025-05-29 RX ORDER — IODIXANOL 320 MG/ML
INJECTION, SOLUTION INTRAVASCULAR
Status: DISPENSED
Start: 2025-05-29 | End: 2025-05-30

## 2025-05-29 RX ORDER — SODIUM CHLORIDE 9 MG/ML
INJECTION, SOLUTION INTRAVENOUS PRN
Status: CANCELLED | OUTPATIENT
Start: 2025-05-29

## 2025-05-29 RX ORDER — PROPOFOL 10 MG/ML
INJECTION, EMULSION INTRAVENOUS
Status: COMPLETED
Start: 2025-05-29 | End: 2025-05-29

## 2025-05-29 RX ORDER — SODIUM CHLORIDE 9 MG/ML
INJECTION, SOLUTION INTRAVENOUS CONTINUOUS
Status: CANCELLED | OUTPATIENT
Start: 2025-05-29

## 2025-05-29 RX ORDER — NALOXONE HYDROCHLORIDE 0.4 MG/ML
INJECTION, SOLUTION INTRAMUSCULAR; INTRAVENOUS; SUBCUTANEOUS PRN
Status: CANCELLED | OUTPATIENT
Start: 2025-05-29

## 2025-05-29 RX ORDER — SODIUM CHLORIDE 0.9 % (FLUSH) 0.9 %
5-40 SYRINGE (ML) INJECTION EVERY 12 HOURS SCHEDULED
Status: CANCELLED | OUTPATIENT
Start: 2025-05-29

## 2025-05-29 RX ORDER — MAGNESIUM HYDROXIDE 1200 MG/15ML
LIQUID ORAL CONTINUOUS PRN
Status: COMPLETED | OUTPATIENT
Start: 2025-05-29 | End: 2025-05-29

## 2025-05-29 RX ORDER — ONDANSETRON 2 MG/ML
4 INJECTION INTRAMUSCULAR; INTRAVENOUS
Status: CANCELLED | OUTPATIENT
Start: 2025-05-29

## 2025-05-29 RX ORDER — PROPOFOL 10 MG/ML
INJECTION, EMULSION INTRAVENOUS
Status: DISCONTINUED | OUTPATIENT
Start: 2025-05-29 | End: 2025-05-29 | Stop reason: SDUPTHER

## 2025-05-29 RX ORDER — HEPARIN SODIUM 1000 [USP'U]/ML
INJECTION, SOLUTION INTRAVENOUS; SUBCUTANEOUS PRN
Status: DISCONTINUED | OUTPATIENT
Start: 2025-05-29 | End: 2025-05-29 | Stop reason: ALTCHOICE

## 2025-05-29 RX ORDER — SODIUM CHLORIDE 9 MG/ML
INJECTION, SOLUTION INTRAVENOUS
Status: DISCONTINUED | OUTPATIENT
Start: 2025-05-29 | End: 2025-05-29 | Stop reason: SDUPTHER

## 2025-05-29 RX ORDER — FENTANYL CITRATE 50 UG/ML
INJECTION, SOLUTION INTRAMUSCULAR; INTRAVENOUS
Status: DISCONTINUED | OUTPATIENT
Start: 2025-05-29 | End: 2025-05-29 | Stop reason: SDUPTHER

## 2025-05-29 RX ORDER — CEFAZOLIN SODIUM 1 G/3ML
INJECTION, POWDER, FOR SOLUTION INTRAMUSCULAR; INTRAVENOUS
Status: DISCONTINUED | OUTPATIENT
Start: 2025-05-29 | End: 2025-05-29 | Stop reason: SDUPTHER

## 2025-05-29 RX ORDER — IODIXANOL 320 MG/ML
INJECTION, SOLUTION INTRAVASCULAR PRN
Status: DISCONTINUED | OUTPATIENT
Start: 2025-05-29 | End: 2025-05-29 | Stop reason: ALTCHOICE

## 2025-05-29 RX ORDER — MIDAZOLAM HYDROCHLORIDE 1 MG/ML
INJECTION, SOLUTION INTRAMUSCULAR; INTRAVENOUS
Status: DISCONTINUED | OUTPATIENT
Start: 2025-05-29 | End: 2025-05-29 | Stop reason: SDUPTHER

## 2025-05-29 RX ADMIN — FENTANYL CITRATE 25 MCG: 50 INJECTION, SOLUTION INTRAMUSCULAR; INTRAVENOUS at 14:59

## 2025-05-29 RX ADMIN — PHENYLEPHRINE HYDROCHLORIDE 40 MCG/MIN: 10 INJECTION INTRAVENOUS at 14:49

## 2025-05-29 RX ADMIN — FENTANYL CITRATE 25 MCG: 50 INJECTION, SOLUTION INTRAMUSCULAR; INTRAVENOUS at 15:43

## 2025-05-29 RX ADMIN — FENTANYL CITRATE 25 MCG: 50 INJECTION, SOLUTION INTRAMUSCULAR; INTRAVENOUS at 13:41

## 2025-05-29 RX ADMIN — HEPARIN SODIUM 5000 UNITS: 5000 INJECTION INTRAVENOUS; SUBCUTANEOUS at 06:38

## 2025-05-29 RX ADMIN — HEPARIN SODIUM 5000 UNITS: 5000 INJECTION INTRAVENOUS; SUBCUTANEOUS at 21:27

## 2025-05-29 RX ADMIN — CEFAZOLIN 2 G: 1 INJECTION, POWDER, FOR SOLUTION INTRAMUSCULAR; INTRAVENOUS at 13:50

## 2025-05-29 RX ADMIN — HEPARIN SODIUM 6600 UNITS: 1000 INJECTION INTRAVENOUS; SUBCUTANEOUS at 14:05

## 2025-05-29 RX ADMIN — SODIUM CHLORIDE: 0.9 INJECTION, SOLUTION INTRAVENOUS at 13:31

## 2025-05-29 RX ADMIN — ACETAMINOPHEN 650 MG: 325 TABLET ORAL at 23:44

## 2025-05-29 RX ADMIN — FENTANYL CITRATE 50 MCG: 50 INJECTION, SOLUTION INTRAMUSCULAR; INTRAVENOUS at 13:35

## 2025-05-29 RX ADMIN — PROPOFOL 100 MCG/KG/MIN: 10 INJECTION, EMULSION INTRAVENOUS at 13:40

## 2025-05-29 RX ADMIN — MIDAZOLAM 2 MG: 1 INJECTION INTRAMUSCULAR; INTRAVENOUS at 13:35

## 2025-05-29 RX ADMIN — Medication 100 MCG: at 14:13

## 2025-05-29 RX ADMIN — SODIUM CHLORIDE, PRESERVATIVE FREE 10 ML: 5 INJECTION INTRAVENOUS at 21:27

## 2025-05-29 ASSESSMENT — PAIN DESCRIPTION - LOCATION: LOCATION: NECK

## 2025-05-29 ASSESSMENT — PAIN DESCRIPTION - ORIENTATION: ORIENTATION: LEFT

## 2025-05-29 ASSESSMENT — PAIN SCALES - GENERAL: PAINLEVEL_OUTOF10: 4

## 2025-05-29 ASSESSMENT — PAIN DESCRIPTION - DESCRIPTORS: DESCRIPTORS: ACHING;SORE

## 2025-05-29 NOTE — OP NOTE
Operative Note      Patient: Jules Nichols  YOB: 1980  MRN: [id number]    Date of Procedure: 5/29/2025    Pre-Op Diagnosis Codes:      * Malfunction of arteriovenous dialysis fistula, initial encounter [T82.590A]    Post-Op Diagnosis: Same       Procedures:  1) Open declot of right arm AV graft  2) Patch angioplasty of proximal anastomosis with bovine pericardium  3) Fistulagram of right arm AV graft with PTA of the graft and venous anastomosis (8 mm x 40 mm standard PTA balloon)  4) Ligation of right arm AV graft  5) Placement of left internal jugular tunneled HD catheter (28 cm palindrome catheter)    Surgeon(s):  Kevin Hook MD    Assistant: None    Anesthesia: Monitor Anesthesia Care    Estimated Blood Loss (mL): 75 mL    Complications: None    Specimens: Right arm AV graft thrombus    Implants:  Implant Name Type Inv. Item Serial No.  Lot No. LRB No. Used Action   PATCH VASC W0.8XL8CM PERIPH BOV PERICARD N PVC N DEHP CRSS - [id number] Vascular grafts PATCH VASC W0.8XL8CM PERIPH BOV PERICARD N PVC N DEHP CRSS  [employer]- [id number] Right 1 Implanted         Drains: None    Findings:  Infection Present At Time Of Surgery (PATOS) (choose all levels that have infection present):  No infection present  Other Findings: Right arm AV graft had acute thrombus. This was removed with open thrombectomy. The graftotomy had longitudinal tear requiring patch angioplasty repair. Fistulagram revealed residual narrowing and thrombus of graft as well as stenosis of the distal basilic vein. After PTA the proximal fistulagram was performed with balloon up. The proximal segment of graft was nearly occluded despite patch angioplasty. There was also segment of previous cephalic that was blind loop that now had extravasation. The basilic vein also appeared stenotic and will require stent placement. Due to multiple issues with graft, I did not think it will be a good option long term

## 2025-05-29 NOTE — PROGRESS NOTES
Pt arrived from [facility].Pre op questions reviewed and  right arm cleansed with chlorhexadine wipes.

## 2025-05-29 NOTE — PROGRESS NOTES
Division of Vascular Surgery             Progress Note      Name: Jules Nichols  MRN: [id number]         Overnight Events:     No acute events overnight.      Subjective:     Patient seen and examined in room. patient is resting in bed comfortably.  He reports that he has been NPO.  He did not have dialysis yesterday due to potassium levels decreasing at 4.3.  He is scheduled today for AV graft open versus percutaneous mechanical thrombectomy.  He denies any questions at present time.  He denies fever, chills, chest pain, or shortness of breath.    Physical Exam:     Vitals:  /77   Pulse 87   Temp 98.1 °F (36.7 °C) (Temporal)   Resp 20   Ht 1.626 m (5' 4\")   Wt 66.7 kg (147 lb)   SpO2 96%   BMI 25.23 kg/m²       General appearance - alert, well appearing and in no acute distress  Mental status - oriented to person, place and time with normal affect  Head - normocephalic and atraumatic  Neck - supple, no carotid bruits, thyroid not palpable, no JVD  Chest - clear to auscultation, normal effort  Heart - normal rate, regular rhythm, no murmurs  Abdomen - soft, non-tender, non-distended, bowel sounds present all four quadrants, no masses  Neurological - normal speech, no focal findings or movement disorder noted, cranial nerves II through XII grossly intact  Extremities - peripheral pulses palpable, no pedal edema or calf pain with palpation  Skin - no gross lesions, rashes, or induration noted, right upper extremity AV graft, no thrill present.      Imaging:     Recent Results (from the past 24 hours)   CBC with Auto Differential    Collection Time: 05/28/25 10:10 AM   Result Value Ref Range    WBC 9.4 3.5 - 11.3 k/uL    RBC 4.15 (L) 4.21 - 5.77 m/uL    Hemoglobin 13.7 13.0 - 17.0 g/dL    Hematocrit 41.1 40.7 - 50.3 %    MCV 99.0 82.6 - 102.9 fL    MCH 33.0 25.2 - 33.5 pg    MCHC 33.3 28.4 - 34.8 g/dL    RDW 14.1 11.8 - 14.4 %    Platelets 184 138 - 453 k/uL    MPV 10.1 8.1 - 13.5 fL    NRBC Automated

## 2025-05-29 NOTE — PROGRESS NOTES
Received in [facility] 12 post OR. PT arouses to verbal stimuli. VSS. Left tunnel cath and right arm dressing dry and intact.

## 2025-05-29 NOTE — CARE COORDINATION
Attempted to meet with patient to complete initial case management assessment but patient is currently off the unit in OR

## 2025-05-29 NOTE — PROGRESS NOTES
[facility]  Occupational Therapy Not Seen Note    DATE: 2025    NAME: Jules Nichols  MRN: [id number]   : 1980      Patient not seen this date for Occupational Therapy due to:    Surgery/Procedure: open versus percutaneous mechanical thrombectomy of right upper extremity AV graft today    Next Scheduled Treatment: Ck post op in pm or      Electronically signed by Isabella Villanueva OT on 2025 at 7:48 AM

## 2025-05-29 NOTE — ANESTHESIA POSTPROCEDURE EVALUATION
Department of Anesthesiology  Postprocedure Note    Patient: Jules Nichols  MRN: [id number]  YOB: 1980  Date of evaluation: 5/29/2025    Procedure Summary       Date: 05/29/25 Room / Location: [place] [address] / [facility]    Anesthesia Start: 1331 Anesthesia Stop: 1705    Procedure: OPEN DECLOT OF UPPER EXTREMITY GRAFT, LIGATION OF ATERIOVENOUS FISTULA WITH XENOSURE BOVINE PERICARDIAL PATCH,14.5 FR TUNNELED CATHETER PLACEMENT, BALLOON ANGIOPLASTY 8MM X 40MM (Right: Arm Upper) Diagnosis:       Malfunction of arteriovenous dialysis fistula, initial encounter      (Malfunction of arteriovenous dialysis fistula, initial encounter [T82.590A])    Surgeons: Kevin Hook MD Responsible Provider: Logan Do MD    Anesthesia Type: MAC ASA Status: 3            Anesthesia Type: No value filed.    Jonah Phase I: Jonah Score: 10    Jonah Phase II:      Anesthesia Post Evaluation    Patient location during evaluation: PACU  Patient participation: complete - patient participated  Level of consciousness: awake  Pain score: 1  Airway patency: patent  Nausea & Vomiting: no nausea and no vomiting  Cardiovascular status: blood pressure returned to baseline and hemodynamically stable  Respiratory status: acceptable  Hydration status: euvolemic  Pain management: adequate    No notable events documented.

## 2025-05-29 NOTE — PROGRESS NOTES
[facility]  Internal Medicine Teaching Residency Program  Inpatient Daily Progress Note  ______________________________________________________________________________    Patient: Jules Nichols  YOB: 1980   MRN:[id number]    Acct: [id number]     Room: 0419/0419-02  Admit date: 5/28/2025  Today's date: 05/29/25  Number of days in the hospital: 1    SUBJECTIVE   Admitting Diagnosis: AV fistula occlusion, initial encounter  CC: AV graft malfunction      Pt examined at bedside. Chart & results reviewed.   No acute events overnight  Vascular surgery planning for OR today  Hemodynamically stable, on room air, afebrile  Denies any acute complaints    ROS:  Constitutional:  negative for chills, fevers, sweats  Respiratory:  negative for cough, dyspnea on exertion, hemoptysis, shortness of breath, wheezing  Cardiovascular:  negative for chest pain, chest pressure/discomfort, lower extremity edema, palpitations  Gastrointestinal:  negative for abdominal pain, constipation, diarrhea, nausea, vomiting  Neurological:  negative for dizziness, headache  BRIEF HISTORY     45 y.o. male presents with a chief complaint of AV graft malfunction     Past medical history significant for type II DM, ESRD on hemodialysis, right AV fistula.     He presents to the ED from his dialysis center as they were unable to obtain access from his right AV fistula.  Vascular surgery was consulted they recommended medicine admission for possible surgical intervention tomorrow.  Labs in ED were significant for creatinine of 7.1, potassium is stable at 5.3.  Other labs are within normal limits     On examination patient denies any acute complaints, hemodynamically stable, on room air, afebrile. Denies any acute complaints, his last dialysis session was on 5/26.    OBJECTIVE     Vital Signs:  /83   Pulse 87   Temp 98 °F (36.7 °C) (Oral)   Resp 20   Ht 1.626 m (5' 4\")   Wt 66.7 kg (147 lb)

## 2025-05-29 NOTE — ANESTHESIA PRE PROCEDURE
Department of Anesthesiology  Preprocedure Note       Name:  Jules Nichols   Age:  45 y.o.  :  1980                                          MRN:  [id number]         Date:  2025      Surgeon: Surgeon(s):  Kevin Hook MD    Procedure: Procedure(s):  OPEN DECLOT OF UPPER EXTREMITY GRAFT, POSSIBLE GRAFT REVISION, POSSILE TUNNELED CATHETER PLACEMENT    Medications prior to admission:   Prior to Admission medications    Medication Sig Start Date End Date Taking? Authorizing Provider   sevelamer (RENVELA) 800 MG tablet Take 1 tablet by mouth 3 times daily (with meals) 24  Yes Kallie Joseph MD   midodrine (PROAMATINE) 10 MG tablet Take 1 tablet by mouth 3 times daily as needed (for SBP<90) 24  Yes Kallie Joseph MD   insulin glargine (LANTUS SOLOSTAR) 100 UNIT/ML injection pen Inject 20 Units into the skin daily 24  Yes Ty Tee MD   Blood Glucose Monitoring Suppl (TRUERESULT BLOOD GLUCOSE) W/DEVICE KIT 1 kit by Does not apply route daily as needed 16  Yes Elvin Kelley MD   [facility] Ultra Thin Lancets MISC 1 each by Does not apply route 4 times daily (before meals and nightly) 16  Yes Elvin Kelley MD   Glucose Blood (BLOOD GLUCOSE TEST STRIPS) STRP 1 each by Does not apply route 4 times daily (before meals and nightly) 16  Yes Elvin Kelley MD   Alcohol Swabs 70 % PADS Inject 1 each into the skin 3 times daily as needed 16  Yes Elvin Kelley MD   Insulin Pen Needle (B-D ULTRAFINE III SHORT PEN) 31G X 8 MM MISC Inject 1 each into the skin daily May substitute with any brand 16  Yes Elvin Kelley MD   VELPHORO 500 MG CHEW chewable tablet Take 1 tablet by mouth 3 times daily (with meals)  Patient not taking: Reported on 2025   ProviderFlora MD   tamsulosin (FLOMAX) 0.4 MG capsule Take 1 capsule by mouth daily  Patient not taking: Reported on 2025   Ty Tee MD       Current medications:

## 2025-05-29 NOTE — PROGRESS NOTES
Nephrology Progress Note      SUBJECTIVE       Pt was seen and examined. No acute issues overnight. Stable hemodynamics .   Lab work showed sodium 138, BUN 68, creatinine 7.9, GFR 8, hemoglobin 11, WBC 7  Dialysis today the patient undergoes vascular procedure    Brief history  45-year-old male with history of ESRD on  dialysis at [facility], through right upper extremity AV fistula under Dr. Ann, type 2 diabetes mellitus, hypertension, anemia of chronic disease recently had angioplasty in  of the right upper extremity, and fistulogram for maintaining patency of his AV graft.  Now came in again with AV graft malfunction, no thrill was appreciable, vascular surgery is consulted for percutaneous mechanical thrombectomy of the right upper extremity.  Nephrology consulted to assist with dialysis.       OBJECTIVE      CURRENT TEMPERATURE:  Temp: 98 °F (36.7 °C)  MAXIMUM TEMPERATURE OVER 24HRS:  Temp (24hrs), Av °F (36.7 °C), Min:97.7 °F (36.5 °C), Max:98.4 °F (36.9 °C)    CURRENT RESPIRATORY RATE:  Respirations: 20  CURRENT PULSE:  Pulse: 87  CURRENT BLOOD PRESSURE:  BP: 116/83  24HR BLOOD PRESSURE RANGE:  Systolic (24hrs), Av , Min:96 , Max:129   ; Diastolic (24hrs), Av, Min:70, Max:88    24HR INTAKE/OUTPUT:    Intake/Output Summary (Last 24 hours) at 2025 0880  Last data filed at 2025 0657  Gross per 24 hour   Intake 240 ml   Output --   Net 240 ml     WEIGHT :Patient Vitals for the past 96 hrs (Last 3 readings):   Weight   25 1534 66.7 kg (147 lb)       PHYSICAL EXAM      GENERAL APPEARANCE:Awake, alert, in no acute distress  SKIN: warm and dry, no rash or erythema  EYES: conjunctivae normal and sclera anicteric  ENT: no thrush no pharyngeal congestion   NECK:   No JVD. No carotid bruits and no carotid lymphadenopathy   PULMONARY: lungs are clear on auscultation. No wheezing, no ronchi   CARDIOVASCULAR: S1 and S2 normal, no S3 or S4. No rubs, no  murmur  ABDOMEN: soft nontender, bowel sounds present, no organomegaly, no ascites  EXTREMITIES: no cyanosis, clubbing, no edema     CURRENT MEDICATIONS      sodium chloride flush 0.9 % injection 5-40 mL, 2 times per day  sodium chloride flush 0.9 % injection 5-40 mL, PRN  0.9 % sodium chloride infusion, PRN  potassium chloride (KLOR-CON M) extended release tablet 40 mEq, PRN   Or  potassium bicarb-citric acid (EFFER-K) effervescent tablet 40 mEq, PRN   Or  potassium chloride 10 mEq/100 mL IVPB (Peripheral Line), PRN  magnesium sulfate 2000 mg in 50 mL IVPB premix, PRN  ondansetron (ZOFRAN-ODT) disintegrating tablet 4 mg, Q8H PRN   Or  ondansetron (ZOFRAN) injection 4 mg, Q6H PRN  polyethylene glycol (GLYCOLAX) packet 17 g, Daily PRN  acetaminophen (TYLENOL) tablet 650 mg, Q6H PRN   Or  acetaminophen (TYLENOL) suppository 650 mg, Q6H PRN  insulin lispro (HUMALOG,ADMELOG) injection vial 0-8 Units, 4x Daily AC & HS  heparin (porcine) injection 5,000 Units, 3 times per day          LABS      CBC:   Recent Labs     05/28/25  1010 05/29/25  0352   WBC 9.4 7.2   RBC 4.15* 3.57*   HGB 13.7 11.6*   HCT 41.1 35.0*   MCV 99.0 98.0   MCH 33.0 32.5   MCHC 33.3 33.1   RDW 14.1 14.1    163   MPV 10.1 10.4      BMP:   Recent Labs     05/28/25  1010 05/29/25  0352    138   K 5.3 4.3    101   CO2 22 21   BUN 61* 68*   CREATININE 7.1* 7.9*   GLUCOSE 139* 158*   CALCIUM 10.1 9.2      BNP:No results found for: \"BNP\"  PHOSPHORUS:  No results for input(s): \"PHOS\" in the last 72 hours.  MAGNESIUM: No results for input(s): \"MG\" in the last 72 hours.  ALBUMIN: No results for input(s): \"LABALBU\" in the last 72 hours.  IRON:    Lab Results   Component Value Date/Time    IRON 32 03/16/2024 03:37 AM     IRON SATURATION:  No components found for: \"LABIRON\"  TIBC:    Lab Results   Component Value Date/Time    TIBC 146 03/16/2024 03:37 AM     FERRITIN:    Lab Results   Component Value Date/Time    FERRITIN 290 03/16/2024 03:37

## 2025-05-30 ENCOUNTER — APPOINTMENT (OUTPATIENT)
Dept: DIALYSIS | Age: 45
DRG: 182 | End: 2025-05-30
Payer: MEDICAID

## 2025-05-30 VITALS
WEIGHT: 150.57 LBS | TEMPERATURE: 99.3 F | RESPIRATION RATE: 15 BRPM | OXYGEN SATURATION: 98 % | BODY MASS INDEX: 25.71 KG/M2 | SYSTOLIC BLOOD PRESSURE: 109 MMHG | DIASTOLIC BLOOD PRESSURE: 72 MMHG | HEIGHT: 64 IN | HEART RATE: 94 BPM

## 2025-05-30 LAB
ANION GAP SERPL CALCULATED.3IONS-SCNC: 17 MMOL/L (ref 9–16)
BASOPHILS # BLD: <0.03 K/UL (ref 0–0.2)
BASOPHILS NFR BLD: 0 % (ref 0–2)
BUN SERPL-MCNC: 69 MG/DL (ref 6–20)
CALCIUM SERPL-MCNC: 8.6 MG/DL (ref 8.6–10.4)
CHLORIDE SERPL-SCNC: 103 MMOL/L (ref 98–107)
CO2 SERPL-SCNC: 17 MMOL/L (ref 20–31)
CREAT SERPL-MCNC: 8.4 MG/DL (ref 0.7–1.2)
EOSINOPHIL # BLD: 0.11 K/UL (ref 0–0.44)
EOSINOPHILS RELATIVE PERCENT: 2 % (ref 1–4)
ERYTHROCYTE [DISTWIDTH] IN BLOOD BY AUTOMATED COUNT: 14.4 % (ref 11.8–14.4)
GFR, ESTIMATED: 7 ML/MIN/1.73M2
GLUCOSE BLD-MCNC: 128 MG/DL (ref 75–110)
GLUCOSE BLD-MCNC: 98 MG/DL (ref 75–110)
GLUCOSE SERPL-MCNC: 117 MG/DL (ref 74–99)
HCT VFR BLD AUTO: 32.6 % (ref 40.7–50.3)
HGB BLD-MCNC: 11.2 G/DL (ref 13–17)
IMM GRANULOCYTES # BLD AUTO: <0.03 K/UL (ref 0–0.3)
IMM GRANULOCYTES NFR BLD: 0 %
LYMPHOCYTES NFR BLD: 1.26 K/UL (ref 1.1–3.7)
LYMPHOCYTES RELATIVE PERCENT: 18 % (ref 24–43)
MCH RBC QN AUTO: 33.3 PG (ref 25.2–33.5)
MCHC RBC AUTO-ENTMCNC: 34.4 G/DL (ref 28.4–34.8)
MCV RBC AUTO: 97 FL (ref 82.6–102.9)
MONOCYTES NFR BLD: 0.52 K/UL (ref 0.1–1.2)
MONOCYTES NFR BLD: 7 % (ref 3–12)
NEUTROPHILS NFR BLD: 73 % (ref 36–65)
NEUTS SEG NFR BLD: 5.23 K/UL (ref 1.5–8.1)
NRBC BLD-RTO: 0 PER 100 WBC
PLATELET # BLD AUTO: 145 K/UL (ref 138–453)
PMV BLD AUTO: 10 FL (ref 8.1–13.5)
POTASSIUM SERPL-SCNC: 4.7 MMOL/L (ref 3.7–5.3)
RBC # BLD AUTO: 3.36 M/UL (ref 4.21–5.77)
SODIUM SERPL-SCNC: 137 MMOL/L (ref 136–145)
WBC OTHER # BLD: 7.2 K/UL (ref 3.5–11.3)

## 2025-05-30 PROCEDURE — 85025 COMPLETE CBC W/AUTO DIFF WBC: CPT

## 2025-05-30 PROCEDURE — 80048 BASIC METABOLIC PNL TOTAL CA: CPT

## 2025-05-30 PROCEDURE — 99232 SBSQ HOSP IP/OBS MODERATE 35: CPT | Performed by: STUDENT IN AN ORGANIZED HEALTH CARE EDUCATION/TRAINING PROGRAM

## 2025-05-30 PROCEDURE — 90935 HEMODIALYSIS ONE EVALUATION: CPT

## 2025-05-30 PROCEDURE — 5A1D70Z PERFORMANCE OF URINARY FILTRATION, INTERMITTENT, LESS THAN 6 HOURS PER DAY: ICD-10-PCS | Performed by: STUDENT IN AN ORGANIZED HEALTH CARE EDUCATION/TRAINING PROGRAM

## 2025-05-30 PROCEDURE — 99232 SBSQ HOSP IP/OBS MODERATE 35: CPT | Performed by: INTERNAL MEDICINE

## 2025-05-30 PROCEDURE — 6360000002 HC RX W HCPCS: Performed by: STUDENT IN AN ORGANIZED HEALTH CARE EDUCATION/TRAINING PROGRAM

## 2025-05-30 PROCEDURE — 82947 ASSAY GLUCOSE BLOOD QUANT: CPT

## 2025-05-30 PROCEDURE — 2500000003 HC RX 250 WO HCPCS: Performed by: STUDENT IN AN ORGANIZED HEALTH CARE EDUCATION/TRAINING PROGRAM

## 2025-05-30 PROCEDURE — 36415 COLL VENOUS BLD VENIPUNCTURE: CPT

## 2025-05-30 RX ORDER — ACETAMINOPHEN 325 MG/1
650 TABLET ORAL EVERY 6 HOURS PRN
Qty: 20 TABLET | Refills: 0 | Status: SHIPPED | OUTPATIENT
Start: 2025-05-30 | End: 2025-06-04

## 2025-05-30 RX ORDER — OXYCODONE HYDROCHLORIDE 5 MG/1
5 TABLET ORAL EVERY 8 HOURS PRN
Qty: 9 TABLET | Refills: 0 | Status: SHIPPED | OUTPATIENT
Start: 2025-05-30 | End: 2025-06-02

## 2025-05-30 RX ORDER — HEPARIN SODIUM 1000 [USP'U]/ML
2100 INJECTION, SOLUTION INTRAVENOUS; SUBCUTANEOUS ONCE
Status: DISCONTINUED | OUTPATIENT
Start: 2025-05-30 | End: 2025-05-30 | Stop reason: HOSPADM

## 2025-05-30 RX ADMIN — HEPARIN SODIUM 5000 UNITS: 5000 INJECTION INTRAVENOUS; SUBCUTANEOUS at 06:08

## 2025-05-30 RX ADMIN — SODIUM CHLORIDE, PRESERVATIVE FREE 10 ML: 5 INJECTION INTRAVENOUS at 08:22

## 2025-05-30 ASSESSMENT — PAIN SCALES - GENERAL
PAINLEVEL_OUTOF10: 0
PAINLEVEL_OUTOF10: 0
PAINLEVEL_OUTOF10: 3

## 2025-05-30 NOTE — DISCHARGE SUMMARY
[facility]     Department of Internal Medicine - Staff Internal Medicine Teaching Service    INPATIENT DISCHARGE SUMMARY      Patient Identification:  Jules Nichols is a 45 y.o. male.  :  1980  MRN: [id number]     Acct: [id number]   PCP: Elvin Kelley MD  Admit Date:  2025  Discharge date and time: 2025  3:53 PM   Attending Provider: Dr ebonie Mendoza                                  ACTIVE DISCHARGE DIAGNOSES     Hospital Problem Lists:  Principal Problem:    AV fistula occlusion, initial encounter  Active Problems:    ESRD needing dialysis (HCC)    Type 2 diabetes mellitus with kidney complication, with long-term current use of insulin (HCC)    AV fistula thrombosis, initial encounter  Resolved Problems:    * No resolved hospital problems. *      HOSPITAL STAY     Brief Inpatient course:   Jules Nichols is a 45 y.o. male with medical history significant for ESRD on hemodialysis, type 2 diabetes mellitus who was admitted for the management of AV fistula occlusion, initial encounter, presented to the emergency department with malfunctioning AV graft.  He was the dialysis center and they were not able to obtain access through history graft was sent to ED for further evaluation.  Vascular surgery was consulted and patient underwent open thrombectomy of the AV graft, however vascular is considering doing a right brachial to subclavian AV graft and so tunneled hemodialysis catheter was placed and patient underwent hemodialysis subsequently. he Is medically stable and is being discharged with the following instructions      Procedures/ Significant Interventions:    Open thrombectomy of the AV graft    Consults:     Consults:     Final Specialist Recommendations/Findings:   IP CONSULT TO VASCULAR SURGERY  IP CONSULT TO INTERNAL MEDICINE  IP CONSULT TO NEPHROLOGY  IP CONSULT TO CASE MANAGEMENT      Any Hospital Acquired Infections: none    Discharge Functional Status:   wound covered with basic dressings for comfort and protection.      Call your doctor for the following:  Chills  Temperature greater than 101  Pain that is not tolerable despite taking pain medicine as ordered There is increased swelling, redness or warmth at surgical site There is increased drainage or bleeding from surgical site    Follow up labs: none  Follow up imaging: none    Note that over 30 minutes was spent in preparing discharge papers, discussing discharge with patient, medication review, etc.      Latoya Ascencio MD, MD  Internal Medicine Resident, PGY-2  [facility]; [place], [place]  5/30/2025, 4:09 PM

## 2025-05-30 NOTE — CARE COORDINATION
Case Management Assessment  Initial Evaluation    Date/Time of Evaluation: 5/30/2025 8:51 AM  Assessment Completed by: Afsaneh Aguero RN    If patient is discharged prior to next notation, then this note serves as note for discharge by case management.    Patient Name: Jules Nichols                   YOB: 1980  Diagnosis: AV fistula thrombosis, initial encounter [T82.868A]  AV fistula occlusion, initial encounter [T82.898A]                   Date / Time: 5/28/2025  9:27 AM    Patient Admission Status: Inpatient   Readmission Risk (Low < 19, Mod (19-27), High > 27): Readmission Risk Score: 12.3    Current PCP: Elvin Kelley MD  PCP verified by CM? Yes    Chart Reviewed: Yes      History Provided by: Patient  Patient Orientation: Alert and Oriented, Person, Place, Situation    Patient Cognition: Alert    Hospitalization in the last 30 days (Readmission):  No    If yes, Readmission Assessment in CM Navigator will be completed.    Advance Directives:      Code Status: Full Code   Patient's Primary Decision Maker is: Legal Next of Kin    Primary Decision Maker: Eddi Nichols - Brother/Sister - [phone]    Discharge Planning:    Patient lives with: Friends Type of Home: House  Primary Care Giver: Self  Patient Support Systems include: Family Members, Friends/Neighbors   Current Financial resources: Medicaid  Current community resources:    Current services prior to admission: None            Current DME:              Type of Home Care services:  None    ADLS  Prior functional level: Independent in ADLs/IADLs  Current functional level: Independent in ADLs/IADLs    PT AM-PAC:   /24  OT AM-PAC:   /24    Family can provide assistance at DC: Yes  Would you like Case Management to discuss the discharge plan with any other family members/significant others, and if so, who? No  Plans to Return to Present Housing: Yes  Other Identified Issues/Barriers to RETURNING to current housing: na  Potential

## 2025-05-30 NOTE — PROGRESS NOTES
[facility]  Internal Medicine Teaching Residency Program  Inpatient Daily Progress Note  ______________________________________________________________________________    Patient: Jules Nichols  YOB: 1980   MRN:[id number]    Acct: [id number]     Room: 0419/0419-02  Admit date: 2025  Today's date: 25  Number of days in the hospital: 2    SUBJECTIVE   Admitting Diagnosis: AV fistula occlusion, initial encounter  CC: AV graft malfunction    Patient seen and examined bedside this morning  No overnight events  He underwent open thrombectomy of the right upper arm AV graft  Denies any acute complaints this morning  Hemodialysis today      Labs this morning reviewed  Potassium 4.7  Hemoglobin 9.2    Discharge today after hemodialysis    BRIEF HISTORY     45 y.o. male presents with a chief complaint of AV graft malfunction     Past medical history significant for type II DM, ESRD on hemodialysis, right AV fistula.     He presents to the ED from his dialysis center as they were unable to obtain access from his right AV fistula.  Vascular surgery was consulted they recommended medicine admission for possible surgical intervention tomorrow.  Labs in ED were significant for creatinine of 7.1, potassium is stable at 5.3.  Other labs are within normal limits     On examination patient denies any acute complaints, hemodynamically stable, on room air, afebrile. Denies any acute complaints, his last dialysis session was on .    OBJECTIVE     Vital Signs:  /72   Pulse 94   Temp 99.3 °F (37.4 °C)   Resp 15   Ht 1.626 m (5' 4\")   Wt 68.3 kg (150 lb 9.2 oz)   SpO2 98%   BMI 25.85 kg/m²     Temp (24hrs), Av.6 °F (37 °C), Min:98 °F (36.7 °C), Max:99.3 °F (37.4 °C)    In: 880   Out: -     Physical Exam  Constitutional:       General: He is not in acute distress.  HENT:      Head: Normocephalic and atraumatic.      Mouth/Throat:      Mouth: Mucous  membranes are moist.   Cardiovascular:      Rate and Rhythm: Normal rate.      Heart sounds: Normal heart sounds.   Pulmonary:      Effort: No respiratory distress.      Breath sounds: Normal breath sounds.   Abdominal:      General: There is no distension.      Palpations: Abdomen is soft.   Musculoskeletal:      Right lower leg: No edema.      Left lower leg: No edema.   Skin:     General: Skin is dry.   Neurological:      Mental Status: He is alert and oriented to person, place, and time.      Motor: No weakness.   Psychiatric:         Mood and Affect: Mood normal.         Medications:  Scheduled Medications:    sodium chloride flush  5-40 mL IntraVENous 2 times per day    insulin lispro  0-8 Units SubCUTAneous 4x Daily AC & HS    heparin (porcine)  5,000 Units SubCUTAneous 3 times per day     Continuous Infusions:    sodium chloride       PRN Medicationssodium chloride flush, 5-40 mL, PRN  sodium chloride, , PRN  magnesium sulfate, 2,000 mg, PRN  ondansetron, 4 mg, Q8H PRN   Or  ondansetron, 4 mg, Q6H PRN  polyethylene glycol, 17 g, Daily PRN  acetaminophen, 650 mg, Q6H PRN   Or  acetaminophen, 650 mg, Q6H PRN        Diagnostic Labs:  CBC:   Recent Labs     05/28/25  1010 05/29/25  0352 05/30/25  0553   WBC 9.4 7.2 7.2   RBC 4.15* 3.57* 3.36*   HGB 13.7 11.6* 11.2*   HCT 41.1 35.0* 32.6*   MCV 99.0 98.0 97.0   RDW 14.1 14.1 14.4    163 145     BMP:   Recent Labs     05/28/25  1010 05/29/25  0352 05/30/25  0553    138 137   K 5.3 4.3 4.7    101 103   CO2 22 21 17*   BUN 61* 68* 69*   CREATININE 7.1* 7.9* 8.4*     BNP: No results for input(s): \"BNP\" in the last 72 hours.  PT/INR:   Recent Labs     05/28/25  1650   PROTIME 13.8   INR 1.0     APTT: No results for input(s): \"APTT\" in the last 72 hours.  CARDIAC ENZYMES: No results for input(s): \"CKMB\", \"CKMBINDEX\", \"TROPONINI\" in the last 72 hours.    Invalid input(s): \"CKTOTAL;3\"  FASTING LIPID PANEL:  Lab Results   Component Value Date    CHOL

## 2025-05-30 NOTE — PROGRESS NOTES
Nephrology Progress Note      SUBJECTIVE      Seen and examined at bedside, no complaints today, his last dialysis session was Monday  Status post ligation of AV fistula and tunneled catheter placement on     Brief history  45-year-old male with history of ESRD on  dialysis at [facility], through right upper extremity AV fistula under Dr. Ann, type 2 diabetes mellitus, hypertension, anemia of chronic disease recently had angioplasty in  of the right upper extremity, and fistulogram for maintaining patency of his AV graft.  Now came in again with AV graft malfunction, no thrill was appreciable, vascular surgery is consulted for percutaneous mechanical thrombectomy of the right upper extremity.  Nephrology consulted to assist with dialysis.       OBJECTIVE      CURRENT TEMPERATURE:  Temp: 98.4 °F (36.9 °C)  MAXIMUM TEMPERATURE OVER 24HRS:  Temp (24hrs), Av.4 °F (36.9 °C), Min:98 °F (36.7 °C), Max:99.1 °F (37.3 °C)    CURRENT RESPIRATORY RATE:  Respirations: 14  CURRENT PULSE:  Pulse: 94  CURRENT BLOOD PRESSURE:  BP: 96/73  24HR BLOOD PRESSURE RANGE:  Systolic (24hrs), Av , Min:84 , Max:109   ; Diastolic (24hrs), Av, Min:50, Max:82    24HR INTAKE/OUTPUT:    Intake/Output Summary (Last 24 hours) at 2025 0959  Last data filed at 2025 0544  Gross per 24 hour   Intake 880 ml   Output --   Net 880 ml     WEIGHT :Patient Vitals for the past 96 hrs (Last 3 readings):   Weight   25 0600 67.9 kg (149 lb 11.1 oz)   25 1534 66.7 kg (147 lb)       PHYSICAL EXAM      GENERAL APPEARANCE:Awake, alert, in no acute distress  SKIN: warm and dry, no rash or erythema  EYES: conjunctivae normal and sclera anicteric  ENT: no thrush no pharyngeal congestion   NECK:   No JVD. No carotid bruits and no carotid lymphadenopathy   PULMONARY: lungs are clear on auscultation. No wheezing, no ronchi   CARDIOVASCULAR: S1 and S2 normal, no S3 or S4. No rubs, no murmur  ABDOMEN:

## 2025-05-30 NOTE — PLAN OF CARE
Problem: Chronic Conditions and Co-morbidities  Goal: Patient's chronic conditions and co-morbidity symptoms are monitored and maintained or improved  5/30/2025 0156 by Ariadna Carr RN  Outcome: Progressing     Problem: Discharge Planning  Goal: Discharge to home or other facility with appropriate resources  5/30/2025 0156 by Ariadna Carr RN  Outcome: Progressing     Problem: Safety - Adult  Goal: Free from fall injury  5/30/2025 0156 by Ariadna Carr RN  Outcome: Progressing

## 2025-05-30 NOTE — PLAN OF CARE
Problem: Chronic Conditions and Co-morbidities  Goal: Patient's chronic conditions and co-morbidity symptoms are monitored and maintained or improved  5/30/2025 1450 by Bharati Nicolas RN  Outcome: Completed  5/30/2025 0156 by Ariadna Carr RN  Outcome: Progressing     Problem: Discharge Planning  Goal: Discharge to home or other facility with appropriate resources  5/30/2025 1450 by Bharati Nicolas RN  Outcome: Completed  5/30/2025 0156 by Ariadna Carr RN  Outcome: Progressing     Problem: Safety - Adult  Goal: Free from fall injury  5/30/2025 1450 by Bharati Nicolas RN  Outcome: Completed  5/30/2025 0156 by Ariadna Carr RN  Outcome: Progressing

## 2025-05-30 NOTE — PROGRESS NOTES
Division of Vascular Surgery             Progress Note      Name: Jules Nichols  MRN: [id number]         Overnight Events:      No acute events overnight      Subjective:     C.E. 44 y/o male with a PMH of type II DM, ESRD on hemodialysis, right AV fistula with cc of chief complaint of AV malfunction. Pt went to the OR 5/29. Per nursing update no events overnight. Pt evaluated at the bedside this morning to evaluate the fistula. Pt is pleasant upon exam and is helpful without any complaints. Pt reports pain in minimal currently at a 2/10 and no issues, questions, or concerns. Patient is scheduled for dialysis today and his sleep was good as well as his appetite has returned.     Physical Exam:     Vitals:  BP 96/73   Pulse 94   Temp 98.4 °F (36.9 °C) (Oral)   Resp 14   Ht 1.626 m (5' 4\")   Wt 67.9 kg (149 lb 11.1 oz)   SpO2 98%   BMI 25.69 kg/m²     General appearance - alert, well appearing and in no acute distress  Mental status - oriented to person, place and time with normal affect  Head - normocephalic and atraumatic  Neck - supple.   Chest - clear to auscultation, normal effort  Heart - normal rate, regular rhythm, no murmurs  Abdomen - non-distended,   Neurological - normal speech, no focal findings or movement disorder noted  Extremities - peripheral pulses palpable, no pedal edema or calf pain with palpation  Skin - no gross lesions, rashes, or induration noted  Vascular Exam - Arm evaluated dressing present which is unwound. No tenderness noted without signs of drainage. No signs of deficits in strength or sensations in BL arms. Hands are equal in temperature strength and sensation. Good cap refill. surgical dressing without signs of blood or discharge      Imaging:   No imaging complete during this encounter.       Assessment:     The patient is a 44 y/o male presents for malfunction of arteriovenous dialysis fistula, and went to the OR on 5/29 for open thrombectomy of right upper arm AV graft,

## 2025-05-30 NOTE — PROGRESS NOTES
Occupational Therapy    [facility]  Occupational Therapy Not Seen Note    DATE: 2025    NAME: Jules Nichols  MRN: [id number]   : 1980      Patient not seen this date for Occupational Therapy due to:    Patient independent with ADLs and functional mobility with no acute OT needs. Will defer OT evaluation at this time. Please reorder OT if future needs arise. RN in agreement.    Electronically signed by MIC BRADSHAW/L on 2025 at 3:35 PM

## 2025-05-30 NOTE — PROGRESS NOTES
Occupational Therapy    [facility]  Occupational Therapy Not Seen Note    DATE: 2025    NAME: Jules Nichols  MRN: [id number]   : 1980      Patient not seen this date for Occupational Therapy due to:    Pt currently off the floor to Hemodialysis    Electronically signed by LAURA BRADSHAW on 2025 at 12:35 PM

## 2025-05-30 NOTE — PROGRESS NOTES
Physical Therapy        Physical Therapy Cancel Note      DATE: 2025    NAME: Jules Nichols  MRN: [id number]   : 1980      Patient not seen this date for Physical Therapy due to:    Hemodialysis: PT will check back as time allows.      Electronically signed by Liz Jiménez PT on 2025 at 12:27 PM

## 2025-05-30 NOTE — PROGRESS NOTES
Physical Therapy        Physical Therapy Cancel Note      DATE: 2025    NAME: Jules Nichols  MRN: [id number]   : 1980      Patient not seen this date for Physical Therapy due to:    Patient independent with functional mobility. Will defer PT evaluation at this time. Please reorder PT if future needs arise.       Electronically signed by Liz Jiménez PT on 2025 at 3:24 PM

## 2025-05-30 NOTE — DISCHARGE INSTRUCTIONS
Dialysis Access instructions per Vascular surgery:    Please use left chest tunneled dialysis catheter. Right upper extremity access is ligated.     Discharge Instructions for Vascular Surgery  Take all medications as prescribed. Follow up with your PCP within the next 7-10 days. Follow up with Dr. Hook in the Vascular Clinic in 2-3 weeks, call for appointment. No driving while using narcotic pain medications. Call the clinic or return to the ED if any questions or concerns arise.      No lifting above 10Ibs for next 2 weeks with right arm.   No soaking in bathtubs or submerging yourself in water for 4 weeks  Resume activity as tolerated,   No operating heavy equipment while using narcotics   Wash incision gently with soap and water, pat dry.  Will remove sutures at post op visit with Dr. Hook in 2-3 weeks if needed.   Keep wound covered with basic dressings for comfort and protection.      Call your doctor for the following:  Chills  Temperature greater than 101  Pain that is not tolerable despite taking pain medicine as ordered There is increased swelling, redness or warmth at surgical site There is increased drainage or bleeding from surgical site

## 2025-05-30 NOTE — PROGRESS NOTES
PHARMACY NOTE:    The electrolyte replacement protocol for potassium/magnesium has been discontinued per P&T guidelines because the patient has reduced renal function (CrCl < 30 mL/min).      The patient's most recent potassium & magnesium levels are:  Recent Labs     05/28/25  1010 05/29/25  0352 05/30/25  0553   K 5.3 4.3 4.7     Estimated Creatinine Clearance: 9 mL/min (A) (based on SCr of 8.4 mg/dL (HH)).    For patients with decreased renal function (below 30ml/min) needing potassium/magnesium supplementation, please order individual bolus doses with appropriate monitoring.      Please contact the inpatient pharmacy with any concerns.  Thank you.

## 2025-06-02 ENCOUNTER — TELEPHONE (OUTPATIENT)
Dept: INTERNAL MEDICINE CLINIC | Age: 45
End: 2025-06-02

## 2025-06-02 NOTE — TELEPHONE ENCOUNTER
Care Transitions Initial Follow Up Call    Outreach made within 2 business days of discharge: Yes    Patient: Jules Nichols Patient : 1980   MRN: [id number]  Reason for Admission: fistula occlusion   Discharge Date: 25       1st attempt, lvm       Follow Up  Future Appointments   Date Time Provider Department Center   2025  1:45 PM Elvin Kelley MD [facility] ECC DEP       Quyen Kaiser MA

## 2025-06-03 ENCOUNTER — TELEPHONE (OUTPATIENT)
Dept: VASCULAR SURGERY | Age: 45
End: 2025-06-03

## 2025-06-03 NOTE — TELEPHONE ENCOUNTER
Care Transitions Initial Follow Up Call    Outreach made within 2 business days of discharge: Yes    Patient: Jules Nichols Patient : 1980   MRN: [id number]  Reason for Admission: AV fistulas occlusion  Discharge Date: 25       Spoke with: self    Discharge department/facility: home    TCM Interactive Patient Contact:  Was patient able to fill all prescriptions: Yes  Was patient instructed to bring all medications to the follow-up visit: Yes  Is patient taking all medications as directed in the discharge summary? Yes  Does patient understand their discharge instructions: Yes  Does patient have questions or concerns that need addressed prior to 7-14 day follow up office visit: no    Additional needs identified to be addressed with provider  No needs identified             Denied sooner appointment     Follow Up  Future Appointments   Date Time Provider Department Center   [facility]25  1:00 PM [facility]   2025  1:45 PM Elvin Kelley MD [facility]       Quyen Kaiser MA

## 2025-06-03 NOTE — TELEPHONE ENCOUNTER
----- Message from EMELINA RING MA sent at 6/3/2025  1:00 PM EDT -----  Regarding: FW: Schedule for surgery  Called and left message for patient to call and schedule.  ----- Message -----  From: Kevin Hook MD  Sent: 5/30/2025   3:15 PM EDT  To: Aubree Dumont MA  Subject: Schedule for surgery                             Needs to two cases:    1) Book for right arm arteriogram in two weeks. MAC. Hybrid room.    2) Book for right brachial to subclavian AV graft creation with general anesthesia in 3 weeks. This can be done in OR 22.    I want to do angiogram and bring him back for the graft creation so they have to be done in that order. Thanks.

## 2025-06-04 VITALS
OXYGEN SATURATION: 98 % | DIASTOLIC BLOOD PRESSURE: 68 MMHG | HEIGHT: 64 IN | TEMPERATURE: 98.5 F | WEIGHT: 146.39 LBS | SYSTOLIC BLOOD PRESSURE: 108 MMHG | RESPIRATION RATE: 17 BRPM | HEART RATE: 90 BPM | BODY MASS INDEX: 24.99 KG/M2

## 2025-06-04 NOTE — PROGRESS NOTES
Dialysis Post Treatment Note  Vitals:    05/30/25 1345   BP: 108/68   Pulse: 90   Resp: 17   Temp: 98.5 °F (36.9 °C)   SpO2:      Pre-Weight = 68.3kg  Post-weight = Weight - Scale: 66.4 kg (146 lb 6.2 oz)  Total Liters Processed = Blood Volume Processed (Liters): 84.3 L  Rinseback Volume (mL) = Rinseback Volume (ml): 300 ml  Net Removal (mL) =  2000  Patient's dry weight= TBD  Type of access used= tunneled cath  Length of treatment= 210 min  Date of last dressing change = 05/30/2025  Outpatient dialysis unit/days= MWF  Outpatient nephrologist= TBD      PT tolerated tx well w/o issue. VSS pre/intra/post tx. 2L removed w/o complaint.

## 2025-06-04 NOTE — PROGRESS NOTES
Dialysis Time Out  To be done by RN and tech or 2 RNs  Staff Names Octavio YING OCDT    [x]  Identity of the patient using 2 patient identifiers  [x]  Consent for treatment  [x]  Equipment-proper machine and dialyzer  [x]  B-Hep B status (labs ordered and drawn, machine heated following tx)   [x]  Orders- to include bath, blood flow, dialyzer, time and fluid removal  [x]  Access-Correct site and in working order  [x]  Time for patient to ask questions.

## 2025-06-04 NOTE — TELEPHONE ENCOUNTER
Annabella from dialysis called - scheduled RUE Arteriogram for Monday 6/16/2025 8:00 am, arrive 6:30 am, scheduled AV Graft for Monday 6/23/2025 8:00 am, arrive 6:30 am.  She is aware he needs to be NPO / transportation / location.  Paperwork faxed to dialysis at [phone] for patient.

## 2025-06-16 ENCOUNTER — HOSPITAL ENCOUNTER (OUTPATIENT)
Age: 45
Setting detail: OUTPATIENT SURGERY
Discharge: HOME | End: 2025-06-16
Attending: STUDENT IN AN ORGANIZED HEALTH CARE EDUCATION/TRAINING PROGRAM | Admitting: STUDENT IN AN ORGANIZED HEALTH CARE EDUCATION/TRAINING PROGRAM
Payer: MEDICAID

## 2025-06-16 ENCOUNTER — ANESTHESIA EVENT (OUTPATIENT)
Dept: OPERATING ROOM | Age: 45
End: 2025-06-16
Payer: MEDICAID

## 2025-06-16 ENCOUNTER — ANESTHESIA (OUTPATIENT)
Dept: OPERATING ROOM | Age: 45
End: 2025-06-16
Payer: MEDICAID

## 2025-06-16 ENCOUNTER — APPOINTMENT (OUTPATIENT)
Age: 45
End: 2025-06-16
Attending: STUDENT IN AN ORGANIZED HEALTH CARE EDUCATION/TRAINING PROGRAM
Payer: MEDICAID

## 2025-06-16 VITALS
HEIGHT: 64 IN | SYSTOLIC BLOOD PRESSURE: 120 MMHG | HEART RATE: 76 BPM | WEIGHT: 149.2 LBS | RESPIRATION RATE: 17 BRPM | DIASTOLIC BLOOD PRESSURE: 80 MMHG | TEMPERATURE: 98.1 F | BODY MASS INDEX: 25.47 KG/M2 | OXYGEN SATURATION: 99 %

## 2025-06-16 DIAGNOSIS — T82.868A AV FISTULA THROMBOSIS, INITIAL ENCOUNTER: Primary | ICD-10-CM

## 2025-06-16 LAB
BUN BLD-MCNC: 50 MG/DL (ref 8–26)
CO2 BLD CALC-SCNC: 23 MMOL/L (ref 22–30)
ECHO BSA: 1.75 M2
EGFR, POC: 8 ML/MIN/1.73M2
GLUCOSE BLD-MCNC: 185 MG/DL (ref 74–100)
HCT VFR BLD AUTO: 31 % (ref 41–53)
POC CREATININE: 8.2 MG/DL (ref 0.51–1.19)
POC HEMOGLOBIN (CALC): 10.6 G/DL (ref 13.5–17.5)
POTASSIUM BLD-SCNC: 3.6 MMOL/L (ref 3.5–4.5)
SODIUM BLD-SCNC: 141 MMOL/L (ref 138–146)

## 2025-06-16 PROCEDURE — 2580000003 HC RX 258

## 2025-06-16 PROCEDURE — 3600000017 HC SURGERY HYBRID ADDL 15MIN: Performed by: STUDENT IN AN ORGANIZED HEALTH CARE EDUCATION/TRAINING PROGRAM

## 2025-06-16 PROCEDURE — 6360000002 HC RX W HCPCS: Performed by: STUDENT IN AN ORGANIZED HEALTH CARE EDUCATION/TRAINING PROGRAM

## 2025-06-16 PROCEDURE — C1760 CLOSURE DEV, VASC: HCPCS | Performed by: STUDENT IN AN ORGANIZED HEALTH CARE EDUCATION/TRAINING PROGRAM

## 2025-06-16 PROCEDURE — 6360000004 HC RX CONTRAST MEDICATION: Performed by: STUDENT IN AN ORGANIZED HEALTH CARE EDUCATION/TRAINING PROGRAM

## 2025-06-16 PROCEDURE — C1769 GUIDE WIRE: HCPCS | Performed by: STUDENT IN AN ORGANIZED HEALTH CARE EDUCATION/TRAINING PROGRAM

## 2025-06-16 PROCEDURE — 82374 ASSAY BLOOD CARBON DIOXIDE: CPT

## 2025-06-16 PROCEDURE — 84295 ASSAY OF SERUM SODIUM: CPT

## 2025-06-16 PROCEDURE — 84132 ASSAY OF SERUM POTASSIUM: CPT

## 2025-06-16 PROCEDURE — C1894 INTRO/SHEATH, NON-LASER: HCPCS | Performed by: STUDENT IN AN ORGANIZED HEALTH CARE EDUCATION/TRAINING PROGRAM

## 2025-06-16 PROCEDURE — 82565 ASSAY OF CREATININE: CPT

## 2025-06-16 PROCEDURE — 85014 HEMATOCRIT: CPT

## 2025-06-16 PROCEDURE — C1892 INTRO/SHEATH,FIXED,PEEL-AWAY: HCPCS | Performed by: STUDENT IN AN ORGANIZED HEALTH CARE EDUCATION/TRAINING PROGRAM

## 2025-06-16 PROCEDURE — 3600000007 HC SURGERY HYBRID BASE: Performed by: STUDENT IN AN ORGANIZED HEALTH CARE EDUCATION/TRAINING PROGRAM

## 2025-06-16 PROCEDURE — 82947 ASSAY GLUCOSE BLOOD QUANT: CPT

## 2025-06-16 PROCEDURE — 2500000003 HC RX 250 WO HCPCS: Performed by: STUDENT IN AN ORGANIZED HEALTH CARE EDUCATION/TRAINING PROGRAM

## 2025-06-16 PROCEDURE — 7100000011 HC PHASE II RECOVERY - ADDTL 15 MIN: Performed by: STUDENT IN AN ORGANIZED HEALTH CARE EDUCATION/TRAINING PROGRAM

## 2025-06-16 PROCEDURE — 2500000003 HC RX 250 WO HCPCS

## 2025-06-16 PROCEDURE — 2709999900 HC NON-CHARGEABLE SUPPLY: Performed by: STUDENT IN AN ORGANIZED HEALTH CARE EDUCATION/TRAINING PROGRAM

## 2025-06-16 PROCEDURE — 3700000000 HC ANESTHESIA ATTENDED CARE: Performed by: STUDENT IN AN ORGANIZED HEALTH CARE EDUCATION/TRAINING PROGRAM

## 2025-06-16 PROCEDURE — 7100000010 HC PHASE II RECOVERY - FIRST 15 MIN: Performed by: STUDENT IN AN ORGANIZED HEALTH CARE EDUCATION/TRAINING PROGRAM

## 2025-06-16 PROCEDURE — C1887 CATHETER, GUIDING: HCPCS | Performed by: STUDENT IN AN ORGANIZED HEALTH CARE EDUCATION/TRAINING PROGRAM

## 2025-06-16 PROCEDURE — 6360000002 HC RX W HCPCS

## 2025-06-16 PROCEDURE — 84520 ASSAY OF UREA NITROGEN: CPT

## 2025-06-16 PROCEDURE — 3700000001 HC ADD 15 MINUTES (ANESTHESIA): Performed by: STUDENT IN AN ORGANIZED HEALTH CARE EDUCATION/TRAINING PROGRAM

## 2025-06-16 RX ORDER — FENTANYL CITRATE 50 UG/ML
50 INJECTION, SOLUTION INTRAMUSCULAR; INTRAVENOUS EVERY 5 MIN PRN
Refills: 0 | Status: CANCELLED | OUTPATIENT
Start: 2025-06-16

## 2025-06-16 RX ORDER — LIDOCAINE HYDROCHLORIDE 10 MG/ML
INJECTION, SOLUTION EPIDURAL; INFILTRATION; INTRACAUDAL; PERINEURAL PRN
Status: DISCONTINUED | OUTPATIENT
Start: 2025-06-16 | End: 2025-06-16 | Stop reason: ALTCHOICE

## 2025-06-16 RX ORDER — IODIXANOL 320 MG/ML
INJECTION, SOLUTION INTRAVASCULAR PRN
Status: DISCONTINUED | OUTPATIENT
Start: 2025-06-16 | End: 2025-06-16 | Stop reason: ALTCHOICE

## 2025-06-16 RX ORDER — MIDAZOLAM HYDROCHLORIDE 1 MG/ML
INJECTION, SOLUTION INTRAMUSCULAR; INTRAVENOUS
Status: DISCONTINUED | OUTPATIENT
Start: 2025-06-16 | End: 2025-06-16 | Stop reason: SDUPTHER

## 2025-06-16 RX ORDER — PROPOFOL 10 MG/ML
INJECTION, EMULSION INTRAVENOUS
Status: COMPLETED
Start: 2025-06-16 | End: 2025-06-16

## 2025-06-16 RX ORDER — PHENYLEPHRINE HCL IN 0.9% NACL 1 MG/10 ML
SYRINGE (ML) INTRAVENOUS
Status: DISCONTINUED | OUTPATIENT
Start: 2025-06-16 | End: 2025-06-16 | Stop reason: SDUPTHER

## 2025-06-16 RX ORDER — IODIXANOL 320 MG/ML
INJECTION, SOLUTION INTRAVASCULAR
Status: DISCONTINUED
Start: 2025-06-16 | End: 2025-06-16 | Stop reason: HOSPADM

## 2025-06-16 RX ORDER — SODIUM CHLORIDE 9 MG/ML
INJECTION, SOLUTION INTRAVENOUS PRN
Status: CANCELLED | OUTPATIENT
Start: 2025-06-16

## 2025-06-16 RX ORDER — SODIUM CHLORIDE 0.9 % (FLUSH) 0.9 %
5-40 SYRINGE (ML) INJECTION PRN
Status: CANCELLED | OUTPATIENT
Start: 2025-06-16

## 2025-06-16 RX ORDER — FENTANYL CITRATE 50 UG/ML
25 INJECTION, SOLUTION INTRAMUSCULAR; INTRAVENOUS EVERY 5 MIN PRN
Refills: 0 | Status: CANCELLED | OUTPATIENT
Start: 2025-06-16

## 2025-06-16 RX ORDER — MIDAZOLAM HYDROCHLORIDE 2 MG/2ML
1 INJECTION, SOLUTION INTRAMUSCULAR; INTRAVENOUS EVERY 10 MIN PRN
Status: CANCELLED | OUTPATIENT
Start: 2025-06-16

## 2025-06-16 RX ORDER — NALOXONE HYDROCHLORIDE 0.4 MG/ML
INJECTION, SOLUTION INTRAMUSCULAR; INTRAVENOUS; SUBCUTANEOUS PRN
Status: CANCELLED | OUTPATIENT
Start: 2025-06-16

## 2025-06-16 RX ORDER — GLYCOPYRROLATE 0.2 MG/ML
INJECTION INTRAMUSCULAR; INTRAVENOUS
Status: DISCONTINUED | OUTPATIENT
Start: 2025-06-16 | End: 2025-06-16 | Stop reason: SDUPTHER

## 2025-06-16 RX ORDER — ONDANSETRON 2 MG/ML
4 INJECTION INTRAMUSCULAR; INTRAVENOUS
Status: CANCELLED | OUTPATIENT
Start: 2025-06-16

## 2025-06-16 RX ORDER — PROPOFOL 10 MG/ML
INJECTION, EMULSION INTRAVENOUS
Status: DISCONTINUED | OUTPATIENT
Start: 2025-06-16 | End: 2025-06-16 | Stop reason: SDUPTHER

## 2025-06-16 RX ORDER — SODIUM CHLORIDE 0.9 % (FLUSH) 0.9 %
5-40 SYRINGE (ML) INJECTION EVERY 12 HOURS SCHEDULED
Status: CANCELLED | OUTPATIENT
Start: 2025-06-16

## 2025-06-16 RX ORDER — SODIUM CHLORIDE 9 MG/ML
INJECTION, SOLUTION INTRAVENOUS
Status: DISCONTINUED | OUTPATIENT
Start: 2025-06-16 | End: 2025-06-16 | Stop reason: SDUPTHER

## 2025-06-16 RX ORDER — HEPARIN SODIUM 1000 [USP'U]/ML
2100 INJECTION, SOLUTION INTRAVENOUS; SUBCUTANEOUS ONCE
Status: COMPLETED | OUTPATIENT
Start: 2025-06-16 | End: 2025-06-16

## 2025-06-16 RX ORDER — SODIUM CHLORIDE 9 MG/ML
INJECTION, SOLUTION INTRAVENOUS CONTINUOUS
Status: DISCONTINUED | OUTPATIENT
Start: 2025-06-16 | End: 2025-06-16 | Stop reason: HOSPADM

## 2025-06-16 RX ADMIN — HEPARIN SODIUM 2100 UNITS: 1000 INJECTION INTRAVENOUS; SUBCUTANEOUS at 11:01

## 2025-06-16 RX ADMIN — PROPOFOL 100 MG: 10 INJECTION, EMULSION INTRAVENOUS at 08:04

## 2025-06-16 RX ADMIN — Medication 200 MCG: at 08:19

## 2025-06-16 RX ADMIN — SODIUM CHLORIDE: 9 INJECTION, SOLUTION INTRAVENOUS at 07:58

## 2025-06-16 RX ADMIN — Medication 20 MG: at 08:04

## 2025-06-16 RX ADMIN — GLYCOPYRROLATE 0.1 MG: 0.2 INJECTION INTRAMUSCULAR; INTRAVENOUS at 08:05

## 2025-06-16 RX ADMIN — Medication 200 MCG: at 08:29

## 2025-06-16 RX ADMIN — MIDAZOLAM 2 MG: 1 INJECTION INTRAMUSCULAR; INTRAVENOUS at 07:58

## 2025-06-16 RX ADMIN — PROPOFOL 150 MCG/KG/MIN: 10 INJECTION, EMULSION INTRAVENOUS at 08:05

## 2025-06-16 ASSESSMENT — ENCOUNTER SYMPTOMS
GASTROINTESTINAL NEGATIVE: 1
RESPIRATORY NEGATIVE: 1

## 2025-06-16 NOTE — PROGRESS NOTES
Patient admitted, consent signed and questions answered. Patient ready for procedure. Call light to reach with side rails up 2 of 2.  History and physical needs to be completed.

## 2025-06-16 NOTE — OP NOTE
Operative Note      Patient: Jules Nichols  YOB: 1980  MRN: [id number]    Date of Procedure: 6/16/2025    Pre-Op Diagnosis Codes:      * Dialysis AV fistula malfunction, initial encounter [T82.590A]    Post-Op Diagnosis: Same       Procedures:  1) Ultrasound guided access of right common femoral artery  2) Angiogram of aortic arch with catheter in aorta  3) Right upper extremity angiogram with catheter in right subclavian and brachial arteries  4) Deployment of right common femoral artery closure device (5 [employer])    Surgeon(s):  Kevin Hook MD    Assistant: Sarah Machado DO (Resident)    Anesthesia: Monitor Anesthesia Care    Estimated Blood Loss (mL): 10 mL    Complications: None    Specimens: None    Implants: None      Drains: None    Findings:  Infection Present At Time Of Surgery (PATOS) (choose all levels that have infection present):  No infection present  Other Findings: Right common femoral, profunda and superficial femoral arteries are patent.  Aortic arch is patent with patent innominate, left carotid and left subclavian arteries.  The right subclavian, axillary and brachial arteries are patent.  Radial and ulnar arteries are patent to the hand but the radial artery appears to be the larger and more dominant artery. Good hemostasis in right groin at the end of case.    Plan: Will proceed with right arm brachial to subclavian AV graft creation.    Detailed Description of Procedure:   Mr. Nichols was brought to the hybrid room placed in supine position with right arm out.  His airway and sedation were managed by the anesthesia team.  Bilateral groins were prepped and draped in standard sterile fashion.  A timeout was performed.  I evaluated his right common femoral artery with ultrasound and it was patent and compressible.  I anesthetized the overlying skin with local anesthetic.  Under direct ultrasound guidance I accessed the right common femoral artery with a micropuncture

## 2025-06-16 NOTE — ANESTHESIA POSTPROCEDURE EVALUATION
Department of Anesthesiology  Postprocedure Note    Patient: Jules Nichols  MRN: [id number]  YOB: 1980  Date of evaluation: 6/16/2025    Procedure Summary       Date: 06/16/25 Room / Location: [place] [address] / [facility]    Anesthesia Start: 0758 Anesthesia Stop: 0900    Procedure: RIGHT UPPER EXTREMITY ARTERIOGRAM VIA RIGHT GROIN ACCESS (Right: Groin) Diagnosis:       Dialysis AV fistula malfunction, initial encounter      (Dialysis AV fistula malfunction, initial encounter [T82.590A])    Surgeons: Kevin Hook MD Responsible Provider: Uli Tracey MD    Anesthesia Type: MAC ASA Status: 3            Anesthesia Type: No value filed.    Jonah Phase I:      Jonah Phase II: Jonah Score: 10  POST-OP ANESTHESIA NOTE       /80   Pulse 76   Temp 98.1 °F (36.7 °C) (Oral)   Resp 17   Ht 1.626 m (5' 4\")   Wt 67.7 kg (149 lb 3.2 oz)   SpO2 99%   BMI 25.61 kg/m²    Pain Assessment: None - Denies Pain          Anesthesia Post Evaluation    Patient location during evaluation: PACU  Patient participation: complete - patient participated  Level of consciousness: awake  Pain score: 0  Airway patency: patent  Nausea & Vomiting: no vomiting and no nausea  Cardiovascular status: hemodynamically stable  Respiratory status: acceptable  Hydration status: stable  Pain management: adequate        No notable events documented.

## 2025-06-16 NOTE — ANESTHESIA PRE PROCEDURE
Department of Anesthesiology  Preprocedure Note       Name:  Jules Nichols   Age:  45 y.o.  :  1980                                          MRN:  [id number]         Date:  2025      Surgeon: Surgeon(s):  Kevin Castillo MD    Procedure: Procedure(s):  castillo / UPPER EXTREMITY ARTERIOGRAM    Medications prior to admission:   Prior to Admission medications    Medication Sig Start Date End Date Taking? Authorizing Provider   sevelamer (RENVELA) 800 MG tablet Take 1 tablet by mouth 3 times daily (with meals) 24  Yes Kallie Joseph MD   midodrine (PROAMATINE) 10 MG tablet Take 1 tablet by mouth 3 times daily as needed (for SBP<90) 24  Yes Kallie Joseph MD   insulin glargine (LANTUS SOLOSTAR) 100 UNIT/ML injection pen Inject 20 Units into the skin daily 24  Yes Ty Tee MD   acetaminophen (TYLENOL) 325 MG tablet Take 2 tablets by mouth every 6 hours as needed for Pain 25  Latoya Ascencio MD   VELPHORO 500 MG CHEW chewable tablet Take 1 tablet by mouth 3 times daily (with meals)  Patient not taking: Reported on 2025   ProviderFlora MD   tamsulosin (FLOMAX) 0.4 MG capsule Take 1 capsule by mouth daily  Patient not taking: Reported on 2025   Ty Tee MD   Blood Glucose Monitoring Suppl (TRUERESULT BLOOD GLUCOSE) W/DEVICE KIT 1 kit by Does not apply route daily as needed 16   Elvin Kelley MD [facility] Ultra Thin Lancets MISC 1 each by Does not apply route 4 times daily (before meals and nightly) 16   Elvin Kelley MD   Glucose Blood (BLOOD GLUCOSE TEST STRIPS) STRP 1 each by Does not apply route 4 times daily (before meals and nightly) 16   Elvin Kelley MD   Alcohol Swabs 70 % PADS Inject 1 each into the skin 3 times daily as needed 16   Elvin Kelley MD   Insulin Pen Needle (B-D ULTRAFINE III SHORT PEN) 31G X 8 MM MISC Inject 1 each into the skin daily May substitute with any brand

## 2025-06-16 NOTE — H&P
Division of Vascular Surgery        HISTORY AND PHYSICAL      History of Present Illness:      Jules Nichols is a 45 y.o. gentleman who presents for upper extremity arteriogram. PMH of type II DM, ESRD on hemodialysis, right AV graft with  chief complaint of AV malfunction. He had ligation of R arm AV graft and tunneled dialysis catheter placement on 5/29/2025. He is here today for mapping of the upper extremity to eval arteries for further dialysis access.     Medical History:     Past Medical History:   Diagnosis Date    Benign essential HTN     Chronic kidney disease 01/20/2016    Dr Stafford    Diabetic keto-acidosis (HCC) 01/20/2016    hx of     Dialysis patient     MONDAY WEDNESDAY AND FRIDAYS  /  [facility]    Hyperlipemia     Uncontrolled type 2 diabetes mellitus with nephropathy 01/20/2016       Surgical History:     Past Surgical History:   Procedure Laterality Date    ABDOMEN SURGERY N/A 02/24/2024    WOUND BED PREPARATION BILATERAL GROIN AND ARMPIT, CHEST AND BACK performed by Boyd Keene MD at [facility] OR    AV FISTULA CREATION Right 07/18/2024    DR. HOOK    DEBRIDEMENT      Irrigation and Debridement BILATERAL GROIN AND ARMPIT, and buttocks, chest    DIALYSIS FISTULA CREATION Right 07/18/2024    ARTERIOVENOUS FISTULA CREATION RIGHT performed by Kevin Hook MD at [facility] CVOR    DIALYSIS FISTULA CREATION Right 12/27/2024    UPPER EXTREMITY FISTULAGRAM performed by Kevin Hook MD at [facility] CVOR    DIALYSIS FISTULA CREATION Right 02/06/2025    DR FERRELL  /  GRAFT TO RIGHT UPPER ARM    DIALYSIS FISTULA CREATION Right 2/6/2025    RIGHT UPPER EXTREMITY ARTERIOVENOUS GRAFT IMPLANTATION WITH ARTEGRAFT 6mm X 38cm GRAFT performed by Kevin Hook MD at [facility] CVOR    DIALYSIS FISTULA CREATION Right 5/29/2025    OPEN DECLOT OF UPPER EXTREMITY GRAFT, LIGATION OF ATERIOVENOUS FISTULA WITH XENOSURE BOVINE PERICARDIAL PATCH,14.5 FR TUNNELED CATHETER PLACEMENT, BALLOON ANGIOPLASTY 8MM X

## 2025-06-16 NOTE — PROGRESS NOTES
Received post procedure to [facility] to room 7. Assessment obtained. Restrictions reviewed with patient. Post procedure pathway initiated.  Right groin site soft, dressing dry and intact.  No hematoma noted.   Patient without complaints. Head of bed flat with right leg straight.

## 2025-06-23 ENCOUNTER — ANESTHESIA (OUTPATIENT)
Dept: OPERATING ROOM | Age: 45
End: 2025-06-23
Payer: MEDICAID

## 2025-06-23 ENCOUNTER — HOSPITAL ENCOUNTER (OUTPATIENT)
Age: 45
Setting detail: OUTPATIENT SURGERY
Discharge: HOME | End: 2025-06-23
Attending: STUDENT IN AN ORGANIZED HEALTH CARE EDUCATION/TRAINING PROGRAM | Admitting: STUDENT IN AN ORGANIZED HEALTH CARE EDUCATION/TRAINING PROGRAM
Payer: MEDICAID

## 2025-06-23 ENCOUNTER — ANESTHESIA EVENT (OUTPATIENT)
Dept: OPERATING ROOM | Age: 45
End: 2025-06-23
Payer: MEDICAID

## 2025-06-23 VITALS
DIASTOLIC BLOOD PRESSURE: 69 MMHG | SYSTOLIC BLOOD PRESSURE: 105 MMHG | OXYGEN SATURATION: 100 % | HEART RATE: 102 BPM | TEMPERATURE: 96.8 F | RESPIRATION RATE: 20 BRPM

## 2025-06-23 DIAGNOSIS — Z98.890 S/P ARTERIOVENOUS (AV) FISTULA CREATION: Primary | ICD-10-CM

## 2025-06-23 LAB
BUN BLD-MCNC: 44 MG/DL (ref 8–26)
CA-I BLD-SCNC: 1.19 MMOL/L (ref 1.15–1.33)
CHLORIDE BLD-SCNC: 102 MMOL/L (ref 98–107)
EGFR, POC: 8 ML/MIN/1.73M2
GLUCOSE BLD-MCNC: 151 MG/DL (ref 74–100)
GLUCOSE BLD-MCNC: 173 MG/DL (ref 75–110)
HCT VFR BLD AUTO: 34 % (ref 41–53)
POC CREATININE: 7.5 MG/DL (ref 0.51–1.19)
POC HEMOGLOBIN (CALC): 11.7 G/DL (ref 13.5–17.5)
POTASSIUM BLD-SCNC: 3.7 MMOL/L (ref 3.5–4.5)
SODIUM BLD-SCNC: 142 MMOL/L (ref 138–146)

## 2025-06-23 PROCEDURE — 82435 ASSAY OF BLOOD CHLORIDE: CPT

## 2025-06-23 PROCEDURE — 82947 ASSAY GLUCOSE BLOOD QUANT: CPT

## 2025-06-23 PROCEDURE — 3600000003 HC SURGERY LEVEL 3 BASE: Performed by: STUDENT IN AN ORGANIZED HEALTH CARE EDUCATION/TRAINING PROGRAM

## 2025-06-23 PROCEDURE — 7100000010 HC PHASE II RECOVERY - FIRST 15 MIN: Performed by: STUDENT IN AN ORGANIZED HEALTH CARE EDUCATION/TRAINING PROGRAM

## 2025-06-23 PROCEDURE — 6360000002 HC RX W HCPCS

## 2025-06-23 PROCEDURE — 2709999900 HC NON-CHARGEABLE SUPPLY: Performed by: STUDENT IN AN ORGANIZED HEALTH CARE EDUCATION/TRAINING PROGRAM

## 2025-06-23 PROCEDURE — 82565 ASSAY OF CREATININE: CPT

## 2025-06-23 PROCEDURE — 3700000000 HC ANESTHESIA ATTENDED CARE: Performed by: STUDENT IN AN ORGANIZED HEALTH CARE EDUCATION/TRAINING PROGRAM

## 2025-06-23 PROCEDURE — 7100000001 HC PACU RECOVERY - ADDTL 15 MIN: Performed by: STUDENT IN AN ORGANIZED HEALTH CARE EDUCATION/TRAINING PROGRAM

## 2025-06-23 PROCEDURE — 2720000010 HC SURG SUPPLY STERILE: Performed by: STUDENT IN AN ORGANIZED HEALTH CARE EDUCATION/TRAINING PROGRAM

## 2025-06-23 PROCEDURE — 7100000011 HC PHASE II RECOVERY - ADDTL 15 MIN: Performed by: STUDENT IN AN ORGANIZED HEALTH CARE EDUCATION/TRAINING PROGRAM

## 2025-06-23 PROCEDURE — 84132 ASSAY OF SERUM POTASSIUM: CPT

## 2025-06-23 PROCEDURE — C1889 IMPLANT/INSERT DEVICE, NOC: HCPCS | Performed by: STUDENT IN AN ORGANIZED HEALTH CARE EDUCATION/TRAINING PROGRAM

## 2025-06-23 PROCEDURE — 2580000003 HC RX 258

## 2025-06-23 PROCEDURE — 82330 ASSAY OF CALCIUM: CPT

## 2025-06-23 PROCEDURE — L8670 VASCULAR GRAFT, SYNTHETIC: HCPCS | Performed by: STUDENT IN AN ORGANIZED HEALTH CARE EDUCATION/TRAINING PROGRAM

## 2025-06-23 PROCEDURE — 7100000000 HC PACU RECOVERY - FIRST 15 MIN: Performed by: STUDENT IN AN ORGANIZED HEALTH CARE EDUCATION/TRAINING PROGRAM

## 2025-06-23 PROCEDURE — 85014 HEMATOCRIT: CPT

## 2025-06-23 PROCEDURE — 2500000003 HC RX 250 WO HCPCS

## 2025-06-23 PROCEDURE — 6370000000 HC RX 637 (ALT 250 FOR IP): Performed by: STUDENT IN AN ORGANIZED HEALTH CARE EDUCATION/TRAINING PROGRAM

## 2025-06-23 PROCEDURE — 3600000013 HC SURGERY LEVEL 3 ADDTL 15MIN: Performed by: STUDENT IN AN ORGANIZED HEALTH CARE EDUCATION/TRAINING PROGRAM

## 2025-06-23 PROCEDURE — 2500000003 HC RX 250 WO HCPCS: Performed by: STUDENT IN AN ORGANIZED HEALTH CARE EDUCATION/TRAINING PROGRAM

## 2025-06-23 PROCEDURE — 3700000001 HC ADD 15 MINUTES (ANESTHESIA): Performed by: STUDENT IN AN ORGANIZED HEALTH CARE EDUCATION/TRAINING PROGRAM

## 2025-06-23 PROCEDURE — 84520 ASSAY OF UREA NITROGEN: CPT

## 2025-06-23 PROCEDURE — 6360000002 HC RX W HCPCS: Performed by: ANESTHESIOLOGY

## 2025-06-23 PROCEDURE — 6360000002 HC RX W HCPCS: Performed by: STUDENT IN AN ORGANIZED HEALTH CARE EDUCATION/TRAINING PROGRAM

## 2025-06-23 PROCEDURE — 84295 ASSAY OF SERUM SODIUM: CPT

## 2025-06-23 DEVICE — IMPLANTABLE DEVICE: Type: IMPLANTABLE DEVICE | Status: FUNCTIONAL

## 2025-06-23 DEVICE — VENAFLO® II EPTFE VASCULAR GRAFT WITH CARBON STEPPED, 4-7 MM X 35 CM
Type: IMPLANTABLE DEVICE | Site: SUBCLAVIAN | Status: FUNCTIONAL
Brand: VENAFLO® II EPTFE VASCULAR GRAFTS

## 2025-06-23 DEVICE — CLIP LIG M BLU TI HRT SHP WIRE HORZ 6 CLIPS PER PK: Type: IMPLANTABLE DEVICE | Status: FUNCTIONAL

## 2025-06-23 RX ORDER — SODIUM CHLORIDE 9 MG/ML
INJECTION, SOLUTION INTRAVENOUS
Status: DISCONTINUED | OUTPATIENT
Start: 2025-06-23 | End: 2025-06-23 | Stop reason: SDUPTHER

## 2025-06-23 RX ORDER — CEFAZOLIN SODIUM 1 G/3ML
INJECTION, POWDER, FOR SOLUTION INTRAMUSCULAR; INTRAVENOUS
Status: DISCONTINUED | OUTPATIENT
Start: 2025-06-23 | End: 2025-06-23 | Stop reason: SDUPTHER

## 2025-06-23 RX ORDER — SODIUM CHLORIDE 9 MG/ML
INJECTION, SOLUTION INTRAVENOUS PRN
Status: DISCONTINUED | OUTPATIENT
Start: 2025-06-23 | End: 2025-06-23 | Stop reason: HOSPADM

## 2025-06-23 RX ORDER — DOCUSATE SODIUM 100 MG/1
100 CAPSULE, LIQUID FILLED ORAL 2 TIMES DAILY
Qty: 60 CAPSULE | Refills: 0 | Status: SHIPPED | OUTPATIENT
Start: 2025-06-23 | End: 2025-07-23

## 2025-06-23 RX ORDER — FENTANYL CITRATE 50 UG/ML
INJECTION, SOLUTION INTRAMUSCULAR; INTRAVENOUS
Status: DISCONTINUED | OUTPATIENT
Start: 2025-06-23 | End: 2025-06-23 | Stop reason: SDUPTHER

## 2025-06-23 RX ORDER — SODIUM CHLORIDE 0.9 % (FLUSH) 0.9 %
5-40 SYRINGE (ML) INJECTION PRN
Status: DISCONTINUED | OUTPATIENT
Start: 2025-06-23 | End: 2025-06-23 | Stop reason: HOSPADM

## 2025-06-23 RX ORDER — PROTAMINE SULFATE 10 MG/ML
INJECTION, SOLUTION INTRAVENOUS
Status: DISCONTINUED | OUTPATIENT
Start: 2025-06-23 | End: 2025-06-23 | Stop reason: SDUPTHER

## 2025-06-23 RX ORDER — ONDANSETRON 2 MG/ML
INJECTION INTRAMUSCULAR; INTRAVENOUS
Status: DISCONTINUED | OUTPATIENT
Start: 2025-06-23 | End: 2025-06-23 | Stop reason: SDUPTHER

## 2025-06-23 RX ORDER — NALOXONE HYDROCHLORIDE 0.4 MG/ML
INJECTION, SOLUTION INTRAMUSCULAR; INTRAVENOUS; SUBCUTANEOUS PRN
Status: DISCONTINUED | OUTPATIENT
Start: 2025-06-23 | End: 2025-06-23 | Stop reason: HOSPADM

## 2025-06-23 RX ORDER — SODIUM CHLORIDE 0.9 % (FLUSH) 0.9 %
5-40 SYRINGE (ML) INJECTION EVERY 12 HOURS SCHEDULED
Status: DISCONTINUED | OUTPATIENT
Start: 2025-06-23 | End: 2025-06-23 | Stop reason: HOSPADM

## 2025-06-23 RX ORDER — EPHEDRINE SULFATE/0.9% NACL/PF 25 MG/5 ML
SYRINGE (ML) INTRAVENOUS
Status: DISCONTINUED | OUTPATIENT
Start: 2025-06-23 | End: 2025-06-23 | Stop reason: SDUPTHER

## 2025-06-23 RX ORDER — ROCURONIUM BROMIDE 10 MG/ML
INJECTION, SOLUTION INTRAVENOUS
Status: DISCONTINUED | OUTPATIENT
Start: 2025-06-23 | End: 2025-06-23 | Stop reason: SDUPTHER

## 2025-06-23 RX ORDER — MAGNESIUM HYDROXIDE 1200 MG/15ML
LIQUID ORAL CONTINUOUS PRN
Status: COMPLETED | OUTPATIENT
Start: 2025-06-23 | End: 2025-06-23

## 2025-06-23 RX ORDER — HEPARIN SODIUM 1000 [USP'U]/ML
INJECTION, SOLUTION INTRAVENOUS; SUBCUTANEOUS
Status: DISCONTINUED | OUTPATIENT
Start: 2025-06-23 | End: 2025-06-23 | Stop reason: SDUPTHER

## 2025-06-23 RX ORDER — LIDOCAINE HYDROCHLORIDE 10 MG/ML
INJECTION, SOLUTION EPIDURAL; INFILTRATION; INTRACAUDAL; PERINEURAL
Status: DISCONTINUED | OUTPATIENT
Start: 2025-06-23 | End: 2025-06-23 | Stop reason: SDUPTHER

## 2025-06-23 RX ORDER — ONDANSETRON 2 MG/ML
4 INJECTION INTRAMUSCULAR; INTRAVENOUS
Status: COMPLETED | OUTPATIENT
Start: 2025-06-23 | End: 2025-06-23

## 2025-06-23 RX ORDER — HYDROCODONE BITARTRATE AND ACETAMINOPHEN 5; 325 MG/1; MG/1
1 TABLET ORAL EVERY 6 HOURS PRN
Qty: 18 TABLET | Refills: 0 | Status: SHIPPED | OUTPATIENT
Start: 2025-06-23 | End: 2025-06-28

## 2025-06-23 RX ORDER — DEXAMETHASONE SODIUM PHOSPHATE 10 MG/ML
INJECTION, SOLUTION INTRA-ARTICULAR; INTRALESIONAL; INTRAMUSCULAR; INTRAVENOUS; SOFT TISSUE
Status: DISCONTINUED | OUTPATIENT
Start: 2025-06-23 | End: 2025-06-23 | Stop reason: SDUPTHER

## 2025-06-23 RX ORDER — LIDOCAINE HYDROCHLORIDE 10 MG/ML
INJECTION, SOLUTION EPIDURAL; INFILTRATION; INTRACAUDAL; PERINEURAL PRN
Status: DISCONTINUED | OUTPATIENT
Start: 2025-06-23 | End: 2025-06-23 | Stop reason: ALTCHOICE

## 2025-06-23 RX ORDER — FENTANYL CITRATE 50 UG/ML
25 INJECTION, SOLUTION INTRAMUSCULAR; INTRAVENOUS EVERY 5 MIN PRN
Status: DISCONTINUED | OUTPATIENT
Start: 2025-06-23 | End: 2025-06-23 | Stop reason: HOSPADM

## 2025-06-23 RX ORDER — PHENYLEPHRINE HCL IN 0.9% NACL 1 MG/10 ML
SYRINGE (ML) INTRAVENOUS
Status: DISCONTINUED | OUTPATIENT
Start: 2025-06-23 | End: 2025-06-23 | Stop reason: SDUPTHER

## 2025-06-23 RX ORDER — PROPOFOL 10 MG/ML
INJECTION, EMULSION INTRAVENOUS
Status: DISCONTINUED | OUTPATIENT
Start: 2025-06-23 | End: 2025-06-23 | Stop reason: SDUPTHER

## 2025-06-23 RX ORDER — FENTANYL CITRATE 50 UG/ML
50 INJECTION, SOLUTION INTRAMUSCULAR; INTRAVENOUS EVERY 5 MIN PRN
Status: DISCONTINUED | OUTPATIENT
Start: 2025-06-23 | End: 2025-06-23 | Stop reason: HOSPADM

## 2025-06-23 RX ADMIN — FENTANYL CITRATE 25 MCG: 50 INJECTION, SOLUTION INTRAMUSCULAR; INTRAVENOUS at 09:11

## 2025-06-23 RX ADMIN — FENTANYL CITRATE 100 MCG: 50 INJECTION, SOLUTION INTRAMUSCULAR; INTRAVENOUS at 08:31

## 2025-06-23 RX ADMIN — FENTANYL CITRATE 25 MCG: 50 INJECTION, SOLUTION INTRAMUSCULAR; INTRAVENOUS at 10:13

## 2025-06-23 RX ADMIN — Medication 100 MCG: at 10:41

## 2025-06-23 RX ADMIN — HEPARIN SODIUM 300 UNITS: 1000 INJECTION INTRAVENOUS; SUBCUTANEOUS at 11:29

## 2025-06-23 RX ADMIN — SODIUM CHLORIDE: 9 INJECTION, SOLUTION INTRAVENOUS at 08:26

## 2025-06-23 RX ADMIN — ONDANSETRON 4 MG: 2 INJECTION, SOLUTION INTRAMUSCULAR; INTRAVENOUS at 12:25

## 2025-06-23 RX ADMIN — CEFAZOLIN 2 G: 1 INJECTION, POWDER, FOR SOLUTION INTRAMUSCULAR; INTRAVENOUS at 12:38

## 2025-06-23 RX ADMIN — Medication 200 MCG: at 11:53

## 2025-06-23 RX ADMIN — ROCURONIUM BROMIDE 50 MG: 10 INJECTION, SOLUTION INTRAVENOUS at 08:31

## 2025-06-23 RX ADMIN — DEXAMETHASONE SODIUM PHOSPHATE 5 MG: 10 INJECTION INTRAMUSCULAR; INTRAVENOUS at 08:31

## 2025-06-23 RX ADMIN — PROPOFOL 100 MG: 10 INJECTION, EMULSION INTRAVENOUS at 08:31

## 2025-06-23 RX ADMIN — Medication 100 MCG: at 11:44

## 2025-06-23 RX ADMIN — Medication 200 MCG: at 12:15

## 2025-06-23 RX ADMIN — Medication 200 MCG: at 11:29

## 2025-06-23 RX ADMIN — SUGAMMADEX 200 MG: 100 INJECTION, SOLUTION INTRAVENOUS at 12:15

## 2025-06-23 RX ADMIN — FENTANYL CITRATE 25 MCG: 50 INJECTION, SOLUTION INTRAMUSCULAR; INTRAVENOUS at 12:13

## 2025-06-23 RX ADMIN — SODIUM CHLORIDE: 9 INJECTION, SOLUTION INTRAVENOUS at 11:35

## 2025-06-23 RX ADMIN — Medication 100 MCG: at 12:13

## 2025-06-23 RX ADMIN — EPHEDRINE SULFATE 5 MG: 5 INJECTION INTRAVENOUS at 11:44

## 2025-06-23 RX ADMIN — Medication 100 MCG: at 10:29

## 2025-06-23 RX ADMIN — HEPARIN SODIUM 6800 UNITS: 1000 INJECTION INTRAVENOUS; SUBCUTANEOUS at 10:29

## 2025-06-23 RX ADMIN — CEFAZOLIN 2 G: 1 INJECTION, POWDER, FOR SOLUTION INTRAMUSCULAR; INTRAVENOUS at 08:38

## 2025-06-23 RX ADMIN — Medication 100 MCG: at 11:03

## 2025-06-23 RX ADMIN — FENTANYL CITRATE 25 MCG: 50 INJECTION, SOLUTION INTRAMUSCULAR; INTRAVENOUS at 12:09

## 2025-06-23 RX ADMIN — EPHEDRINE SULFATE 5 MG: 5 INJECTION INTRAVENOUS at 11:51

## 2025-06-23 RX ADMIN — PROTAMINE SULFATE 40 MG: 10 INJECTION, SOLUTION INTRAVENOUS at 12:13

## 2025-06-23 RX ADMIN — Medication 100 MCG: at 10:54

## 2025-06-23 RX ADMIN — EPHEDRINE SULFATE 5 MG: 5 INJECTION INTRAVENOUS at 11:35

## 2025-06-23 RX ADMIN — ROCURONIUM BROMIDE 40 MG: 10 INJECTION, SOLUTION INTRAVENOUS at 10:12

## 2025-06-23 RX ADMIN — ONDANSETRON 4 MG: 2 INJECTION INTRAMUSCULAR; INTRAVENOUS at 13:30

## 2025-06-23 RX ADMIN — LIDOCAINE HYDROCHLORIDE 50 MG: 10 INJECTION, SOLUTION EPIDURAL; INFILTRATION; INTRACAUDAL; PERINEURAL at 08:31

## 2025-06-23 ASSESSMENT — PAIN SCALES - GENERAL
PAINLEVEL_OUTOF10: 0

## 2025-06-23 ASSESSMENT — ENCOUNTER SYMPTOMS
GASTROINTESTINAL NEGATIVE: 1
RESPIRATORY NEGATIVE: 1

## 2025-06-23 NOTE — H&P
Exam  General appearance - alert, well appearing and in no acute distress  Mental status - oriented to person, place and time with normal affect  Head - normocephalic and atraumatic  Neck - supple.   Chest - clear to auscultation, normal effort  Heart - normal rate, regular rhythm, no murmurs  Abdomen - non-distended,   Neurological - normal speech, no focal findings or movement disorder noted  Extremities - peripheral pulses palpable, no pedal edema or calf pain with palpation  Skin - no gross lesions, rashes, or induration noted      Assessment and Plan:     Plan for right AV graft creation. Risks and benefits discussed with patient and all questions answered. Written consent obtained.    Electronically signed by Sarah Machado DO on 6/16/25 at 7:32 AM EDT

## 2025-06-23 NOTE — PROGRESS NOTES
CLINICAL PHARMACY NOTE: MEDS TO BEDS    Total # of Prescriptions Filled: 2   The following medications were delivered to the patient:  Colace 100mg  Hydrocodone-acetaminophen 5-325mg    Additional Documentation: delivered to patient in room Cath 7 6/23 at 2:46pm. No co-pay.

## 2025-06-23 NOTE — OP NOTE
Operative Note      Patient: Jules Nichols  YOB: 1980  MRN: [id number]    Date of Procedure: 6/23/2025    Pre-Op Diagnosis Codes:      * Dialysis AV fistula malfunction, initial encounter [T82.590A]     * ESRD (end stage renal disease) (HCC) [N18.6]    Post-Op Diagnosis: Same       Procedure: Right brachial artery to subclavian vein AV graft creation (5-7 mm PTFE)    Surgeon(s):  Kevin Hook MD    Assistant: Sarah Machado DO (Resident)    Anesthesia: General    Estimated Blood Loss (mL): 50 mL    Complications: None    Specimens: None    Implants:  Implant Name Type Inv. Item Serial No.  Lot No. LRB No. Used Action   CLIP INT WECK SM WIDE RED TI TRNSVRS GRV CHEVRON SHP W/ PERCIS TIP 6 PER PK - [id number]  CLIP INT WECK SM WIDE RED TI TRNSVRS GRV CHEVRON SHP W/ PERCIS TIP 6 PER PK  [employer]  Right 1 Implanted   CLIP LIG M NELSY TI HRT SHP WIRE HORZ 6 CLIPS PER PK - [id number]  CLIP LIG M NELSY TI HRT SHP WIRE HORZ 6 CLIPS PER PK  [employer]  Right 1 Implanted   GRAFT VASC L35CM DIA4-7MM STP C SURF TECHNOLOGY EPTFE - [id number] Vascular grafts GRAFT VASC L35CM DIA4-7MM STP C SURF TECHNOLOGY EPTFE  BARD PERIPHERAL VASCULAR-WD [id number] Right 1 Implanted         Drains: None    Findings:  Infection Present At Time Of Surgery (PATOS) (choose all levels that have infection present):  No infection present  Other Findings: Patent right axillary/subclavian vein at least 10 mm. The right brachial artery was healthy appearing and likely 3-4 mm. After the graft was created, there was palpable thrill in the graft. Good hemostasis. No issues with right hand. Palpable radial pulse.    Detailed Description of Procedure:   Mr. Nichols was brought to the operating room placed in supine position with right arm out.  He was intubated and sedated by the anesthesia team.  Right arm was prepped and draped in standard sterile fashion.  A timeout was performed.  I started by making a

## 2025-06-23 NOTE — ANESTHESIA POSTPROCEDURE EVALUATION
Department of Anesthesiology  Postprocedure Note    Patient: Jules Nichols  MRN: [id number]  YOB: 1980  Date of evaluation: 6/23/2025    Procedure Summary       Date: 06/23/25 Room / Location: [place] [address] / [facility]    Anesthesia Start: 0826 Anesthesia Stop: 1300    Procedure: RIGHT UPPER EXTREMITY BRACHIAL TO SUBCLAVIAN ARTERIOVENOUS GRAFT CREATION (Right: Chest) Diagnosis:       Dialysis AV fistula malfunction, initial encounter      ESRD (end stage renal disease) ([facility])      (Dialysis AV fistula malfunction, initial encounter [T82.590A])      (ESRD (end stage renal disease) ([facility]) [N18.6])    Surgeons: Kevin Hook MD Responsible Provider: Orville Jackson MD    Anesthesia Type: general ASA Status: 3            Anesthesia Type: No value filed.    Jonah Phase I: Jonah Score: 10    Jonah Phase II: Jonah Score: 10    Anesthesia Post Evaluation    Patient location during evaluation: PACU  Patient participation: complete - patient participated  Level of consciousness: awake and alert  Pain score: 2  Airway patency: patent  Nausea & Vomiting: no vomiting and no nausea  Cardiovascular status: hemodynamically stable  Respiratory status: acceptable  Hydration status: stable  Pain management: adequate    No notable events documented.

## 2025-06-23 NOTE — DISCHARGE INSTRUCTIONS
Continue to use dialysis catheter for dialysis. Call Dr. Hook's office to make follow-up appointment.

## 2025-06-23 NOTE — ANESTHESIA PRE PROCEDURE
Department of Anesthesiology  Preprocedure Note       Name:  Jules Nichols   Age:  45 y.o.  :  1980                                          MRN:  [id number]         Date:  2025      Surgeon: Surgeon(s):  Kevin Hook MD    Procedure: Procedure(s):  UPPER EXTREMITY BRACHIAL TO SUBCLAVIAN ARTERIOVENOUS GRAFT CREATION    Medications prior to admission:   Prior to Admission medications    Medication Sig Start Date End Date Taking? Authorizing Provider   acetaminophen (TYLENOL) 325 MG tablet Take 2 tablets by mouth every 6 hours as needed for Pain 25  Latoya Ascencio MD   VELPHORO 500 MG CHEW chewable tablet Take 1 tablet by mouth 3 times daily (with meals)  Patient not taking: Reported on 2025   Provider, MD Flora   sevelamer (RENVELA) 800 MG tablet Take 1 tablet by mouth 3 times daily (with meals) 24   Kallie Joseph MD   midodrine (PROAMATINE) 10 MG tablet Take 1 tablet by mouth 3 times daily as needed (for SBP<90) 24   Kallie Joseph MD   tamsulosin (FLOMAX) 0.4 MG capsule Take 1 capsule by mouth daily  Patient not taking: Reported on 2025   Ty Tee MD   insulin glargine (LANTUS SOLOSTAR) 100 UNIT/ML injection pen Inject 20 Units into the skin daily 24   Ty Tee MD   Blood Glucose Monitoring Suppl (TRUERESULT BLOOD GLUCOSE) W/DEVICE KIT 1 kit by Does not apply route daily as needed 16   Elvin Kelley MD [facility] Ultra Thin Lancets MISC 1 each by Does not apply route 4 times daily (before meals and nightly) 16   Elvin Kelley MD   Glucose Blood (BLOOD GLUCOSE TEST STRIPS) STRP 1 each by Does not apply route 4 times daily (before meals and nightly) 16   Elvin Kelely MD   Alcohol Swabs 70 % PADS Inject 1 each into the skin 3 times daily as needed 16   Elvin Kelley MD   Insulin Pen Needle (B-D ULTRAFINE III SHORT PEN) 31G X 8 MM MISC Inject 1 each into the skin daily May

## 2025-07-02 ENCOUNTER — TELEPHONE (OUTPATIENT)
Dept: INTERNAL MEDICINE CLINIC | Age: 45
End: 2025-07-02

## 2025-07-08 ENCOUNTER — OFFICE VISIT (OUTPATIENT)
Dept: VASCULAR SURGERY | Age: 45
End: 2025-07-08

## 2025-07-08 VITALS
HEIGHT: 64 IN | BODY MASS INDEX: 25.44 KG/M2 | DIASTOLIC BLOOD PRESSURE: 58 MMHG | OXYGEN SATURATION: 98 % | SYSTOLIC BLOOD PRESSURE: 103 MMHG | WEIGHT: 149 LBS | RESPIRATION RATE: 18 BRPM

## 2025-07-08 DIAGNOSIS — N18.6 ESRD (END STAGE RENAL DISEASE) (HCC): Primary | ICD-10-CM

## 2025-07-08 PROCEDURE — 99024 POSTOP FOLLOW-UP VISIT: CPT | Performed by: STUDENT IN AN ORGANIZED HEALTH CARE EDUCATION/TRAINING PROGRAM

## 2025-08-12 ENCOUNTER — PROCEDURE VISIT (OUTPATIENT)
Dept: VASCULAR SURGERY | Age: 45
End: 2025-08-12

## 2025-08-12 VITALS
DIASTOLIC BLOOD PRESSURE: 71 MMHG | HEIGHT: 64 IN | BODY MASS INDEX: 25.44 KG/M2 | WEIGHT: 149 LBS | RESPIRATION RATE: 18 BRPM | SYSTOLIC BLOOD PRESSURE: 100 MMHG | OXYGEN SATURATION: 98 %

## 2025-08-12 DIAGNOSIS — N18.6 END STAGE RENAL DISEASE (HCC): Primary | ICD-10-CM

## 2025-08-12 RX ORDER — POTASSIUM CHLORIDE 1500 MG/1
20 TABLET, EXTENDED RELEASE ORAL DAILY
COMMUNITY
Start: 2025-08-08

## 2025-08-12 ASSESSMENT — ENCOUNTER SYMPTOMS
ABDOMINAL DISTENTION: 0
COLOR CHANGE: 0
VOICE CHANGE: 0
ABDOMINAL PAIN: 0
COUGH: 0
EYE PAIN: 0
CHEST TIGHTNESS: 0
VOMITING: 0
TROUBLE SWALLOWING: 0

## 2025-08-29 ENCOUNTER — OFFICE VISIT (OUTPATIENT)
Dept: VASCULAR SURGERY | Age: 45
End: 2025-08-29
Payer: MEDICAID

## 2025-08-29 ENCOUNTER — TELEPHONE (OUTPATIENT)
Dept: VASCULAR SURGERY | Age: 45
End: 2025-08-29

## 2025-08-29 ENCOUNTER — HOSPITAL ENCOUNTER (EMERGENCY)
Age: 45
Discharge: HOME | End: 2025-08-30
Attending: EMERGENCY MEDICINE
Payer: MEDICAID

## 2025-08-29 ENCOUNTER — APPOINTMENT (OUTPATIENT)
Age: 45
End: 2025-08-29
Attending: STUDENT IN AN ORGANIZED HEALTH CARE EDUCATION/TRAINING PROGRAM
Payer: MEDICAID

## 2025-08-29 ENCOUNTER — HOSPITAL ENCOUNTER (OUTPATIENT)
Age: 45
Setting detail: OUTPATIENT SURGERY
Discharge: HOME | End: 2025-08-29
Attending: STUDENT IN AN ORGANIZED HEALTH CARE EDUCATION/TRAINING PROGRAM | Admitting: STUDENT IN AN ORGANIZED HEALTH CARE EDUCATION/TRAINING PROGRAM
Payer: MEDICAID

## 2025-08-29 ENCOUNTER — ANESTHESIA EVENT (OUTPATIENT)
Dept: OPERATING ROOM | Age: 45
End: 2025-08-29
Payer: MEDICAID

## 2025-08-29 ENCOUNTER — ANESTHESIA (OUTPATIENT)
Dept: OPERATING ROOM | Age: 45
End: 2025-08-29
Payer: MEDICAID

## 2025-08-29 VITALS
HEART RATE: 83 BPM | WEIGHT: 148 LBS | HEIGHT: 64 IN | DIASTOLIC BLOOD PRESSURE: 70 MMHG | BODY MASS INDEX: 25.27 KG/M2 | TEMPERATURE: 98.9 F | OXYGEN SATURATION: 100 % | RESPIRATION RATE: 15 BRPM | SYSTOLIC BLOOD PRESSURE: 96 MMHG

## 2025-08-29 DIAGNOSIS — T82.590A DIALYSIS AV FISTULA MALFUNCTION, INITIAL ENCOUNTER: Primary | ICD-10-CM

## 2025-08-29 DIAGNOSIS — T82.868A AV GRAFT THROMBOSIS, INITIAL ENCOUNTER: Primary | ICD-10-CM

## 2025-08-29 DIAGNOSIS — T82.868A AV FISTULA THROMBOSIS, INITIAL ENCOUNTER: ICD-10-CM

## 2025-08-29 DIAGNOSIS — Z99.2 ENCOUNTER REGARDING VASCULAR ACCESS FOR DIALYSIS FOR END-STAGE RENAL DISEASE (HCC): Primary | ICD-10-CM

## 2025-08-29 DIAGNOSIS — N18.6 ENCOUNTER REGARDING VASCULAR ACCESS FOR DIALYSIS FOR END-STAGE RENAL DISEASE (HCC): Primary | ICD-10-CM

## 2025-08-29 LAB
BUN BLD-MCNC: 45 MG/DL (ref 8–26)
CHLORIDE BLD-SCNC: 107 MMOL/L (ref 98–107)
EGFR, POC: 9 ML/MIN/1.73M2
GLUCOSE BLD-MCNC: 100 MG/DL (ref 74–100)
HBV SURFACE AG SERPL QL IA: NONREACTIVE
HCT VFR BLD AUTO: 48 % (ref 41–53)
POC CREATININE: 6.9 MG/DL (ref 0.51–1.19)
POC HEMOGLOBIN (CALC): 16.3 G/DL (ref 13.5–17.5)
POTASSIUM BLD-SCNC: 5.8 MMOL/L (ref 3.5–4.5)
SODIUM BLD-SCNC: 140 MMOL/L (ref 138–146)

## 2025-08-29 PROCEDURE — 84132 ASSAY OF SERUM POTASSIUM: CPT

## 2025-08-29 PROCEDURE — 2720000010 HC SURG SUPPLY STERILE: Performed by: STUDENT IN AN ORGANIZED HEALTH CARE EDUCATION/TRAINING PROGRAM

## 2025-08-29 PROCEDURE — C1892 INTRO/SHEATH,FIXED,PEEL-AWAY: HCPCS | Performed by: STUDENT IN AN ORGANIZED HEALTH CARE EDUCATION/TRAINING PROGRAM

## 2025-08-29 PROCEDURE — 6360000004 HC RX CONTRAST MEDICATION: Performed by: STUDENT IN AN ORGANIZED HEALTH CARE EDUCATION/TRAINING PROGRAM

## 2025-08-29 PROCEDURE — 3700000000 HC ANESTHESIA ATTENDED CARE: Performed by: STUDENT IN AN ORGANIZED HEALTH CARE EDUCATION/TRAINING PROGRAM

## 2025-08-29 PROCEDURE — 6370000000 HC RX 637 (ALT 250 FOR IP): Performed by: STUDENT IN AN ORGANIZED HEALTH CARE EDUCATION/TRAINING PROGRAM

## 2025-08-29 PROCEDURE — 2500000003 HC RX 250 WO HCPCS: Performed by: STUDENT IN AN ORGANIZED HEALTH CARE EDUCATION/TRAINING PROGRAM

## 2025-08-29 PROCEDURE — C1725 CATH, TRANSLUMIN NON-LASER: HCPCS | Performed by: STUDENT IN AN ORGANIZED HEALTH CARE EDUCATION/TRAINING PROGRAM

## 2025-08-29 PROCEDURE — 99213 OFFICE O/P EST LOW 20 MIN: CPT | Performed by: SURGERY

## 2025-08-29 PROCEDURE — 82565 ASSAY OF CREATININE: CPT

## 2025-08-29 PROCEDURE — 3700000001 HC ADD 15 MINUTES (ANESTHESIA): Performed by: STUDENT IN AN ORGANIZED HEALTH CARE EDUCATION/TRAINING PROGRAM

## 2025-08-29 PROCEDURE — 99283 EMERGENCY DEPT VISIT LOW MDM: CPT

## 2025-08-29 PROCEDURE — 7100000011 HC PHASE II RECOVERY - ADDTL 15 MIN: Performed by: STUDENT IN AN ORGANIZED HEALTH CARE EDUCATION/TRAINING PROGRAM

## 2025-08-29 PROCEDURE — 6360000002 HC RX W HCPCS

## 2025-08-29 PROCEDURE — 2580000003 HC RX 258: Performed by: STUDENT IN AN ORGANIZED HEALTH CARE EDUCATION/TRAINING PROGRAM

## 2025-08-29 PROCEDURE — C1894 INTRO/SHEATH, NON-LASER: HCPCS | Performed by: STUDENT IN AN ORGANIZED HEALTH CARE EDUCATION/TRAINING PROGRAM

## 2025-08-29 PROCEDURE — 3600000007 HC SURGERY HYBRID BASE: Performed by: STUDENT IN AN ORGANIZED HEALTH CARE EDUCATION/TRAINING PROGRAM

## 2025-08-29 PROCEDURE — 7100000010 HC PHASE II RECOVERY - FIRST 15 MIN: Performed by: STUDENT IN AN ORGANIZED HEALTH CARE EDUCATION/TRAINING PROGRAM

## 2025-08-29 PROCEDURE — 36833 AV FISTULA REVISION: CPT | Performed by: STUDENT IN AN ORGANIZED HEALTH CARE EDUCATION/TRAINING PROGRAM

## 2025-08-29 PROCEDURE — 2500000003 HC RX 250 WO HCPCS

## 2025-08-29 PROCEDURE — 84520 ASSAY OF UREA NITROGEN: CPT

## 2025-08-29 PROCEDURE — 7100000001 HC PACU RECOVERY - ADDTL 15 MIN: Performed by: STUDENT IN AN ORGANIZED HEALTH CARE EDUCATION/TRAINING PROGRAM

## 2025-08-29 PROCEDURE — 84295 ASSAY OF SERUM SODIUM: CPT

## 2025-08-29 PROCEDURE — 7100000000 HC PACU RECOVERY - FIRST 15 MIN: Performed by: STUDENT IN AN ORGANIZED HEALTH CARE EDUCATION/TRAINING PROGRAM

## 2025-08-29 PROCEDURE — 2580000003 HC RX 258

## 2025-08-29 PROCEDURE — 2709999900 HC NON-CHARGEABLE SUPPLY: Performed by: STUDENT IN AN ORGANIZED HEALTH CARE EDUCATION/TRAINING PROGRAM

## 2025-08-29 PROCEDURE — 3600000017 HC SURGERY HYBRID ADDL 15MIN: Performed by: STUDENT IN AN ORGANIZED HEALTH CARE EDUCATION/TRAINING PROGRAM

## 2025-08-29 PROCEDURE — 82947 ASSAY GLUCOSE BLOOD QUANT: CPT

## 2025-08-29 PROCEDURE — C1757 CATH, THROMBECTOMY/EMBOLECT: HCPCS | Performed by: STUDENT IN AN ORGANIZED HEALTH CARE EDUCATION/TRAINING PROGRAM

## 2025-08-29 PROCEDURE — 82435 ASSAY OF BLOOD CHLORIDE: CPT

## 2025-08-29 PROCEDURE — 85014 HEMATOCRIT: CPT

## 2025-08-29 PROCEDURE — 6360000002 HC RX W HCPCS: Performed by: STUDENT IN AN ORGANIZED HEALTH CARE EDUCATION/TRAINING PROGRAM

## 2025-08-29 PROCEDURE — 87340 HEPATITIS B SURFACE AG IA: CPT

## 2025-08-29 RX ORDER — PROPOFOL 10 MG/ML
INJECTION, EMULSION INTRAVENOUS
Status: DISCONTINUED | OUTPATIENT
Start: 2025-08-29 | End: 2025-08-29 | Stop reason: SDUPTHER

## 2025-08-29 RX ORDER — LIDOCAINE HYDROCHLORIDE 10 MG/ML
INJECTION, SOLUTION INFILTRATION; PERINEURAL
Status: DISCONTINUED
Start: 2025-08-29 | End: 2025-08-29 | Stop reason: HOSPADM

## 2025-08-29 RX ORDER — MIDAZOLAM HYDROCHLORIDE 1 MG/ML
INJECTION, SOLUTION INTRAMUSCULAR; INTRAVENOUS
Status: DISCONTINUED | OUTPATIENT
Start: 2025-08-29 | End: 2025-08-29 | Stop reason: SDUPTHER

## 2025-08-29 RX ORDER — PHENYLEPHRINE HCL IN 0.9% NACL 1 MG/10 ML
SYRINGE (ML) INTRAVENOUS
Status: DISCONTINUED | OUTPATIENT
Start: 2025-08-29 | End: 2025-08-29 | Stop reason: SDUPTHER

## 2025-08-29 RX ORDER — HEPARIN SODIUM 1000 [USP'U]/ML
INJECTION, SOLUTION INTRAVENOUS; SUBCUTANEOUS
Status: DISCONTINUED | OUTPATIENT
Start: 2025-08-29 | End: 2025-08-29 | Stop reason: SDUPTHER

## 2025-08-29 RX ORDER — IODIXANOL 320 MG/ML
INJECTION, SOLUTION INTRAVASCULAR PRN
Status: DISCONTINUED | OUTPATIENT
Start: 2025-08-29 | End: 2025-08-29 | Stop reason: ALTCHOICE

## 2025-08-29 RX ORDER — PROPOFOL 10 MG/ML
INJECTION, EMULSION INTRAVENOUS
Status: COMPLETED
Start: 2025-08-29 | End: 2025-08-29

## 2025-08-29 RX ORDER — ROCURONIUM BROMIDE 10 MG/ML
INJECTION, SOLUTION INTRAVENOUS
Status: DISCONTINUED | OUTPATIENT
Start: 2025-08-29 | End: 2025-08-29 | Stop reason: SDUPTHER

## 2025-08-29 RX ORDER — LIDOCAINE HYDROCHLORIDE 10 MG/ML
INJECTION, SOLUTION EPIDURAL; INFILTRATION; INTRACAUDAL; PERINEURAL PRN
Status: DISCONTINUED | OUTPATIENT
Start: 2025-08-29 | End: 2025-08-29 | Stop reason: ALTCHOICE

## 2025-08-29 RX ORDER — SODIUM CHLORIDE 9 MG/ML
INJECTION, SOLUTION INTRAVENOUS
Status: DISCONTINUED | OUTPATIENT
Start: 2025-08-29 | End: 2025-08-29 | Stop reason: SDUPTHER

## 2025-08-29 RX ORDER — DEXAMETHASONE SODIUM PHOSPHATE 10 MG/ML
INJECTION, SOLUTION INTRA-ARTICULAR; INTRALESIONAL; INTRAMUSCULAR; INTRAVENOUS; SOFT TISSUE
Status: DISCONTINUED | OUTPATIENT
Start: 2025-08-29 | End: 2025-08-29 | Stop reason: SDUPTHER

## 2025-08-29 RX ORDER — FENTANYL CITRATE 50 UG/ML
INJECTION, SOLUTION INTRAMUSCULAR; INTRAVENOUS
Status: DISCONTINUED | OUTPATIENT
Start: 2025-08-29 | End: 2025-08-29 | Stop reason: SDUPTHER

## 2025-08-29 RX ORDER — SODIUM CHLORIDE 9 MG/ML
INJECTION, SOLUTION INTRAVENOUS CONTINUOUS
Status: DISCONTINUED | OUTPATIENT
Start: 2025-08-29 | End: 2025-08-29 | Stop reason: HOSPADM

## 2025-08-29 RX ORDER — IODIXANOL 320 MG/ML
INJECTION, SOLUTION INTRAVASCULAR
Status: DISCONTINUED
Start: 2025-08-29 | End: 2025-08-29 | Stop reason: HOSPADM

## 2025-08-29 RX ORDER — CEFAZOLIN SODIUM 1 G/3ML
INJECTION, POWDER, FOR SOLUTION INTRAMUSCULAR; INTRAVENOUS
Status: DISCONTINUED | OUTPATIENT
Start: 2025-08-29 | End: 2025-08-29 | Stop reason: SDUPTHER

## 2025-08-29 RX ORDER — ONDANSETRON 2 MG/ML
INJECTION INTRAMUSCULAR; INTRAVENOUS
Status: DISCONTINUED | OUTPATIENT
Start: 2025-08-29 | End: 2025-08-29 | Stop reason: SDUPTHER

## 2025-08-29 RX ORDER — HYDROCODONE BITARTRATE AND ACETAMINOPHEN 5; 325 MG/1; MG/1
1 TABLET ORAL EVERY 6 HOURS PRN
Qty: 12 TABLET | Refills: 0 | Status: SHIPPED | OUTPATIENT
Start: 2025-08-29 | End: 2025-09-01

## 2025-08-29 RX ORDER — HEPARIN SODIUM 200 [USP'U]/100ML
INJECTION, SOLUTION INTRAVENOUS CONTINUOUS PRN
Status: COMPLETED | OUTPATIENT
Start: 2025-08-29 | End: 2025-08-29

## 2025-08-29 RX ADMIN — DEXAMETHASONE SODIUM PHOSPHATE 4 MG: 10 INJECTION INTRAMUSCULAR; INTRAVENOUS at 16:51

## 2025-08-29 RX ADMIN — ONDANSETRON 4 MG: 2 INJECTION, SOLUTION INTRAMUSCULAR; INTRAVENOUS at 16:51

## 2025-08-29 RX ADMIN — FENTANYL CITRATE 25 MCG: 50 INJECTION, SOLUTION INTRAMUSCULAR; INTRAVENOUS at 16:27

## 2025-08-29 RX ADMIN — Medication 50 MCG: at 16:23

## 2025-08-29 RX ADMIN — CEFAZOLIN 2 G: 1 INJECTION, POWDER, FOR SOLUTION INTRAMUSCULAR; INTRAVENOUS at 16:15

## 2025-08-29 RX ADMIN — SODIUM CHLORIDE: 9 INJECTION, SOLUTION INTRAVENOUS at 16:00

## 2025-08-29 RX ADMIN — SUGAMMADEX 200 MG: 100 INJECTION, SOLUTION INTRAVENOUS at 17:58

## 2025-08-29 RX ADMIN — Medication 100 MCG: at 16:28

## 2025-08-29 RX ADMIN — MIDAZOLAM 2 MG: 1 INJECTION INTRAMUSCULAR; INTRAVENOUS at 16:05

## 2025-08-29 RX ADMIN — FENTANYL CITRATE 25 MCG: 50 INJECTION, SOLUTION INTRAMUSCULAR; INTRAVENOUS at 16:16

## 2025-08-29 RX ADMIN — FENTANYL CITRATE 25 MCG: 50 INJECTION, SOLUTION INTRAMUSCULAR; INTRAVENOUS at 16:07

## 2025-08-29 RX ADMIN — ROCURONIUM BROMIDE 30 MG: 10 INJECTION INTRAVENOUS at 16:44

## 2025-08-29 RX ADMIN — PROPOFOL 100 MCG/KG/MIN: 10 INJECTION, EMULSION INTRAVENOUS at 16:07

## 2025-08-29 RX ADMIN — PROPOFOL 100 MG: 10 INJECTION, EMULSION INTRAVENOUS at 16:43

## 2025-08-29 RX ADMIN — PHENYLEPHRINE HYDROCHLORIDE 40 MCG/MIN: 10 INJECTION INTRAVENOUS at 16:34

## 2025-08-29 RX ADMIN — SUGAMMADEX 200 MG: 100 INJECTION, SOLUTION INTRAVENOUS at 18:01

## 2025-08-29 RX ADMIN — FENTANYL CITRATE 25 MCG: 50 INJECTION, SOLUTION INTRAMUSCULAR; INTRAVENOUS at 16:20

## 2025-08-29 RX ADMIN — SODIUM CHLORIDE: 0.9 INJECTION, SOLUTION INTRAVENOUS at 15:23

## 2025-08-29 RX ADMIN — HEPARIN SODIUM 6000 UNITS: 1000 INJECTION, SOLUTION INTRAVENOUS; SUBCUTANEOUS at 17:03

## 2025-08-29 ASSESSMENT — ENCOUNTER SYMPTOMS
ALLERGIC/IMMUNOLOGIC NEGATIVE: 1
SHORTNESS OF BREATH: 0
VOMITING: 0
COLOR CHANGE: 0
ABDOMINAL PAIN: 0
BACK PAIN: 0
NAUSEA: 0
ABDOMINAL PAIN: 0
DIARRHEA: 0
SHORTNESS OF BREATH: 0
COUGH: 0
CHEST TIGHTNESS: 0

## 2025-08-29 ASSESSMENT — PAIN SCALES - GENERAL: PAINLEVEL_OUTOF10: 0

## 2025-08-29 ASSESSMENT — PAIN - FUNCTIONAL ASSESSMENT: PAIN_FUNCTIONAL_ASSESSMENT: 0-10

## 2025-08-30 VITALS
RESPIRATION RATE: 16 BRPM | DIASTOLIC BLOOD PRESSURE: 73 MMHG | BODY MASS INDEX: 25.05 KG/M2 | WEIGHT: 145.94 LBS | TEMPERATURE: 98.2 F | HEART RATE: 128 BPM | OXYGEN SATURATION: 96 % | SYSTOLIC BLOOD PRESSURE: 100 MMHG

## 2025-09-02 ENCOUNTER — ANESTHESIA (OUTPATIENT)
Dept: OPERATING ROOM | Age: 45
DRG: 182 | End: 2025-09-02
Payer: MEDICAID

## 2025-09-02 ENCOUNTER — HOSPITAL ENCOUNTER (INPATIENT)
Age: 45
LOS: 1 days | Discharge: HOME | DRG: 182 | End: 2025-09-03
Attending: EMERGENCY MEDICINE | Admitting: STUDENT IN AN ORGANIZED HEALTH CARE EDUCATION/TRAINING PROGRAM
Payer: MEDICAID

## 2025-09-02 ENCOUNTER — TELEPHONE (OUTPATIENT)
Dept: VASCULAR SURGERY | Age: 45
End: 2025-09-02

## 2025-09-02 ENCOUNTER — ANESTHESIA EVENT (OUTPATIENT)
Dept: OPERATING ROOM | Age: 45
DRG: 182 | End: 2025-09-02
Payer: MEDICAID

## 2025-09-02 DIAGNOSIS — T82.898A ARTERIOVENOUS FISTULA OCCLUSION, INITIAL ENCOUNTER: Primary | ICD-10-CM

## 2025-09-02 DIAGNOSIS — T82.868A CLOTTED RENAL DIALYSIS ARTERIOVENOUS GRAFT, INITIAL ENCOUNTER: ICD-10-CM

## 2025-09-02 LAB
ANION GAP SERPL CALCULATED.3IONS-SCNC: 20 MMOL/L (ref 9–16)
BUN SERPL-MCNC: 64 MG/DL (ref 6–20)
CALCIUM SERPL-MCNC: 9.5 MG/DL (ref 8.6–10.4)
CHLORIDE SERPL-SCNC: 100 MMOL/L (ref 98–107)
CO2 SERPL-SCNC: 19 MMOL/L (ref 20–31)
CREAT SERPL-MCNC: 8.7 MG/DL (ref 0.7–1.2)
GFR, ESTIMATED: 7 ML/MIN/1.73M2
GLUCOSE SERPL-MCNC: 175 MG/DL (ref 74–99)
POTASSIUM SERPL-SCNC: 5.4 MMOL/L (ref 3.7–5.3)
SODIUM SERPL-SCNC: 139 MMOL/L (ref 136–145)

## 2025-09-02 PROCEDURE — 2500000003 HC RX 250 WO HCPCS: Performed by: STUDENT IN AN ORGANIZED HEALTH CARE EDUCATION/TRAINING PROGRAM

## 2025-09-02 PROCEDURE — C1757 CATH, THROMBECTOMY/EMBOLECT: HCPCS | Performed by: STUDENT IN AN ORGANIZED HEALTH CARE EDUCATION/TRAINING PROGRAM

## 2025-09-02 PROCEDURE — 03C70ZZ EXTIRPATION OF MATTER FROM RIGHT BRACHIAL ARTERY, OPEN APPROACH: ICD-10-PCS | Performed by: STUDENT IN AN ORGANIZED HEALTH CARE EDUCATION/TRAINING PROGRAM

## 2025-09-02 PROCEDURE — C1892 INTRO/SHEATH,FIXED,PEEL-AWAY: HCPCS | Performed by: STUDENT IN AN ORGANIZED HEALTH CARE EDUCATION/TRAINING PROGRAM

## 2025-09-02 PROCEDURE — 2709999900 HC NON-CHARGEABLE SUPPLY: Performed by: STUDENT IN AN ORGANIZED HEALTH CARE EDUCATION/TRAINING PROGRAM

## 2025-09-02 PROCEDURE — 5A1D70Z PERFORMANCE OF URINARY FILTRATION, INTERMITTENT, LESS THAN 6 HOURS PER DAY: ICD-10-PCS | Performed by: INTERNAL MEDICINE

## 2025-09-02 PROCEDURE — 3700000000 HC ANESTHESIA ATTENDED CARE: Performed by: STUDENT IN AN ORGANIZED HEALTH CARE EDUCATION/TRAINING PROGRAM

## 2025-09-02 PROCEDURE — 6360000002 HC RX W HCPCS: Performed by: STUDENT IN AN ORGANIZED HEALTH CARE EDUCATION/TRAINING PROGRAM

## 2025-09-02 PROCEDURE — 6370000000 HC RX 637 (ALT 250 FOR IP): Performed by: STUDENT IN AN ORGANIZED HEALTH CARE EDUCATION/TRAINING PROGRAM

## 2025-09-02 PROCEDURE — B51W1ZZ FLUOROSCOPY OF DIALYSIS SHUNT/FISTULA USING LOW OSMOLAR CONTRAST: ICD-10-PCS | Performed by: STUDENT IN AN ORGANIZED HEALTH CARE EDUCATION/TRAINING PROGRAM

## 2025-09-02 PROCEDURE — 6360000004 HC RX CONTRAST MEDICATION: Performed by: STUDENT IN AN ORGANIZED HEALTH CARE EDUCATION/TRAINING PROGRAM

## 2025-09-02 PROCEDURE — 94761 N-INVAS EAR/PLS OXIMETRY MLT: CPT

## 2025-09-02 PROCEDURE — 6360000002 HC RX W HCPCS

## 2025-09-02 PROCEDURE — 3600000012 HC SURGERY LEVEL 2 ADDTL 15MIN: Performed by: STUDENT IN AN ORGANIZED HEALTH CARE EDUCATION/TRAINING PROGRAM

## 2025-09-02 PROCEDURE — 3600000002 HC SURGERY LEVEL 2 BASE: Performed by: STUDENT IN AN ORGANIZED HEALTH CARE EDUCATION/TRAINING PROGRAM

## 2025-09-02 PROCEDURE — 99285 EMERGENCY DEPT VISIT HI MDM: CPT

## 2025-09-02 PROCEDURE — 2060000000 HC ICU INTERMEDIATE R&B

## 2025-09-02 PROCEDURE — 6370000000 HC RX 637 (ALT 250 FOR IP)

## 2025-09-02 PROCEDURE — C1768 GRAFT, VASCULAR: HCPCS | Performed by: STUDENT IN AN ORGANIZED HEALTH CARE EDUCATION/TRAINING PROGRAM

## 2025-09-02 PROCEDURE — 7100000010 HC PHASE II RECOVERY - FIRST 15 MIN: Performed by: STUDENT IN AN ORGANIZED HEALTH CARE EDUCATION/TRAINING PROGRAM

## 2025-09-02 PROCEDURE — 80048 BASIC METABOLIC PNL TOTAL CA: CPT

## 2025-09-02 PROCEDURE — 90935 HEMODIALYSIS ONE EVALUATION: CPT

## 2025-09-02 PROCEDURE — 2720000010 HC SURG SUPPLY STERILE: Performed by: STUDENT IN AN ORGANIZED HEALTH CARE EDUCATION/TRAINING PROGRAM

## 2025-09-02 PROCEDURE — 3700000001 HC ADD 15 MINUTES (ANESTHESIA): Performed by: STUDENT IN AN ORGANIZED HEALTH CARE EDUCATION/TRAINING PROGRAM

## 2025-09-02 PROCEDURE — 2500000003 HC RX 250 WO HCPCS

## 2025-09-02 PROCEDURE — 2580000003 HC RX 258

## 2025-09-02 PROCEDURE — 03WY0JZ REVISION OF SYNTHETIC SUBSTITUTE IN UPPER ARTERY, OPEN APPROACH: ICD-10-PCS | Performed by: STUDENT IN AN ORGANIZED HEALTH CARE EDUCATION/TRAINING PROGRAM

## 2025-09-02 PROCEDURE — 7100000011 HC PHASE II RECOVERY - ADDTL 15 MIN: Performed by: STUDENT IN AN ORGANIZED HEALTH CARE EDUCATION/TRAINING PROGRAM

## 2025-09-02 DEVICE — GRAFT VASC L50CM L40CM ID7MM EPTFE THN WALL REM RNG STRTCH: Type: IMPLANTABLE DEVICE | Site: ARM | Status: FUNCTIONAL

## 2025-09-02 RX ORDER — HEPARIN SODIUM 5000 [USP'U]/ML
5000 INJECTION, SOLUTION INTRAVENOUS; SUBCUTANEOUS EVERY 8 HOURS SCHEDULED
Status: DISCONTINUED | OUTPATIENT
Start: 2025-09-02 | End: 2025-09-02

## 2025-09-02 RX ORDER — LIDOCAINE HYDROCHLORIDE 10 MG/ML
INJECTION, SOLUTION EPIDURAL; INFILTRATION; INTRACAUDAL; PERINEURAL PRN
Status: DISCONTINUED | OUTPATIENT
Start: 2025-09-02 | End: 2025-09-02 | Stop reason: ALTCHOICE

## 2025-09-02 RX ORDER — GLUCAGON 1 MG/ML
1 KIT INJECTION PRN
Status: DISCONTINUED | OUTPATIENT
Start: 2025-09-02 | End: 2025-09-03 | Stop reason: HOSPADM

## 2025-09-02 RX ORDER — OXYCODONE HYDROCHLORIDE 10 MG/1
10 TABLET ORAL PRN
Status: DISCONTINUED | OUTPATIENT
Start: 2025-09-02 | End: 2025-09-02 | Stop reason: HOSPADM

## 2025-09-02 RX ORDER — POLYETHYLENE GLYCOL 3350 17 G/17G
17 POWDER, FOR SOLUTION ORAL DAILY PRN
Status: DISCONTINUED | OUTPATIENT
Start: 2025-09-02 | End: 2025-09-03 | Stop reason: HOSPADM

## 2025-09-02 RX ORDER — IODIXANOL 320 MG/ML
INJECTION, SOLUTION INTRAVASCULAR
Status: DISPENSED
Start: 2025-09-02 | End: 2025-09-03

## 2025-09-02 RX ORDER — LORAZEPAM 2 MG/ML
0.5 INJECTION INTRAMUSCULAR
Status: DISCONTINUED | OUTPATIENT
Start: 2025-09-02 | End: 2025-09-02 | Stop reason: HOSPADM

## 2025-09-02 RX ORDER — ONDANSETRON 4 MG/1
4 TABLET, ORALLY DISINTEGRATING ORAL EVERY 8 HOURS PRN
Status: DISCONTINUED | OUTPATIENT
Start: 2025-09-02 | End: 2025-09-03 | Stop reason: HOSPADM

## 2025-09-02 RX ORDER — PROCHLORPERAZINE EDISYLATE 5 MG/ML
5 INJECTION INTRAMUSCULAR; INTRAVENOUS
Status: DISCONTINUED | OUTPATIENT
Start: 2025-09-02 | End: 2025-09-02 | Stop reason: HOSPADM

## 2025-09-02 RX ORDER — SODIUM CHLORIDE 9 MG/ML
INJECTION, SOLUTION INTRAVENOUS PRN
Status: DISCONTINUED | OUTPATIENT
Start: 2025-09-02 | End: 2025-09-03 | Stop reason: HOSPADM

## 2025-09-02 RX ORDER — TAMSULOSIN HYDROCHLORIDE 0.4 MG/1
0.4 CAPSULE ORAL DAILY
Status: DISCONTINUED | OUTPATIENT
Start: 2025-09-02 | End: 2025-09-03 | Stop reason: HOSPADM

## 2025-09-02 RX ORDER — TAMSULOSIN HYDROCHLORIDE 0.4 MG/1
0.4 CAPSULE ORAL DAILY
Status: DISCONTINUED | OUTPATIENT
Start: 2025-09-02 | End: 2025-09-02

## 2025-09-02 RX ORDER — GABAPENTIN 100 MG/1
100 CAPSULE ORAL EVERY 8 HOURS SCHEDULED
Status: DISCONTINUED | OUTPATIENT
Start: 2025-09-02 | End: 2025-09-03 | Stop reason: HOSPADM

## 2025-09-02 RX ORDER — DROPERIDOL 2.5 MG/ML
0.62 INJECTION, SOLUTION INTRAMUSCULAR; INTRAVENOUS
Status: DISCONTINUED | OUTPATIENT
Start: 2025-09-02 | End: 2025-09-02 | Stop reason: HOSPADM

## 2025-09-02 RX ORDER — ACETAMINOPHEN 500 MG
1000 TABLET ORAL EVERY 8 HOURS SCHEDULED
Status: DISCONTINUED | OUTPATIENT
Start: 2025-09-02 | End: 2025-09-03 | Stop reason: HOSPADM

## 2025-09-02 RX ORDER — MIDODRINE HYDROCHLORIDE 10 MG/1
10 TABLET ORAL 3 TIMES DAILY PRN
Status: DISCONTINUED | OUTPATIENT
Start: 2025-09-02 | End: 2025-09-03 | Stop reason: HOSPADM

## 2025-09-02 RX ORDER — EPHEDRINE SULFATE/0.9% NACL/PF 25 MG/5 ML
SYRINGE (ML) INTRAVENOUS
Status: DISCONTINUED | OUTPATIENT
Start: 2025-09-02 | End: 2025-09-02 | Stop reason: SDUPTHER

## 2025-09-02 RX ORDER — DEXTROSE MONOHYDRATE 100 MG/ML
INJECTION, SOLUTION INTRAVENOUS CONTINUOUS PRN
Status: DISCONTINUED | OUTPATIENT
Start: 2025-09-02 | End: 2025-09-03 | Stop reason: HOSPADM

## 2025-09-02 RX ORDER — SODIUM CHLORIDE 0.9 % (FLUSH) 0.9 %
5-40 SYRINGE (ML) INJECTION PRN
Status: DISCONTINUED | OUTPATIENT
Start: 2025-09-02 | End: 2025-09-02 | Stop reason: HOSPADM

## 2025-09-02 RX ORDER — ONDANSETRON 2 MG/ML
INJECTION INTRAMUSCULAR; INTRAVENOUS
Status: DISCONTINUED | OUTPATIENT
Start: 2025-09-02 | End: 2025-09-02 | Stop reason: SDUPTHER

## 2025-09-02 RX ORDER — HYDRALAZINE HYDROCHLORIDE 20 MG/ML
10 INJECTION INTRAMUSCULAR; INTRAVENOUS
Status: DISCONTINUED | OUTPATIENT
Start: 2025-09-02 | End: 2025-09-02 | Stop reason: HOSPADM

## 2025-09-02 RX ORDER — PHENYLEPHRINE HCL IN 0.9% NACL 1 MG/10 ML
SYRINGE (ML) INTRAVENOUS
Status: DISCONTINUED | OUTPATIENT
Start: 2025-09-02 | End: 2025-09-02 | Stop reason: SDUPTHER

## 2025-09-02 RX ORDER — HEPARIN SODIUM 1000 [USP'U]/ML
INJECTION, SOLUTION INTRAVENOUS; SUBCUTANEOUS
Status: DISCONTINUED | OUTPATIENT
Start: 2025-09-02 | End: 2025-09-02 | Stop reason: SDUPTHER

## 2025-09-02 RX ORDER — CEFAZOLIN SODIUM 1 G/3ML
INJECTION, POWDER, FOR SOLUTION INTRAMUSCULAR; INTRAVENOUS
Status: DISCONTINUED | OUTPATIENT
Start: 2025-09-02 | End: 2025-09-02 | Stop reason: SDUPTHER

## 2025-09-02 RX ORDER — LIDOCAINE HYDROCHLORIDE 10 MG/ML
INJECTION, SOLUTION EPIDURAL; INFILTRATION; INTRACAUDAL; PERINEURAL
Status: DISCONTINUED | OUTPATIENT
Start: 2025-09-02 | End: 2025-09-02 | Stop reason: SDUPTHER

## 2025-09-02 RX ORDER — INSULIN GLARGINE 100 [IU]/ML
20 INJECTION, SOLUTION SUBCUTANEOUS DAILY
Status: DISCONTINUED | OUTPATIENT
Start: 2025-09-02 | End: 2025-09-03 | Stop reason: HOSPADM

## 2025-09-02 RX ORDER — SODIUM CHLORIDE 0.9 % (FLUSH) 0.9 %
5-40 SYRINGE (ML) INJECTION EVERY 12 HOURS SCHEDULED
Status: DISCONTINUED | OUTPATIENT
Start: 2025-09-02 | End: 2025-09-03 | Stop reason: HOSPADM

## 2025-09-02 RX ORDER — SODIUM CHLORIDE 9 MG/ML
INJECTION, SOLUTION INTRAVENOUS PRN
Status: DISCONTINUED | OUTPATIENT
Start: 2025-09-02 | End: 2025-09-02 | Stop reason: HOSPADM

## 2025-09-02 RX ORDER — OXYCODONE HYDROCHLORIDE 5 MG/1
5 TABLET ORAL EVERY 6 HOURS PRN
Refills: 0 | Status: DISCONTINUED | OUTPATIENT
Start: 2025-09-02 | End: 2025-09-03 | Stop reason: HOSPADM

## 2025-09-02 RX ORDER — SODIUM CHLORIDE 0.9 % (FLUSH) 0.9 %
5-40 SYRINGE (ML) INJECTION PRN
Status: DISCONTINUED | OUTPATIENT
Start: 2025-09-02 | End: 2025-09-03 | Stop reason: HOSPADM

## 2025-09-02 RX ORDER — FENTANYL CITRATE 50 UG/ML
25 INJECTION, SOLUTION INTRAMUSCULAR; INTRAVENOUS EVERY 5 MIN PRN
Status: DISCONTINUED | OUTPATIENT
Start: 2025-09-02 | End: 2025-09-02 | Stop reason: HOSPADM

## 2025-09-02 RX ORDER — IODIXANOL 320 MG/ML
INJECTION, SOLUTION INTRAVASCULAR PRN
Status: DISCONTINUED | OUTPATIENT
Start: 2025-09-02 | End: 2025-09-02 | Stop reason: ALTCHOICE

## 2025-09-02 RX ORDER — SEVELAMER CARBONATE 800 MG/1
800 TABLET, FILM COATED ORAL
Status: DISCONTINUED | OUTPATIENT
Start: 2025-09-02 | End: 2025-09-03 | Stop reason: HOSPADM

## 2025-09-02 RX ORDER — PROPOFOL 10 MG/ML
INJECTION, EMULSION INTRAVENOUS
Status: DISCONTINUED | OUTPATIENT
Start: 2025-09-02 | End: 2025-09-02 | Stop reason: SDUPTHER

## 2025-09-02 RX ORDER — SODIUM CHLORIDE 9 MG/ML
INJECTION, SOLUTION INTRAVENOUS
Status: DISCONTINUED | OUTPATIENT
Start: 2025-09-02 | End: 2025-09-02 | Stop reason: SDUPTHER

## 2025-09-02 RX ORDER — LABETALOL HYDROCHLORIDE 5 MG/ML
10 INJECTION, SOLUTION INTRAVENOUS
Status: DISCONTINUED | OUTPATIENT
Start: 2025-09-02 | End: 2025-09-02 | Stop reason: HOSPADM

## 2025-09-02 RX ORDER — SODIUM CHLORIDE 0.9 % (FLUSH) 0.9 %
5-40 SYRINGE (ML) INJECTION EVERY 12 HOURS SCHEDULED
Status: DISCONTINUED | OUTPATIENT
Start: 2025-09-02 | End: 2025-09-02 | Stop reason: HOSPADM

## 2025-09-02 RX ORDER — ONDANSETRON 2 MG/ML
4 INJECTION INTRAMUSCULAR; INTRAVENOUS EVERY 6 HOURS PRN
Status: DISCONTINUED | OUTPATIENT
Start: 2025-09-02 | End: 2025-09-03 | Stop reason: HOSPADM

## 2025-09-02 RX ORDER — OXYCODONE HYDROCHLORIDE 5 MG/1
5 TABLET ORAL PRN
Status: DISCONTINUED | OUTPATIENT
Start: 2025-09-02 | End: 2025-09-02 | Stop reason: HOSPADM

## 2025-09-02 RX ADMIN — ONDANSETRON 4 MG: 2 INJECTION, SOLUTION INTRAMUSCULAR; INTRAVENOUS at 17:24

## 2025-09-02 RX ADMIN — TAMSULOSIN HYDROCHLORIDE 0.4 MG: 0.4 CAPSULE ORAL at 23:35

## 2025-09-02 RX ADMIN — EPHEDRINE SULFATE 10 MG: 5 INJECTION INTRAVENOUS at 16:38

## 2025-09-02 RX ADMIN — APIXABAN 10 MG: 5 TABLET, FILM COATED ORAL at 23:35

## 2025-09-02 RX ADMIN — PROPOFOL 300 MG: 10 INJECTION, EMULSION INTRAVENOUS at 15:55

## 2025-09-02 RX ADMIN — Medication 200 MCG: at 16:20

## 2025-09-02 RX ADMIN — PROPOFOL 75 MCG/KG/MIN: 10 INJECTION, EMULSION INTRAVENOUS at 16:07

## 2025-09-02 RX ADMIN — EPHEDRINE SULFATE 5 MG: 5 INJECTION INTRAVENOUS at 17:04

## 2025-09-02 RX ADMIN — HEPARIN SODIUM 6600 UNITS: 1000 INJECTION, SOLUTION INTRAVENOUS; SUBCUTANEOUS at 16:17

## 2025-09-02 RX ADMIN — EPHEDRINE SULFATE 10 MG: 5 INJECTION INTRAVENOUS at 17:13

## 2025-09-02 RX ADMIN — GABAPENTIN 100 MG: 100 CAPSULE ORAL at 23:35

## 2025-09-02 RX ADMIN — SODIUM CHLORIDE: 9 INJECTION, SOLUTION INTRAVENOUS at 15:44

## 2025-09-02 RX ADMIN — PHENYLEPHRINE HYDROCHLORIDE 25 MCG/MIN: 10 INJECTION INTRAVENOUS at 16:28

## 2025-09-02 RX ADMIN — ACETAMINOPHEN 1000 MG: 500 TABLET ORAL at 23:35

## 2025-09-02 RX ADMIN — CEFAZOLIN 2 G: 1 INJECTION, POWDER, FOR SOLUTION INTRAMUSCULAR; INTRAVENOUS at 15:58

## 2025-09-02 RX ADMIN — SODIUM CHLORIDE, PRESERVATIVE FREE 10 ML: 5 INJECTION INTRAVENOUS at 19:43

## 2025-09-02 RX ADMIN — Medication 100 MCG: at 16:28

## 2025-09-02 RX ADMIN — LIDOCAINE HYDROCHLORIDE 50 MG: 10 INJECTION, SOLUTION EPIDURAL; INFILTRATION; INTRACAUDAL; PERINEURAL at 15:55

## 2025-09-02 RX ADMIN — SEVELAMER CARBONATE 800 MG: 800 TABLET, FILM COATED ORAL at 23:35

## 2025-09-02 ASSESSMENT — ENCOUNTER SYMPTOMS
EYES NEGATIVE: 1
COLOR CHANGE: 0
SHORTNESS OF BREATH: 0
RESPIRATORY NEGATIVE: 1
GASTROINTESTINAL NEGATIVE: 1
NAUSEA: 0
VOMITING: 0
ALLERGIC/IMMUNOLOGIC NEGATIVE: 1

## 2025-09-02 ASSESSMENT — PAIN SCALES - GENERAL
PAINLEVEL_OUTOF10: 0
PAINLEVEL_OUTOF10: 0

## 2025-09-03 ENCOUNTER — APPOINTMENT (OUTPATIENT)
Dept: INTERVENTIONAL RADIOLOGY/VASCULAR | Age: 45
DRG: 182 | End: 2025-09-03
Payer: MEDICAID

## 2025-09-03 ENCOUNTER — APPOINTMENT (OUTPATIENT)
Age: 45
DRG: 182 | End: 2025-09-03
Attending: STUDENT IN AN ORGANIZED HEALTH CARE EDUCATION/TRAINING PROGRAM
Payer: MEDICAID

## 2025-09-03 VITALS
RESPIRATION RATE: 15 BRPM | TEMPERATURE: 97.9 F | WEIGHT: 136.69 LBS | DIASTOLIC BLOOD PRESSURE: 57 MMHG | BODY MASS INDEX: 23.46 KG/M2 | SYSTOLIC BLOOD PRESSURE: 85 MMHG | OXYGEN SATURATION: 99 % | HEART RATE: 90 BPM

## 2025-09-03 PROBLEM — I95.9 HYPOTENSION: Status: ACTIVE | Noted: 2025-09-03

## 2025-09-03 PROBLEM — Z99.2 ESRD (END STAGE RENAL DISEASE) ON DIALYSIS (HCC): Status: ACTIVE | Noted: 2025-01-14

## 2025-09-03 LAB
ANION GAP SERPL CALCULATED.3IONS-SCNC: 16 MMOL/L (ref 9–16)
BASOPHILS # BLD: 0.03 K/UL (ref 0–0.2)
BASOPHILS NFR BLD: 1 % (ref 0–2)
BUN SERPL-MCNC: 34 MG/DL (ref 6–20)
CALCIUM SERPL-MCNC: 8.2 MG/DL (ref 8.6–10.4)
CHLORIDE SERPL-SCNC: 96 MMOL/L (ref 98–107)
CO2 SERPL-SCNC: 27 MMOL/L (ref 20–31)
CREAT SERPL-MCNC: 5.6 MG/DL (ref 0.7–1.2)
EOSINOPHIL # BLD: 0.07 K/UL (ref 0–0.44)
EOSINOPHILS RELATIVE PERCENT: 1 % (ref 1–4)
ERYTHROCYTE [DISTWIDTH] IN BLOOD BY AUTOMATED COUNT: 13.4 % (ref 11.8–14.4)
GFR, ESTIMATED: 12 ML/MIN/1.73M2
GLUCOSE BLD-MCNC: 177 MG/DL (ref 75–110)
GLUCOSE SERPL-MCNC: 140 MG/DL (ref 74–99)
HCT VFR BLD AUTO: 27.8 % (ref 40.7–50.3)
HGB BLD-MCNC: 9.3 G/DL (ref 13–17)
IMM GRANULOCYTES # BLD AUTO: 0.03 K/UL (ref 0–0.3)
IMM GRANULOCYTES NFR BLD: 1 %
LYMPHOCYTES NFR BLD: 1.38 K/UL (ref 1.1–3.7)
LYMPHOCYTES RELATIVE PERCENT: 22 % (ref 24–43)
MCH RBC QN AUTO: 33.2 PG (ref 25.2–33.5)
MCHC RBC AUTO-ENTMCNC: 33.5 G/DL (ref 28.4–34.8)
MCV RBC AUTO: 99.3 FL (ref 82.6–102.9)
MONOCYTES NFR BLD: 0.3 K/UL (ref 0.1–1.2)
MONOCYTES NFR BLD: 5 % (ref 3–12)
NEUTROPHILS NFR BLD: 70 % (ref 36–65)
NEUTS SEG NFR BLD: 4.5 K/UL (ref 1.5–8.1)
NRBC BLD-RTO: 0 PER 100 WBC
PLATELET # BLD AUTO: 152 K/UL (ref 138–453)
PMV BLD AUTO: 10.2 FL (ref 8.1–13.5)
POTASSIUM SERPL-SCNC: 3.9 MMOL/L (ref 3.7–5.3)
RBC # BLD AUTO: 2.8 M/UL (ref 4.21–5.77)
SODIUM SERPL-SCNC: 139 MMOL/L (ref 136–145)
WBC OTHER # BLD: 6.3 K/UL (ref 3.5–11.3)

## 2025-09-03 PROCEDURE — 82947 ASSAY GLUCOSE BLOOD QUANT: CPT

## 2025-09-03 PROCEDURE — 6360000002 HC RX W HCPCS: Performed by: RADIOLOGY

## 2025-09-03 PROCEDURE — 80048 BASIC METABOLIC PNL TOTAL CA: CPT

## 2025-09-03 PROCEDURE — 77001 FLUOROGUIDE FOR VEIN DEVICE: CPT

## 2025-09-03 PROCEDURE — C1769 GUIDE WIRE: HCPCS

## 2025-09-03 PROCEDURE — 2580000003 HC RX 258

## 2025-09-03 PROCEDURE — 0JH63XZ INSERTION OF TUNNELED VASCULAR ACCESS DEVICE INTO CHEST SUBCUTANEOUS TISSUE AND FASCIA, PERCUTANEOUS APPROACH: ICD-10-PCS | Performed by: RADIOLOGY

## 2025-09-03 PROCEDURE — 6370000000 HC RX 637 (ALT 250 FOR IP): Performed by: STUDENT IN AN ORGANIZED HEALTH CARE EDUCATION/TRAINING PROGRAM

## 2025-09-03 PROCEDURE — 76937 US GUIDE VASCULAR ACCESS: CPT

## 2025-09-03 PROCEDURE — 85025 COMPLETE CBC W/AUTO DIFF WBC: CPT

## 2025-09-03 PROCEDURE — 6370000000 HC RX 637 (ALT 250 FOR IP)

## 2025-09-03 PROCEDURE — 2500000003 HC RX 250 WO HCPCS: Performed by: STUDENT IN AN ORGANIZED HEALTH CARE EDUCATION/TRAINING PROGRAM

## 2025-09-03 PROCEDURE — 02H633Z INSERTION OF INFUSION DEVICE INTO RIGHT ATRIUM, PERCUTANEOUS APPROACH: ICD-10-PCS | Performed by: RADIOLOGY

## 2025-09-03 PROCEDURE — 94761 N-INVAS EAR/PLS OXIMETRY MLT: CPT

## 2025-09-03 PROCEDURE — 99232 SBSQ HOSP IP/OBS MODERATE 35: CPT | Performed by: STUDENT IN AN ORGANIZED HEALTH CARE EDUCATION/TRAINING PROGRAM

## 2025-09-03 PROCEDURE — 36558 INSERT TUNNELED CV CATH: CPT

## 2025-09-03 PROCEDURE — 36415 COLL VENOUS BLD VENIPUNCTURE: CPT

## 2025-09-03 RX ORDER — MIDODRINE HYDROCHLORIDE 10 MG/1
10 TABLET ORAL
Status: DISCONTINUED | OUTPATIENT
Start: 2025-09-03 | End: 2025-09-03 | Stop reason: HOSPADM

## 2025-09-03 RX ORDER — HEPARIN SODIUM 5000 [USP'U]/ML
INJECTION, SOLUTION INTRAVENOUS; SUBCUTANEOUS PRN
Status: COMPLETED | OUTPATIENT
Start: 2025-09-03 | End: 2025-09-03

## 2025-09-03 RX ORDER — 0.9 % SODIUM CHLORIDE 0.9 %
500 INTRAVENOUS SOLUTION INTRAVENOUS ONCE
Status: COMPLETED | OUTPATIENT
Start: 2025-09-03 | End: 2025-09-03

## 2025-09-03 RX ORDER — GABAPENTIN 100 MG/1
100 CAPSULE ORAL EVERY 8 HOURS SCHEDULED
Qty: 90 CAPSULE | Refills: 1 | Status: SHIPPED | OUTPATIENT
Start: 2025-09-03 | End: 2025-11-02

## 2025-09-03 RX ADMIN — MIDODRINE HYDROCHLORIDE 10 MG: 10 TABLET ORAL at 05:38

## 2025-09-03 RX ADMIN — Medication 2000 MG: at 15:19

## 2025-09-03 RX ADMIN — INSULIN GLARGINE 20 UNITS: 100 INJECTION, SOLUTION SUBCUTANEOUS at 08:34

## 2025-09-03 RX ADMIN — MIDODRINE HYDROCHLORIDE 10 MG: 10 TABLET ORAL at 00:18

## 2025-09-03 RX ADMIN — SODIUM CHLORIDE, PRESERVATIVE FREE 10 ML: 5 INJECTION INTRAVENOUS at 08:30

## 2025-09-03 RX ADMIN — GABAPENTIN 100 MG: 100 CAPSULE ORAL at 13:29

## 2025-09-03 RX ADMIN — TAMSULOSIN HYDROCHLORIDE 0.4 MG: 0.4 CAPSULE ORAL at 08:29

## 2025-09-03 RX ADMIN — HEPARIN SODIUM 1.9 ML: 5000 INJECTION, SOLUTION INTRAVENOUS; SUBCUTANEOUS at 15:38

## 2025-09-03 RX ADMIN — APIXABAN 10 MG: 5 TABLET, FILM COATED ORAL at 08:29

## 2025-09-03 RX ADMIN — ACETAMINOPHEN 1000 MG: 500 TABLET ORAL at 13:28

## 2025-09-03 RX ADMIN — ACETAMINOPHEN 1000 MG: 500 TABLET ORAL at 05:32

## 2025-09-03 RX ADMIN — SEVELAMER CARBONATE 800 MG: 800 TABLET, FILM COATED ORAL at 11:39

## 2025-09-03 RX ADMIN — MIDODRINE HYDROCHLORIDE 10 MG: 10 TABLET ORAL at 13:29

## 2025-09-03 RX ADMIN — SEVELAMER CARBONATE 800 MG: 800 TABLET, FILM COATED ORAL at 08:29

## 2025-09-03 RX ADMIN — GABAPENTIN 100 MG: 100 CAPSULE ORAL at 05:32

## 2025-09-03 RX ADMIN — SODIUM CHLORIDE 500 ML: 0.9 INJECTION, SOLUTION INTRAVENOUS at 06:38

## 2025-09-03 ASSESSMENT — PAIN SCALES - GENERAL
PAINLEVEL_OUTOF10: 0

## 2025-09-03 ASSESSMENT — PAIN - FUNCTIONAL ASSESSMENT: PAIN_FUNCTIONAL_ASSESSMENT: 0-10

## (undated) DEVICE — ADHESIVE SKIN CLOSURE TOP 0.8 CC PREM PUR LIQUIBAND RAPID LF

## (undated) DEVICE — LIQUIBAND RAPID ADHESIVE 36/CS 0.8ML: Brand: MEDLINE

## (undated) DEVICE — DEVICE VASC CLSR 5FR GRY W/ INTEGR SEAL 10ML LOK SYR

## (undated) DEVICE — GAUZE,SPONGE,FLUFF,6"X6.75",STRL,5/TRAY: Brand: MEDLINE

## (undated) DEVICE — SUTURE PERMA-HAND SZ 2-0 L30IN NONABSORBABLE BLK L26MM SH K833H

## (undated) DEVICE — GLOVE SURG SZ 75 CRM LTX FREE POLYISOPRENE POLYMER BEAD ANTI

## (undated) DEVICE — PINNACLE INTRODUCER SHEATH: Brand: PINNACLE

## (undated) DEVICE — BAG C ARM 22 IN LEN 22 IN W LTX FREE ST SNAP

## (undated) DEVICE — STRAP ARMBRD W1.5XL32IN FOAM STR YET SFT W/ HK AND LOOP

## (undated) DEVICE — PAD,ABDOMINAL,5"X9",ST,LF,25/BX: Brand: MEDLINE INDUSTRIES, INC.

## (undated) DEVICE — TOWEL SURG W17XL27IN NAT COT DISP ST 80 PER CA

## (undated) DEVICE — SYRINGE MED 20ML STD CLR PLAS LUERLOCK TIP N CTRL DISP

## (undated) DEVICE — DRAPE,REIN 53X77,STERILE: Brand: MEDLINE

## (undated) DEVICE — SUTURE N ABSRB MONOFILAMENT 6-0 BV1 24 IN DA BLU PROLENE EP8805H

## (undated) DEVICE — GLOVE SURG SZ 75 L12IN FNGR THK79MIL GRN LTX FREE

## (undated) DEVICE — Device

## (undated) DEVICE — SET PEN AND LBL AND L BWL LBL PAL PEN AND LBLS PAL700]

## (undated) DEVICE — SUTURE N ABSRB L 18 IN SZ 4-0 NDL L 19 MM NYL MONOFILAMENT

## (undated) DEVICE — RIVAL® PTA BALLOON DILATATION CATHETER 8 MM X 8 CM, 135 CM CATHETER: Brand: RIVAL®

## (undated) DEVICE — GLOVE SURG SZ 7 CRM LTX FREE POLYISOPRENE POLYMER BEAD ANTI

## (undated) DEVICE — BLADE SURG 15 TWIN BK CARBON STL STRL CISION LF DISP

## (undated) DEVICE — SUTURE VICRYL + SZ 2-0 L27IN ABSRB WHT SH 1/2 CIR TAPERCUT VCP417H

## (undated) DEVICE — BLADE,CARBON-STEEL,15,STRL,DISPOSABLE,TB: Brand: MEDLINE

## (undated) DEVICE — MARKER,SKIN,WI/RULER AND LABELS: Brand: MEDLINE

## (undated) DEVICE — SUTURE PROL SZ 6-0 L24IN NONABSORBABLE BLU L9.3MM BV-1 3/8 8805H

## (undated) DEVICE — PAD N ADH W3XL4IN POLY COT SFT PERF FLM EASILY CUT ABSRB

## (undated) DEVICE — GOWN,SIRUS,NONRNF,SETINSLV,2XL,18/CS: Brand: MEDLINE

## (undated) DEVICE — DECANTER BAG 9": Brand: MEDLINE INDUSTRIES, INC.

## (undated) DEVICE — CONTAINER SPEC 4OZ PNEUMAT TB OR THRD LID PT ID LBL

## (undated) DEVICE — EVERGRIP INSERT SET: Brand: FOGARTY EVERGRIP

## (undated) DEVICE — GOWN,AURORA,NONREINFORCED,LARGE: Brand: MEDLINE

## (undated) DEVICE — SHEET, T, LAPAROTOMY, STERILE: Brand: MEDLINE

## (undated) DEVICE — TOWEL,OR,DSP,ST,NATURAL,DLX,4/PK,20PK/CS: Brand: MEDLINE

## (undated) DEVICE — SUTURE NONABSORBABLE MONOFILAMENT 7-0 BV-1 1X24 IN PROLENE 8702H

## (undated) DEVICE — AGENT HEMOSTATIC SURGIFLOW MATRIX KIT W/THROMBIN

## (undated) DEVICE — ELECTRODE PT RET AD L9FT HI MOIST COND ADH HYDRGEL CORDED

## (undated) DEVICE — DRESSING TRNSPAR W5XL4.5IN FLM SHT SEMIPERMEABLE WIND

## (undated) DEVICE — PACK SURG CUST AV FIST CUST PACK SURG CUST SVMMC

## (undated) DEVICE — GLOVE SURG SZ 6 L12IN FNGR THK79MIL GRN LTX FREE

## (undated) DEVICE — PROVE COVER: Brand: UNBRANDED

## (undated) DEVICE — STRAP POS FOAM SFT STR FOR KNEE BODY

## (undated) DEVICE — LEGGINGS, PAIR, 31X48, STERILE: Brand: MEDLINE

## (undated) DEVICE — 3M™ TEGADERM™ CHG DRESSING 25/CARTON 4 CARTONS/CASE 1657: Brand: TEGADERM™

## (undated) DEVICE — THE STERILE LIGHT HANDLE COVER IS USED WITH STERIS SURGICAL LIGHTING AND VISUALIZATION SYSTEMS.

## (undated) DEVICE — PROTECTOR ULN NRV PUR FOAM HK LOOP STRP ANATOMICALLY

## (undated) DEVICE — SET EXTN IV L30IN TBNG DIA0.1IN PRIMING 4ML MACBOR FEM ADPT

## (undated) DEVICE — DEVICE INFL 20ML 30ATM DGT FLD DISPNS SYR W ACCESSPLUS BLU

## (undated) DEVICE — GLOVE SURG SZ 65 L12IN FNGR THK79MIL GRN LTX FREE

## (undated) DEVICE — STRAP,POSITIONING,KNEE/BODY,FOAM,4X60": Brand: MEDLINE

## (undated) DEVICE — DRAPE,UTILTY,TAPE,15X26, 4EA/PK: Brand: MEDLINE

## (undated) DEVICE — BLADE,CARBON-STEEL,10,STRL,DISPOSABLE,TB: Brand: MEDLINE

## (undated) DEVICE — GLOVE SURG SZ 65 CRM LTX FREE POLYISOPRENE POLYMER BEAD ANTI

## (undated) DEVICE — 450 ML BOTTLE OF 0.05% CHLORHEXIDINE GLUCONATE IN 99.95% STERILE WATER FOR IRRIGATION, USP AND APPLICATOR.: Brand: IRRISEPT ANTIMICROBIAL WOUND LAVAGE

## (undated) DEVICE — GAUZE,PACKING STRIP,IODOFORM,1/2"X5YD,ST: Brand: CURAD

## (undated) DEVICE — HYBRID ROOM PACK: Brand: MEDLINE INDUSTRIES, INC.

## (undated) DEVICE — SUTURE NONABSORBABLE MONOFILAMENT 6-0 C-1 1X30 IN PROLENE 8706H

## (undated) DEVICE — DEVICE CTRL FLOWSWITCH 24 PER BX

## (undated) DEVICE — NEPTUNE E-SEP SMOKE EVACUATION PENCIL, COATED, 70MM BLADE, PUSH BUTTON SWITCH: Brand: NEPTUNE E-SEP

## (undated) DEVICE — CONTAINER,SPECIMEN,4OZ,OR STRL: Brand: MEDLINE

## (undated) DEVICE — DRAPE LAP 102X78X121IN POLYPR SMS STD T INSTR PD CNTOUR

## (undated) DEVICE — SUTURE VICRYL + SZ 4-0 L27IN ABSRB UD L26MM SH 1/2 CIR VCP415H

## (undated) DEVICE — BANDAGE,GAUZE,BULKEE II,4.5"X4.1YD,STRL: Brand: MEDLINE

## (undated) DEVICE — RADIFOCUS GLIDEWIRE: Brand: GLIDEWIRE

## (undated) DEVICE — APPLICATOR MEDICATED 10.5 CC SOLUTION HI LT ORNG CHLORAPREP

## (undated) DEVICE — KIT MICRO INTRO 4FR STIFF 40CM NIGHTENALL TUNGSTEN 7SMT

## (undated) DEVICE — POSITIONER,HEAD,MULTIRING,36CS: Brand: MEDLINE

## (undated) DEVICE — BLADE,CARBON-STEEL,15,STRL,DISPOSABLE: Brand: MEDLINE

## (undated) DEVICE — SURGICAL SUCTION CONNECTING TUBE WITH MALE CONNECTOR AND SUCTION CLAMP, 2 FT. LONG (.6 M), 5 MM I.D.: Brand: CONMED

## (undated) DEVICE — LOOP VES W13MM THK09MM MINI RED SIL FLD REPELLENT

## (undated) DEVICE — COVER LT HNDL GRN PLAS SFT FLX 2 PER PK

## (undated) DEVICE — RADIFOCUS TORQUE DEVICE MULTI-TORQUE VISE: Brand: RADIFOCUS TORQUE DEVICE

## (undated) DEVICE — COVER,LIGHT HANDLE,FLX,2/PK: Brand: MEDLINE INDUSTRIES, INC.

## (undated) DEVICE — SUTURE PERMAHAND SZ 4-0 L18IN NONABSORBABLE BLK SILK BRAID A183H

## (undated) DEVICE — APPLICATOR MEDICATED 26 CC SOLUTION HI LT ORNG CHLORAPREP

## (undated) DEVICE — SUTURE N ABSRB L 36 IN SZ 5-0 NDL L 13 MM POLYPRO OR PPL BLU

## (undated) DEVICE — RADIFOCUS GLIDEWIRE ADVANTAGE GUIDEWIRE: Brand: GLIDEWIRE ADVANTAGE

## (undated) DEVICE — CATHETER ANGIOPLSTY OTW 035 6 FRX135 CM 8X40 MM EVERCROSS

## (undated) DEVICE — SPONGE GZ W6XL6.75IN COT 6 PLY SUP FLUF EXTRA ABSRB FOR PRE

## (undated) DEVICE — SUTURE VICRYL + SZ 3-0 L27IN ABSRB UD L26MM SH 1/2 CIR VCP416H

## (undated) DEVICE — SYRINGE MED 10ML LUERLOCK TIP W/O SFTY DISP

## (undated) DEVICE — DRAPE SURG W53XL77IN STD SMS POLYPR 3 QTR ABSRB REINF W/O

## (undated) DEVICE — PINNACLE R/O II HIFLO INTRODUCER SHEATH WITH RADIOPAQUE MARKER: Brand: PINNACLE

## (undated) DEVICE — GOWN SURG 2XL L49IN BLU NONREINFORCED SET IN SL HK LOOP

## (undated) DEVICE — STERILE POLYISOPRENE POWDER-FREE SURGICAL GLOVES WITH EMOLLIENT COATING: Brand: PROTEXIS

## (undated) DEVICE — DRESSING,GAUZE,XEROFORM,CURAD,5"X9",ST: Brand: CURAD

## (undated) DEVICE — CATHETER ANGIO 5FR L100CM GWIRE 0.035IN 1CM SPC PGTL FLSH

## (undated) DEVICE — INSERT SURG CLMP L33MM SET 2 JAW RADPQ DISP FOR FGRTY

## (undated) DEVICE — DRAPE ORTH W77XL108IN POLYPR SMS SHT SPL STD ABSRB REINF

## (undated) DEVICE — PACK SURG ABD SVMMC

## (undated) DEVICE — GUIDEWIRE VASC L180CM DIA0.035IN TIP L7CM PTFE S STL STR

## (undated) DEVICE — CATHETER BERN 4FR 100CM

## (undated) DEVICE — CYSTO/BLADDER IRRIGATION SET, REGULATING CLAMP

## (undated) DEVICE — TRAY SURG CUST CRD CATH TOLEDO

## (undated) DEVICE — GLOVE SURG SZ 7 L12IN FNGR THK79MIL GRN LTX FREE

## (undated) DEVICE — DRAPE,UNDRBUT,WHT GRAD PCH,CAPPORT,20/CS: Brand: MEDLINE

## (undated) DEVICE — DRAPE,UTILITY,XL,4/PK,STERILE: Brand: MEDLINE

## (undated) DEVICE — STRIP,CLOSURE,WOUND,MEDI-STRIP,1/2X4: Brand: MEDLINE

## (undated) DEVICE — TOWEL,OR,DSP,ST,BLUE,DLX,XR,4/PK,20PK/CS: Brand: MEDLINE

## (undated) DEVICE — DRESSING PETRO W5XL9IN 3% BISMUTH TRIBROMOPHENATE XRFRM

## (undated) DEVICE — SYRINGE MEDICAL 3ML CLEAR PLASTIC STANDARD NON CONTROL LUERLOCK TIP DISPOSABLE

## (undated) DEVICE — CATHETER ANGIOPLSTY OTW 035 6 FRX80 CM 6X80 MM IN.PACT AV

## (undated) DEVICE — 3M™ IOBAN™ 2 ANTIMICROBIAL INCISE DRAPE 6651EZ: Brand: IOBAN™ 2

## (undated) DEVICE — SOLUTION SCRB 4OZ 7.5% POVIDONE IOD ANTIMIC BTL

## (undated) DEVICE — TIP SUCTION YANKAUER STD FLX W/ FLANGE NO VENT STRL

## (undated) DEVICE — CATHETER ANGIO 5FR L65CM 0.038IN KMP SEL SFT RADPQ TIP HI

## (undated) DEVICE — SUTURE VCRL SZ 2-0 L27IN ABSRB UD L26MM SH 1/2 CIR J417H

## (undated) DEVICE — YANKAUER,FLEXIBLE HANDLE,REGLR CAPACITY: Brand: MEDLINE INDUSTRIES, INC.

## (undated) DEVICE — LOOP VES W25MM THK1MM MAXI RED SIL FLD REPELLENT 100 PER

## (undated) DEVICE — CATHETER BERN 4FR 65CM (MIN ORDER 2 BX)

## (undated) DEVICE — SYRINGE MED 5ML STD CLR PLAS LUERLOCK TIP N CTRL DISP

## (undated) DEVICE — GARMENT,MEDLINE,DVT,INT,CALF,MED, GEN2: Brand: MEDLINE

## (undated) DEVICE — CATHETER ANGIOPLSTY OTW 035 5 FRX135 CM 4X200 MM EVERCROSS

## (undated) DEVICE — COVER PRB 96X6 IN 20 CC W/BND TAPE STRL GEL FLEXI-FEEL

## (undated) DEVICE — ULTRASONIC SET TUBE SONIC 1 SONICVAC DISP

## (undated) DEVICE — SOLUTION PREP PAINT POV IOD FOR SKIN MUCOUS MEM

## (undated) DEVICE — PREMIUM DRY TRAY LF: Brand: MEDLINE INDUSTRIES, INC.

## (undated) DEVICE — TUBING, SUCTION, 9/32" X 20', STRAIGHT: Brand: MEDLINE INDUSTRIES, INC.

## (undated) DEVICE — SPONGE LAP W18XL18IN WHT COT 4 PLY FLD STRUNG RADPQ DISP ST 2 PER PACK

## (undated) DEVICE — SHEET, ORTHO, SPLIT, STERILE: Brand: MEDLINE

## (undated) DEVICE — SUTURE MONOCRYL SZ 5-0 L18IN ABSRB UD PC-3 L16MM 3/8 CIR Y844G

## (undated) DEVICE — TAPE UMB 72X7/32 IN

## (undated) DEVICE — 4F 40 CM LEMAITRE EMBOLECTOMY CATHETER, EIFU: Brand: LEMAITRE EMBOLECTOMY CATHETER

## (undated) DEVICE — YANKAUER,POOLE TIP,STERILE,50/CS: Brand: MEDLINE